# Patient Record
Sex: MALE | Race: WHITE | Employment: OTHER | URBAN - METROPOLITAN AREA
[De-identification: names, ages, dates, MRNs, and addresses within clinical notes are randomized per-mention and may not be internally consistent; named-entity substitution may affect disease eponyms.]

---

## 2017-01-09 ENCOUNTER — OFFICE VISIT (OUTPATIENT)
Dept: CARDIOTHORACIC SURGERY | Age: 67
End: 2017-01-09

## 2017-01-09 VITALS
OXYGEN SATURATION: 99 % | BODY MASS INDEX: 21.11 KG/M2 | WEIGHT: 130.8 LBS | DIASTOLIC BLOOD PRESSURE: 64 MMHG | HEART RATE: 63 BPM | SYSTOLIC BLOOD PRESSURE: 116 MMHG | RESPIRATION RATE: 16 BRPM | TEMPERATURE: 97.9 F

## 2017-01-09 DIAGNOSIS — K55.1 CHRONIC MESENTERIC ISCHEMIA (HCC): Primary | ICD-10-CM

## 2017-01-09 LAB
BASOPHILS ABSOLUTE: 0.1 K/UL (ref 0–0.2)
BASOPHILS RELATIVE PERCENT: 1.2 %
EOSINOPHILS ABSOLUTE: 0.4 K/UL (ref 0–0.6)
EOSINOPHILS RELATIVE PERCENT: 6.2 %
HCT VFR BLD CALC: 36.1 % (ref 40.5–52.5)
HEMOGLOBIN: 11.8 G/DL (ref 13.5–17.5)
LYMPHOCYTES ABSOLUTE: 1 K/UL (ref 1–5.1)
LYMPHOCYTES RELATIVE PERCENT: 17.2 %
MCH RBC QN AUTO: 31.6 PG (ref 26–34)
MCHC RBC AUTO-ENTMCNC: 32.7 G/DL (ref 31–36)
MCV RBC AUTO: 96.6 FL (ref 80–100)
MONOCYTES ABSOLUTE: 0.7 K/UL (ref 0–1.3)
MONOCYTES RELATIVE PERCENT: 10.9 %
NEUTROPHILS ABSOLUTE: 3.9 K/UL (ref 1.7–7.7)
NEUTROPHILS RELATIVE PERCENT: 64.5 %
PDW BLD-RTO: 16.4 % (ref 12.4–15.4)
PLATELET # BLD: 318 K/UL (ref 135–450)
PMV BLD AUTO: 7.5 FL (ref 5–10.5)
RBC # BLD: 3.74 M/UL (ref 4.2–5.9)
WBC # BLD: 6 K/UL (ref 4–11)

## 2017-01-09 PROCEDURE — 99024 POSTOP FOLLOW-UP VISIT: CPT | Performed by: SURGERY

## 2017-01-11 ENCOUNTER — TELEPHONE (OUTPATIENT)
Dept: CARDIOTHORACIC SURGERY | Age: 67
End: 2017-01-11

## 2017-01-18 ENCOUNTER — OFFICE VISIT (OUTPATIENT)
Dept: INTERNAL MEDICINE CLINIC | Age: 67
End: 2017-01-18

## 2017-01-18 ENCOUNTER — TELEPHONE (OUTPATIENT)
Dept: SURGERY | Age: 67
End: 2017-01-18

## 2017-01-18 VITALS
HEART RATE: 74 BPM | RESPIRATION RATE: 12 BRPM | WEIGHT: 130 LBS | DIASTOLIC BLOOD PRESSURE: 70 MMHG | BODY MASS INDEX: 20.89 KG/M2 | SYSTOLIC BLOOD PRESSURE: 120 MMHG | HEIGHT: 66 IN

## 2017-01-18 DIAGNOSIS — E78.5 HYPERLIPIDEMIA, UNSPECIFIED HYPERLIPIDEMIA TYPE: ICD-10-CM

## 2017-01-18 DIAGNOSIS — R10.33 ABDOMINAL PAIN, PERIUMBILICAL: ICD-10-CM

## 2017-01-18 DIAGNOSIS — K21.9 GASTROESOPHAGEAL REFLUX DISEASE WITHOUT ESOPHAGITIS: ICD-10-CM

## 2017-01-18 DIAGNOSIS — M79.2 NEUROPATHIC PAIN: ICD-10-CM

## 2017-01-18 DIAGNOSIS — I25.10 CAD IN NATIVE ARTERY: ICD-10-CM

## 2017-01-18 DIAGNOSIS — I25.10 CORONARY ARTERY CALCIFICATION SEEN ON CT SCAN: ICD-10-CM

## 2017-01-18 DIAGNOSIS — I73.9 PVD (PERIPHERAL VASCULAR DISEASE) WITH CLAUDICATION (HCC): ICD-10-CM

## 2017-01-18 DIAGNOSIS — I65.23 CAROTID STENOSIS, ASYMPTOMATIC, BILATERAL: ICD-10-CM

## 2017-01-18 DIAGNOSIS — I10 BENIGN ESSENTIAL HTN: Primary | ICD-10-CM

## 2017-01-18 DIAGNOSIS — J44.9 CHRONIC OBSTRUCTIVE PULMONARY DISEASE, UNSPECIFIED COPD TYPE (HCC): ICD-10-CM

## 2017-01-18 LAB
A/G RATIO: 2 (ref 1.1–2.2)
ALBUMIN SERPL-MCNC: 4.5 G/DL (ref 3.4–5)
ALP BLD-CCNC: 75 U/L (ref 40–129)
ALT SERPL-CCNC: 12 U/L (ref 10–40)
ANION GAP SERPL CALCULATED.3IONS-SCNC: 18 MMOL/L (ref 3–16)
AST SERPL-CCNC: 16 U/L (ref 15–37)
BASOPHILS ABSOLUTE: 0.1 K/UL (ref 0–0.2)
BASOPHILS RELATIVE PERCENT: 1.3 %
BILIRUB SERPL-MCNC: 0.6 MG/DL (ref 0–1)
BUN BLDV-MCNC: 10 MG/DL (ref 7–20)
C-REACTIVE PROTEIN: <0.3 MG/L (ref 0–5.1)
CALCIUM SERPL-MCNC: 9.8 MG/DL (ref 8.3–10.6)
CHLORIDE BLD-SCNC: 97 MMOL/L (ref 99–110)
CHOLESTEROL, TOTAL: 127 MG/DL (ref 0–199)
CO2: 25 MMOL/L (ref 21–32)
CREAT SERPL-MCNC: 0.8 MG/DL (ref 0.8–1.3)
EOSINOPHILS ABSOLUTE: 0.4 K/UL (ref 0–0.6)
EOSINOPHILS RELATIVE PERCENT: 6.1 %
GFR AFRICAN AMERICAN: >60
GFR NON-AFRICAN AMERICAN: >60
GLOBULIN: 2.2 G/DL
GLUCOSE BLD-MCNC: 89 MG/DL (ref 70–99)
HCT VFR BLD CALC: 37.9 % (ref 40.5–52.5)
HDLC SERPL-MCNC: 65 MG/DL (ref 40–60)
HEMOGLOBIN: 12.6 G/DL (ref 13.5–17.5)
LDL CHOLESTEROL CALCULATED: 48 MG/DL
LYMPHOCYTES ABSOLUTE: 1 K/UL (ref 1–5.1)
LYMPHOCYTES RELATIVE PERCENT: 15.5 %
MCH RBC QN AUTO: 31.9 PG (ref 26–34)
MCHC RBC AUTO-ENTMCNC: 33.2 G/DL (ref 31–36)
MCV RBC AUTO: 96.1 FL (ref 80–100)
MONOCYTES ABSOLUTE: 0.7 K/UL (ref 0–1.3)
MONOCYTES RELATIVE PERCENT: 10.9 %
NEUTROPHILS ABSOLUTE: 4.1 K/UL (ref 1.7–7.7)
NEUTROPHILS RELATIVE PERCENT: 66.2 %
PDW BLD-RTO: 16.3 % (ref 12.4–15.4)
PLATELET # BLD: 323 K/UL (ref 135–450)
PMV BLD AUTO: 7.8 FL (ref 5–10.5)
POTASSIUM SERPL-SCNC: 5.2 MMOL/L (ref 3.5–5.1)
RBC # BLD: 3.94 M/UL (ref 4.2–5.9)
SEDIMENTATION RATE, ERYTHROCYTE: 5 MM/HR (ref 0–20)
SODIUM BLD-SCNC: 140 MMOL/L (ref 136–145)
TOTAL PROTEIN: 6.7 G/DL (ref 6.4–8.2)
TRIGL SERPL-MCNC: 68 MG/DL (ref 0–150)
VLDLC SERPL CALC-MCNC: 14 MG/DL
WBC # BLD: 6.2 K/UL (ref 4–11)

## 2017-01-18 PROCEDURE — 3017F COLORECTAL CA SCREEN DOC REV: CPT | Performed by: INTERNAL MEDICINE

## 2017-01-18 PROCEDURE — 1123F ACP DISCUSS/DSCN MKR DOCD: CPT | Performed by: INTERNAL MEDICINE

## 2017-01-18 PROCEDURE — 99214 OFFICE O/P EST MOD 30 MIN: CPT | Performed by: INTERNAL MEDICINE

## 2017-01-18 PROCEDURE — G8419 CALC BMI OUT NRM PARAM NOF/U: HCPCS | Performed by: INTERNAL MEDICINE

## 2017-01-18 PROCEDURE — G8484 FLU IMMUNIZE NO ADMIN: HCPCS | Performed by: INTERNAL MEDICINE

## 2017-01-18 PROCEDURE — 1036F TOBACCO NON-USER: CPT | Performed by: INTERNAL MEDICINE

## 2017-01-18 PROCEDURE — G8926 SPIRO NO PERF OR DOC: HCPCS | Performed by: INTERNAL MEDICINE

## 2017-01-18 PROCEDURE — G8598 ASA/ANTIPLAT THER USED: HCPCS | Performed by: INTERNAL MEDICINE

## 2017-01-18 PROCEDURE — G8427 DOCREV CUR MEDS BY ELIG CLIN: HCPCS | Performed by: INTERNAL MEDICINE

## 2017-01-18 PROCEDURE — 4040F PNEUMOC VAC/ADMIN/RCVD: CPT | Performed by: INTERNAL MEDICINE

## 2017-01-18 PROCEDURE — 3023F SPIROM DOC REV: CPT | Performed by: INTERNAL MEDICINE

## 2017-01-18 RX ORDER — GABAPENTIN 100 MG/1
100 CAPSULE ORAL NIGHTLY
Qty: 30 CAPSULE | Refills: 1 | Status: SHIPPED | OUTPATIENT
Start: 2017-01-18 | End: 2017-03-12 | Stop reason: SDUPTHER

## 2017-01-24 ENCOUNTER — OFFICE VISIT (OUTPATIENT)
Dept: ORTHOPEDIC SURGERY | Age: 67
End: 2017-01-24

## 2017-01-24 VITALS — HEIGHT: 66 IN | WEIGHT: 130 LBS | BODY MASS INDEX: 20.89 KG/M2

## 2017-01-24 DIAGNOSIS — S92.351A CLOSED DISPLACED FRACTURE OF FIFTH METATARSAL BONE OF RIGHT FOOT, INITIAL ENCOUNTER: Primary | ICD-10-CM

## 2017-01-24 PROCEDURE — 99024 POSTOP FOLLOW-UP VISIT: CPT | Performed by: ORTHOPAEDIC SURGERY

## 2017-01-24 PROCEDURE — 73630 X-RAY EXAM OF FOOT: CPT | Performed by: ORTHOPAEDIC SURGERY

## 2017-02-10 RX ORDER — VARENICLINE TARTRATE 1 MG/1
TABLET, FILM COATED ORAL
Qty: 56 TABLET | Refills: 0 | Status: SHIPPED | OUTPATIENT
Start: 2017-02-10 | End: 2017-04-06 | Stop reason: SDUPTHER

## 2017-03-01 ENCOUNTER — TELEPHONE (OUTPATIENT)
Dept: SURGERY | Age: 67
End: 2017-03-01

## 2017-03-08 ENCOUNTER — HOSPITAL ENCOUNTER (OUTPATIENT)
Dept: ENDOSCOPY | Age: 67
Discharge: HOME | End: 2017-03-08
Attending: INTERNAL MEDICINE | Admitting: INTERNAL MEDICINE

## 2017-03-08 VITALS
HEIGHT: 66 IN | HEART RATE: 62 BPM | TEMPERATURE: 97.7 F | SYSTOLIC BLOOD PRESSURE: 128 MMHG | RESPIRATION RATE: 20 BRPM | WEIGHT: 140 LBS | DIASTOLIC BLOOD PRESSURE: 64 MMHG | OXYGEN SATURATION: 98 % | BODY MASS INDEX: 22.5 KG/M2

## 2017-03-08 RX ORDER — CARVEDILOL 12.5 MG/1
TABLET ORAL
Qty: 60 TABLET | Refills: 6 | Status: SHIPPED | OUTPATIENT
Start: 2017-03-08 | End: 2017-09-25 | Stop reason: SDUPTHER

## 2017-03-13 RX ORDER — VARENICLINE TARTRATE
KIT
Qty: 53 TABLET | Refills: 0 | Status: SHIPPED | OUTPATIENT
Start: 2017-03-13 | End: 2017-05-05

## 2017-03-13 RX ORDER — GABAPENTIN 100 MG/1
CAPSULE ORAL
Qty: 30 CAPSULE | Refills: 3 | Status: SHIPPED | OUTPATIENT
Start: 2017-03-13 | End: 2017-05-05 | Stop reason: ALTCHOICE

## 2017-03-15 ENCOUNTER — TELEPHONE (OUTPATIENT)
Dept: CASE MANAGEMENT | Age: 67
End: 2017-03-15

## 2017-03-28 ENCOUNTER — OFFICE VISIT (OUTPATIENT)
Dept: ORTHOPEDIC SURGERY | Age: 67
End: 2017-03-28

## 2017-03-28 VITALS
SYSTOLIC BLOOD PRESSURE: 122 MMHG | HEART RATE: 61 BPM | HEIGHT: 66 IN | WEIGHT: 137 LBS | DIASTOLIC BLOOD PRESSURE: 75 MMHG | BODY MASS INDEX: 22.02 KG/M2

## 2017-03-28 DIAGNOSIS — M20.21 HALLUX RIGIDUS OF RIGHT FOOT: ICD-10-CM

## 2017-03-28 DIAGNOSIS — M79.671 RIGHT FOOT PAIN: ICD-10-CM

## 2017-03-28 DIAGNOSIS — S92.351A CLOSED DISPLACED FRACTURE OF FIFTH METATARSAL BONE OF RIGHT FOOT, INITIAL ENCOUNTER: Primary | ICD-10-CM

## 2017-03-28 PROCEDURE — 73630 X-RAY EXAM OF FOOT: CPT | Performed by: ORTHOPAEDIC SURGERY

## 2017-03-28 PROCEDURE — 99214 OFFICE O/P EST MOD 30 MIN: CPT | Performed by: ORTHOPAEDIC SURGERY

## 2017-03-28 PROCEDURE — 4040F PNEUMOC VAC/ADMIN/RCVD: CPT | Performed by: ORTHOPAEDIC SURGERY

## 2017-03-28 PROCEDURE — G8427 DOCREV CUR MEDS BY ELIG CLIN: HCPCS | Performed by: ORTHOPAEDIC SURGERY

## 2017-03-28 PROCEDURE — 1036F TOBACCO NON-USER: CPT | Performed by: ORTHOPAEDIC SURGERY

## 2017-03-28 PROCEDURE — G8419 CALC BMI OUT NRM PARAM NOF/U: HCPCS | Performed by: ORTHOPAEDIC SURGERY

## 2017-03-28 PROCEDURE — 3017F COLORECTAL CA SCREEN DOC REV: CPT | Performed by: ORTHOPAEDIC SURGERY

## 2017-03-28 PROCEDURE — 1123F ACP DISCUSS/DSCN MKR DOCD: CPT | Performed by: ORTHOPAEDIC SURGERY

## 2017-03-28 PROCEDURE — G8484 FLU IMMUNIZE NO ADMIN: HCPCS | Performed by: ORTHOPAEDIC SURGERY

## 2017-03-28 PROCEDURE — G8598 ASA/ANTIPLAT THER USED: HCPCS | Performed by: ORTHOPAEDIC SURGERY

## 2017-04-03 ENCOUNTER — HOSPITAL ENCOUNTER (OUTPATIENT)
Dept: CT IMAGING | Age: 67
Discharge: HOME | End: 2017-04-03
Attending: ORTHOPAEDIC SURGERY | Admitting: ORTHOPAEDIC SURGERY

## 2017-04-03 DIAGNOSIS — M79.671 RIGHT FOOT PAIN: ICD-10-CM

## 2017-04-03 DIAGNOSIS — M79.671 PAIN OF RIGHT FOOT: ICD-10-CM

## 2017-04-05 RX ORDER — ATORVASTATIN CALCIUM 80 MG/1
TABLET, FILM COATED ORAL
Qty: 30 TABLET | Refills: 5 | Status: SHIPPED | OUTPATIENT
Start: 2017-04-05 | End: 2017-09-25 | Stop reason: SDUPTHER

## 2017-04-05 RX ORDER — CLOPIDOGREL BISULFATE 75 MG/1
TABLET ORAL
Qty: 60 TABLET | Refills: 0 | Status: SHIPPED | OUTPATIENT
Start: 2017-04-05 | End: 2017-06-03 | Stop reason: SDUPTHER

## 2017-04-07 RX ORDER — VARENICLINE TARTRATE 1 MG/1
TABLET, FILM COATED ORAL
Qty: 56 TABLET | Refills: 0 | Status: SHIPPED | OUTPATIENT
Start: 2017-04-07 | End: 2017-05-01 | Stop reason: SDUPTHER

## 2017-05-01 RX ORDER — VARENICLINE TARTRATE 1 MG/1
TABLET, FILM COATED ORAL
Qty: 56 TABLET | Refills: 0 | Status: SHIPPED | OUTPATIENT
Start: 2017-05-01 | End: 2017-10-06 | Stop reason: SDUPTHER

## 2017-05-05 ENCOUNTER — OFFICE VISIT (OUTPATIENT)
Dept: INTERNAL MEDICINE CLINIC | Age: 67
End: 2017-05-05

## 2017-05-05 VITALS
HEIGHT: 66 IN | BODY MASS INDEX: 22.18 KG/M2 | DIASTOLIC BLOOD PRESSURE: 78 MMHG | RESPIRATION RATE: 12 BRPM | SYSTOLIC BLOOD PRESSURE: 120 MMHG | WEIGHT: 138 LBS | HEART RATE: 63 BPM

## 2017-05-05 DIAGNOSIS — I73.9 PAD (PERIPHERAL ARTERY DISEASE) (HCC): ICD-10-CM

## 2017-05-05 DIAGNOSIS — K21.9 GASTROESOPHAGEAL REFLUX DISEASE WITHOUT ESOPHAGITIS: ICD-10-CM

## 2017-05-05 DIAGNOSIS — I25.10 CAD IN NATIVE ARTERY: ICD-10-CM

## 2017-05-05 DIAGNOSIS — I65.23 CAROTID STENOSIS, ASYMPTOMATIC, BILATERAL: ICD-10-CM

## 2017-05-05 DIAGNOSIS — E78.5 HYPERLIPIDEMIA, UNSPECIFIED HYPERLIPIDEMIA TYPE: ICD-10-CM

## 2017-05-05 DIAGNOSIS — E55.9 VITAMIN D DEFICIENCY: ICD-10-CM

## 2017-05-05 DIAGNOSIS — I10 BENIGN ESSENTIAL HTN: Primary | ICD-10-CM

## 2017-05-05 DIAGNOSIS — J44.9 CHRONIC OBSTRUCTIVE PULMONARY DISEASE, UNSPECIFIED COPD TYPE (HCC): ICD-10-CM

## 2017-05-05 LAB
A/G RATIO: 1.8 (ref 1.1–2.2)
ALBUMIN SERPL-MCNC: 4.5 G/DL (ref 3.4–5)
ALP BLD-CCNC: 87 U/L (ref 40–129)
ALT SERPL-CCNC: 13 U/L (ref 10–40)
ANION GAP SERPL CALCULATED.3IONS-SCNC: 17 MMOL/L (ref 3–16)
AST SERPL-CCNC: 22 U/L (ref 15–37)
BASOPHILS ABSOLUTE: 0.1 K/UL (ref 0–0.2)
BASOPHILS RELATIVE PERCENT: 1.9 %
BILIRUB SERPL-MCNC: 0.9 MG/DL (ref 0–1)
BUN BLDV-MCNC: 12 MG/DL (ref 7–20)
CALCIUM SERPL-MCNC: 9.2 MG/DL (ref 8.3–10.6)
CHLORIDE BLD-SCNC: 98 MMOL/L (ref 99–110)
CHOLESTEROL, TOTAL: 182 MG/DL (ref 0–199)
CO2: 24 MMOL/L (ref 21–32)
CREAT SERPL-MCNC: 0.9 MG/DL (ref 0.8–1.3)
EOSINOPHILS ABSOLUTE: 0.4 K/UL (ref 0–0.6)
EOSINOPHILS RELATIVE PERCENT: 5.6 %
GFR AFRICAN AMERICAN: >60
GFR NON-AFRICAN AMERICAN: >60
GLOBULIN: 2.5 G/DL
GLUCOSE BLD-MCNC: 94 MG/DL (ref 70–99)
HCT VFR BLD CALC: 38.4 % (ref 40.5–52.5)
HDLC SERPL-MCNC: 74 MG/DL (ref 40–60)
HEMOGLOBIN: 12.5 G/DL (ref 13.5–17.5)
LDL CHOLESTEROL CALCULATED: 96 MG/DL
LYMPHOCYTES ABSOLUTE: 0.9 K/UL (ref 1–5.1)
LYMPHOCYTES RELATIVE PERCENT: 14.7 %
MCH RBC QN AUTO: 30.1 PG (ref 26–34)
MCHC RBC AUTO-ENTMCNC: 32.5 G/DL (ref 31–36)
MCV RBC AUTO: 92.6 FL (ref 80–100)
MONOCYTES ABSOLUTE: 0.8 K/UL (ref 0–1.3)
MONOCYTES RELATIVE PERCENT: 12.4 %
NEUTROPHILS ABSOLUTE: 4.2 K/UL (ref 1.7–7.7)
NEUTROPHILS RELATIVE PERCENT: 65.4 %
PDW BLD-RTO: 16.9 % (ref 12.4–15.4)
PLATELET # BLD: 310 K/UL (ref 135–450)
PMV BLD AUTO: 7.6 FL (ref 5–10.5)
POTASSIUM SERPL-SCNC: 5.4 MMOL/L (ref 3.5–5.1)
RBC # BLD: 4.14 M/UL (ref 4.2–5.9)
SODIUM BLD-SCNC: 139 MMOL/L (ref 136–145)
TOTAL PROTEIN: 7 G/DL (ref 6.4–8.2)
TRIGL SERPL-MCNC: 61 MG/DL (ref 0–150)
VITAMIN D 25-HYDROXY: 22.2 NG/ML
VLDLC SERPL CALC-MCNC: 12 MG/DL
WBC # BLD: 6.4 K/UL (ref 4–11)

## 2017-05-05 PROCEDURE — 4040F PNEUMOC VAC/ADMIN/RCVD: CPT | Performed by: INTERNAL MEDICINE

## 2017-05-05 PROCEDURE — 1123F ACP DISCUSS/DSCN MKR DOCD: CPT | Performed by: INTERNAL MEDICINE

## 2017-05-05 PROCEDURE — 1036F TOBACCO NON-USER: CPT | Performed by: INTERNAL MEDICINE

## 2017-05-05 PROCEDURE — G8427 DOCREV CUR MEDS BY ELIG CLIN: HCPCS | Performed by: INTERNAL MEDICINE

## 2017-05-05 PROCEDURE — 3017F COLORECTAL CA SCREEN DOC REV: CPT | Performed by: INTERNAL MEDICINE

## 2017-05-05 PROCEDURE — G8419 CALC BMI OUT NRM PARAM NOF/U: HCPCS | Performed by: INTERNAL MEDICINE

## 2017-05-05 PROCEDURE — G8926 SPIRO NO PERF OR DOC: HCPCS | Performed by: INTERNAL MEDICINE

## 2017-05-05 PROCEDURE — 99214 OFFICE O/P EST MOD 30 MIN: CPT | Performed by: INTERNAL MEDICINE

## 2017-05-05 PROCEDURE — G8598 ASA/ANTIPLAT THER USED: HCPCS | Performed by: INTERNAL MEDICINE

## 2017-05-05 PROCEDURE — 3023F SPIROM DOC REV: CPT | Performed by: INTERNAL MEDICINE

## 2017-05-16 DIAGNOSIS — J44.9 CHRONIC OBSTRUCTIVE PULMONARY DISEASE, UNSPECIFIED COPD TYPE (HCC): Primary | ICD-10-CM

## 2017-05-16 LAB
DLCO %PRED: NORMAL
DLCO PRE: NORMAL
FEF 25-75%-POST: NORMAL
FEF 25-75%-PRE: NORMAL
FEV1-POST: NORMAL
FEV1-PRE: NORMAL
FEV1/FVC-POST: NORMAL
FEV1/FVC-PRE: NORMAL
FVC-POST: NORMAL
FVC-PRE: NORMAL
MEP: NORMAL
MIP: NORMAL
TLC %PRED: NORMAL
TLC PRE: NORMAL

## 2017-05-16 PROCEDURE — 94060 EVALUATION OF WHEEZING: CPT | Performed by: INTERNAL MEDICINE

## 2017-05-16 RX ORDER — ALBUTEROL SULFATE 2.5 MG/3ML
2.5 SOLUTION RESPIRATORY (INHALATION) ONCE
Status: DISCONTINUED | OUTPATIENT
Start: 2017-05-16 | End: 2017-12-23

## 2017-05-19 ENCOUNTER — TELEPHONE (OUTPATIENT)
Dept: INTERNAL MEDICINE CLINIC | Age: 67
End: 2017-05-19

## 2017-06-05 RX ORDER — CLOPIDOGREL BISULFATE 75 MG/1
TABLET ORAL
Qty: 60 TABLET | Refills: 0 | Status: SHIPPED | OUTPATIENT
Start: 2017-06-05 | End: 2017-08-07 | Stop reason: SDUPTHER

## 2017-06-29 ENCOUNTER — CARE COORDINATION (OUTPATIENT)
Dept: CARE COORDINATION | Age: 67
End: 2017-06-29

## 2017-07-11 DIAGNOSIS — I65.23 CAROTID STENOSIS, ASYMPTOMATIC, BILATERAL: Primary | ICD-10-CM

## 2017-07-25 ENCOUNTER — PROCEDURE VISIT (OUTPATIENT)
Dept: SURGERY | Age: 67
End: 2017-07-25

## 2017-07-25 DIAGNOSIS — I70.213 ATHEROSCLEROSIS OF NATIVE ARTERY OF BOTH LOWER EXTREMITIES WITH INTERMITTENT CLAUDICATION (HCC): ICD-10-CM

## 2017-07-25 DIAGNOSIS — I65.23 CAROTID STENOSIS, BILATERAL: Primary | ICD-10-CM

## 2017-07-25 DIAGNOSIS — I73.9 CLAUDICATION OF BOTH LOWER EXTREMITIES (HCC): ICD-10-CM

## 2017-07-25 DIAGNOSIS — I73.9 PVD (PERIPHERAL VASCULAR DISEASE) WITH CLAUDICATION (HCC): ICD-10-CM

## 2017-07-25 PROCEDURE — 93880 EXTRACRANIAL BILAT STUDY: CPT | Performed by: SURGERY

## 2017-07-25 PROCEDURE — 93925 LOWER EXTREMITY STUDY: CPT | Performed by: SURGERY

## 2017-07-28 ENCOUNTER — OFFICE VISIT (OUTPATIENT)
Dept: SURGERY | Age: 67
End: 2017-07-28

## 2017-07-28 VITALS
SYSTOLIC BLOOD PRESSURE: 122 MMHG | DIASTOLIC BLOOD PRESSURE: 75 MMHG | BODY MASS INDEX: 21.86 KG/M2 | WEIGHT: 135.41 LBS | HEART RATE: 65 BPM

## 2017-07-28 DIAGNOSIS — I70.213 ATHEROSCLEROSIS OF NATIVE ARTERY OF BOTH LOWER EXTREMITIES WITH INTERMITTENT CLAUDICATION (HCC): ICD-10-CM

## 2017-07-28 DIAGNOSIS — K55.1 ATHEROSCLEROSIS OF SUPERIOR MESENTERIC ARTERY (HCC): ICD-10-CM

## 2017-07-28 DIAGNOSIS — K55.1 MESENTERIC ARTERY STENOSIS (HCC): ICD-10-CM

## 2017-07-28 DIAGNOSIS — I70.0 ATHEROSCLEROSIS OF ABDOMINAL AORTA (HCC): ICD-10-CM

## 2017-07-28 DIAGNOSIS — I70.1 RENAL ARTERY ATHEROSCLEROSIS (HCC): ICD-10-CM

## 2017-07-28 DIAGNOSIS — I65.23 CAROTID STENOSIS, ASYMPTOMATIC, BILATERAL: Primary | ICD-10-CM

## 2017-07-28 PROCEDURE — 4040F PNEUMOC VAC/ADMIN/RCVD: CPT | Performed by: SURGERY

## 2017-07-28 PROCEDURE — G8598 ASA/ANTIPLAT THER USED: HCPCS | Performed by: SURGERY

## 2017-07-28 PROCEDURE — G8420 CALC BMI NORM PARAMETERS: HCPCS | Performed by: SURGERY

## 2017-07-28 PROCEDURE — 99214 OFFICE O/P EST MOD 30 MIN: CPT | Performed by: SURGERY

## 2017-07-28 PROCEDURE — 1036F TOBACCO NON-USER: CPT | Performed by: SURGERY

## 2017-07-28 PROCEDURE — G8427 DOCREV CUR MEDS BY ELIG CLIN: HCPCS | Performed by: SURGERY

## 2017-07-28 PROCEDURE — 3017F COLORECTAL CA SCREEN DOC REV: CPT | Performed by: SURGERY

## 2017-07-28 PROCEDURE — 1123F ACP DISCUSS/DSCN MKR DOCD: CPT | Performed by: SURGERY

## 2017-07-28 ASSESSMENT — ENCOUNTER SYMPTOMS
GASTROINTESTINAL NEGATIVE: 1
RESPIRATORY NEGATIVE: 1
EYES NEGATIVE: 1
COLOR CHANGE: 1

## 2017-07-31 ENCOUNTER — PROCEDURE VISIT (OUTPATIENT)
Dept: SURGERY | Age: 67
End: 2017-07-31

## 2017-07-31 DIAGNOSIS — K55.1 ATHEROSCLEROSIS OF SUPERIOR MESENTERIC ARTERY (HCC): ICD-10-CM

## 2017-07-31 DIAGNOSIS — K55.1 MESENTERIC ARTERY STENOSIS (HCC): ICD-10-CM

## 2017-07-31 DIAGNOSIS — I70.0 ATHEROSCLEROSIS OF ABDOMINAL AORTA (HCC): ICD-10-CM

## 2017-07-31 DIAGNOSIS — I70.1 RENAL ARTERY ATHEROSCLEROSIS (HCC): ICD-10-CM

## 2017-07-31 PROCEDURE — 93975 VASCULAR STUDY: CPT | Performed by: SURGERY

## 2017-08-01 ENCOUNTER — OFFICE VISIT (OUTPATIENT)
Dept: SURGERY | Age: 67
End: 2017-08-01

## 2017-08-01 VITALS
HEART RATE: 74 BPM | WEIGHT: 137 LBS | DIASTOLIC BLOOD PRESSURE: 75 MMHG | BODY MASS INDEX: 22.02 KG/M2 | SYSTOLIC BLOOD PRESSURE: 129 MMHG | HEIGHT: 66 IN

## 2017-08-01 DIAGNOSIS — K55.1 MESENTERIC ARTERY STENOSIS (HCC): Primary | ICD-10-CM

## 2017-08-01 DIAGNOSIS — K55.1 ATHEROSCLEROSIS OF SUPERIOR MESENTERIC ARTERY (HCC): ICD-10-CM

## 2017-08-01 DIAGNOSIS — I70.219 ATHEROSCLEROTIC PVD WITH INTERMITTENT CLAUDICATION (HCC): ICD-10-CM

## 2017-08-01 DIAGNOSIS — I70.0 ATHEROSCLEROSIS OF ABDOMINAL AORTA (HCC): ICD-10-CM

## 2017-08-01 DIAGNOSIS — E78.5 HYPERLIPIDEMIA, UNSPECIFIED HYPERLIPIDEMIA TYPE: ICD-10-CM

## 2017-08-01 DIAGNOSIS — I10 BENIGN ESSENTIAL HTN: ICD-10-CM

## 2017-08-01 PROCEDURE — 99213 OFFICE O/P EST LOW 20 MIN: CPT | Performed by: SURGERY

## 2017-08-01 PROCEDURE — 1036F TOBACCO NON-USER: CPT | Performed by: SURGERY

## 2017-08-01 PROCEDURE — G8427 DOCREV CUR MEDS BY ELIG CLIN: HCPCS | Performed by: SURGERY

## 2017-08-01 PROCEDURE — G8598 ASA/ANTIPLAT THER USED: HCPCS | Performed by: SURGERY

## 2017-08-01 PROCEDURE — 4040F PNEUMOC VAC/ADMIN/RCVD: CPT | Performed by: SURGERY

## 2017-08-01 PROCEDURE — 3017F COLORECTAL CA SCREEN DOC REV: CPT | Performed by: SURGERY

## 2017-08-01 PROCEDURE — G8420 CALC BMI NORM PARAMETERS: HCPCS | Performed by: SURGERY

## 2017-08-01 PROCEDURE — 1123F ACP DISCUSS/DSCN MKR DOCD: CPT | Performed by: SURGERY

## 2017-08-01 ASSESSMENT — ENCOUNTER SYMPTOMS
ALLERGIC/IMMUNOLOGIC NEGATIVE: 1
EYES NEGATIVE: 1
RESPIRATORY NEGATIVE: 1
COLOR CHANGE: 1
GASTROINTESTINAL NEGATIVE: 1

## 2017-08-10 ENCOUNTER — OFFICE VISIT (OUTPATIENT)
Dept: INTERNAL MEDICINE CLINIC | Age: 67
End: 2017-08-10

## 2017-08-10 VITALS
DIASTOLIC BLOOD PRESSURE: 75 MMHG | BODY MASS INDEX: 21.38 KG/M2 | HEIGHT: 66 IN | HEART RATE: 68 BPM | SYSTOLIC BLOOD PRESSURE: 113 MMHG | WEIGHT: 133 LBS

## 2017-08-10 DIAGNOSIS — E78.5 HYPERLIPIDEMIA, UNSPECIFIED HYPERLIPIDEMIA TYPE: ICD-10-CM

## 2017-08-10 DIAGNOSIS — I95.2 HYPOTENSION DUE TO DRUGS: Primary | ICD-10-CM

## 2017-08-10 DIAGNOSIS — I10 BENIGN ESSENTIAL HTN: ICD-10-CM

## 2017-08-10 DIAGNOSIS — I25.10 CAD IN NATIVE ARTERY: ICD-10-CM

## 2017-08-10 DIAGNOSIS — F17.200 TOBACCO USE DISORDER: ICD-10-CM

## 2017-08-10 DIAGNOSIS — J44.9 CHRONIC OBSTRUCTIVE PULMONARY DISEASE, UNSPECIFIED COPD TYPE (HCC): ICD-10-CM

## 2017-08-10 DIAGNOSIS — I73.9 PAD (PERIPHERAL ARTERY DISEASE) (HCC): ICD-10-CM

## 2017-08-10 DIAGNOSIS — E55.9 VITAMIN D DEFICIENCY: ICD-10-CM

## 2017-08-10 LAB
A/G RATIO: 2.1 (ref 1.1–2.2)
ALBUMIN SERPL-MCNC: 4.4 G/DL (ref 3.4–5)
ALP BLD-CCNC: 82 U/L (ref 40–129)
ALT SERPL-CCNC: 25 U/L (ref 10–40)
ANION GAP SERPL CALCULATED.3IONS-SCNC: 16 MMOL/L (ref 3–16)
AST SERPL-CCNC: 33 U/L (ref 15–37)
BILIRUB SERPL-MCNC: 1 MG/DL (ref 0–1)
BUN BLDV-MCNC: 13 MG/DL (ref 7–20)
CALCIUM SERPL-MCNC: 9.3 MG/DL (ref 8.3–10.6)
CHLORIDE BLD-SCNC: 97 MMOL/L (ref 99–110)
CHOLESTEROL, TOTAL: 126 MG/DL (ref 0–199)
CO2: 24 MMOL/L (ref 21–32)
CREAT SERPL-MCNC: 1 MG/DL (ref 0.8–1.3)
GFR AFRICAN AMERICAN: >60
GFR NON-AFRICAN AMERICAN: >60
GLOBULIN: 2.1 G/DL
GLUCOSE BLD-MCNC: 79 MG/DL (ref 70–99)
HDLC SERPL-MCNC: 70 MG/DL (ref 40–60)
LDL CHOLESTEROL CALCULATED: 42 MG/DL
POTASSIUM SERPL-SCNC: 5.7 MMOL/L (ref 3.5–5.1)
SODIUM BLD-SCNC: 137 MMOL/L (ref 136–145)
TOTAL PROTEIN: 6.5 G/DL (ref 6.4–8.2)
TRIGL SERPL-MCNC: 70 MG/DL (ref 0–150)
TSH SERPL DL<=0.05 MIU/L-ACNC: 3.71 UIU/ML (ref 0.27–4.2)
VITAMIN D 25-HYDROXY: 30.8 NG/ML
VLDLC SERPL CALC-MCNC: 14 MG/DL

## 2017-08-10 PROCEDURE — G8427 DOCREV CUR MEDS BY ELIG CLIN: HCPCS | Performed by: INTERNAL MEDICINE

## 2017-08-10 PROCEDURE — 1123F ACP DISCUSS/DSCN MKR DOCD: CPT | Performed by: INTERNAL MEDICINE

## 2017-08-10 PROCEDURE — 99214 OFFICE O/P EST MOD 30 MIN: CPT | Performed by: INTERNAL MEDICINE

## 2017-08-10 PROCEDURE — 1036F TOBACCO NON-USER: CPT | Performed by: INTERNAL MEDICINE

## 2017-08-10 PROCEDURE — 3023F SPIROM DOC REV: CPT | Performed by: INTERNAL MEDICINE

## 2017-08-10 PROCEDURE — 3017F COLORECTAL CA SCREEN DOC REV: CPT | Performed by: INTERNAL MEDICINE

## 2017-08-10 PROCEDURE — 4040F PNEUMOC VAC/ADMIN/RCVD: CPT | Performed by: INTERNAL MEDICINE

## 2017-08-10 PROCEDURE — G8420 CALC BMI NORM PARAMETERS: HCPCS | Performed by: INTERNAL MEDICINE

## 2017-08-10 PROCEDURE — G8926 SPIRO NO PERF OR DOC: HCPCS | Performed by: INTERNAL MEDICINE

## 2017-08-10 PROCEDURE — G8598 ASA/ANTIPLAT THER USED: HCPCS | Performed by: INTERNAL MEDICINE

## 2017-08-10 RX ORDER — VARENICLINE TARTRATE 1 MG/1
TABLET, FILM COATED ORAL
Qty: 60 TABLET | Refills: 2 | Status: SHIPPED | OUTPATIENT
Start: 2017-08-10 | End: 2017-12-23 | Stop reason: CLARIF

## 2017-08-10 RX ORDER — LOPERAMIDE HYDROCHLORIDE 2 MG/1
2 CAPSULE ORAL DAILY PRN
COMMUNITY
End: 2020-06-08

## 2017-08-10 RX ORDER — NIFEDIPINE 30 MG/1
30 TABLET, EXTENDED RELEASE ORAL DAILY
Qty: 30 TABLET | Refills: 3 | Status: SHIPPED | OUTPATIENT
Start: 2017-08-10 | End: 2017-12-03 | Stop reason: SDUPTHER

## 2017-08-14 RX ORDER — CLOPIDOGREL BISULFATE 75 MG/1
TABLET ORAL
Qty: 60 TABLET | Refills: 0 | Status: SHIPPED | OUTPATIENT
Start: 2017-08-14 | End: 2017-10-09 | Stop reason: SDUPTHER

## 2017-10-06 ENCOUNTER — CARE COORDINATION (OUTPATIENT)
Dept: CARE COORDINATION | Age: 67
End: 2017-10-06

## 2017-10-06 RX ORDER — VARENICLINE TARTRATE 1 MG/1
TABLET, FILM COATED ORAL
Qty: 56 TABLET | Refills: 3 | Status: SHIPPED | OUTPATIENT
Start: 2017-10-06 | End: 2018-01-15 | Stop reason: SDUPTHER

## 2017-10-06 NOTE — CARE COORDINATION
Ambulatory Care Coordination Note  10/6/2017  CM Risk Score: 9  Simon Mortality Risk Score: 5.35    ACC: Vahe Mayen RN    Summary Note: Call to patient   Reports tiredness in legs-   Reports BP good,SBP running 110's. Doing well on Chantix- would like to continue for another 8 weeks. - needs refill   Coming to see Dr Home Angulo next week for preop for cataract surgery- 10/18, 11/1  Following Low Potassium Diet, reviewed foods high in Potassium    Plan  Follow up after OV          Care Coordination Interventions    Program Enrollment:  Rising Risk   Referral from Primary Care Provider:  No   Suggested Interventions and Community Resources   Smoking Cessation:  Completed   Specialty Services Referral:  Completed   Other Services: In Process             Goals Addressed     None          Prior to Admission medications    Medication Sig Start Date End Date Taking?  Authorizing Provider   atorvastatin (LIPITOR) 80 MG tablet TAKE 1 TABLET BY MOUTH DAILY 9/25/17   Pepito Leal DO   carvedilol (COREG) 12.5 MG tablet TAKE 1 TABLET BY MOUTH TWICE DAILY 9/25/17   Pepito Leal DO   clopidogrel (PLAVIX) 75 MG tablet TAKE 1 TABLET BY MOUTH DAILY 8/14/17   Hiro Bateman MD   loperamide (RA ANTI-DIARRHEAL) 2 MG capsule Take 2 mg by mouth 4 times daily as needed for Diarrhea    Historical Provider, MD   varenicline (CHANTIX CONTINUING MONTH PAK) 1 MG tablet TAKE 1 TABLET BY MOUTH TWICE DAILY 8/10/17   Pepito Leal DO   NIFEdipine (PROCARDIA XL) 30 MG extended release tablet Take 1 tablet by mouth daily 8/10/17   Pepito Leal DO   irbesartan (AVAPRO) 300 MG tablet TAKE 1 TABLET BY MOUTH EVERY DAY 6/5/17   Pepito Lela DO   CHANTIX CONTINUING MONTH KELLY 1 MG tablet TAKE 1 TABLET BY MOUTH TWICE DAILY 5/1/17   Pepito Leal DO   aspirin 81 MG tablet Take 81 mg by mouth daily    Historical Provider, MD   Vitamin D (CHOLECALCIFEROL) 1000 UNITS CAPS capsule Take 2,000 Units by mouth daily     Historical Provider, MD Future Appointments  Date Time Provider Virgilio Souza   10/10/2017 10:45 AM Andreina Irizarry DO [facility]   2/9/2018 9:30 AM [facility]   2/9/2018 11:30 AM Lian Smith MD [facility]

## 2017-10-06 NOTE — PATIENT INSTRUCTIONS
Potassium-Restricted Diet: Care Instructions  Your Care Instructions  Potassium is a mineral. It helps keep the right mix of fluids in your body. It also helps your nerves and muscles work as they should. Most people get the potassium they need from the foods they eat. But if you have certain health problems, such as kidney disease, you may need to be careful about how much potassium you get. This is because too much potassium can be harmful. You can control how much potassium you get. You can do this if you eat foods that don't have much of it and you don't eat foods that have lots of it. Follow-up care is a key part of your treatment and safety. Be sure to make and go to all appointments, and call your doctor if you are having problems. It's also a good idea to know your test results and keep a list of the medicines you take. How can you care for yourself at home? · Potassium is in many foods, including in vegetables, fruits, and milk products. Foods high in potassium include bananas, cantaloupe, and tomatoes. They also include foods like broccoli, milk, and potatoes. · Low potassium foods include blueberries, raspberries, and cucumbers. They also include white or brown rice, spaghetti, and macaroni. · Do not use a salt substitute or \"lite\" salt unless you talk to your doctor first. These often are very high in potassium. · Be sure to tell your doctor about any prescription or over-the-counter medicines you are taking. Some medicines can increase the potassium in your body. Where can you learn more? Go to https://NexJ Systemslyubov.FanMob. org and sign in to your [employer] account. Enter M262 in the [facility] box to learn more about \"Potassium-Restricted Diet: Care Instructions. \"     If you do not have an account, please click on the \"Sign Up Now\" link. Current as of: July 26, 2016  Content Version: 11.3  © 2396-2561 [email or web address], Incorporated.  Care instructions adapted under license by [facility] ([facility]). If you have questions about a medical condition or this instruction, always ask your healthcare professional. [place] [address] any warranty or liability for your use of this information.

## 2017-10-09 RX ORDER — CLOPIDOGREL BISULFATE 75 MG/1
TABLET ORAL
Qty: 60 TABLET | Refills: 0 | Status: SHIPPED | OUTPATIENT
Start: 2017-10-09 | End: 2017-12-03 | Stop reason: SDUPTHER

## 2017-10-10 ENCOUNTER — OFFICE VISIT (OUTPATIENT)
Dept: INTERNAL MEDICINE CLINIC | Age: 67
End: 2017-10-10

## 2017-10-10 VITALS
BODY MASS INDEX: 22.18 KG/M2 | HEART RATE: 60 BPM | DIASTOLIC BLOOD PRESSURE: 63 MMHG | HEIGHT: 66 IN | SYSTOLIC BLOOD PRESSURE: 131 MMHG | WEIGHT: 138 LBS | RESPIRATION RATE: 12 BRPM

## 2017-10-10 DIAGNOSIS — Z23 NEED FOR INFLUENZA VACCINATION: ICD-10-CM

## 2017-10-10 DIAGNOSIS — I25.10 CAD IN NATIVE ARTERY: ICD-10-CM

## 2017-10-10 DIAGNOSIS — Z01.818 PREOP EXAM FOR INTERNAL MEDICINE: Primary | ICD-10-CM

## 2017-10-10 DIAGNOSIS — I73.9 PAD (PERIPHERAL ARTERY DISEASE) (HCC): ICD-10-CM

## 2017-10-10 DIAGNOSIS — I10 BENIGN ESSENTIAL HTN: ICD-10-CM

## 2017-10-10 DIAGNOSIS — J44.9 CHRONIC OBSTRUCTIVE PULMONARY DISEASE, UNSPECIFIED COPD TYPE (HCC): ICD-10-CM

## 2017-10-10 DIAGNOSIS — H26.9 CATARACT OF BOTH EYES, UNSPECIFIED CATARACT TYPE: ICD-10-CM

## 2017-10-10 PROCEDURE — 1036F TOBACCO NON-USER: CPT | Performed by: INTERNAL MEDICINE

## 2017-10-10 PROCEDURE — 90662 IIV NO PRSV INCREASED AG IM: CPT | Performed by: INTERNAL MEDICINE

## 2017-10-10 PROCEDURE — 3023F SPIROM DOC REV: CPT | Performed by: INTERNAL MEDICINE

## 2017-10-10 PROCEDURE — 3017F COLORECTAL CA SCREEN DOC REV: CPT | Performed by: INTERNAL MEDICINE

## 2017-10-10 PROCEDURE — 1123F ACP DISCUSS/DSCN MKR DOCD: CPT | Performed by: INTERNAL MEDICINE

## 2017-10-10 PROCEDURE — G8926 SPIRO NO PERF OR DOC: HCPCS | Performed by: INTERNAL MEDICINE

## 2017-10-10 PROCEDURE — G8420 CALC BMI NORM PARAMETERS: HCPCS | Performed by: INTERNAL MEDICINE

## 2017-10-10 PROCEDURE — 99214 OFFICE O/P EST MOD 30 MIN: CPT | Performed by: INTERNAL MEDICINE

## 2017-10-10 PROCEDURE — 4040F PNEUMOC VAC/ADMIN/RCVD: CPT | Performed by: INTERNAL MEDICINE

## 2017-10-10 PROCEDURE — G8598 ASA/ANTIPLAT THER USED: HCPCS | Performed by: INTERNAL MEDICINE

## 2017-10-10 PROCEDURE — G8428 CUR MEDS NOT DOCUMENT: HCPCS | Performed by: INTERNAL MEDICINE

## 2017-10-10 PROCEDURE — G0008 ADMIN INFLUENZA VIRUS VAC: HCPCS | Performed by: INTERNAL MEDICINE

## 2017-10-10 PROCEDURE — G8484 FLU IMMUNIZE NO ADMIN: HCPCS | Performed by: INTERNAL MEDICINE

## 2017-10-10 NOTE — PROGRESS NOTES
[facility]) Physicians  Internal Medicine  Patient Encounter  Ghada Mustafa D.O., [facility]          Chief Complaint   Patient presents with    Pre-op Exam     cataract surg. lt eye  10/18/17  [facility] [place]    Coronary Artery Disease    COPD    Circumcision    Hypertension       HPI-- 78 y.o. male presents today for a preoperative physical.  Pt diagnosed with BL cataracts. Pt has been struggling with worsening visual acuity over the last 6-10 months. In the last 2 months, his vision has become increasingly blurry. He is struggling with night vision and distance. t is scheduled for cataract surgery with IOL implant. I have been asked to see patient for pre-operative risk assessment and clearance. Surgery for the left eye is scheduled for 10/18/2017 and the right eye will be completed 11/1/2017 by Dr. Gallito Veras at [facility] [place]. CAD-- He denies CP, SOB, dizziness, orthopnea. PVD-- he denies claudication. Legs can still get tired. COPD-- he denies cough, wheezing. He is not on inhalers. He quit smoking. He is on Chantix. HTN-- Home readings--104-139/60's-70's. R 30 mg daily, Avapro 300 mg daily, and Coreg 12.5 mg BID. He does occasionally have lightheadedness. No syncope.       Medical/Surgical Histories     Past Medical History:   Diagnosis Date    Abdominal aortic atherosclerosis ([address]     Atherosclerosis of superior mesenteric artery ([address]     CAD (coronary artery disease)     Cerebral artery occlusion with cerebral infarction (HCC)     Chronic kidney disease     Colon polyps     COPD (chronic obstructive pulmonary disease) (HCC)     CVA (cerebral infarction) 5/2013    Multiple, occipital and Cerebellar     DDD (degenerative disc disease)     Cerivical DDD    Epicondylitis elbow, medial     GERD (gastroesophageal reflux disease)     Hyperlipidemia     Hypertension     Ischemic colitis ([address] 11/2016    Osteoarthritis, hand     PVD (peripheral vascular disease) with claudication ([address] Left leg with mod-severe arterial ischemia    Renal artery atherosclerosis (HCC)     SMA stenosis ([address] 11/2016    Thoracic aorta atherosclerosis (HCC)     Venous insufficiency     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  ARTERIAL BYPASS SURGRY  11/29/2016    Dr. Radha Bravo - aorto-SMA bypass using 8mm PTFE    COLONOSCOPY  2008    COLONOSCOPY  9/2/2015    Dr. Josh Bowers Bilateral 1/16/13    bilateral with mesh, Dr. Shani Kumar  9/2/2015    Dr. Kristin Wolfe  11/03    R Leg Vein stripping       No reported problems with anesthesia.       Medications/Allergies     Medication Sig    clopidogrel (PLAVIX) 75 MG tablet TAKE 1 TABLET BY MOUTH DAILY    varenicline (CHANTIX CONTINUING MONTH KELLY) 1 MG tablet TAKE 1 TABLET BY MOUTH TWICE DAILY    atorvastatin (LIPITOR) 80 MG tablet TAKE 1 TABLET BY MOUTH DAILY    carvedilol (COREG) 12.5 MG tablet TAKE 1 TABLET BY MOUTH TWICE DAILY    loperamide (RA ANTI-DIARRHEAL) 2 MG capsule Take 2 mg by mouth 4 times daily as needed for Diarrhea    varenicline (CHANTIX CONTINUING MONTH KELLY) 1 MG tablet TAKE 1 TABLET BY MOUTH TWICE DAILY    NIFEdipine (PROCARDIA XL) 30 MG extended release tablet Take 1 tablet by mouth daily    irbesartan (AVAPRO) 300 MG tablet TAKE 1 TABLET BY MOUTH EVERY DAY    aspirin 81 MG tablet Take 81 mg by mouth daily    Vitamin D (CHOLECALCIFEROL) 1000 UNITS CAPS capsule Take 2,000 Units by mouth daily          No Known Allergies      Substance Use History     Social History   Substance Use Topics    Smoking status: Former Smoker     Packs/day: 0.50     Years: 46.00     Types: Cigarettes     Start date: 1/1/1970     Quit date: 6/21/2016    Smokeless tobacco: Never Used      Comment: Maintain cessation    Alcohol use 12.0 oz/week     20 Cans of beer per week      Comment: Occasionally          Family History     Family History   Problem Relation Age of Onset    Heart Attack Father     Heart Disease Father      MI    Cancer Brother      Brain CA    Hypertension Brother     Cancer Brother      Throat cancer        No family history of problems with general anesthesia, venous thrombosis, or bleeding dyscrasias. REVIEW OF SYSTEMS:    CONSTITUTIONAL:  Neg   Recent weight changes, fatigue, fever, chills or night sweats, anorexia, Sleep difficulties  EYES: As per HPI  EARS:  Neg Hearing loss, tinnitus, vertigo, discharge or otalgia. NOSE:  Neg  Rhinorrhea, sneezing, itching, allergy, epistaxis, or snoring  MOUTH/THROAT:  Neg Bleeding gums, hoarseness, sore throat, dysphagia, throat infections  RESPIRATORY:  Neg SOB ,wheeze, cough, sputum, hemoptysis. CARDIOVASCULAR:  Neg Chest pain, palpitations, heart murmur, dyspnea on exertion, orthopnea, paroxysmal nocturnal dyspnea or edema of extremities, or claudication  GASTROINTESTINAL:  Neg   Nausea, vomiting, or diarrhea, constipation hematemesis, heart burn, dysphagia,change in bowel movements or stool caliber, hematochezia, melena, abdominal pain  GENITOURINARY:  Neg  Urinary frequency, hesitancy, urgency, polyuria, dysuria, hematuria, nocturia, incontinence, change in stream, genital pain or swelling, kidney stones  HEMATOLOGIC/LYMPHATIC:  Neg  Anemia, bleeding dyscrasias, easy bruising, blood clots (DVT/PE), transfusions, or enlarged lymph nodes  MUSCULOSKELETAL:  Neg  New myalgias, bone pain, joint pain, joint swelling, low back pain, neck pain, radicular pain, or fractures.   NEUROLOGICAL:  Neg  Loss of Consciousness, memeory loss or forgetfulness, confusion, difficulty concentrating, seizures, insomina, aphasia or dysarthria unilateral weakness or paresthesias, ataxia, headaches, syncope, tremor  PSYCHIATRIC:  Neg  Depression, anxiety  SKIN :  Neg  Rash  ENDOCRINE:    No H/O Diabetes or Thyroid disease. Physical Exam    /63   Pulse 60   Resp 12   Ht 5' 6\" (1.676 m)   Wt 138 lb (62.6 kg)   BMI 22.27 kg/m²   GEN:  78 y.o. male who is in NAD, A&O. He appears stated age and well nourished. He is cooperative and pleasant. HEAD:  NC/AT, no lesions. EYES:  GABY, EOMI, No scleral icterus or conjunctival injection or discharge. Visual fields in tact to confrontation. OU cataracts. EARS:  EAC's clear, TM's normal.  NOSE:  Nasal cavity is clear. No mucosal congestion or discharge. Sinuses are nontender. MOUTH & THROAT:  Oral cavity is clear without mucosal lesions. Tongue is midline. Dentition is in good repair. No pharyngeal erythema or exudate. NECK:  Supple. Full ROM. Trachea is midline. No increased JVD. No thyromegaly or nodules. No masses  LYMPH: No C/SC/A/F nodes  CARDIAC:  S1S2 NL. Regular rhythm. No murmur/clicks/rubs. No ectopy. PMI is non-displaced. VASC: Carotid upstrokes 2+. No bruits noted. BL pedal pulses palpable, 1+. PULM:  Lungs are CTA. Symmetric breath sounds noted. AP Diameter NL. Decreased BS throughout. No crackles or wheezing. GI:  Abdomen is soft and nontender. No distension. No organomegaly. No masses. No pulsatile masses. EXT:  No Cyanosis or clubbing. No edema. SKIN: Warm and dry, normal turgor, no rash or lesions of concern. NEURO:  Cranial nerves 2-12 are NL. Speech fluent and coherent. PSYCH:  Mood and affect NL. Judgement and insight NL. Encounter Diagnoses   Name Primary?     Preop exam for internal medicine Yes    Cataract of both eyes, unspecified cataract type     CAD in native artery     Chronic obstructive pulmonary disease, unspecified COPD type ([address]     Benign essential HTN     PAD (peripheral artery disease) ([address]        Plan:  Acceptable risk for the planned procedure.   No contraindications at this time    EKG-- Not indicated for this low risk procedure  Lab-- Not indicated for this low risk procedure      Pt will avoid NSAID's, OTC vitamin supplements and fish oil 1 week prior to procedure            Electronically Signed:  Queenie Wilhelm, D.O      Copy of H&P given to pt and sent to Sx

## 2017-11-29 RX ORDER — IRBESARTAN 300 MG/1
TABLET ORAL
Qty: 30 TABLET | Refills: 0 | Status: SHIPPED | OUTPATIENT
Start: 2017-11-29 | End: 2017-12-28 | Stop reason: SDUPTHER

## 2017-12-04 RX ORDER — CLOPIDOGREL BISULFATE 75 MG/1
TABLET ORAL
Qty: 60 TABLET | Refills: 5 | Status: SHIPPED | OUTPATIENT
Start: 2017-12-04 | End: 2018-03-27 | Stop reason: SDUPTHER

## 2017-12-22 ENCOUNTER — CARE COORDINATION (OUTPATIENT)
Dept: CARE COORDINATION | Age: 67
End: 2017-12-22

## 2017-12-22 NOTE — CARE COORDINATION
Call to patient   remains smoke free   Cataract surgery yielded 20/25 vision   feeling good  no reported symptoms     plan  Continued support smoking cessation     Future Appointments  Date Time Provider Virgilio Souza   1/8/2018 4:00 PM DO KALYAN Casey [facility]   2/9/2018 9:30 AM SCHEDULE, VASCULAR LAB 2 [facility]   2/20/2018 1:15 PM Candace Pace MD [facility]

## 2017-12-23 PROBLEM — F10.10 ALCOHOL ABUSE: Chronic | Status: ACTIVE | Noted: 2017-12-23

## 2017-12-23 PROBLEM — R55 SYNCOPE: Status: ACTIVE | Noted: 2017-12-23

## 2017-12-23 PROBLEM — R55 SYNCOPE AND COLLAPSE: Status: ACTIVE | Noted: 2017-12-23

## 2017-12-26 ENCOUNTER — CARE COORDINATION (OUTPATIENT)
Dept: CASE MANAGEMENT | Age: 67
End: 2017-12-26

## 2017-12-26 NOTE — CARE COORDINATION
[address] Transitions Initial Follow Up Call    Call within 2 business days of discharge: Yes    Patient: Sirena Salazar Patient : 1950   MRN: [id number]  Reason for Admission: Syncope and Collapse  Discharge Date: 17 RARS: [facility] Risk Score: 18.25     Spoke with: [id number] Mars[address]: Sikhism  Non-face-to-face services provided:  Scheduled appointment with PCP-Dr Padmaja Noonan 17  Obtained and reviewed discharge summary and/or continuity of care documents      Care Transitions 24 Hour Call    Do you have any ongoing symptoms?:  No  Do you have a copy of your discharge instructions?:  Yes  Do you have all of your prescriptions and are they filled?:  Yes  Have you been contacted by a [facility] Pharmacist?:  No  Have you scheduled your follow up appointment?:  Yes  How are you going to get to your appointment?:  Car - family or friend to transport  Were you discharged with any Home Care or Post Acute Services:  No  Do you have support at home?:  Partner/Spouse/SO  Do you feel like you have everything you need to keep you well at home?:  Yes  Are you an active caregiver in your home?:  No  Care Transitions Interventions     Care Transition nurse 24-48 hr post hospital call. Pt states he his doing fine. No recurrent passing out, lightheadness or falls. He has scheduled f/u with Dr Padmaja Noonan for this 17. No change in meds. Follow Up  Future Appointments  Date Time Provider Virgilio Souza   2017 10:00 AM Cozette Cranker, DO [facility]   2018 4:00 PM Cozette Cranker, DO [facility]   2018 9:30 AM SCHEDULE, VASCULAR LAB 2 [facility]   2018 1:15 PM Tonita Mohs, MD [facility]     [id number] Transition Coordinator  [phone]  [email or web address]

## 2017-12-29 ENCOUNTER — OFFICE VISIT (OUTPATIENT)
Dept: INTERNAL MEDICINE CLINIC | Age: 67
End: 2017-12-29

## 2017-12-29 VITALS
WEIGHT: 138 LBS | BODY MASS INDEX: 22.27 KG/M2 | RESPIRATION RATE: 12 BRPM | SYSTOLIC BLOOD PRESSURE: 120 MMHG | DIASTOLIC BLOOD PRESSURE: 66 MMHG

## 2017-12-29 DIAGNOSIS — J06.9 VIRAL URI: ICD-10-CM

## 2017-12-29 DIAGNOSIS — R55 VASOVAGAL SYNCOPE: Primary | ICD-10-CM

## 2017-12-29 PROCEDURE — 1111F DSCHRG MED/CURRENT MED MERGE: CPT | Performed by: INTERNAL MEDICINE

## 2017-12-29 PROCEDURE — 99495 TRANSJ CARE MGMT MOD F2F 14D: CPT | Performed by: INTERNAL MEDICINE

## 2017-12-29 RX ORDER — GUAIFENESIN 600 MG/1
600 TABLET, EXTENDED RELEASE ORAL 2 TIMES DAILY
COMMUNITY
Start: 2017-12-29 | End: 2018-04-10 | Stop reason: ALTCHOICE

## 2017-12-29 RX ORDER — FLUTICASONE PROPIONATE 50 MCG
2 SPRAY, SUSPENSION (ML) NASAL DAILY
Qty: 1 BOTTLE | Refills: 3 | Status: SHIPPED | OUTPATIENT
Start: 2017-12-29 | End: 2018-04-10 | Stop reason: ALTCHOICE

## 2017-12-29 NOTE — PATIENT INSTRUCTIONS
you may need emergency care. For example, call if:  ? · You have symptoms of a heart problem. These may include:  ¨ Chest pain or pressure. ¨ Severe trouble breathing. ¨ A fast or irregular heartbeat. ? Watch closely for changes in your health, and be sure to contact your doctor if:  ? · You have more episodes of fainting at home. ? · You do not get better as expected. Where can you learn more? Go to https://Sofeapepiceweb.vitalclip. org and sign in to your [employer] account. Enter L754 in the App.net box to learn more about \"Vasovagal Syncope: Care Instructions. \"     If you do not have an account, please click on the \"Sign Up Now\" link. Current as of: March 20, 2017  Content Version: 11.4  © 2643-5450 [employer]. Care instructions adapted under license by [facility] ([facility]). If you have questions about a medical condition or this instruction, always ask your healthcare professional. [place] [address] any warranty or liability for your use of this information. Patient Education        Fainting: Care Instructions  Your Care Instructions    When you faint, or pass out, you lose consciousness for a short time. A brief drop in blood flow to the brain often causes it. When you fall or lie down, more blood flows to your brain and you regain consciousness. Emotional stress, pain, or overheating-especially if you have been standing-can make you faint. In these cases, fainting is usually not serious. But fainting can be a sign of a more serious problem. Your doctor may want you to have more tests to rule out other causes. The treatment you need depends on the reason why you fainted. The doctor has checked you carefully, but problems can develop later. If you notice any problems or new symptoms, get medical treatment right away. Follow-up care is a key part of your treatment and safety.  Be sure to make and go to all appointments, and call your doctor if you are having problems. It's also a good idea to know your test results and keep a list of the medicines you take. How can you care for yourself at home? · Drink plenty of fluids to prevent dehydration. If you have kidney, heart, or liver disease and have to limit fluids, talk with your doctor before you increase your fluid intake. When should you call for help? Call 911 anytime you think you may need emergency care. For example, call if:  ? · You have symptoms of a heart problem. These may include:  ¨ Chest pain or pressure. ¨ Severe trouble breathing. ¨ A fast or irregular heartbeat. ¨ Lightheadedness or sudden weakness. ¨ Coughing up pink, foamy mucus. ¨ Passing out. After you call 911, the  may tell you to chew 1 adult-strength or 2 to 4 low-dose aspirin. Wait for an ambulance. Do not try to drive yourself. ? · You have symptoms of a stroke. These may include:  ¨ Sudden numbness, tingling, weakness, or loss of movement in your face, arm, or leg, especially on only one side of your body. ¨ Sudden vision changes. ¨ Sudden trouble speaking. ¨ Sudden confusion or trouble understanding simple statements. ¨ Sudden problems with walking or balance. ¨ A sudden, severe headache that is different from past headaches. ? · You passed out (lost consciousness) again. ? Watch closely for changes in your health, and be sure to contact your doctor if:  ? · You do not get better as expected. Where can you learn more? Go to https://Yebhicamryn3DLT.com.Buyapowa. org and sign in to your [employer] account. Enter Q301 in the [employer] box to learn more about \"Fainting: Care Instructions. \"     If you do not have an account, please click on the \"Sign Up Now\" link. Current as of: March 20, 2017  Content Version: 11.4  © 2576-4214 Healthwise, Incorporated. Care instructions adapted under license by [facility] ([facility]).  If you have questions about a medical condition or this instruction, always ask your professional. [address] any warranty or liability for your use of this information.

## 2017-12-29 NOTE — PROGRESS NOTES
clopidogrel (PLAVIX) 75 MG tablet  TAKE 1 TABLET BY MOUTH DAILY             fluticasone (FLONASE) 50 MCG/ACT nasal spray  2 sprays by Nasal route daily             guaiFENesin (MUCINEX) 600 MG extended release tablet  Take 1 tablet by mouth 2 times daily             irbesartan (AVAPRO) 300 MG tablet  TAKE 1 TABLET BY MOUTH EVERY DAY             loperamide (RA ANTI-DIARRHEAL) 2 MG capsule  Take 2 mg by mouth daily as needed for Diarrhea              NIFEdipine (PROCARDIA XL) 30 MG extended release tablet  TAKE 1 TABLET BY MOUTH DAILY             varenicline (CHANTIX CONTINUING MONTH KELLY) 1 MG tablet  TAKE 1 TABLET BY MOUTH TWICE DAILY                   Medications marked \"taking\" at this time  Outpatient Prescriptions Marked as Taking for the 12/29/17 encounter (Office Visit) with Flip Leal, DO   Medication Sig Dispense Refill    fluticasone (FLONASE) 50 MCG/ACT nasal spray 2 sprays by Nasal route daily 1 Bottle 3    guaiFENesin (MUCINEX) 600 MG extended release tablet Take 1 tablet by mouth 2 times daily      irbesartan (AVAPRO) 300 MG tablet TAKE 1 TABLET BY MOUTH EVERY DAY 30 tablet 5    Cholecalciferol (VITAMIN D) 2000 units CAPS capsule Take 1 capsule by mouth daily      NIFEdipine (PROCARDIA XL) 30 MG extended release tablet TAKE 1 TABLET BY MOUTH DAILY 30 tablet 5    clopidogrel (PLAVIX) 75 MG tablet TAKE 1 TABLET BY MOUTH DAILY 60 tablet 5    varenicline (CHANTIX CONTINUING MONTH PAK) 1 MG tablet TAKE 1 TABLET BY MOUTH TWICE DAILY 56 tablet 3    atorvastatin (LIPITOR) 80 MG tablet TAKE 1 TABLET BY MOUTH DAILY 30 tablet 5    carvedilol (COREG) 12.5 MG tablet TAKE 1 TABLET BY MOUTH TWICE DAILY 60 tablet 5    loperamide (RA ANTI-DIARRHEAL) 2 MG capsule Take 2 mg by mouth daily as needed for Diarrhea       aspirin 81 MG tablet Take 81 mg by mouth daily          Medications patient taking as of now reconciled against medications ordered at time of hospital discharge       Chief Complaint Patient presents with    Follow-up     admitted to [facility] on 12/23/17 to 12/24/17         Inpatient course: Discharge summary reviewed- see chart. While hospitalized, he was monitored on telemetry. No arrhythmias. Initially, the attending was going to obtain an MRI of the brain, but he had no neuro deficits and thus the test was not indicated. He had 1 stitch placed in the nose laceration. History of Present illness - This is a 70-year-old white male with known history of peripheral vascular disease, coronary artery disease, vascular bypass surgery, COPD, CVA amongst other medical problems who [facility]. after a syncopal episode. The event occurred while standing in the kitchen simply talking to his family. It was reported that the oven was on in the kitchen was very hot. Patient walked into the other room and fell and lost consciousness. He struck his face and sustained a small laceration to the bridge of his nose. Patient reports he did have a prodrome feeling of lightheadedness. Patient also states he was taking cold medicine including NyQuil for upper respiratory symptoms. Patient was admitted to the telemetry unit. No arrhythmias were identified. Patient was discharged on his home medications. He continues to have nasal congestion.       Past Medical History:   Diagnosis Date    Abdominal aortic atherosclerosis ([address]     Atherosclerosis of superior mesenteric artery ([address]     CAD (coronary artery disease)     Cerebral artery occlusion with cerebral infarction (HCC)     Chronic kidney disease     Colon polyps     COPD (chronic obstructive pulmonary disease) (HCC)     CVA (cerebral infarction) 5/2013    Multiple, occipital and Cerebellar     DDD (degenerative disc disease)     Cerivical DDD    Epicondylitis elbow, medial     GERD (gastroesophageal reflux disease)     Hyperlipidemia     Hypertension     Ischemic colitis ([address] conjunctivae normal  ENT: tympanic membrane, external ear and ear canal normal bilaterally, oropharynx clear and moist with normal mucous membranes. Nasal cavity with mucosal congestion and clear rhinorrhea. Neck: neck supple and non tender without mass, no thyromegaly or thyroid nodules, no cervical lymphadenopathy   Pulmonary/Chest: clear to auscultation bilaterally- no wheezes, rales or rhonchi, normal air movement, no respiratory distress  Cardiovascular: normal rate, normal S1 and S2, no gallops, intact distal pulses and no carotid bruits  Extremities: no cyanosis and no clubbing  Neurologic: gait and coordination normal, speech normal and no focal motor deficits. Assessment/Plan:  Becki Arambula was seen today for follow-up. Diagnoses and all orders for this visit:    Vasovagal syncope  -     IN DISCHARGE MEDS RECONCILED W/ CURRENT OUTPATIENT MED LIST    Viral URI  -     fluticasone (FLONASE) 50 MCG/ACT nasal spray; 2 sprays by Nasal route daily  -     guaiFENesin (MUCINEX) 600 MG extended release tablet; Take 1 tablet by mouth 2 times daily          Medical Decision Making: moderate complexity    Literature provided on above diagnoses. Advised pt to call should symptoms return.

## 2018-01-03 ENCOUNTER — CARE COORDINATION (OUTPATIENT)
Dept: CASE MANAGEMENT | Age: 68
End: 2018-01-03

## 2018-01-15 ENCOUNTER — OFFICE VISIT (OUTPATIENT)
Dept: INTERNAL MEDICINE CLINIC | Age: 68
End: 2018-01-15

## 2018-01-15 VITALS
DIASTOLIC BLOOD PRESSURE: 64 MMHG | WEIGHT: 138 LBS | BODY MASS INDEX: 22.27 KG/M2 | HEART RATE: 76 BPM | SYSTOLIC BLOOD PRESSURE: 112 MMHG

## 2018-01-15 DIAGNOSIS — I70.0 THORACIC AORTA ATHEROSCLEROSIS (HCC): ICD-10-CM

## 2018-01-15 DIAGNOSIS — Z87.891 FORMER SMOKER: ICD-10-CM

## 2018-01-15 DIAGNOSIS — I25.10 CORONARY ARTERY CALCIFICATION SEEN ON CT SCAN: ICD-10-CM

## 2018-01-15 DIAGNOSIS — I70.1 RENAL ARTERY ATHEROSCLEROSIS (HCC): ICD-10-CM

## 2018-01-15 DIAGNOSIS — J43.9 PULMONARY EMPHYSEMA, UNSPECIFIED EMPHYSEMA TYPE (HCC): ICD-10-CM

## 2018-01-15 DIAGNOSIS — I25.10 CAD IN NATIVE ARTERY: ICD-10-CM

## 2018-01-15 DIAGNOSIS — E55.9 VITAMIN D DEFICIENCY: ICD-10-CM

## 2018-01-15 DIAGNOSIS — J44.9 CHRONIC OBSTRUCTIVE PULMONARY DISEASE, UNSPECIFIED COPD TYPE (HCC): ICD-10-CM

## 2018-01-15 DIAGNOSIS — K55.1 ATHEROSCLEROSIS OF SUPERIOR MESENTERIC ARTERY (HCC): ICD-10-CM

## 2018-01-15 DIAGNOSIS — E78.5 HYPERLIPIDEMIA, UNSPECIFIED HYPERLIPIDEMIA TYPE: ICD-10-CM

## 2018-01-15 DIAGNOSIS — I10 BENIGN ESSENTIAL HTN: Primary | ICD-10-CM

## 2018-01-15 LAB
A/G RATIO: 2 (ref 1.1–2.2)
ALBUMIN SERPL-MCNC: 4.5 G/DL (ref 3.4–5)
ALP BLD-CCNC: 92 U/L (ref 40–129)
ALT SERPL-CCNC: 41 U/L (ref 10–40)
ANION GAP SERPL CALCULATED.3IONS-SCNC: 14 MMOL/L (ref 3–16)
AST SERPL-CCNC: 43 U/L (ref 15–37)
BILIRUB SERPL-MCNC: 0.8 MG/DL (ref 0–1)
BUN BLDV-MCNC: 17 MG/DL (ref 7–20)
CALCIUM SERPL-MCNC: 9.5 MG/DL (ref 8.3–10.6)
CHLORIDE BLD-SCNC: 99 MMOL/L (ref 99–110)
CHOLESTEROL, TOTAL: 117 MG/DL (ref 0–199)
CO2: 25 MMOL/L (ref 21–32)
CREAT SERPL-MCNC: 1 MG/DL (ref 0.8–1.3)
GFR AFRICAN AMERICAN: >60
GFR NON-AFRICAN AMERICAN: >60
GLOBULIN: 2.3 G/DL
GLUCOSE BLD-MCNC: 90 MG/DL (ref 70–99)
HDLC SERPL-MCNC: 65 MG/DL (ref 40–60)
LDL CHOLESTEROL CALCULATED: 42 MG/DL
POTASSIUM SERPL-SCNC: 5.5 MMOL/L (ref 3.5–5.1)
SODIUM BLD-SCNC: 138 MMOL/L (ref 136–145)
TOTAL PROTEIN: 6.8 G/DL (ref 6.4–8.2)
TRIGL SERPL-MCNC: 51 MG/DL (ref 0–150)
VITAMIN D 25-HYDROXY: 25.4 NG/ML
VLDLC SERPL CALC-MCNC: 10 MG/DL

## 2018-01-15 PROCEDURE — G8427 DOCREV CUR MEDS BY ELIG CLIN: HCPCS | Performed by: INTERNAL MEDICINE

## 2018-01-15 PROCEDURE — 3023F SPIROM DOC REV: CPT | Performed by: INTERNAL MEDICINE

## 2018-01-15 PROCEDURE — 1111F DSCHRG MED/CURRENT MED MERGE: CPT | Performed by: INTERNAL MEDICINE

## 2018-01-15 PROCEDURE — G8484 FLU IMMUNIZE NO ADMIN: HCPCS | Performed by: INTERNAL MEDICINE

## 2018-01-15 PROCEDURE — G8926 SPIRO NO PERF OR DOC: HCPCS | Performed by: INTERNAL MEDICINE

## 2018-01-15 PROCEDURE — 1123F ACP DISCUSS/DSCN MKR DOCD: CPT | Performed by: INTERNAL MEDICINE

## 2018-01-15 PROCEDURE — 1036F TOBACCO NON-USER: CPT | Performed by: INTERNAL MEDICINE

## 2018-01-15 PROCEDURE — 4040F PNEUMOC VAC/ADMIN/RCVD: CPT | Performed by: INTERNAL MEDICINE

## 2018-01-15 PROCEDURE — G8420 CALC BMI NORM PARAMETERS: HCPCS | Performed by: INTERNAL MEDICINE

## 2018-01-15 PROCEDURE — 3017F COLORECTAL CA SCREEN DOC REV: CPT | Performed by: INTERNAL MEDICINE

## 2018-01-15 PROCEDURE — G8598 ASA/ANTIPLAT THER USED: HCPCS | Performed by: INTERNAL MEDICINE

## 2018-01-15 PROCEDURE — 99214 OFFICE O/P EST MOD 30 MIN: CPT | Performed by: INTERNAL MEDICINE

## 2018-01-15 RX ORDER — VARENICLINE TARTRATE 1 MG/1
TABLET, FILM COATED ORAL
Qty: 60 TABLET | Refills: 1 | Status: SHIPPED | OUTPATIENT
Start: 2018-01-15 | End: 2018-04-10 | Stop reason: ALTCHOICE

## 2018-01-15 ASSESSMENT — PATIENT HEALTH QUESTIONNAIRE - PHQ9
1. LITTLE INTEREST OR PLEASURE IN DOING THINGS: 0
SUM OF ALL RESPONSES TO PHQ QUESTIONS 1-9: 0
SUM OF ALL RESPONSES TO PHQ9 QUESTIONS 1 & 2: 0
2. FEELING DOWN, DEPRESSED OR HOPELESS: 0

## 2018-01-15 NOTE — PROGRESS NOTES
[facility]) Physicians  Internal Medicine  Patient Encounter  [address] [facility], D.O., [facility]         Chief Complaint   Patient presents with    3 Month Follow-Up    Medication Check       HPI: 78 y.o. male seen today for follow up regarding the status of his current chronic medical problems below and medication review. He has been feeling well since his hospital stay for syncope. No further episodes of syncope. He has occasional dizziness when he gets up quickly. He denies any vision changes, diaphoresis, tremors. He reports he is compliant with medications, and he has good appetite. He has a cup of coffee for breakfast, then meals for lunch and dinner. He tries to adhere to a healthy diet, avoiding fried foods. He does not exercise daily. HTN-- He has been monitoring his blood pressure daily usage a cuff at home. His blood pressures run in the 120-30s/70s. He is compliant with taking his Avapro, Procardia ER, and Coreg. He denies chest pain, shortness of breath, lightheadedness, swelling in the lower extremities. He can be a little dizzy when he changes his head position quickly. Hyperlipidemia:    Lab Results   Component Value Date    LDLCALC 42 08/10/2017   He is on Lipitor 80 mg nightly and aspirin. He denies polymyalgias, muscle weakness or cramping. PAD/Carotid stenosis-- He was seen by Dr. Joe Alexandre. He had arterial eval of the legs, aorta/renal, and carotids back in August 2017. He has upcoming appointment with Dr. Brendan Abraham on 2/9/2018. Patient is compliant with taking Plavix. He denies lightheadedness, chest pain, palpitations, pain in his lower extremities. Previous history----> Patient has history of chronic mesenteric ischemia and underwent SMA bypass  11/29/2016. Patient's also had a left fem-pop bypass 9/14/2016. He also has EMELINA, carotid stenosis, abd and thoracic aortic ASVD.   Last carotid doppler-- 16-49% BL, 7/2017    CAD--Previous history---->coronary artery calcification seen on CT scan July 2014. Stress Myoview was neg 1/24/2015. He is compliant with his medications. He denies angina, SOB, MAJANO, orthopnea. No swelling. He had another stress test 11/28/2016 which was (-) for ischemia. COPD--He is a former smoker. He quit one year ago. He would like a refill on the Chantix which helped. He denies bizarre dreams, mood changes, suicidal thoughts. No wheezing, shortness of breath. He does not require inhalers. PFT's surprisingly decent. Emphysema seen on CT. He is now off smoking. The Chantix is helping.      Past Medical History:   Diagnosis Date    Abdominal aortic atherosclerosis ([address]     Atherosclerosis of superior mesenteric artery ([address]     CAD (coronary artery disease)     Cerebral artery occlusion with cerebral infarction (HCC)     Chronic kidney disease     Colon polyps     COPD (chronic obstructive pulmonary disease) (HCC)     CVA (cerebral infarction) 5/2013    Multiple, occipital and Cerebellar     DDD (degenerative disc disease)     Cerivical DDD    Epicondylitis elbow, medial     GERD (gastroesophageal reflux disease)     Hyperlipidemia     Hypertension     Ischemic colitis ([address] 11/2016    Osteoarthritis, hand     PVD (peripheral vascular disease) with claudication (HCC)     Left leg with mod-severe arterial ischemia    Renal artery atherosclerosis (HCC)     SMA stenosis ([address] 11/2016    Thoracic aorta atherosclerosis (HCC)     Venous insufficiency     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

## 2018-02-09 ENCOUNTER — PROCEDURE VISIT (OUTPATIENT)
Dept: SURGERY | Age: 68
End: 2018-02-09

## 2018-02-09 DIAGNOSIS — I70.213 ATHEROSCLEROSIS OF NATIVE ARTERY OF BOTH LOWER EXTREMITIES WITH INTERMITTENT CLAUDICATION (HCC): ICD-10-CM

## 2018-02-09 DIAGNOSIS — K55.1 MESENTERIC ARTERY STENOSIS (HCC): ICD-10-CM

## 2018-02-09 DIAGNOSIS — I70.219 ATHEROSCLEROTIC PVD WITH INTERMITTENT CLAUDICATION (HCC): ICD-10-CM

## 2018-02-09 DIAGNOSIS — K55.1 ATHEROSCLEROSIS OF SUPERIOR MESENTERIC ARTERY (HCC): ICD-10-CM

## 2018-02-09 DIAGNOSIS — I73.9 PVD (PERIPHERAL VASCULAR DISEASE) WITH CLAUDICATION (HCC): ICD-10-CM

## 2018-02-09 PROCEDURE — 93925 LOWER EXTREMITY STUDY: CPT | Performed by: SURGERY

## 2018-02-09 PROCEDURE — 93978 VASCULAR STUDY: CPT | Performed by: SURGERY

## 2018-02-20 ENCOUNTER — OFFICE VISIT (OUTPATIENT)
Dept: SURGERY | Age: 68
End: 2018-02-20

## 2018-02-20 VITALS — BODY MASS INDEX: 25.99 KG/M2 | SYSTOLIC BLOOD PRESSURE: 100 MMHG | WEIGHT: 161 LBS | DIASTOLIC BLOOD PRESSURE: 66 MMHG

## 2018-02-20 DIAGNOSIS — K55.1 MESENTERIC ARTERY STENOSIS (HCC): ICD-10-CM

## 2018-02-20 DIAGNOSIS — I10 BENIGN ESSENTIAL HTN: ICD-10-CM

## 2018-02-20 DIAGNOSIS — I70.213 ATHEROSCLEROSIS OF NATIVE ARTERY OF BOTH LOWER EXTREMITIES WITH INTERMITTENT CLAUDICATION (HCC): Primary | ICD-10-CM

## 2018-02-20 DIAGNOSIS — E78.5 HYPERLIPIDEMIA, UNSPECIFIED HYPERLIPIDEMIA TYPE: ICD-10-CM

## 2018-02-20 DIAGNOSIS — I70.219 ATHEROSCLEROTIC PVD WITH INTERMITTENT CLAUDICATION (HCC): ICD-10-CM

## 2018-02-20 DIAGNOSIS — I73.9 PVD (PERIPHERAL VASCULAR DISEASE) WITH CLAUDICATION (HCC): ICD-10-CM

## 2018-02-20 DIAGNOSIS — K55.1 ATHEROSCLEROSIS OF SUPERIOR MESENTERIC ARTERY (HCC): ICD-10-CM

## 2018-02-20 PROCEDURE — 3017F COLORECTAL CA SCREEN DOC REV: CPT | Performed by: SURGERY

## 2018-02-20 PROCEDURE — 1036F TOBACCO NON-USER: CPT | Performed by: SURGERY

## 2018-02-20 PROCEDURE — 4040F PNEUMOC VAC/ADMIN/RCVD: CPT | Performed by: SURGERY

## 2018-02-20 PROCEDURE — G8427 DOCREV CUR MEDS BY ELIG CLIN: HCPCS | Performed by: SURGERY

## 2018-02-20 PROCEDURE — 99214 OFFICE O/P EST MOD 30 MIN: CPT | Performed by: SURGERY

## 2018-02-20 PROCEDURE — 1123F ACP DISCUSS/DSCN MKR DOCD: CPT | Performed by: SURGERY

## 2018-02-20 PROCEDURE — G8419 CALC BMI OUT NRM PARAM NOF/U: HCPCS | Performed by: SURGERY

## 2018-02-20 PROCEDURE — G8484 FLU IMMUNIZE NO ADMIN: HCPCS | Performed by: SURGERY

## 2018-02-20 PROCEDURE — G8598 ASA/ANTIPLAT THER USED: HCPCS | Performed by: SURGERY

## 2018-02-20 ASSESSMENT — ENCOUNTER SYMPTOMS
COLOR CHANGE: 1
RESPIRATORY NEGATIVE: 1
ALLERGIC/IMMUNOLOGIC NEGATIVE: 1
GASTROINTESTINAL NEGATIVE: 1
EYES NEGATIVE: 1

## 2018-02-20 NOTE — PATIENT INSTRUCTIONS
Patient will benefit from continued aspirin 81 mg and Plavix 75 mg for daily antiplatelet therapy and Lipitor 80 mg for daily statin therapy. Patient has been instructed to follow up in 6 months for a mesenteric scan, arterial duplex with chun's and office visit. The patient will need to fast for at least 5 hours prior to the ultrasound scan. Please understand that by not doing so may result in having an inaccurate ultrasound and may cause your ultrasound to be rescheduled.

## 2018-03-19 ENCOUNTER — CARE COORDINATION (OUTPATIENT)
Dept: CARE COORDINATION | Age: 68
End: 2018-03-19

## 2018-03-19 NOTE — PATIENT INSTRUCTIONS
also need to adjust the dose of your diabetes medications. It is not known whether fluticasone nasal will harm an unborn baby. Do not use this medicine without a doctor's advice if you are pregnant or plan to become pregnant. It is not known whether fluticasone nasal passes into breast milk or if it could harm a nursing baby. Do not use this medicine without a doctor's advice if you are breast-feeding a baby. Steroid medicine can affect growth in children. Tell your doctor if your child is not growing at a normal rate while using this medicine. How should I use fluticasone nasal?  Use exactly as directed on the label, or as prescribed by your doctor. Do not use in larger or smaller amounts or for longer than recommended. Do not share this medicine with another person, even if they have the same symptoms you have. The usual dose of fluticasone nasal is 1 to 2 sprays into each nostril once per day. Your dose may change after your symptoms improve. Follow all dosing instructions very carefully. Do not use Flonase in a child younger than 3years old. Do not use Veramyst in a child younger than 3years old. Any child using fluticasone nasal should be supervised by an adult while using the nasal spray. This medication comes with patient instructions for safe and effective use, and directions for priming the inhaler device. Follow these directions carefully. Ask your doctor or pharmacist if you have any questions. If you use Flonase, shake the inhaler device gently before each use. If you use Veramyst, shake the device vigorously before each use. Before your first use, prime the nasal spray pump by shaking the medicine well and spraying 6 test sprays into the air (away from your face), until a fine mist appears. Prime the Flonase spray pump any time you have not used it for 7 days or longer.  Prime the Veramyst spray pump any time you have not used it for longer than 30 days, or if you have left the cap off for 5 use.  What is guaifenesin? Guaifenesin is an expectorant. It helps loosen congestion in your chest and throat, making it easier to cough out through your mouth. Guaifenesin is used to reduce chest congestion caused by the common cold, infections, or allergies. Guaifenesin may also be used for purposes not listed in this medication guide. What should I discuss with my healthcare provider before taking guaifenesin? You should not use this medicine if you are allergic to guaifenesin. Ask a doctor or pharmacist if it is safe for you to use this medicine if you have other medical conditions. It is not known whether guaifenesin will harm an unborn baby. Ask a doctor before using this medicine if you are pregnant. It is not known whether guaifenesin passes into breast milk or if it could harm a nursing baby. Ask a doctor before using this medicine if you are breast-feeding a baby. Do not give this medicine to a child without medical advice. How should I take guaifenesin? Use exactly as directed on the label, or as prescribed by your doctor. Do not use in larger or smaller amounts or for longer than recommended. Cough medicine is usually taken only for a short time until your symptoms clear up. Take guaifenesin with food if it upsets your stomach. Measure liquid medicine with the dosing syringe provided, or with a special dose-measuring spoon or medicine cup. If you do not have a dose-measuring device, ask your pharmacist for one. Drink extra fluids to help loosen the congestion and lubricate your throat while you are taking this medication. Store at room temperature away from moisture, heat, and light. What happens if I miss a dose? Since cough or cold medicine is taken when needed, you may not be on a dosing schedule. If you are taking the medication regularly, take the missed dose as soon as you remember. Skip the missed dose if it is almost time for your next scheduled dose.  Do not take extra medicine

## 2018-03-19 NOTE — CARE COORDINATION
COPD Assessment    Does the patient understand envrionmental exposure?:  Yes  Does the patient have a nebulizer?:  No     No patient-reported symptoms         Symptoms:      Have you had a recent diagnosis of pneumonia either by PCP or at a hospital?:  No       Ambulatory Care Coordination Note  3/19/2018  CM Risk Score: 5  [place] Mortality Risk Score: 5.8    ACC: Kvng Rosario RN    Summary Note:   call to patient   reports doing well   BP checks at home, running 130's/ 70's  Had appt w Dr Valdemar Mcgowan, no findings, no changes. reports legs continue to \" feel tired\"  Plans on starting walking program.  Remains smoke free- continues on Chantix. No further syncopal episodes reported. has some seasonal allergies- when cutting grass       Reviewed use of Flonase and Muccinex for seasonal allergies. Plan  Start walking program  Remain smoke free   use of flonase as needed             Care Coordination Interventions    Program Enrollment:  Rising Risk  Referral from Primary Care Provider:  No  Suggested Interventions and Community Resources  Disease Specific Clinic:  Completed  Smoking Cessation:  Completed  Specialty Services Referral:  Completed  Other Services:  Completed  Zone Management Tools:  Completed         Goals Addressed      Patient Stated (pt-stated)                  Start a walking program this spring    Barriers: none  Plan for overcoming my barriers: N/A  Confidence: 7/10  Anticipated Goal Completion Date:             Prior to Admission medications    Medication Sig Start Date End Date Taking?  Authorizing Provider   atorvastatin (LIPITOR) 80 MG tablet TAKE 1 TABLET BY MOUTH DAILY 3/13/18   Pepito Leal, DO   carvedilol (COREG) 12.5 MG tablet TAKE 1 TABLET BY MOUTH TWICE DAILY 3/13/18   Pepito Leal DO   varenicline (CHANTIX CONTINUING MONTH KELLY) 1 MG tablet TAKE 1 TABLET BY MOUTH TWICE DAILY 1/15/18   Pepito Leal DO   fluticasone (FLONASE) 50 MCG/ACT nasal spray 2 sprays by Nasal route daily

## 2018-03-27 RX ORDER — CLOPIDOGREL BISULFATE 75 MG/1
TABLET ORAL
Qty: 90 TABLET | Refills: 5 | Status: ON HOLD | OUTPATIENT
Start: 2018-03-27 | End: 2018-09-15

## 2018-04-10 ENCOUNTER — TELEPHONE (OUTPATIENT)
Dept: PHARMACY | Facility: CLINIC | Age: 68
End: 2018-04-10

## 2018-04-10 RX ORDER — AMOXICILLIN 875 MG/1
1 TABLET, COATED ORAL 2 TIMES DAILY
Refills: 0 | COMMUNITY
Start: 2018-04-06 | End: 2018-04-16

## 2018-04-17 ENCOUNTER — OFFICE VISIT (OUTPATIENT)
Dept: INTERNAL MEDICINE CLINIC | Age: 68
End: 2018-04-17

## 2018-04-17 VITALS
HEIGHT: 66 IN | DIASTOLIC BLOOD PRESSURE: 70 MMHG | RESPIRATION RATE: 12 BRPM | BODY MASS INDEX: 22.34 KG/M2 | HEART RATE: 64 BPM | SYSTOLIC BLOOD PRESSURE: 148 MMHG | WEIGHT: 139 LBS

## 2018-04-17 DIAGNOSIS — J43.9 PULMONARY EMPHYSEMA, UNSPECIFIED EMPHYSEMA TYPE (HCC): ICD-10-CM

## 2018-04-17 DIAGNOSIS — I73.9 PAD (PERIPHERAL ARTERY DISEASE) (HCC): ICD-10-CM

## 2018-04-17 DIAGNOSIS — I10 BENIGN ESSENTIAL HTN: Primary | ICD-10-CM

## 2018-04-17 DIAGNOSIS — E55.9 VITAMIN D DEFICIENCY: ICD-10-CM

## 2018-04-17 DIAGNOSIS — E78.5 HYPERLIPIDEMIA, UNSPECIFIED HYPERLIPIDEMIA TYPE: ICD-10-CM

## 2018-04-17 DIAGNOSIS — R51.9 NONINTRACTABLE EPISODIC HEADACHE, UNSPECIFIED HEADACHE TYPE: ICD-10-CM

## 2018-04-17 DIAGNOSIS — I25.10 CAD IN NATIVE ARTERY: ICD-10-CM

## 2018-04-17 DIAGNOSIS — J44.9 CHRONIC OBSTRUCTIVE PULMONARY DISEASE, UNSPECIFIED COPD TYPE (HCC): ICD-10-CM

## 2018-04-17 PROBLEM — R55 SYNCOPE: Status: RESOLVED | Noted: 2017-12-23 | Resolved: 2018-04-17

## 2018-04-17 PROBLEM — R55 SYNCOPE AND COLLAPSE: Status: RESOLVED | Noted: 2017-12-23 | Resolved: 2018-04-17

## 2018-04-17 LAB
A/G RATIO: 2 (ref 1.1–2.2)
ALBUMIN SERPL-MCNC: 4.7 G/DL (ref 3.4–5)
ALP BLD-CCNC: 88 U/L (ref 40–129)
ALT SERPL-CCNC: 33 U/L (ref 10–40)
ANION GAP SERPL CALCULATED.3IONS-SCNC: 14 MMOL/L (ref 3–16)
AST SERPL-CCNC: 36 U/L (ref 15–37)
BASOPHILS ABSOLUTE: 0.1 K/UL (ref 0–0.2)
BASOPHILS RELATIVE PERCENT: 2.2 %
BILIRUB SERPL-MCNC: 1.2 MG/DL (ref 0–1)
BUN BLDV-MCNC: 12 MG/DL (ref 7–20)
CALCIUM SERPL-MCNC: 9.6 MG/DL (ref 8.3–10.6)
CHLORIDE BLD-SCNC: 97 MMOL/L (ref 99–110)
CHOLESTEROL, TOTAL: 128 MG/DL (ref 0–199)
CO2: 27 MMOL/L (ref 21–32)
CREAT SERPL-MCNC: 0.9 MG/DL (ref 0.8–1.3)
EOSINOPHILS ABSOLUTE: 0.8 K/UL (ref 0–0.6)
EOSINOPHILS RELATIVE PERCENT: 13 %
GFR AFRICAN AMERICAN: >60
GFR NON-AFRICAN AMERICAN: >60
GLOBULIN: 2.4 G/DL
GLUCOSE BLD-MCNC: 97 MG/DL (ref 70–99)
HCT VFR BLD CALC: 42.9 % (ref 40.5–52.5)
HDLC SERPL-MCNC: 68 MG/DL (ref 40–60)
HEMOGLOBIN: 14.2 G/DL (ref 13.5–17.5)
LDL CHOLESTEROL CALCULATED: 47 MG/DL
LYMPHOCYTES ABSOLUTE: 1 K/UL (ref 1–5.1)
LYMPHOCYTES RELATIVE PERCENT: 15.4 %
MCH RBC QN AUTO: 32.7 PG (ref 26–34)
MCHC RBC AUTO-ENTMCNC: 33 G/DL (ref 31–36)
MCV RBC AUTO: 99.1 FL (ref 80–100)
MONOCYTES ABSOLUTE: 0.8 K/UL (ref 0–1.3)
MONOCYTES RELATIVE PERCENT: 12.7 %
NEUTROPHILS ABSOLUTE: 3.7 K/UL (ref 1.7–7.7)
NEUTROPHILS RELATIVE PERCENT: 56.7 %
PDW BLD-RTO: 14.7 % (ref 12.4–15.4)
PLATELET # BLD: 243 K/UL (ref 135–450)
PMV BLD AUTO: 8.4 FL (ref 5–10.5)
POTASSIUM SERPL-SCNC: 5.3 MMOL/L (ref 3.5–5.1)
RBC # BLD: 4.33 M/UL (ref 4.2–5.9)
SEDIMENTATION RATE, ERYTHROCYTE: 6 MM/HR (ref 0–20)
SODIUM BLD-SCNC: 138 MMOL/L (ref 136–145)
TOTAL PROTEIN: 7.1 G/DL (ref 6.4–8.2)
TRIGL SERPL-MCNC: 65 MG/DL (ref 0–150)
TSH SERPL DL<=0.05 MIU/L-ACNC: 4.81 UIU/ML (ref 0.27–4.2)
VITAMIN D 25-HYDROXY: 34 NG/ML
VLDLC SERPL CALC-MCNC: 13 MG/DL
WBC # BLD: 6.5 K/UL (ref 4–11)

## 2018-04-17 PROCEDURE — G8598 ASA/ANTIPLAT THER USED: HCPCS | Performed by: INTERNAL MEDICINE

## 2018-04-17 PROCEDURE — 1036F TOBACCO NON-USER: CPT | Performed by: INTERNAL MEDICINE

## 2018-04-17 PROCEDURE — G8926 SPIRO NO PERF OR DOC: HCPCS | Performed by: INTERNAL MEDICINE

## 2018-04-17 PROCEDURE — 99214 OFFICE O/P EST MOD 30 MIN: CPT | Performed by: INTERNAL MEDICINE

## 2018-04-17 PROCEDURE — 3023F SPIROM DOC REV: CPT | Performed by: INTERNAL MEDICINE

## 2018-04-17 PROCEDURE — 1123F ACP DISCUSS/DSCN MKR DOCD: CPT | Performed by: INTERNAL MEDICINE

## 2018-04-17 PROCEDURE — 3017F COLORECTAL CA SCREEN DOC REV: CPT | Performed by: INTERNAL MEDICINE

## 2018-04-17 PROCEDURE — G8427 DOCREV CUR MEDS BY ELIG CLIN: HCPCS | Performed by: INTERNAL MEDICINE

## 2018-04-17 PROCEDURE — G8420 CALC BMI NORM PARAMETERS: HCPCS | Performed by: INTERNAL MEDICINE

## 2018-04-17 PROCEDURE — 4040F PNEUMOC VAC/ADMIN/RCVD: CPT | Performed by: INTERNAL MEDICINE

## 2018-06-22 ENCOUNTER — CARE COORDINATION (OUTPATIENT)
Dept: INTERNAL MEDICINE CLINIC | Age: 68
End: 2018-06-22

## 2018-06-26 ASSESSMENT — PATIENT HEALTH QUESTIONNAIRE - PHQ9: SUM OF ALL RESPONSES TO PHQ QUESTIONS 1-9: 0

## 2018-07-13 ENCOUNTER — CARE COORDINATION (OUTPATIENT)
Dept: INTERNAL MEDICINE CLINIC | Age: 68
End: 2018-07-13

## 2018-07-13 NOTE — CARE COORDINATION
Call to patient to invite to [facility]  Group meeting  Pt is not on any inhalers and feels has good understanding of medications  Pt declined attending at this time    Recently ordered an \" as seen on tv\" beet juice however has not yet received it by mail. Advised to discuss w Dr Gudino [place] prior to starting as well as any other OTC/ supplements    Pt verbalized understanding  Has appt upcoming w DR Gudino [place] and will bring it along. Will call if he changes his mind and would like to talk by phone or meet at group meeting.     Future Appointments  Date Time Provider Virgilio Souza   7/30/2018 11:15 AM DO KALYAN Green [facility]   8/28/2018 1:00 PM SCHEDULE, VASCULAR LAB 1 [id number]   8/28/2018 1:45 PM Micah Andrews MD [facility]

## 2018-07-30 ENCOUNTER — OFFICE VISIT (OUTPATIENT)
Dept: INTERNAL MEDICINE CLINIC | Age: 68
End: 2018-07-30

## 2018-07-30 VITALS
SYSTOLIC BLOOD PRESSURE: 130 MMHG | WEIGHT: 135 LBS | BODY MASS INDEX: 21.69 KG/M2 | HEIGHT: 66 IN | HEART RATE: 63 BPM | DIASTOLIC BLOOD PRESSURE: 66 MMHG | RESPIRATION RATE: 12 BRPM

## 2018-07-30 DIAGNOSIS — I10 BENIGN ESSENTIAL HTN: Primary | ICD-10-CM

## 2018-07-30 DIAGNOSIS — E78.5 HYPERLIPIDEMIA, UNSPECIFIED HYPERLIPIDEMIA TYPE: ICD-10-CM

## 2018-07-30 DIAGNOSIS — I65.23 ASYMPTOMATIC BILATERAL CAROTID ARTERY STENOSIS: ICD-10-CM

## 2018-07-30 DIAGNOSIS — I73.9 PAD (PERIPHERAL ARTERY DISEASE) (HCC): ICD-10-CM

## 2018-07-30 DIAGNOSIS — I25.10 CORONARY ARTERY CALCIFICATION SEEN ON CT SCAN: ICD-10-CM

## 2018-07-30 DIAGNOSIS — I25.10 CAD IN NATIVE ARTERY: ICD-10-CM

## 2018-07-30 DIAGNOSIS — J43.9 PULMONARY EMPHYSEMA, UNSPECIFIED EMPHYSEMA TYPE (HCC): ICD-10-CM

## 2018-07-30 PROCEDURE — 3023F SPIROM DOC REV: CPT | Performed by: INTERNAL MEDICINE

## 2018-07-30 PROCEDURE — 3017F COLORECTAL CA SCREEN DOC REV: CPT | Performed by: INTERNAL MEDICINE

## 2018-07-30 PROCEDURE — G8598 ASA/ANTIPLAT THER USED: HCPCS | Performed by: INTERNAL MEDICINE

## 2018-07-30 PROCEDURE — 4040F PNEUMOC VAC/ADMIN/RCVD: CPT | Performed by: INTERNAL MEDICINE

## 2018-07-30 PROCEDURE — 99214 OFFICE O/P EST MOD 30 MIN: CPT | Performed by: INTERNAL MEDICINE

## 2018-07-30 PROCEDURE — G8926 SPIRO NO PERF OR DOC: HCPCS | Performed by: INTERNAL MEDICINE

## 2018-07-30 PROCEDURE — G8420 CALC BMI NORM PARAMETERS: HCPCS | Performed by: INTERNAL MEDICINE

## 2018-07-30 PROCEDURE — G8427 DOCREV CUR MEDS BY ELIG CLIN: HCPCS | Performed by: INTERNAL MEDICINE

## 2018-07-30 PROCEDURE — 1101F PT FALLS ASSESS-DOCD LE1/YR: CPT | Performed by: INTERNAL MEDICINE

## 2018-07-30 PROCEDURE — 1123F ACP DISCUSS/DSCN MKR DOCD: CPT | Performed by: INTERNAL MEDICINE

## 2018-07-30 PROCEDURE — 1036F TOBACCO NON-USER: CPT | Performed by: INTERNAL MEDICINE

## 2018-07-30 NOTE — PATIENT INSTRUCTIONS
Patient Education        Deciding About Using Medicines To Quit Smoking  Deciding About Using Medicines To Quit Smoking  What are the medicines you can use? Your doctor may prescribe varenicline (Chantix) or bupropion (Zyban). These medicines can help you cope with cravings for tobacco. They are pills that don't contain nicotine. You also can use nicotine replacement products. These do contain nicotine. There are many types. · Gum and lozenges slowly release nicotine into your mouth. · Patches stick to your skin. They slowly release nicotine into your bloodstream.  · An inhaler has a cespedes that contains nicotine. You breathe in a puff of nicotine vapor through your mouth and throat. · Nasal spray releases a mist that contains nicotine. What are key points about this decision? · Using medicines can double your chances of quitting smoking. They can ease cravings and withdrawal symptoms. · Getting counseling along with using medicine can raise your chances of quitting even more. · If you smoke fewer than 5 cigarettes a day, you may not need medicines to help you quit smoking. · These medicines have less nicotine than cigarettes. And by itself, nicotine is not nearly as harmful as smoking. The tars, carbon monoxide, and other toxic chemicals in tobacco cause the harmful effects. · The side effects of nicotine replacement products depend on the type of product. For example, a patch can make your skin red and itchy. Medicines in pill form can make you sick to your stomach. They can also cause dry mouth and trouble sleeping. For most people, the side effects are not bad enough to make them stop using the products. Why might you choose to use medicines to quit smoking? · You have tried on your own to stop smoking, but you were not able to stop. · You smoke more than 5 cigarettes a day. · You want to increase your chances of quitting smoking.   · You want to reduce your cravings and withdrawal

## 2018-07-30 NOTE — PROGRESS NOTES
m)   Wt 135 lb (61.2 kg)   BMI 21.79 kg/m²    BP Readings from Last 3 Encounters:   07/30/18 130/66   04/17/18 (!) 148/70   02/20/18 100/66      Wt Readings from Last 3 Encounters:   07/30/18 135 lb (61.2 kg)   04/17/18 139 lb (63 kg)   02/20/18 161 lb (73 kg)       GEN: NAD, A&O, Non-toxic  HEENT: NC/AT, JOCELINE, EOMI, Anicteric. TM's Nl. Oral cavity without mucosal lesions. Mucous membranes moist.   NECK:  Supple. No thyromegaly or nodules. No JVD. LYMPH: No C/SC nodes. CV: Regular rhythm. No ectopy. Rate normal.  Soft 1/6 systolic murmur-- No different. PULM: CTA. No wheezing. Decreased BS throughout. EXT: No edema  GI: Abdomen is soft,  BS+, No masses. Nontender, nondistended  VASC:  Pedal pulses are palpable, 1+ and symmetrical.  Carotid upstrokes are 2+. Soft right carotid bruit-- No worse  SKIN:  No rashes   NEURO: No focal deficits. MS:  Right foot first MTP joint with bony hyperostosis with some redness and callus formation. ASSESSMENT/PLAN:    1. Benign essential HTN  Blood pressure is well controlled  Continue current medical regimen  Continue no added salt diet  Stay well hydrated. Patient counseled on symptoms of hypotension  - Comprehensive Metabolic Panel; Future  - Lipid Panel; Future    2. CAD in native artery  -- Problem was incidentally identified on CT scan. Condition is well controlled  Continue to observe for cardiac decompensation and angina. Continue aspirin and Plavix  - Lipid Panel; Future    3. Hyperlipidemia, unspecified hyperlipidemia type  Condition is controlled  Due for lab  Continue high intensity statin (Lipitor 80 mg on (  Continue to observe for the development of adverse side effects such as myalgias. - Comprehensive Metabolic Panel; Future  - Lipid Panel; Future    4. PAD (peripheral artery disease) (HCC)  Condition is stable without signs of claudication or postprandial abdominal pain status post aortic-superior mesenteric artery bypass.   Continue

## 2018-08-28 ENCOUNTER — PROCEDURE VISIT (OUTPATIENT)
Dept: SURGERY | Age: 68
End: 2018-08-28

## 2018-08-28 ENCOUNTER — OFFICE VISIT (OUTPATIENT)
Dept: SURGERY | Age: 68
End: 2018-08-28

## 2018-08-28 VITALS — WEIGHT: 134 LBS | BODY MASS INDEX: 21.63 KG/M2 | DIASTOLIC BLOOD PRESSURE: 80 MMHG | SYSTOLIC BLOOD PRESSURE: 128 MMHG

## 2018-08-28 DIAGNOSIS — I73.9 PVD (PERIPHERAL VASCULAR DISEASE) WITH CLAUDICATION (HCC): ICD-10-CM

## 2018-08-28 DIAGNOSIS — I10 BENIGN ESSENTIAL HTN: ICD-10-CM

## 2018-08-28 DIAGNOSIS — K55.1 ATHEROSCLEROSIS OF SUPERIOR MESENTERIC ARTERY (HCC): ICD-10-CM

## 2018-08-28 DIAGNOSIS — I70.219 ATHEROSCLEROTIC PVD WITH INTERMITTENT CLAUDICATION (HCC): ICD-10-CM

## 2018-08-28 DIAGNOSIS — I70.213 ATHEROSCLEROSIS OF NATIVE ARTERY OF BOTH LOWER EXTREMITIES WITH INTERMITTENT CLAUDICATION (HCC): ICD-10-CM

## 2018-08-28 DIAGNOSIS — K55.1 MESENTERIC ARTERY STENOSIS (HCC): Primary | ICD-10-CM

## 2018-08-28 DIAGNOSIS — E78.5 HYPERLIPIDEMIA, UNSPECIFIED HYPERLIPIDEMIA TYPE: ICD-10-CM

## 2018-08-28 DIAGNOSIS — K55.1 MESENTERIC ARTERY STENOSIS (HCC): ICD-10-CM

## 2018-08-28 DIAGNOSIS — I65.23 ASYMPTOMATIC BILATERAL CAROTID ARTERY STENOSIS: ICD-10-CM

## 2018-08-28 PROCEDURE — 99213 OFFICE O/P EST LOW 20 MIN: CPT | Performed by: NURSE PRACTITIONER

## 2018-08-28 PROCEDURE — 1123F ACP DISCUSS/DSCN MKR DOCD: CPT | Performed by: NURSE PRACTITIONER

## 2018-08-28 PROCEDURE — 1101F PT FALLS ASSESS-DOCD LE1/YR: CPT | Performed by: NURSE PRACTITIONER

## 2018-08-28 PROCEDURE — G8598 ASA/ANTIPLAT THER USED: HCPCS | Performed by: NURSE PRACTITIONER

## 2018-08-28 PROCEDURE — 1036F TOBACCO NON-USER: CPT | Performed by: NURSE PRACTITIONER

## 2018-08-28 PROCEDURE — G8420 CALC BMI NORM PARAMETERS: HCPCS | Performed by: NURSE PRACTITIONER

## 2018-08-28 PROCEDURE — G8427 DOCREV CUR MEDS BY ELIG CLIN: HCPCS | Performed by: NURSE PRACTITIONER

## 2018-08-28 PROCEDURE — 4040F PNEUMOC VAC/ADMIN/RCVD: CPT | Performed by: NURSE PRACTITIONER

## 2018-08-28 PROCEDURE — 93925 LOWER EXTREMITY STUDY: CPT | Performed by: SURGERY

## 2018-08-28 PROCEDURE — 93976 VASCULAR STUDY: CPT | Performed by: SURGERY

## 2018-08-28 PROCEDURE — 3017F COLORECTAL CA SCREEN DOC REV: CPT | Performed by: NURSE PRACTITIONER

## 2018-08-28 ASSESSMENT — ENCOUNTER SYMPTOMS
ALLERGIC/IMMUNOLOGIC NEGATIVE: 1
COLOR CHANGE: 1
RESPIRATORY NEGATIVE: 1
GASTROINTESTINAL NEGATIVE: 1
EYES NEGATIVE: 1

## 2018-08-28 NOTE — PROGRESS NOTES
for a mesenteric scan, arterial duplex with chun's , carotid scant and office visit. The patient will need to fast for at least 5 hours prior to the ultrasound scan. Please understand that by not doing so may result in having an inaccurate ultrasound and may cause your ultrasound to be rescheduled. Patient has been instructed to follow up in 6 months for a carotid duplex scan and office visit. Signs/Symptoms of Stroke:  - Watch for one eye going dark like a shade was pulled down over it. - Watch for one side of the body not working.  - Difficulty with speech such as slurring your words  - Difficulty finding the right words, i.e. Calling an object by the wrong name when you know the real name. If you have any of these symptoms, do not call us, or your family doctor. Call 911, and go immediately to the hospital. You have a 4 hour window from the point when symptoms start to get to the hospital, get a CT scan done, and initiate clot dissolving drugs. HALI Louis MA am scribing for and in the presence of MARY Britt CNP on this date of 08/28/18 at 2:08 PM    I MARY Britt CNP personally performed the services described in this documentation as scribed by the Certified Medical Assistant Anuja Louis in my presence and it is both accurate and complete.

## 2018-09-11 ENCOUNTER — CARE COORDINATION (OUTPATIENT)
Dept: INTERNAL MEDICINE CLINIC | Age: 68
End: 2018-09-11

## 2018-09-11 ENCOUNTER — OFFICE VISIT (OUTPATIENT)
Dept: INTERNAL MEDICINE CLINIC | Age: 68
End: 2018-09-11

## 2018-09-11 VITALS
DIASTOLIC BLOOD PRESSURE: 74 MMHG | HEART RATE: 80 BPM | SYSTOLIC BLOOD PRESSURE: 118 MMHG | BODY MASS INDEX: 21.95 KG/M2 | RESPIRATION RATE: 16 BRPM | WEIGHT: 136 LBS | TEMPERATURE: 98.5 F

## 2018-09-11 DIAGNOSIS — K92.1 MELENA: ICD-10-CM

## 2018-09-11 DIAGNOSIS — M54.50 ACUTE MIDLINE LOW BACK PAIN WITHOUT SCIATICA: Primary | ICD-10-CM

## 2018-09-11 PROCEDURE — 1101F PT FALLS ASSESS-DOCD LE1/YR: CPT | Performed by: INTERNAL MEDICINE

## 2018-09-11 PROCEDURE — G8420 CALC BMI NORM PARAMETERS: HCPCS | Performed by: INTERNAL MEDICINE

## 2018-09-11 PROCEDURE — 1123F ACP DISCUSS/DSCN MKR DOCD: CPT | Performed by: INTERNAL MEDICINE

## 2018-09-11 PROCEDURE — 3017F COLORECTAL CA SCREEN DOC REV: CPT | Performed by: INTERNAL MEDICINE

## 2018-09-11 PROCEDURE — G8427 DOCREV CUR MEDS BY ELIG CLIN: HCPCS | Performed by: INTERNAL MEDICINE

## 2018-09-11 PROCEDURE — 1036F TOBACCO NON-USER: CPT | Performed by: INTERNAL MEDICINE

## 2018-09-11 PROCEDURE — G8598 ASA/ANTIPLAT THER USED: HCPCS | Performed by: INTERNAL MEDICINE

## 2018-09-11 PROCEDURE — 99213 OFFICE O/P EST LOW 20 MIN: CPT | Performed by: INTERNAL MEDICINE

## 2018-09-11 PROCEDURE — 4040F PNEUMOC VAC/ADMIN/RCVD: CPT | Performed by: INTERNAL MEDICINE

## 2018-09-12 ENCOUNTER — TELEPHONE (OUTPATIENT)
Dept: INTERNAL MEDICINE CLINIC | Age: 68
End: 2018-09-12

## 2018-09-13 ENCOUNTER — HOSPITAL ENCOUNTER (INPATIENT)
Age: 68
LOS: 2 days | Discharge: HOME | DRG: 379 | End: 2018-09-15
Attending: EMERGENCY MEDICINE | Admitting: INTERNAL MEDICINE
Payer: MEDICARE

## 2018-09-13 DIAGNOSIS — D64.9 SYMPTOMATIC ANEMIA: ICD-10-CM

## 2018-09-13 DIAGNOSIS — K92.2 GASTROINTESTINAL HEMORRHAGE, UNSPECIFIED GASTROINTESTINAL HEMORRHAGE TYPE: Primary | ICD-10-CM

## 2018-09-13 LAB
ABO/RH: NORMAL
ANION GAP SERPL CALCULATED.3IONS-SCNC: 12 MMOL/L (ref 3–16)
ANTIBODY SCREEN: NORMAL
BASOPHILS ABSOLUTE: 0.1 K/UL (ref 0–0.2)
BASOPHILS RELATIVE PERCENT: 1.6 %
BUN BLDV-MCNC: 17 MG/DL (ref 7–20)
CALCIUM SERPL-MCNC: 9.1 MG/DL (ref 8.3–10.6)
CHLORIDE BLD-SCNC: 99 MMOL/L (ref 99–110)
CO2: 23 MMOL/L (ref 21–32)
CREAT SERPL-MCNC: 0.8 MG/DL (ref 0.8–1.3)
EOSINOPHILS ABSOLUTE: 0.2 K/UL (ref 0–0.6)
EOSINOPHILS RELATIVE PERCENT: 3 %
GFR AFRICAN AMERICAN: >60
GFR NON-AFRICAN AMERICAN: >60
GLUCOSE BLD-MCNC: 90 MG/DL (ref 70–99)
HCT VFR BLD CALC: 30.6 % (ref 40.5–52.5)
HEMOGLOBIN: 10.4 G/DL (ref 13.5–17.5)
LYMPHOCYTES ABSOLUTE: 1.2 K/UL (ref 1–5.1)
LYMPHOCYTES RELATIVE PERCENT: 15.7 %
MCH RBC QN AUTO: 34.4 PG (ref 26–34)
MCHC RBC AUTO-ENTMCNC: 34 G/DL (ref 31–36)
MCV RBC AUTO: 101 FL (ref 80–100)
MONOCYTES ABSOLUTE: 0.8 K/UL (ref 0–1.3)
MONOCYTES RELATIVE PERCENT: 10.6 %
NEUTROPHILS ABSOLUTE: 5.1 K/UL (ref 1.7–7.7)
NEUTROPHILS RELATIVE PERCENT: 69.1 %
PDW BLD-RTO: 15.2 % (ref 12.4–15.4)
PLATELET # BLD: 234 K/UL (ref 135–450)
PMV BLD AUTO: 8 FL (ref 5–10.5)
POTASSIUM SERPL-SCNC: 5 MMOL/L (ref 3.5–5.1)
RBC # BLD: 3.03 M/UL (ref 4.2–5.9)
SODIUM BLD-SCNC: 134 MMOL/L (ref 136–145)
WBC # BLD: 7.4 K/UL (ref 4–11)

## 2018-09-13 PROCEDURE — C9113 INJ PANTOPRAZOLE SODIUM, VIA: HCPCS | Performed by: EMERGENCY MEDICINE

## 2018-09-13 PROCEDURE — 2580000003 HC RX 258: Performed by: INTERNAL MEDICINE

## 2018-09-13 PROCEDURE — 6360000002 HC RX W HCPCS: Performed by: EMERGENCY MEDICINE

## 2018-09-13 PROCEDURE — 86901 BLOOD TYPING SEROLOGIC RH(D): CPT

## 2018-09-13 PROCEDURE — 1200000000 HC SEMI PRIVATE

## 2018-09-13 PROCEDURE — 6370000000 HC RX 637 (ALT 250 FOR IP): Performed by: INTERNAL MEDICINE

## 2018-09-13 PROCEDURE — 80048 BASIC METABOLIC PNL TOTAL CA: CPT

## 2018-09-13 PROCEDURE — 99285 EMERGENCY DEPT VISIT HI MDM: CPT

## 2018-09-13 PROCEDURE — 36415 COLL VENOUS BLD VENIPUNCTURE: CPT

## 2018-09-13 PROCEDURE — 6360000002 HC RX W HCPCS: Performed by: INTERNAL MEDICINE

## 2018-09-13 PROCEDURE — 2580000003 HC RX 258: Performed by: EMERGENCY MEDICINE

## 2018-09-13 PROCEDURE — 86850 RBC ANTIBODY SCREEN: CPT

## 2018-09-13 PROCEDURE — 85025 COMPLETE CBC W/AUTO DIFF WBC: CPT

## 2018-09-13 PROCEDURE — 86900 BLOOD TYPING SEROLOGIC ABO: CPT

## 2018-09-13 RX ORDER — SODIUM CHLORIDE 0.9 % (FLUSH) 0.9 %
10 SYRINGE (ML) INJECTION EVERY 12 HOURS SCHEDULED
Status: DISCONTINUED | OUTPATIENT
Start: 2018-09-13 | End: 2018-09-15 | Stop reason: HOSPADM

## 2018-09-13 RX ORDER — SODIUM CHLORIDE 9 MG/ML
INJECTION, SOLUTION INTRAVENOUS CONTINUOUS
Status: DISCONTINUED | OUTPATIENT
Start: 2018-09-13 | End: 2018-09-15 | Stop reason: HOSPADM

## 2018-09-13 RX ORDER — SODIUM CHLORIDE 0.9 % (FLUSH) 0.9 %
10 SYRINGE (ML) INJECTION PRN
Status: DISCONTINUED | OUTPATIENT
Start: 2018-09-13 | End: 2018-09-15 | Stop reason: HOSPADM

## 2018-09-13 RX ORDER — RANITIDINE 150 MG/1
150 TABLET ORAL DAILY
Status: ON HOLD | COMMUNITY
End: 2018-09-15 | Stop reason: HOSPADM

## 2018-09-13 RX ORDER — ONDANSETRON 2 MG/ML
4 INJECTION INTRAMUSCULAR; INTRAVENOUS EVERY 6 HOURS PRN
Status: DISCONTINUED | OUTPATIENT
Start: 2018-09-13 | End: 2018-09-15 | Stop reason: HOSPADM

## 2018-09-13 RX ORDER — ACETAMINOPHEN 325 MG/1
650 TABLET ORAL EVERY 4 HOURS PRN
Status: DISCONTINUED | OUTPATIENT
Start: 2018-09-13 | End: 2018-09-15 | Stop reason: HOSPADM

## 2018-09-13 RX ORDER — THIAMINE HYDROCHLORIDE 100 MG/ML
100 INJECTION, SOLUTION INTRAMUSCULAR; INTRAVENOUS DAILY
Status: DISCONTINUED | OUTPATIENT
Start: 2018-09-13 | End: 2018-09-15 | Stop reason: HOSPADM

## 2018-09-13 RX ORDER — ATORVASTATIN CALCIUM 80 MG/1
80 TABLET, FILM COATED ORAL DAILY
Status: DISCONTINUED | OUTPATIENT
Start: 2018-09-14 | End: 2018-09-15 | Stop reason: HOSPADM

## 2018-09-13 RX ORDER — NIFEDIPINE 30 MG/1
TABLET, FILM COATED, EXTENDED RELEASE ORAL DAILY
Status: DISCONTINUED | OUTPATIENT
Start: 2018-09-14 | End: 2018-09-15 | Stop reason: HOSPADM

## 2018-09-13 RX ORDER — CARVEDILOL 12.5 MG/1
12.5 TABLET ORAL 2 TIMES DAILY
Status: DISCONTINUED | OUTPATIENT
Start: 2018-09-13 | End: 2018-09-15 | Stop reason: HOSPADM

## 2018-09-13 RX ADMIN — THIAMINE HYDROCHLORIDE 100 MG: 100 INJECTION, SOLUTION INTRAMUSCULAR; INTRAVENOUS at 18:50

## 2018-09-13 RX ADMIN — SODIUM CHLORIDE: 9 INJECTION, SOLUTION INTRAVENOUS at 18:47

## 2018-09-13 RX ADMIN — SODIUM CHLORIDE 80 MG: 9 INJECTION, SOLUTION INTRAVENOUS at 18:48

## 2018-09-13 RX ADMIN — SODIUM CHLORIDE 8 MG/HR: 900 INJECTION, SOLUTION INTRAVENOUS at 19:33

## 2018-09-13 RX ADMIN — CARVEDILOL 12.5 MG: 12.5 TABLET, FILM COATED ORAL at 21:47

## 2018-09-13 RX ADMIN — OCTREOTIDE ACETATE 25 MCG/HR: 500 INJECTION, SOLUTION INTRAVENOUS; SUBCUTANEOUS at 22:04

## 2018-09-13 ASSESSMENT — PAIN DESCRIPTION - FREQUENCY: FREQUENCY: INTERMITTENT

## 2018-09-13 ASSESSMENT — PAIN SCALES - GENERAL
PAINLEVEL_OUTOF10: 3
PAINLEVEL_OUTOF10: 0

## 2018-09-13 ASSESSMENT — PAIN DESCRIPTION - ORIENTATION: ORIENTATION: MID;LOWER

## 2018-09-13 ASSESSMENT — ENCOUNTER SYMPTOMS
DIARRHEA: 0
VOMITING: 0
ABDOMINAL PAIN: 0
SHORTNESS OF BREATH: 0
NAUSEA: 0
BLOOD IN STOOL: 1

## 2018-09-13 ASSESSMENT — PAIN DESCRIPTION - LOCATION: LOCATION: ABDOMEN

## 2018-09-13 ASSESSMENT — PAIN DESCRIPTION - PAIN TYPE: TYPE: ACUTE PAIN

## 2018-09-13 NOTE — ED NOTES
Report called to [facility] on [facility].  Patient will be transported to room [address] [place] [address]  09/13/18 [id number]

## 2018-09-13 NOTE — H&P
reports that he drinks about 12.0 oz of alcohol per week . Family History:      Reviewed in detail and negative for DM, CAD, Cancer, CVA. Positive as follows:    Family History   Problem Relation Age of Onset    Heart Attack Father     Heart Disease Father         MI    Cancer Brother         Brain CA    Hypertension Brother     Cancer Brother         Throat cancer       REVIEW OF SYSTEMS:   Pertinent positives as noted in the HPI. All other systems reviewed and negative. PHYSICAL EXAM:    BP (!) 144/72   Pulse 66   Temp 98.3 °F (36.8 °C) (Oral)   Resp 18   Ht 5' 6\" (1.676 m)   Wt 140 lb (63.5 kg)   SpO2 100%   BMI 22.60 kg/m²     General appearance:  No apparent distress, appears stated age and cooperative. Appears chronically ill. HEENT:  Normal cephalic, atraumatic without obvious deformity. Pupils equal, round, and reactive to light. Extra ocular muscles intact. Conjunctivae/corneas clear. Neck: Supple, with full range of motion. No jugular venous distention. Trachea midline. Respiratory:  Normal respiratory effort. Clear to auscultation, bilaterally without RALES/WHEEZES/RHONCHI. Cardiovascular:  Regular rate and rhythm with normal S1/S2 without MURMUR, rubs or gallops. Abdomen: Soft, non-tender, non-distended with normal bowel sounds. Musculoskeletal:  No clubbing, cyanosis or EDEMA bilaterally. Full range of motion without deformity. Skin: Skin color, texture, turgor normal.  No rashes or lesions. Neurologic:  Neurovascularly intact without any focal sensory/motor deficits.  Cranial nerves: II-XII intact, grossly non-focal.  Psychiatric:  Alert and oriented, thought content appropriate, normal insight  Capillary Refill: Brisk,< 3 seconds   Peripheral Pulses: +2 palpable, equal bilaterally       Labs:     Recent Labs      09/13/18   1522   WBC  7.4   HGB  10.4*   HCT  30.6*   PLT  234     Recent Labs      09/13/18   1522   NA  134*   K  5.0   CL  99   CO2  23   BUN  17 CREATININE  0.8   CALCIUM  9.1     No results for input(s): AST, ALT, BILIDIR, BILITOT, ALKPHOS in the last 72 hours. No results for input(s): INR in the last 72 hours. No results for input(s): [place] Pamela in the last 72 hours. Urinalysis:      Lab Results   Component Value Date    NITRU Negative 11/25/2016    WBCUA None seen 11/08/2016    BACTERIA RARE 08/04/2015    RBCUA None seen 11/08/2016    BLOODU Negative 11/25/2016    SPECGRAV <=1.005 11/25/2016    GLUCOSEU Negative 11/25/2016       ASSESSMENT/PLAN:    Active Hospital Problems    Diagnosis Date Noted    GI bleed [K92.2] 09/13/2018       Acute Medical Issues Being Addressed:  GI bleed- suspect upper. Likely due to nsaid use. Monitor vital signs on tele, 2 large bore IV, IVF, Transfuse for Hgb <7, monitor H&H every 6 hours, GI consulted for endoscopy. NPO. PPI bolus and drip. Hold antithrombotics. Avoid Nsaids. Chronic Stable Medical Issue --> cont. home regimen as appropriate or otherwise noted:  htn  hld  cad  etoh     DVT Prophylaxis: scd  Diet:  npo  Code Status: full    PT/OT Eval Status: will have eval if appropriate given patient's condition    Dispo - once acute medical process is resolved       Ronny Bonds MD    Thank you Lucero Holm DO for the opportunity to be involved in this patient's care.  If you have any questions or concerns please feel free to contact me at [phone] (pager)

## 2018-09-13 NOTE — ED PROVIDER NOTES
vascular disease) with claudication ([address] Renal artery atherosclerosis ([address] SMA stenosis ([address] Thoracic aorta atherosclerosis ([address] Venous insufficiency; and Vertebral artery dissection ([address] He has a past surgical history that includes Venous clot surgery (11/03); Colonoscopy (2008); Inguinal hernia repair (Bilateral, 1/16/13); Colonoscopy (9/2/2015); Upper gastrointestinal endoscopy (9/2/2015); angioplasty (Left, 08/2016); Arterial bypass surgry (Left, 09/14/2016); angioplasty (11/27/2016); and Arterial bypass surgry (11/29/2016). His family history includes Cancer in his brother and brother; Heart Attack in his father; Heart Disease in his father; Hypertension in his brother. He reports that he quit smoking about 2 years ago. His smoking use included Cigarettes. He started smoking about 48 years ago. He has a 23.00 pack-year smoking history. He has never used smokeless tobacco. He reports that he drinks about 12.0 oz of alcohol per week . He reports that he does not use drugs. Medications     Previous Medications    ASPIRIN 81 MG TABLET    Take 81 mg by mouth daily    ATORVASTATIN (LIPITOR) 80 MG TABLET    TAKE 1 TABLET BY MOUTH DAILY    CARVEDILOL (COREG) 12.5 MG TABLET    TAKE 1 TABLET BY MOUTH TWICE DAILY    CHOLECALCIFEROL (VITAMIN D) 2000 UNITS CAPS CAPSULE    Take 1 capsule by mouth daily    CLOPIDOGREL (PLAVIX) 75 MG TABLET    TAKE 1 TABLET BY MOUTH DAILY    IRBESARTAN (AVAPRO) 300 MG TABLET    TAKE 1 TABLET BY MOUTH EVERY DAY    LOPERAMIDE (RA ANTI-DIARRHEAL) 2 MG CAPSULE    Take 2 mg by mouth daily as needed for Diarrhea     NIFEDIPINE (PROCARDIA XL) 30 MG EXTENDED RELEASE TABLET    TAKE 1 TABLET BY MOUTH DAILY       Allergies     He has No Known Allergies. Physical Exam     INITIAL VITALS: BP: (!) 144/72, Temp: 98.3 °F (36.8 °C), Pulse: 71, Resp: 18, SpO2: 100 %   Physical Exam   Constitutional: He is oriented to person, place, and time. He appears well-developed and well-nourished.  No Allergies, and Family Hx were reviewed. The patient was given the following medications:  Orders Placed This Encounter   Medications    FOLLOWED BY Linked Order Group     pantoprazole (PROTONIX) 80 mg in sodium chloride 0.9 % 50 mL bolus     pantoprazole (PROTONIX) 80 mg in sodium chloride 0.9 % 100 mL infusion       CONSULTS:  IP CONSULT TO GI  IP CONSULT TO HOSPITALIST    [address] [facility] Alfredo is a 80 y.o. male presenting with melena 4 days duration with lightheadedness on standing. His orthostatics were positive. His hemoglobin was 10, down 4 g from 4 months ago. We attempted on several occasions to get in touch with the GI doctor, Attar, and we were unsuccessful. The patient has been instructed to come to the emergency department for what sounds like trending of his blood count and endoscopy. The patient continues to have melena and given his drop in hemoglobin and symptoms of anemia, he will be admitted to the hospitalist with GI consultation. Critical Care:  Due to the immediate potential for life-threatening deterioration due to GIB with symptoms of anemia, I spent 30 minutes providing critical care. This time excludes time spent performing procedures but includes time spent on direct patient care, history retrieval, review of the chart, and discussions with patient, family, and consultant(s). Clinical Impression     1. Gastrointestinal hemorrhage, unspecified gastrointestinal hemorrhage type    2. Symptomatic anemia        Disposition     DISPOSITION Decision To Admit 09/13/2018 04:46:34 PM       Silvia Jones MD  09/13/18 1701      5:23 PM  I received a call back from Dr. Chase Tavarez who will be seeing the patient. He asked that the patient be placed on his consult list and he will see the patient and likely perform endoscopy tomorrow.      Silvia Jones MD  09/13/18 [id number]

## 2018-09-14 LAB
FOLATE: 14.37 NG/ML (ref 4.78–24.2)
HCT VFR BLD CALC: 27.6 % (ref 40.5–52.5)
HCT VFR BLD CALC: 29.6 % (ref 40.5–52.5)
HEMOGLOBIN: 9.4 G/DL (ref 13.5–17.5)
HEMOGLOBIN: 9.7 G/DL (ref 13.5–17.5)
INR BLD: 0.99 (ref 0.86–1.14)
PROTHROMBIN TIME: 11.3 SEC (ref 9.8–13)
VITAMIN B-12: 365 PG/ML (ref 211–911)

## 2018-09-14 PROCEDURE — 36415 COLL VENOUS BLD VENIPUNCTURE: CPT

## 2018-09-14 PROCEDURE — 1200000000 HC SEMI PRIVATE

## 2018-09-14 PROCEDURE — 82607 VITAMIN B-12: CPT

## 2018-09-14 PROCEDURE — 85018 HEMOGLOBIN: CPT

## 2018-09-14 PROCEDURE — 0DB68ZX EXCISION OF STOMACH, VIA NATURAL OR ARTIFICIAL OPENING ENDOSCOPIC, DIAGNOSTIC: ICD-10-PCS | Performed by: INTERNAL MEDICINE

## 2018-09-14 PROCEDURE — 6360000002 HC RX W HCPCS: Performed by: INTERNAL MEDICINE

## 2018-09-14 PROCEDURE — 7100000011 HC PHASE II RECOVERY - ADDTL 15 MIN: Performed by: INTERNAL MEDICINE

## 2018-09-14 PROCEDURE — 7100000010 HC PHASE II RECOVERY - FIRST 15 MIN: Performed by: INTERNAL MEDICINE

## 2018-09-14 PROCEDURE — 0DB98ZX EXCISION OF DUODENUM, VIA NATURAL OR ARTIFICIAL OPENING ENDOSCOPIC, DIAGNOSTIC: ICD-10-PCS | Performed by: INTERNAL MEDICINE

## 2018-09-14 PROCEDURE — 2720000010 HC SURG SUPPLY STERILE: Performed by: INTERNAL MEDICINE

## 2018-09-14 PROCEDURE — 6360000002 HC RX W HCPCS: Performed by: EMERGENCY MEDICINE

## 2018-09-14 PROCEDURE — 6370000000 HC RX 637 (ALT 250 FOR IP): Performed by: INTERNAL MEDICINE

## 2018-09-14 PROCEDURE — 88305 TISSUE EXAM BY PATHOLOGIST: CPT

## 2018-09-14 PROCEDURE — 85610 PROTHROMBIN TIME: CPT

## 2018-09-14 PROCEDURE — 2580000003 HC RX 258: Performed by: EMERGENCY MEDICINE

## 2018-09-14 PROCEDURE — C9113 INJ PANTOPRAZOLE SODIUM, VIA: HCPCS | Performed by: EMERGENCY MEDICINE

## 2018-09-14 PROCEDURE — 85014 HEMATOCRIT: CPT

## 2018-09-14 PROCEDURE — 3609012400 HC EGD TRANSORAL BIOPSY SINGLE/MULTIPLE: Performed by: INTERNAL MEDICINE

## 2018-09-14 PROCEDURE — 2580000003 HC RX 258: Performed by: INTERNAL MEDICINE

## 2018-09-14 PROCEDURE — 82746 ASSAY OF FOLIC ACID SERUM: CPT

## 2018-09-14 RX ORDER — MIDAZOLAM HYDROCHLORIDE 1 MG/ML
INJECTION INTRAMUSCULAR; INTRAVENOUS PRN
Status: DISCONTINUED | OUTPATIENT
Start: 2018-09-14 | End: 2018-09-14 | Stop reason: HOSPADM

## 2018-09-14 RX ORDER — MEPERIDINE HYDROCHLORIDE 50 MG/ML
INJECTION INTRAMUSCULAR; INTRAVENOUS; SUBCUTANEOUS PRN
Status: DISCONTINUED | OUTPATIENT
Start: 2018-09-14 | End: 2018-09-14 | Stop reason: HOSPADM

## 2018-09-14 RX ADMIN — CARVEDILOL 12.5 MG: 12.5 TABLET, FILM COATED ORAL at 22:31

## 2018-09-14 RX ADMIN — NIFEDIPINE 30 MG: 30 TABLET, FILM COATED, EXTENDED RELEASE ORAL at 08:55

## 2018-09-14 RX ADMIN — SODIUM CHLORIDE: 9 INJECTION, SOLUTION INTRAVENOUS at 08:56

## 2018-09-14 RX ADMIN — SODIUM CHLORIDE: 9 INJECTION, SOLUTION INTRAVENOUS at 20:28

## 2018-09-14 RX ADMIN — THIAMINE HYDROCHLORIDE 100 MG: 100 INJECTION, SOLUTION INTRAMUSCULAR; INTRAVENOUS at 08:55

## 2018-09-14 RX ADMIN — ATORVASTATIN CALCIUM 80 MG: 80 TABLET, FILM COATED ORAL at 08:55

## 2018-09-14 RX ADMIN — SODIUM CHLORIDE 8 MG/HR: 900 INJECTION, SOLUTION INTRAVENOUS at 07:10

## 2018-09-14 RX ADMIN — CARVEDILOL 12.5 MG: 12.5 TABLET, FILM COATED ORAL at 08:55

## 2018-09-14 ASSESSMENT — PAIN SCALES - GENERAL
PAINLEVEL_OUTOF10: 0

## 2018-09-14 NOTE — PROGRESS NOTES
Hospitalist Progress Note      PCP: Edgar Savage DO    Date of Admission: 9/13/2018    Chief Complaint: melena    Hospital Course: pt admitted for ugib    Subjective: pt comfortable this am    Medications:  Reviewed    Infusion Medications    pantoprozole (PROTONIX) infusion 8 mg/hr (09/14/18 0710)    sodium chloride 75 mL/hr at 09/14/18 1056    octreotide (SANDOSTATIN) infusion 25 mcg/hr (09/13/18 2204)     Scheduled Medications    atorvastatin  80 mg Oral Daily    carvedilol  12.5 mg Oral BID    NIFEdipine   Oral Daily    sodium chloride flush  10 mL Intravenous 2 times per day    sodium chloride flush  10 mL Intravenous 2 times per day    thiamine  100 mg Intramuscular Daily     PRN Meds: sodium chloride flush, acetaminophen, ondansetron, sodium chloride flush      Intake/Output Summary (Last 24 hours) at 09/14/18 1338  Last data filed at 09/14/18 0700   Gross per 24 hour   Intake          2190.33 ml   Output                0 ml   Net          2190.33 ml       Exam:    BP 98/62   Pulse 70   Temp 97 °F (36.1 °C) (Oral)   Resp 16   Ht 5' 6\" (1.676 m)   Wt 140 lb (63.5 kg)   SpO2 90%   BMI 22.60 kg/m²     General appearance: No apparent distress, appears stated age and cooperative. HEENT: Pupils equal, round, and reactive to light. Conjunctivae/corneas clear. Neck: Supple, with full range of motion. No jugular venous distention. Trachea midline. Respiratory:  Normal respiratory effort. Clear to auscultation, bilaterally without RALES/WHEEZES/Rhonchi. Cardiovascular: Regular rate and rhythm with normal S1/S2 without MURMURS, rubs or gallops. Abdomen: Soft, non-tender, non-distended with normal bowel sounds. Musculoskeletal: No clubbing, cyanosis or EDEMA bilaterally. Full range of motion without deformity. Skin: Skin color, texture, turgor normal.  No rashes or lesions. Neurologic:  Neurovascularly intact without any focal sensory/motor deficits.  Cranial nerves: II-XII intact,

## 2018-09-14 NOTE — PROGRESS NOTES
Received call from Dr. Jerel Thompson at [phone] with verbal orders with readback  for NPO after midnight, exceptions for ice chips and sips with meds and to obtain a verified consent for an EGD.

## 2018-09-14 NOTE — CONSULTS
GI Progress Note    Lai Gant is a 80 y.o. male patient. 1. Gastrointestinal hemorrhage, unspecified gastrointestinal hemorrhage type    2.  Symptomatic anemia        Admit Date: 9/13/2018    Subjective:       Minor abdominal discomfort  No nausea or vomiting      ROS:  Cardiovascular ROS: no chest pain or dyspnea on exertion  Gastrointestinal ROS: as above  Respiratory ROS: no cough, shortness of breath, or wheezing    Scheduled Meds:   atorvastatin  80 mg Oral Daily    carvedilol  12.5 mg Oral BID    NIFEdipine   Oral Daily    sodium chloride flush  10 mL Intravenous 2 times per day    sodium chloride flush  10 mL Intravenous 2 times per day    thiamine  100 mg Intramuscular Daily       Continuous Infusions:   pantoprozole (PROTONIX) infusion 8 mg/hr (09/14/18 0710)    sodium chloride 75 mL/hr at 09/14/18 1056    octreotide (SANDOSTATIN) infusion 25 mcg/hr (09/13/18 2204)       PRN Meds:  midazolam, meperidine, sodium chloride flush, acetaminophen, ondansetron, sodium chloride flush      Objective:       Patient Vitals for the past 24 hrs:   BP Temp Temp src Pulse Resp SpO2   09/14/18 1810 (!) 89/56 - - 62 16 98 %   09/14/18 1805 (!) 93/56 - - 68 11 (!) 88 %   09/14/18 1800 (!) 89/57 - - 62 15 98 %   09/14/18 1755 - - - 65 16 98 %   09/14/18 1750 (!) 102/50 - - 64 10 98 %   09/14/18 1745 95/64 - - 65 (!) 4 97 %   09/14/18 1740 (!) 90/57 - - 61 (!) 7 97 %   09/14/18 1735 99/66 - - 66 (!) 7 98 %   09/14/18 1730 109/72 - - 70 (!) 0 98 %   09/14/18 1725 (!) 142/83 - - 74 17 94 %   09/14/18 1308 98/62 97 °F (36.1 °C) Oral 70 16 90 %   09/14/18 0848 125/78 97.3 °F (36.3 °C) Oral 70 16 91 %   09/14/18 0516 118/64 97 °F (36.1 °C) Oral 68 18 92 %   09/14/18 0031 104/68 97.8 °F (36.6 °C) Oral 69 18 94 %   09/13/18 2139 125/77 98.9 °F (37.2 °C) Axillary 68 18 96 %       Exam:    VITALS:  BP (!) 89/56   Pulse 62   Temp 97 °F (36.1 °C) (Oral)   Resp 16   Ht 5' 6\" (1.676 m)   Wt 140 lb (63.5 kg)   SpO2

## 2018-09-14 NOTE — PLAN OF CARE
Problem: Falls - Risk of:  Goal: Will remain free from falls  Will remain free from falls   Outcome: Met This Shift  Patient remains medium fall risk on the linder fall risk scale. Fall risk prevention measures in place: bed in lowest position, non-skids in place, effectively encourage patient to call for assistance.  Remains without fall and continues to use tyrone light appropriately,

## 2018-09-15 VITALS
TEMPERATURE: 97.7 F | OXYGEN SATURATION: 90 % | RESPIRATION RATE: 16 BRPM | HEART RATE: 68 BPM | BODY MASS INDEX: 22.5 KG/M2 | DIASTOLIC BLOOD PRESSURE: 62 MMHG | SYSTOLIC BLOOD PRESSURE: 133 MMHG | WEIGHT: 140 LBS | HEIGHT: 66 IN

## 2018-09-15 LAB
HCT VFR BLD CALC: 27.6 % (ref 40.5–52.5)
HEMOGLOBIN: 9.4 G/DL (ref 13.5–17.5)

## 2018-09-15 PROCEDURE — 2580000003 HC RX 258: Performed by: EMERGENCY MEDICINE

## 2018-09-15 PROCEDURE — 36415 COLL VENOUS BLD VENIPUNCTURE: CPT

## 2018-09-15 PROCEDURE — 6370000000 HC RX 637 (ALT 250 FOR IP): Performed by: INTERNAL MEDICINE

## 2018-09-15 PROCEDURE — 85014 HEMATOCRIT: CPT

## 2018-09-15 PROCEDURE — 85018 HEMOGLOBIN: CPT

## 2018-09-15 PROCEDURE — 6360000002 HC RX W HCPCS: Performed by: EMERGENCY MEDICINE

## 2018-09-15 PROCEDURE — 2580000003 HC RX 258: Performed by: INTERNAL MEDICINE

## 2018-09-15 PROCEDURE — C9113 INJ PANTOPRAZOLE SODIUM, VIA: HCPCS | Performed by: EMERGENCY MEDICINE

## 2018-09-15 PROCEDURE — 6360000002 HC RX W HCPCS: Performed by: INTERNAL MEDICINE

## 2018-09-15 RX ORDER — ACETAMINOPHEN 325 MG/1
650 TABLET ORAL EVERY 6 HOURS PRN
Qty: 120 TABLET | Refills: 3 | Status: SHIPPED | OUTPATIENT
Start: 2018-09-15

## 2018-09-15 RX ORDER — CLOPIDOGREL BISULFATE 75 MG/1
TABLET ORAL
Qty: 90 TABLET | Refills: 5 | Status: SHIPPED | OUTPATIENT
Start: 2018-09-22 | End: 2020-02-25 | Stop reason: SDUPTHER

## 2018-09-15 RX ORDER — PANTOPRAZOLE SODIUM 40 MG/1
40 TABLET, DELAYED RELEASE ORAL DAILY
Qty: 30 TABLET | Refills: 3 | Status: SHIPPED | OUTPATIENT
Start: 2018-09-15 | End: 2019-03-07 | Stop reason: SDUPTHER

## 2018-09-15 RX ADMIN — ATORVASTATIN CALCIUM 80 MG: 80 TABLET, FILM COATED ORAL at 08:58

## 2018-09-15 RX ADMIN — SODIUM CHLORIDE: 9 INJECTION, SOLUTION INTRAVENOUS at 09:05

## 2018-09-15 RX ADMIN — CARVEDILOL 12.5 MG: 12.5 TABLET, FILM COATED ORAL at 08:58

## 2018-09-15 RX ADMIN — SODIUM CHLORIDE 8 MG/HR: 900 INJECTION, SOLUTION INTRAVENOUS at 02:31

## 2018-09-15 RX ADMIN — NIFEDIPINE 30 MG: 30 TABLET, FILM COATED, EXTENDED RELEASE ORAL at 08:58

## 2018-09-15 RX ADMIN — THIAMINE HYDROCHLORIDE 100 MG: 100 INJECTION, SOLUTION INTRAMUSCULAR; INTRAVENOUS at 08:59

## 2018-09-15 ASSESSMENT — PAIN SCALES - GENERAL
PAINLEVEL_OUTOF10: 0

## 2018-09-15 NOTE — PLAN OF CARE
Problem: Falls - Risk of:  Goal: Absence of physical injury  Absence of physical injury   Outcome: Ongoing  Patient is a risk for falls. Bed locked in the lowest position. Bed alarm in use. Call light and personal belongings within reach. Instructed to call for help before getting up, patient verbalizes understanding. Door to room left open. Wearing non-skid socks. Fall sign posted. Will continue to monitor.

## 2018-09-15 NOTE — DISCHARGE SUMMARY
Patient discharging home with wife. IV and telemetry removed. Discharge instructions and medications reviewed, both the patient and his wife verbalize understanding and state they have no further questions at this time. Discharging with personal belongings and AVS packet. Declined wheelchair at discharge.
convenience and continuity at follow-up the following most recent labs are provided:    Lab Results   Component Value Date    WBC 7.4 09/13/2018    HGB 9.4 09/15/2018    HCT 27.6 09/15/2018    .0 09/13/2018     09/13/2018     09/13/2018    K 5.0 09/13/2018    CL 99 09/13/2018    CO2 23 09/13/2018    BUN 17 09/13/2018    CREATININE 0.8 09/13/2018    CALCIUM 9.1 09/13/2018    PHOS 4.0 12/14/2016    ALKPHOS 79 07/30/2018    ALT 34 07/30/2018    AST 42 07/30/2018    BILITOT 0.9 07/30/2018    BILIDIR <0.2 11/24/2016    LABALBU 4.5 07/30/2018    LDLCALC 37 07/30/2018    TRIG 55 07/30/2018     Lab Results   Component Value Date    INR 0.99 09/14/2018    INR 0.93 12/23/2017    INR 0.97 12/01/2016       Radiology:  No orders to display       Discharge Medications:   Current Discharge Medication List      START taking these medications    Details   acetaminophen (TYLENOL) 325 MG tablet Take 2 tablets by mouth every 6 hours as needed for Pain  Qty: 120 tablet, Refills: 3      pantoprazole (PROTONIX) 40 MG tablet Take 1 tablet by mouth daily  Qty: 30 tablet, Refills: 3           Current Discharge Medication List      CONTINUE these medications which have CHANGED    Details   clopidogrel (PLAVIX) 75 MG tablet TAKE 1 TABLET BY MOUTH DAILY  Qty: 90 tablet, Refills: 5    Comments: **Patient requests 90 days supply**      aspirin 81 MG tablet Take 1 tablet by mouth daily  Qty: 30 tablet, Refills: 3           Current Discharge Medication List      CONTINUE these medications which have NOT CHANGED    Details   Probiotic Product (ALIGN PO) Take 1 tablet by mouth daily      Misc Natural Products (GLUCOSAMINE CHOND MSM FORMULA PO) Take 1 tablet by mouth 2 times daily      atorvastatin (LIPITOR) 80 MG tablet TAKE 1 TABLET BY MOUTH DAILY  Qty: 90 tablet, Refills: 3      carvedilol (COREG) 12.5 MG tablet TAKE 1 TABLET BY MOUTH TWICE DAILY  Qty: 180 tablet, Refills: 3      NIFEdipine (PROCARDIA XL) 30 MG extended release

## 2018-09-15 NOTE — PROCEDURES
[address] [place], [address]                                  PROCEDURE NOTE    PATIENT NAME: Hayder Mcnulty                  :        1950  MED REC NO:   [id number]                          ROOM:       6322  ACCOUNT NO:   [de-identified]                           ADMIT DATE: 2018  PROVIDER:     Leland Phelps MD    DATE OF PROCEDURE:  2018    INDICATION FOR PROCEDURE:  Melena with recent drop in his hemoglobin and  hematocrit. DESCRIPTION OF PROCEDURE:  With the patient in the left lateral position,  and after sedation with 50 mg of Demerol and 5 mg of Versed IV, the Olympus  video endoscope was introduced into the esophagus and advanced towards the  GE junction. Esophagus was normal.  There was no sign of esophagitis or  esophageal varices. Stomach was carefully inspected. Enlarged gastric  polyps were noted and appeared to have improved and become less enlarged  than previous procedure, and biopsies were obtained. The stomach revealed  mild gastritis, several erosions, and small gastric ulcers in the antrum. Biopsies were obtained. The duodenum was normal.  The scope was removed  without complication. IMPRESSION:  1. Small gastric ulcer with erosive gastritis. 2.  Enlarged gastric folds.         Antoni Abad MD    D: 2018 18:22:48       T: 2018 22:27:11     [id number]  Job#: [id number]     Doc#: [id number]    CC:

## 2018-09-17 ENCOUNTER — CARE COORDINATION (OUTPATIENT)
Dept: INTERNAL MEDICINE CLINIC | Age: 68
End: 2018-09-17

## 2018-09-17 NOTE — CARE COORDINATION
Called and LM on VM w ACC name and number. May have OV today w GI, as was previously scheduled. Colonoscopy as OP recommended upon discharge from IP stay , EGD completed during [facility] stay.

## 2018-09-21 ENCOUNTER — CARE COORDINATION (OUTPATIENT)
Dept: INTERNAL MEDICINE CLINIC | Age: 68
End: 2018-09-21

## 2018-09-21 NOTE — CARE COORDINATION
Ambulatory Care Coordination Note  9/21/2018  CM Risk Score: 2  Simon Mortality Risk Score: 5.8    ACC: Jimmie Johnson, RN    Summary Note:   Return call from patient  Reports had colonoscopy yesterday, all clear   Has follow up w GI Dr Jacquelynn Duverney on 10/4  EGD found small gastric ulcer and recommended recheck in 2 months  No longer taking Aleve, denies any black stool  Will discuss with Dr Geraldine Stock need to have repeat EGD in 2 months at OV  Back pain continues, using Tylenol Arthritis without much relief,\  He would like to try back exercises more diligently and check back in about a week and see if any improvement. Plan  Follow up next week re back pian  10/4 appt w Dr Yaritza Rees Interventions    Program Enrollment:  Rising Risk  Referral from Primary Care Provider:  Yes  Suggested Interventions and Community Resources  Pharmacist:  Completed  Physical Therapy:  Not Started  Pulmonary Rehab:  Not Started  Smoking Cessation:  Completed         Goals Addressed             Most Recent     Patient Stated (pt-stated)   No change (6/22/2018)             Start a walking program this spring    Barriers: none  Plan for overcoming my barriers: N/A  Confidence: 7/10  Anticipated Goal Completion Date: 9/28/2018    Has had back pain, will start back exercises            Prior to Admission medications    Medication Sig Start Date End Date Taking?  Authorizing Provider   acetaminophen (TYLENOL) 325 MG tablet Take 2 tablets by mouth every 6 hours as needed for Pain 9/15/18   Catrachito Quintana MD   clopidogrel (PLAVIX) 75 MG tablet TAKE 1 TABLET BY MOUTH DAILY 9/22/18   Catrachito Quintana MD   aspirin 81 MG tablet Take 1 tablet by mouth daily 9/22/18   Catrachito Quintana MD   pantoprazole (PROTONIX) 40 MG tablet Take 1 tablet by mouth daily 9/15/18   Catrachito Quintana MD   Probiotic Product (ALIGN PO) Take 1 tablet by mouth daily    Historical Provider, MD   Misc Natural Products ([address] MSM FORMULA PO) Take 1 tablet by mouth 2 times daily    Historical Provider, MD   atorvastatin (LIPITOR) 80 MG tablet TAKE 1 TABLET BY MOUTH DAILY 9/7/18   Pepito Leal DO   carvedilol (COREG) 12.5 MG tablet TAKE 1 TABLET BY MOUTH TWICE DAILY 9/7/18   Pepito Leal DO   NIFEdipine (PROCARDIA XL) 30 MG extended release tablet TAKE 1 TABLET BY MOUTH DAILY 3/27/18   Pepito Leal DO   irbesartan (AVAPRO) 300 MG tablet TAKE 1 TABLET BY MOUTH EVERY DAY 3/27/18   Pepito Leal DO   Cholecalciferol (VITAMIN D) 2000 units CAPS capsule Take 1 capsule by mouth daily    Historical Provider, MD   loperamide (RA ANTI-DIARRHEAL) 2 MG capsule Take 2 mg by mouth daily as needed for Diarrhea     Historical Provider, MD       Future Appointments  Date Time Provider Virgilio Souza   11/29/2018 11:00 AM DO KALYAN Matias [facility]   3/5/2019 9:30 AM SCHEDULE, VASCULAR LAB 1 PHYSGVS [facility]   3/5/2019 11:00 AM John Storm APRN - CNP [facility]     ,   COPD Assessment    Does the patient understand envrionmental exposure?:  Yes  Does the patient have a nebulizer?:  No     No patient-reported symptoms         Symptoms:      Have you had a recent diagnosis of pneumonia either by PCP or at a hospital?:  No      and   General Assessment    Do you have any symptoms that are causing concern?:  No

## 2018-10-03 RX ORDER — NIFEDIPINE 30 MG/1
30 TABLET, EXTENDED RELEASE ORAL DAILY
Qty: 90 TABLET | Refills: 0 | Status: SHIPPED | OUTPATIENT
Start: 2018-10-03 | End: 2018-12-30 | Stop reason: SDUPTHER

## 2018-10-03 RX ORDER — IRBESARTAN 300 MG/1
TABLET ORAL
Qty: 90 TABLET | Refills: 0 | Status: SHIPPED | OUTPATIENT
Start: 2018-10-03 | End: 2018-12-30 | Stop reason: SDUPTHER

## 2018-10-31 ENCOUNTER — CARE COORDINATION (OUTPATIENT)
Dept: INTERNAL MEDICINE CLINIC | Age: 68
End: 2018-10-31

## 2018-12-06 ENCOUNTER — OFFICE VISIT (OUTPATIENT)
Dept: INTERNAL MEDICINE CLINIC | Age: 68
End: 2018-12-06
Payer: MEDICARE

## 2018-12-06 VITALS
HEIGHT: 66 IN | BODY MASS INDEX: 21.69 KG/M2 | DIASTOLIC BLOOD PRESSURE: 80 MMHG | SYSTOLIC BLOOD PRESSURE: 156 MMHG | HEART RATE: 62 BPM | WEIGHT: 135 LBS

## 2018-12-06 DIAGNOSIS — J43.9 PULMONARY EMPHYSEMA, UNSPECIFIED EMPHYSEMA TYPE (HCC): ICD-10-CM

## 2018-12-06 DIAGNOSIS — Z23 NEED FOR PNEUMOCOCCAL VACCINATION: ICD-10-CM

## 2018-12-06 DIAGNOSIS — I10 BENIGN ESSENTIAL HTN: Primary | ICD-10-CM

## 2018-12-06 DIAGNOSIS — I73.9 PAD (PERIPHERAL ARTERY DISEASE) (HCC): ICD-10-CM

## 2018-12-06 DIAGNOSIS — I25.10 CORONARY ARTERY DISEASE INVOLVING NATIVE CORONARY ARTERY OF NATIVE HEART WITHOUT ANGINA PECTORIS: ICD-10-CM

## 2018-12-06 DIAGNOSIS — E78.5 HYPERLIPIDEMIA, UNSPECIFIED HYPERLIPIDEMIA TYPE: ICD-10-CM

## 2018-12-06 DIAGNOSIS — Z23 FLU VACCINE NEED: ICD-10-CM

## 2018-12-06 DIAGNOSIS — D64.9 NORMOCYTIC ANEMIA: ICD-10-CM

## 2018-12-06 DIAGNOSIS — Z87.19 HISTORY OF GI BLEED: ICD-10-CM

## 2018-12-06 DIAGNOSIS — Z79.899 DRUG THERAPY: ICD-10-CM

## 2018-12-06 PROCEDURE — 3017F COLORECTAL CA SCREEN DOC REV: CPT | Performed by: INTERNAL MEDICINE

## 2018-12-06 PROCEDURE — G8598 ASA/ANTIPLAT THER USED: HCPCS | Performed by: INTERNAL MEDICINE

## 2018-12-06 PROCEDURE — 1123F ACP DISCUSS/DSCN MKR DOCD: CPT | Performed by: INTERNAL MEDICINE

## 2018-12-06 PROCEDURE — G8428 CUR MEDS NOT DOCUMENT: HCPCS | Performed by: INTERNAL MEDICINE

## 2018-12-06 PROCEDURE — G8420 CALC BMI NORM PARAMETERS: HCPCS | Performed by: INTERNAL MEDICINE

## 2018-12-06 PROCEDURE — 90662 IIV NO PRSV INCREASED AG IM: CPT | Performed by: INTERNAL MEDICINE

## 2018-12-06 PROCEDURE — 1036F TOBACCO NON-USER: CPT | Performed by: INTERNAL MEDICINE

## 2018-12-06 PROCEDURE — 4040F PNEUMOC VAC/ADMIN/RCVD: CPT | Performed by: INTERNAL MEDICINE

## 2018-12-06 PROCEDURE — 3023F SPIROM DOC REV: CPT | Performed by: INTERNAL MEDICINE

## 2018-12-06 PROCEDURE — 1101F PT FALLS ASSESS-DOCD LE1/YR: CPT | Performed by: INTERNAL MEDICINE

## 2018-12-06 PROCEDURE — 99214 OFFICE O/P EST MOD 30 MIN: CPT | Performed by: INTERNAL MEDICINE

## 2018-12-06 PROCEDURE — G0009 ADMIN PNEUMOCOCCAL VACCINE: HCPCS | Performed by: INTERNAL MEDICINE

## 2018-12-06 PROCEDURE — 90732 PPSV23 VACC 2 YRS+ SUBQ/IM: CPT | Performed by: INTERNAL MEDICINE

## 2018-12-06 PROCEDURE — G8482 FLU IMMUNIZE ORDER/ADMIN: HCPCS | Performed by: INTERNAL MEDICINE

## 2018-12-06 PROCEDURE — G0008 ADMIN INFLUENZA VIRUS VAC: HCPCS | Performed by: INTERNAL MEDICINE

## 2018-12-06 PROCEDURE — G8926 SPIRO NO PERF OR DOC: HCPCS | Performed by: INTERNAL MEDICINE

## 2018-12-06 RX ORDER — IRON POLYSACCHARIDE COMPLEX 180 MG
CAPSULE ORAL
Refills: 0 | COMMUNITY
Start: 2018-11-21 | End: 2019-01-25 | Stop reason: ALTCHOICE

## 2018-12-07 ENCOUNTER — TELEPHONE (OUTPATIENT)
Dept: INTERNAL MEDICINE CLINIC | Age: 68
End: 2018-12-07

## 2018-12-11 ENCOUNTER — TELEPHONE (OUTPATIENT)
Dept: INTERNAL MEDICINE CLINIC | Age: 68
End: 2018-12-11

## 2018-12-11 DIAGNOSIS — D50.0 IRON DEFICIENCY ANEMIA DUE TO CHRONIC BLOOD LOSS: Primary | ICD-10-CM

## 2018-12-11 RX ORDER — LANOLIN ALCOHOL/MO/W.PET/CERES
325 CREAM (GRAM) TOPICAL
Qty: 90 TABLET | Refills: 2 | Status: SHIPPED | OUTPATIENT
Start: 2018-12-11 | End: 2019-07-12 | Stop reason: SDUPTHER

## 2019-01-07 RX ORDER — OLMESARTAN MEDOXOMIL 40 MG/1
40 TABLET ORAL DAILY
Qty: 90 TABLET | Refills: 1 | Status: SHIPPED | OUTPATIENT
Start: 2019-01-07 | End: 2019-05-03 | Stop reason: ALTCHOICE

## 2019-01-11 ENCOUNTER — CARE COORDINATION (OUTPATIENT)
Dept: CARE COORDINATION | Age: 69
End: 2019-01-11

## 2019-01-11 DIAGNOSIS — D50.9 IRON DEFICIENCY ANEMIA, UNSPECIFIED IRON DEFICIENCY ANEMIA TYPE: Primary | ICD-10-CM

## 2019-01-25 ENCOUNTER — OFFICE VISIT (OUTPATIENT)
Dept: INTERNAL MEDICINE CLINIC | Age: 69
End: 2019-01-25
Payer: MEDICARE

## 2019-01-25 VITALS — SYSTOLIC BLOOD PRESSURE: 140 MMHG | DIASTOLIC BLOOD PRESSURE: 68 MMHG

## 2019-01-25 DIAGNOSIS — Q62.11 STENOSIS OF URETEROPELVIC JUNCTION (UPJ): ICD-10-CM

## 2019-01-25 DIAGNOSIS — M48.061 SPINAL STENOSIS OF LUMBAR REGION WITHOUT NEUROGENIC CLAUDICATION: Primary | ICD-10-CM

## 2019-01-25 DIAGNOSIS — M48.061 LUMBAR FORAMINAL STENOSIS: ICD-10-CM

## 2019-01-25 DIAGNOSIS — D64.9 NORMOCYTIC ANEMIA: ICD-10-CM

## 2019-01-25 DIAGNOSIS — N13.30 HYDRONEPHROSIS OF RIGHT KIDNEY: ICD-10-CM

## 2019-01-25 DIAGNOSIS — M51.36 DDD (DEGENERATIVE DISC DISEASE), LUMBAR: ICD-10-CM

## 2019-01-25 PROCEDURE — 1101F PT FALLS ASSESS-DOCD LE1/YR: CPT | Performed by: INTERNAL MEDICINE

## 2019-01-25 PROCEDURE — G8420 CALC BMI NORM PARAMETERS: HCPCS | Performed by: INTERNAL MEDICINE

## 2019-01-25 PROCEDURE — 1036F TOBACCO NON-USER: CPT | Performed by: INTERNAL MEDICINE

## 2019-01-25 PROCEDURE — 3017F COLORECTAL CA SCREEN DOC REV: CPT | Performed by: INTERNAL MEDICINE

## 2019-01-25 PROCEDURE — G8598 ASA/ANTIPLAT THER USED: HCPCS | Performed by: INTERNAL MEDICINE

## 2019-01-25 PROCEDURE — G8482 FLU IMMUNIZE ORDER/ADMIN: HCPCS | Performed by: INTERNAL MEDICINE

## 2019-01-25 PROCEDURE — 99213 OFFICE O/P EST LOW 20 MIN: CPT | Performed by: INTERNAL MEDICINE

## 2019-01-25 PROCEDURE — 4040F PNEUMOC VAC/ADMIN/RCVD: CPT | Performed by: INTERNAL MEDICINE

## 2019-01-25 PROCEDURE — 1123F ACP DISCUSS/DSCN MKR DOCD: CPT | Performed by: INTERNAL MEDICINE

## 2019-01-25 PROCEDURE — G8427 DOCREV CUR MEDS BY ELIG CLIN: HCPCS | Performed by: INTERNAL MEDICINE

## 2019-01-29 ENCOUNTER — TELEPHONE (OUTPATIENT)
Dept: INTERNAL MEDICINE CLINIC | Age: 69
End: 2019-01-29

## 2019-03-07 ENCOUNTER — OFFICE VISIT (OUTPATIENT)
Dept: INTERNAL MEDICINE CLINIC | Age: 69
End: 2019-03-07
Payer: MEDICARE

## 2019-03-07 VITALS
WEIGHT: 132 LBS | OXYGEN SATURATION: 97 % | SYSTOLIC BLOOD PRESSURE: 128 MMHG | HEART RATE: 64 BPM | BODY MASS INDEX: 21.31 KG/M2 | DIASTOLIC BLOOD PRESSURE: 62 MMHG

## 2019-03-07 DIAGNOSIS — I73.9 PAD (PERIPHERAL ARTERY DISEASE) (HCC): ICD-10-CM

## 2019-03-07 DIAGNOSIS — I65.23 CAROTID STENOSIS, ASYMPTOMATIC, BILATERAL: ICD-10-CM

## 2019-03-07 DIAGNOSIS — K55.1 ATHEROSCLEROSIS OF SUPERIOR MESENTERIC ARTERY (HCC): ICD-10-CM

## 2019-03-07 DIAGNOSIS — I25.10 CAD IN NATIVE ARTERY: ICD-10-CM

## 2019-03-07 DIAGNOSIS — Z12.5 SCREENING FOR PROSTATE CANCER: ICD-10-CM

## 2019-03-07 DIAGNOSIS — I10 BENIGN ESSENTIAL HTN: Primary | ICD-10-CM

## 2019-03-07 DIAGNOSIS — I70.0 THORACIC AORTA ATHEROSCLEROSIS (HCC): ICD-10-CM

## 2019-03-07 DIAGNOSIS — I70.1 RENAL ARTERY ATHEROSCLEROSIS (HCC): ICD-10-CM

## 2019-03-07 DIAGNOSIS — J43.9 PULMONARY EMPHYSEMA, UNSPECIFIED EMPHYSEMA TYPE (HCC): ICD-10-CM

## 2019-03-07 DIAGNOSIS — E78.5 HYPERLIPIDEMIA, UNSPECIFIED HYPERLIPIDEMIA TYPE: ICD-10-CM

## 2019-03-07 DIAGNOSIS — D64.9 NORMOCYTIC ANEMIA: ICD-10-CM

## 2019-03-07 PROCEDURE — G8598 ASA/ANTIPLAT THER USED: HCPCS | Performed by: INTERNAL MEDICINE

## 2019-03-07 PROCEDURE — 1036F TOBACCO NON-USER: CPT | Performed by: INTERNAL MEDICINE

## 2019-03-07 PROCEDURE — 1123F ACP DISCUSS/DSCN MKR DOCD: CPT | Performed by: INTERNAL MEDICINE

## 2019-03-07 PROCEDURE — G8482 FLU IMMUNIZE ORDER/ADMIN: HCPCS | Performed by: INTERNAL MEDICINE

## 2019-03-07 PROCEDURE — 1101F PT FALLS ASSESS-DOCD LE1/YR: CPT | Performed by: INTERNAL MEDICINE

## 2019-03-07 PROCEDURE — G8427 DOCREV CUR MEDS BY ELIG CLIN: HCPCS | Performed by: INTERNAL MEDICINE

## 2019-03-07 PROCEDURE — 99214 OFFICE O/P EST MOD 30 MIN: CPT | Performed by: INTERNAL MEDICINE

## 2019-03-07 PROCEDURE — 3017F COLORECTAL CA SCREEN DOC REV: CPT | Performed by: INTERNAL MEDICINE

## 2019-03-07 PROCEDURE — 4040F PNEUMOC VAC/ADMIN/RCVD: CPT | Performed by: INTERNAL MEDICINE

## 2019-03-07 PROCEDURE — 3023F SPIROM DOC REV: CPT | Performed by: INTERNAL MEDICINE

## 2019-03-07 PROCEDURE — G8420 CALC BMI NORM PARAMETERS: HCPCS | Performed by: INTERNAL MEDICINE

## 2019-03-07 PROCEDURE — G8926 SPIRO NO PERF OR DOC: HCPCS | Performed by: INTERNAL MEDICINE

## 2019-03-07 RX ORDER — PANTOPRAZOLE SODIUM 40 MG/1
40 TABLET, DELAYED RELEASE ORAL DAILY
Qty: 90 TABLET | Refills: 3 | Status: SHIPPED | OUTPATIENT
Start: 2019-03-07 | End: 2020-03-05

## 2019-03-07 ASSESSMENT — PATIENT HEALTH QUESTIONNAIRE - PHQ9
SUM OF ALL RESPONSES TO PHQ QUESTIONS 1-9: 0
2. FEELING DOWN, DEPRESSED OR HOPELESS: 0
SUM OF ALL RESPONSES TO PHQ QUESTIONS 1-9: 0
1. LITTLE INTEREST OR PLEASURE IN DOING THINGS: 0
SUM OF ALL RESPONSES TO PHQ9 QUESTIONS 1 & 2: 0

## 2019-03-08 ENCOUNTER — CARE COORDINATION (OUTPATIENT)
Dept: CARE COORDINATION | Age: 69
End: 2019-03-08

## 2019-03-13 ENCOUNTER — TELEPHONE (OUTPATIENT)
Dept: INTERNAL MEDICINE CLINIC | Age: 69
End: 2019-03-13

## 2019-03-27 ENCOUNTER — OFFICE VISIT (OUTPATIENT)
Dept: SURGERY | Age: 69
End: 2019-03-27
Payer: MEDICARE

## 2019-03-27 ENCOUNTER — PROCEDURE VISIT (OUTPATIENT)
Dept: SURGERY | Age: 69
End: 2019-03-27
Payer: MEDICARE

## 2019-03-27 VITALS
WEIGHT: 135.8 LBS | HEART RATE: 61 BPM | BODY MASS INDEX: 21.92 KG/M2 | SYSTOLIC BLOOD PRESSURE: 149 MMHG | DIASTOLIC BLOOD PRESSURE: 78 MMHG

## 2019-03-27 DIAGNOSIS — K55.1 MESENTERIC ARTERY STENOSIS (HCC): ICD-10-CM

## 2019-03-27 DIAGNOSIS — E78.5 HYPERLIPIDEMIA, UNSPECIFIED HYPERLIPIDEMIA TYPE: ICD-10-CM

## 2019-03-27 DIAGNOSIS — I70.213 ATHEROSCLEROSIS OF NATIVE ARTERY OF BOTH LOWER EXTREMITIES WITH INTERMITTENT CLAUDICATION (HCC): Primary | ICD-10-CM

## 2019-03-27 DIAGNOSIS — I65.23 CAROTID STENOSIS, ASYMPTOMATIC, BILATERAL: ICD-10-CM

## 2019-03-27 DIAGNOSIS — I65.23 ASYMPTOMATIC BILATERAL CAROTID ARTERY STENOSIS: ICD-10-CM

## 2019-03-27 DIAGNOSIS — I70.213 ATHEROSCLEROSIS OF NATIVE ARTERY OF BOTH LOWER EXTREMITIES WITH INTERMITTENT CLAUDICATION (HCC): ICD-10-CM

## 2019-03-27 PROCEDURE — 93975 VASCULAR STUDY: CPT | Performed by: SURGERY

## 2019-03-27 PROCEDURE — G8427 DOCREV CUR MEDS BY ELIG CLIN: HCPCS | Performed by: NURSE PRACTITIONER

## 2019-03-27 PROCEDURE — G8482 FLU IMMUNIZE ORDER/ADMIN: HCPCS | Performed by: NURSE PRACTITIONER

## 2019-03-27 PROCEDURE — 93880 EXTRACRANIAL BILAT STUDY: CPT | Performed by: SURGERY

## 2019-03-27 PROCEDURE — G8420 CALC BMI NORM PARAMETERS: HCPCS | Performed by: NURSE PRACTITIONER

## 2019-03-27 PROCEDURE — 4040F PNEUMOC VAC/ADMIN/RCVD: CPT | Performed by: NURSE PRACTITIONER

## 2019-03-27 PROCEDURE — 3017F COLORECTAL CA SCREEN DOC REV: CPT | Performed by: NURSE PRACTITIONER

## 2019-03-27 PROCEDURE — 1036F TOBACCO NON-USER: CPT | Performed by: NURSE PRACTITIONER

## 2019-03-27 PROCEDURE — G8598 ASA/ANTIPLAT THER USED: HCPCS | Performed by: NURSE PRACTITIONER

## 2019-03-27 PROCEDURE — 1123F ACP DISCUSS/DSCN MKR DOCD: CPT | Performed by: NURSE PRACTITIONER

## 2019-03-27 PROCEDURE — 99214 OFFICE O/P EST MOD 30 MIN: CPT | Performed by: NURSE PRACTITIONER

## 2019-03-27 PROCEDURE — 93925 LOWER EXTREMITY STUDY: CPT | Performed by: SURGERY

## 2019-03-27 ASSESSMENT — ENCOUNTER SYMPTOMS
ALLERGIC/IMMUNOLOGIC NEGATIVE: 1
RESPIRATORY NEGATIVE: 1
GASTROINTESTINAL NEGATIVE: 1
EYES NEGATIVE: 1
COLOR CHANGE: 1

## 2019-03-29 ENCOUNTER — TELEPHONE (OUTPATIENT)
Dept: SURGERY | Age: 69
End: 2019-03-29

## 2019-04-01 RX ORDER — NIFEDIPINE 30 MG/1
30 TABLET, EXTENDED RELEASE ORAL DAILY
Qty: 90 TABLET | Refills: 3 | Status: SHIPPED | OUTPATIENT
Start: 2019-04-01 | End: 2019-08-22 | Stop reason: DRUGHIGH

## 2019-04-05 NOTE — PRE-PROCEDURE INSTRUCTIONS
Called patient about procedure in cath lab, instructed to arrive at  1130 for procedure at 1300. Patient should be NPO after midnight, but can take morning medication with sips of water. Instructed to have a responsible adult be with patient to take them home and stay with them afterwards. Patient asked to bring current list of medications. All questions and concerns addressed.

## 2019-04-08 ENCOUNTER — HOSPITAL ENCOUNTER (OUTPATIENT)
Dept: CARDIAC CATH/INVASIVE PROCEDURES | Age: 69
Setting detail: OUTPATIENT SURGERY
Discharge: HOME | End: 2019-04-08
Payer: MEDICARE

## 2019-04-08 VITALS — WEIGHT: 132.5 LBS | BODY MASS INDEX: 21.29 KG/M2 | HEIGHT: 66 IN

## 2019-04-08 DIAGNOSIS — I73.9 PAD (PERIPHERAL ARTERY DISEASE) (HCC): Primary | ICD-10-CM

## 2019-04-08 LAB
ANION GAP SERPL CALCULATED.3IONS-SCNC: 15 MMOL/L (ref 3–16)
BUN BLDV-MCNC: 10 MG/DL (ref 7–20)
CALCIUM SERPL-MCNC: 9 MG/DL (ref 8.3–10.6)
CHLORIDE BLD-SCNC: 96 MMOL/L (ref 99–110)
CO2: 23 MMOL/L (ref 21–32)
CREAT SERPL-MCNC: 0.8 MG/DL (ref 0.8–1.3)
GFR AFRICAN AMERICAN: >60
GFR NON-AFRICAN AMERICAN: >60
GLUCOSE BLD-MCNC: 80 MG/DL (ref 70–99)
HCT VFR BLD CALC: 40.1 % (ref 40.5–52.5)
HEMOGLOBIN: 13.7 G/DL (ref 13.5–17.5)
MCH RBC QN AUTO: 34.1 PG (ref 26–34)
MCHC RBC AUTO-ENTMCNC: 34.1 G/DL (ref 31–36)
MCV RBC AUTO: 100.1 FL (ref 80–100)
PDW BLD-RTO: 16 % (ref 12.4–15.4)
PLATELET # BLD: 253 K/UL (ref 135–450)
PMV BLD AUTO: 6.7 FL (ref 5–10.5)
POC ACT LR: 393 SEC
POTASSIUM REFLEX MAGNESIUM: 4.8 MMOL/L (ref 3.5–5.1)
RBC # BLD: 4 M/UL (ref 4.2–5.9)
SODIUM BLD-SCNC: 134 MMOL/L (ref 136–145)
WBC # BLD: 5.4 K/UL (ref 4–11)

## 2019-04-08 PROCEDURE — 6360000002 HC RX W HCPCS

## 2019-04-08 PROCEDURE — 2500000003 HC RX 250 WO HCPCS

## 2019-04-08 PROCEDURE — 37229 HC TIB PER TERRITORY ATHER: CPT

## 2019-04-08 PROCEDURE — 6360000004 HC RX CONTRAST MEDICATION: Performed by: SURGERY

## 2019-04-08 PROCEDURE — 80048 BASIC METABOLIC PNL TOTAL CA: CPT

## 2019-04-08 PROCEDURE — 85347 COAGULATION TIME ACTIVATED: CPT

## 2019-04-08 PROCEDURE — 2709999900 HC NON-CHARGEABLE SUPPLY

## 2019-04-08 PROCEDURE — APPNB30 APP NON BILLABLE TIME 0-30 MINS: Performed by: NURSE PRACTITIONER

## 2019-04-08 PROCEDURE — C1894 INTRO/SHEATH, NON-LASER: HCPCS

## 2019-04-08 PROCEDURE — 2780000010 HC IMPLANT OTHER

## 2019-04-08 PROCEDURE — C1769 GUIDE WIRE: HCPCS

## 2019-04-08 PROCEDURE — 99153 MOD SED SAME PHYS/QHP EA: CPT

## 2019-04-08 PROCEDURE — 37225 HC FEM POP TERRITORY ATHERECTOMY: CPT

## 2019-04-08 PROCEDURE — 75625 CONTRAST EXAM ABDOMINL AORTA: CPT

## 2019-04-08 PROCEDURE — C1724 CATH, TRANS ATHEREC,ROTATION: HCPCS

## 2019-04-08 PROCEDURE — 75774 ARTERY X-RAY EACH VESSEL: CPT

## 2019-04-08 PROCEDURE — 85027 COMPLETE CBC AUTOMATED: CPT

## 2019-04-08 PROCEDURE — 75716 ARTERY X-RAYS ARMS/LEGS: CPT

## 2019-04-08 PROCEDURE — C1725 CATH, TRANSLUMIN NON-LASER: HCPCS

## 2019-04-08 PROCEDURE — 99152 MOD SED SAME PHYS/QHP 5/>YRS: CPT

## 2019-04-08 RX ORDER — MORPHINE SULFATE 2 MG/ML
2 INJECTION, SOLUTION INTRAMUSCULAR; INTRAVENOUS
Status: ACTIVE | OUTPATIENT
Start: 2019-04-08 | End: 2019-04-08

## 2019-04-08 RX ORDER — FENTANYL CITRATE 50 UG/ML
25 INJECTION, SOLUTION INTRAMUSCULAR; INTRAVENOUS
Status: ACTIVE | OUTPATIENT
Start: 2019-04-08 | End: 2019-04-08

## 2019-04-08 RX ORDER — SODIUM CHLORIDE 9 MG/ML
INJECTION, SOLUTION INTRAVENOUS CONTINUOUS
Status: DISCONTINUED | OUTPATIENT
Start: 2019-04-08 | End: 2019-04-08 | Stop reason: ALTCHOICE

## 2019-04-08 RX ORDER — ACETAMINOPHEN 325 MG/1
650 TABLET ORAL EVERY 4 HOURS PRN
Status: DISCONTINUED | OUTPATIENT
Start: 2019-04-08 | End: 2019-04-09 | Stop reason: HOSPADM

## 2019-04-08 RX ORDER — ONDANSETRON 2 MG/ML
4 INJECTION INTRAMUSCULAR; INTRAVENOUS EVERY 6 HOURS PRN
Status: DISCONTINUED | OUTPATIENT
Start: 2019-04-08 | End: 2019-04-09 | Stop reason: HOSPADM

## 2019-04-08 RX ORDER — SODIUM CHLORIDE 0.9 % (FLUSH) 0.9 %
10 SYRINGE (ML) INJECTION PRN
Status: DISCONTINUED | OUTPATIENT
Start: 2019-04-08 | End: 2019-04-08 | Stop reason: ALTCHOICE

## 2019-04-08 RX ORDER — SODIUM CHLORIDE 0.9 % (FLUSH) 0.9 %
10 SYRINGE (ML) INJECTION EVERY 12 HOURS SCHEDULED
Status: DISCONTINUED | OUTPATIENT
Start: 2019-04-08 | End: 2019-04-08 | Stop reason: ALTCHOICE

## 2019-04-08 RX ORDER — SODIUM CHLORIDE 9 MG/ML
INJECTION, SOLUTION INTRAVENOUS CONTINUOUS
Status: DISCONTINUED | OUTPATIENT
Start: 2019-04-08 | End: 2019-04-09 | Stop reason: HOSPADM

## 2019-04-08 RX ORDER — 0.9 % SODIUM CHLORIDE 0.9 %
500 INTRAVENOUS SOLUTION INTRAVENOUS PRN
Status: DISCONTINUED | OUTPATIENT
Start: 2019-04-08 | End: 2019-04-09 | Stop reason: HOSPADM

## 2019-04-08 RX ORDER — ALPRAZOLAM 0.5 MG/1
0.5 TABLET ORAL
Status: DISCONTINUED | OUTPATIENT
Start: 2019-04-08 | End: 2019-04-08 | Stop reason: ALTCHOICE

## 2019-04-08 RX ADMIN — IOPAMIDOL 150 ML: 755 INJECTION, SOLUTION INTRAVENOUS at 13:30

## 2019-04-08 NOTE — PRE SEDATION
ALPRAZolam  Home Meds:   Prior to Admission medications    Medication Sig Start Date End Date Taking? Authorizing Provider   NIFEdipine (PROCARDIA XL) 30 MG extended release tablet TAKE 1 TABLET BY MOUTH DAILY 4/1/19  Yes Pepito Leal DO   pantoprazole (PROTONIX) 40 MG tablet Take 1 tablet by mouth daily 3/7/19  Yes Pepito Leal DO   olmesartan (BENICAR) 40 MG tablet Take 1 tablet by mouth daily 1/7/19  Yes Pepito Leal DO   ferrous sulfate 325 (65 Fe) MG EC tablet Take 1 tablet by mouth 3 times daily (with meals) 12/11/18  Yes Pepito Leal DO   acetaminophen (TYLENOL) 325 MG tablet Take 2 tablets by mouth every 6 hours as needed for Pain 9/15/18  Yes Julieth Glynn MD   clopidogrel (PLAVIX) 75 MG tablet TAKE 1 TABLET BY MOUTH DAILY 9/22/18  Yes Julieth Glynn MD   aspirin 81 MG tablet Take 1 tablet by mouth daily 9/22/18  Yes Julieth Glynn MD   Probiotic Product (ALIGN PO) Take 1 tablet by mouth daily   Yes Historical Provider, MD   atorvastatin (LIPITOR) 80 MG tablet TAKE 1 TABLET BY MOUTH DAILY 9/7/18  Yes Pepito Leal DO   carvedilol (COREG) 12.5 MG tablet TAKE 1 TABLET BY MOUTH TWICE DAILY 9/7/18  Yes Pepito Leal DO   Cholecalciferol (VITAMIN D) 2000 units CAPS capsule Take 1 capsule by mouth daily   Yes Historical Provider, MD   loperamide (RA ANTI-DIARRHEAL) 2 MG capsule Take 2 mg by mouth daily as needed for Diarrhea    Yes Historical Provider, MD         Pre-Sedation Documentation and Exam:   I have personally completed a history, physical exam & review of systems for this patient (see notes).     Mallampati Airway Assessment:  normal    Prior History of Anesthesia Complications:   none    ASA Classification:  Class 2 - A normal healthy patient with mild systemic disease    Sedation/ Anesthesia Plan:   intravenous sedation    Medications Planned:   midazolam (Versed) intravenously and fentanyl intravenously    Patient is an appropriate candidate for plan of sedation: yes    Electronically signed by Octavio Jain MD on 4/8/2019 at 12:46 PM

## 2019-04-09 NOTE — PROCEDURES
[address]                                 PROCEDURE NOTE    PATIENT NAME: Carmenza Zamora                  :        1950  MED REC NO:   [id number]                          ROOM:  ACCOUNT NO:   [de-identified]                           ADMIT DATE: 2019  PROVIDER:     Jose Douglas MD    ANGIOGRAPHY    DATE OF PROCEDURE:  2019    PREPROCEDURE DIAGNOSES:  1. Lower extremity atherosclerosis with claudication. 2.  Peripheral arterial disease. POSTPROCEDURE DIAGNOSES:  1. Lower extremity atherosclerosis with claudication. 2.  Peripheral arterial disease. PROCEDURES PERFORMED:  1. Ultrasound-guided access to the left common femoral artery. 2.  Abdominal aortogram.  3.  Angiogram of the bilateral lower extremities. 4.  Selective angiography of the right popliteal and tibial arteries. 5.  Atherectomy with angioplasty, right superficial femoral and  popliteal arteries. 6.  Atherectomy with angioplasty, right tibioperoneal trunk. SURGEON:  Jose Douglas MD    ANESTHESIA:  Local with IV sedation. INDICATIONS:  The patient is a 60-year-old male with severe peripheral  arterial disease and progressively worsening lower extremity  claudication. Angiography is indicated to determine if he is a  candidate for revascularization. Endovascular revascularization is  indicated to alleviate his symptoms of disabling claudication. He is  aware of the risks, benefits, and alternatives of the procedure and  willing to proceed. PROCEDURE:  Using ultrasound guidance, percutaneous access was gained to  the left common femoral artery with a 5-Danish sheath. A permanent  image of the access was obtained and recorded. Through the 5-Danish  sheath, a pigtail catheter was advanced into the aorta to the level of  the L1 vertebral body.   At this location, a full and complete abdominal  aortogram was widely patent. The superior mesenteric  artery is patent. There is evidence of a superior mesenteric artery  bypass, which is patent. The infrarenal abdominal aorta is normal  appearing. The bilateral renal arteries are widely patent. Right Lower Extremity:  The right common, internal, and external iliac  arteries are normal.  The common and deep femoral arteries are normal.   The proximal superficial femoral artery has a 40% stenosis at its  origin. There is a calcified 70% stenosis at the distal superficial  femoral artery. The popliteal has a 50% to 60% stenosis. There is  two-vessel runoff to the foot via the peroneal and posterior tibial  arteries. There is a 70% stenosis of the tibioperoneal trunk. The  anterior tibial artery has a high takeoff from the P2 segment of the  popliteal artery and the anterior tibial artery has occluded in the mid  calf. There is reconstitution distally. Left Lower Extremity:  The left common, internal, and external iliac  arteries are widely patent. The common and deep femoral arteries are  widely patent. The native superficial femoral artery occludes in the  mid thigh. There is a femoral to below-knee popliteal artery bypass,  which is widely patent with no stenosis. Tibial vessel runoff is not  well visualized secondary to the patient movement and contrast washout. Main runoff to the foot is via the posterior tibial artery and the  peroneal artery is patent as well. The anterior tibial artery is not  visualized. IMPRESSION:  Good angiographic result postendovascular revascularization  of the right lower extremity.         Iggy Awad MD    D: 04/08/2019 13:39:46       T: 04/08/2019 14:24:45     [id number]  Job#: [id number]     Doc#: [id number]    CC:  Maicol Flores DO

## 2019-04-09 NOTE — PROGRESS NOTES
Patient called to discuss any concerns regarding procedure in cath lab. Reviewed any concerns regarding procedure, medications and follow up. Patient denies any complications or difficulty.

## 2019-04-29 ENCOUNTER — OFFICE VISIT (OUTPATIENT)
Dept: SURGERY | Age: 69
End: 2019-04-29
Payer: MEDICARE

## 2019-04-29 ENCOUNTER — PROCEDURE VISIT (OUTPATIENT)
Dept: SURGERY | Age: 69
End: 2019-04-29
Payer: MEDICARE

## 2019-04-29 VITALS
WEIGHT: 135 LBS | SYSTOLIC BLOOD PRESSURE: 155 MMHG | DIASTOLIC BLOOD PRESSURE: 88 MMHG | BODY MASS INDEX: 21.69 KG/M2 | HEIGHT: 66 IN

## 2019-04-29 DIAGNOSIS — K55.1 ATHEROSCLEROSIS OF SUPERIOR MESENTERIC ARTERY (HCC): ICD-10-CM

## 2019-04-29 DIAGNOSIS — I65.23 CAROTID STENOSIS, ASYMPTOMATIC, BILATERAL: Primary | ICD-10-CM

## 2019-04-29 DIAGNOSIS — I73.9 PAD (PERIPHERAL ARTERY DISEASE) (HCC): ICD-10-CM

## 2019-04-29 DIAGNOSIS — I70.219 ATHEROSCLEROTIC PVD WITH INTERMITTENT CLAUDICATION (HCC): ICD-10-CM

## 2019-04-29 DIAGNOSIS — I73.9 PAD (PERIPHERAL ARTERY DISEASE) (HCC): Primary | ICD-10-CM

## 2019-04-29 PROCEDURE — 4040F PNEUMOC VAC/ADMIN/RCVD: CPT | Performed by: NURSE PRACTITIONER

## 2019-04-29 PROCEDURE — 99212 OFFICE O/P EST SF 10 MIN: CPT | Performed by: NURSE PRACTITIONER

## 2019-04-29 PROCEDURE — G8420 CALC BMI NORM PARAMETERS: HCPCS | Performed by: NURSE PRACTITIONER

## 2019-04-29 PROCEDURE — 1123F ACP DISCUSS/DSCN MKR DOCD: CPT | Performed by: NURSE PRACTITIONER

## 2019-04-29 PROCEDURE — 3017F COLORECTAL CA SCREEN DOC REV: CPT | Performed by: NURSE PRACTITIONER

## 2019-04-29 PROCEDURE — G8598 ASA/ANTIPLAT THER USED: HCPCS | Performed by: NURSE PRACTITIONER

## 2019-04-29 PROCEDURE — 1036F TOBACCO NON-USER: CPT | Performed by: NURSE PRACTITIONER

## 2019-04-29 PROCEDURE — G8427 DOCREV CUR MEDS BY ELIG CLIN: HCPCS | Performed by: NURSE PRACTITIONER

## 2019-04-29 PROCEDURE — 93926 LOWER EXTREMITY STUDY: CPT | Performed by: SURGERY

## 2019-04-29 ASSESSMENT — ENCOUNTER SYMPTOMS
ALLERGIC/IMMUNOLOGIC NEGATIVE: 1
GASTROINTESTINAL NEGATIVE: 1
RESPIRATORY NEGATIVE: 1
COLOR CHANGE: 1
EYES NEGATIVE: 1

## 2019-04-29 NOTE — PROGRESS NOTES
Subjective:      Patient ID: Roxy Valdez is a 77 y.o. male. Chief Complaint   Patient presents with    Follow Up After Procedure     Patient presents to the office s/p right lower extremity angiogram done on 4/8/19. Arterial duplex scan don today by the vascular technician. Leg Pain    The incident occurred more than 1 week ago. The incident occurred at home. The injury mechanism is unknown. The pain is present in the left leg and right leg. The patient is experiencing no pain. It is unknown if a foreign body is present. The symptoms are aggravated by weight bearing and movement. He has tried rest (fem-pop bypass 9/16/16) for the symptoms. Angiogram 4/8/19  PROCEDURES PERFORMED:  1. Ultrasound-guided access to the left common femoral artery. 2.  Abdominal aortogram.  3.  Angiogram of the bilateral lower extremities. 4.  Selective angiography of the right popliteal and tibial arteries. 5.  Atherectomy with angioplasty, right superficial femoral and  popliteal arteries. 6.  Atherectomy with angioplasty, right tibioperoneal trunk.       He has been doing well, walking without pain or discomfort. Denies numbness or tingling       Review of Systems   Constitutional: Negative. HENT: Negative. Eyes: Negative. Respiratory: Negative. Cardiovascular: Negative. Gastrointestinal: Negative. Endocrine: Negative. Genitourinary: Negative. Musculoskeletal: Negative. Skin: Positive for color change (lower extremities). Allergic/Immunologic: Negative. Neurological: Negative. Hematological: Negative. Psychiatric/Behavioral: Negative. Objective:   Physical Exam   Constitutional: He is oriented to person, place, and time. He appears well-developed and well-nourished. HENT:   Head: Normocephalic and atraumatic.    Right Ear: External ear normal.   Left Ear: External ear normal.   Nose: Nose normal.   Mouth/Throat: Oropharynx is clear and moist.   Eyes: Pupils are equal, round, and reactive to light. Conjunctivae and EOM are normal.   Neck: Normal range of motion. Neck supple. Cardiovascular: Normal rate, regular rhythm and normal heart sounds. Pulses:       Carotid pulses are 2+ on the right side, and 2+ on the left side. Femoral pulses are 2+ on the right side, and 2+ on the left side. Dorsalis pedis pulses are 2+ on the right side, and 1+ on the left side. Posterior tibial pulses are 2+ on the right side, and 2+ on the left side. Right 1.38 and left ALISTAIR 0.98   Pulmonary/Chest: Effort normal and breath sounds normal.   Abdominal: Soft. Bowel sounds are normal. He exhibits no distension and no mass. There is no tenderness. Musculoskeletal: Normal range of motion. He exhibits no edema. Neurological: He is alert and oriented to person, place, and time. Skin: Skin is warm and dry. Psychiatric: He has a normal mood and affect. His behavior is normal. Judgment and thought content normal.           Assessment:        Diagnosis Orders   1. Carotid stenosis, asymptomatic, bilateral     2. PAD (peripheral artery disease) ([facility])  VL DUP LOWER EXTREMITY ARTERIES RIGHT   3. Atherosclerotic PVD with intermittent claudication (HCC)  VL DUP LOWER EXTREMITY ARTERIES RIGHT   4. Atherosclerosis of superior mesenteric artery (HCC)       Pt is here for follow up visit for peripheral artery disease. Testing today reviewed - patent right tibioperoneal artery. Plan:         EDUCATION:  Educated patient on plan of care and disease process. - all questions answered    Patient will benefit from continued aspirin 81 mg and Plavix 75 mg for daily antiplatelet therapy and Lipitor 80 mg for daily statin therapy. Patient has been instructed to follow up in 6 months for a mesenteric scan, arterial duplex with alistair's , carotid scan and office visit. The patient will need to fast for at least 5 hours prior to the ultrasound scan.  Please understand that by not doing so may result in having an inaccurate ultrasound and may cause your ultrasound to be rescheduled. Signs/Symptoms of Stroke:  - Watch for one eye going dark like a shade was pulled down over it. - Watch for one side of the body not working.  - Difficulty with speech such as slurring your words  - Difficulty finding the right words, i.e. Calling an object by the wrong name when you know the real name. If you have any of these symptoms, do not call us, or your family doctor. Call 911, and go immediately to the hospital. You have a 4 hour window from the point when symptoms start to get to the hospital, get a CT scan done, and initiate clot dissolving drugs.        MARY Gotti - CNP

## 2019-05-03 RX ORDER — TELMISARTAN 80 MG/1
80 TABLET ORAL DAILY
Qty: 30 TABLET | Refills: 3 | Status: SHIPPED | OUTPATIENT
Start: 2019-05-03 | End: 2019-05-03 | Stop reason: SDUPTHER

## 2019-05-06 RX ORDER — TELMISARTAN 80 MG/1
80 TABLET ORAL DAILY
Qty: 90 TABLET | Refills: 3 | Status: SHIPPED | OUTPATIENT
Start: 2019-05-06 | End: 2020-04-21

## 2019-05-13 ENCOUNTER — CARE COORDINATION (OUTPATIENT)
Dept: CARE COORDINATION | Age: 69
End: 2019-05-13

## 2019-05-13 NOTE — CARE COORDINATION
Call to patient  Made aware of recommendations per Dr Foster Lanes  He will try     Has started walking program    Plan  Will dc from panel   patient has [facility] number to call if concerns/ questions arise

## 2019-06-06 ENCOUNTER — OFFICE VISIT (OUTPATIENT)
Dept: INTERNAL MEDICINE CLINIC | Age: 69
End: 2019-06-06
Payer: MEDICARE

## 2019-06-06 VITALS
SYSTOLIC BLOOD PRESSURE: 130 MMHG | BODY MASS INDEX: 21.38 KG/M2 | HEIGHT: 66 IN | DIASTOLIC BLOOD PRESSURE: 78 MMHG | RESPIRATION RATE: 12 BRPM | HEART RATE: 64 BPM | WEIGHT: 133 LBS

## 2019-06-06 DIAGNOSIS — I73.9 PAD (PERIPHERAL ARTERY DISEASE) (HCC): ICD-10-CM

## 2019-06-06 DIAGNOSIS — M48.061 SPINAL STENOSIS OF LUMBAR REGION WITHOUT NEUROGENIC CLAUDICATION: ICD-10-CM

## 2019-06-06 DIAGNOSIS — E78.5 HYPERLIPIDEMIA, UNSPECIFIED HYPERLIPIDEMIA TYPE: ICD-10-CM

## 2019-06-06 DIAGNOSIS — I25.10 CAD IN NATIVE ARTERY: ICD-10-CM

## 2019-06-06 DIAGNOSIS — I10 BENIGN ESSENTIAL HTN: Primary | ICD-10-CM

## 2019-06-06 DIAGNOSIS — J43.9 PULMONARY EMPHYSEMA, UNSPECIFIED EMPHYSEMA TYPE (HCC): ICD-10-CM

## 2019-06-06 PROCEDURE — 1036F TOBACCO NON-USER: CPT | Performed by: INTERNAL MEDICINE

## 2019-06-06 PROCEDURE — 3017F COLORECTAL CA SCREEN DOC REV: CPT | Performed by: INTERNAL MEDICINE

## 2019-06-06 PROCEDURE — 4040F PNEUMOC VAC/ADMIN/RCVD: CPT | Performed by: INTERNAL MEDICINE

## 2019-06-06 PROCEDURE — 3023F SPIROM DOC REV: CPT | Performed by: INTERNAL MEDICINE

## 2019-06-06 PROCEDURE — 1123F ACP DISCUSS/DSCN MKR DOCD: CPT | Performed by: INTERNAL MEDICINE

## 2019-06-06 PROCEDURE — 99214 OFFICE O/P EST MOD 30 MIN: CPT | Performed by: INTERNAL MEDICINE

## 2019-06-06 PROCEDURE — G8420 CALC BMI NORM PARAMETERS: HCPCS | Performed by: INTERNAL MEDICINE

## 2019-06-06 PROCEDURE — G8598 ASA/ANTIPLAT THER USED: HCPCS | Performed by: INTERNAL MEDICINE

## 2019-06-06 PROCEDURE — G8427 DOCREV CUR MEDS BY ELIG CLIN: HCPCS | Performed by: INTERNAL MEDICINE

## 2019-06-06 PROCEDURE — G8926 SPIRO NO PERF OR DOC: HCPCS | Performed by: INTERNAL MEDICINE

## 2019-07-12 DIAGNOSIS — D50.0 IRON DEFICIENCY ANEMIA DUE TO CHRONIC BLOOD LOSS: ICD-10-CM

## 2019-07-12 RX ORDER — FERROUS SULFATE 325(65) MG
325 TABLET ORAL
Qty: 90 TABLET | Refills: 1 | Status: SHIPPED | OUTPATIENT
Start: 2019-07-12

## 2019-08-22 ENCOUNTER — HOSPITAL ENCOUNTER (OUTPATIENT)
Dept: CT IMAGING | Age: 69
Discharge: HOME | End: 2019-08-22
Payer: MEDICARE

## 2019-08-22 ENCOUNTER — OFFICE VISIT (OUTPATIENT)
Dept: INTERNAL MEDICINE CLINIC | Age: 69
End: 2019-08-22
Payer: MEDICARE

## 2019-08-22 VITALS
RESPIRATION RATE: 14 BRPM | OXYGEN SATURATION: 97 % | HEART RATE: 67 BPM | DIASTOLIC BLOOD PRESSURE: 86 MMHG | SYSTOLIC BLOOD PRESSURE: 176 MMHG

## 2019-08-22 DIAGNOSIS — I10 ESSENTIAL HYPERTENSION: Primary | ICD-10-CM

## 2019-08-22 DIAGNOSIS — I65.23 CAROTID STENOSIS, BILATERAL: ICD-10-CM

## 2019-08-22 DIAGNOSIS — R55 SYNCOPE, UNSPECIFIED SYNCOPE TYPE: ICD-10-CM

## 2019-08-22 DIAGNOSIS — I10 ESSENTIAL HYPERTENSION: ICD-10-CM

## 2019-08-22 DIAGNOSIS — I77.74 VERTEBRAL ARTERY DISSECTION (HCC): ICD-10-CM

## 2019-08-22 DIAGNOSIS — I35.0 AORTIC VALVE STENOSIS, ETIOLOGY OF CARDIAC VALVE DISEASE UNSPECIFIED: ICD-10-CM

## 2019-08-22 LAB
GFR AFRICAN AMERICAN: >60
GFR NON-AFRICAN AMERICAN: >60
PERFORMED ON: NORMAL
POC CREATININE: 0.9 MG/DL (ref 0.8–1.3)
POC SAMPLE TYPE: NORMAL

## 2019-08-22 PROCEDURE — G8427 DOCREV CUR MEDS BY ELIG CLIN: HCPCS | Performed by: INTERNAL MEDICINE

## 2019-08-22 PROCEDURE — 99215 OFFICE O/P EST HI 40 MIN: CPT | Performed by: INTERNAL MEDICINE

## 2019-08-22 PROCEDURE — G8420 CALC BMI NORM PARAMETERS: HCPCS | Performed by: INTERNAL MEDICINE

## 2019-08-22 PROCEDURE — 1036F TOBACCO NON-USER: CPT | Performed by: INTERNAL MEDICINE

## 2019-08-22 PROCEDURE — G8598 ASA/ANTIPLAT THER USED: HCPCS | Performed by: INTERNAL MEDICINE

## 2019-08-22 PROCEDURE — 70498 CT ANGIOGRAPHY NECK: CPT

## 2019-08-22 PROCEDURE — 6360000004 HC RX CONTRAST MEDICATION: Performed by: INTERNAL MEDICINE

## 2019-08-22 PROCEDURE — 4040F PNEUMOC VAC/ADMIN/RCVD: CPT | Performed by: INTERNAL MEDICINE

## 2019-08-22 PROCEDURE — 93000 ELECTROCARDIOGRAM COMPLETE: CPT | Performed by: INTERNAL MEDICINE

## 2019-08-22 PROCEDURE — 70450 CT HEAD/BRAIN W/O DYE: CPT

## 2019-08-22 PROCEDURE — 3017F COLORECTAL CA SCREEN DOC REV: CPT | Performed by: INTERNAL MEDICINE

## 2019-08-22 PROCEDURE — 70496 CT ANGIOGRAPHY HEAD: CPT

## 2019-08-22 PROCEDURE — 82565 ASSAY OF CREATININE: CPT

## 2019-08-22 PROCEDURE — 1123F ACP DISCUSS/DSCN MKR DOCD: CPT | Performed by: INTERNAL MEDICINE

## 2019-08-22 RX ORDER — NIFEDIPINE 30 MG/1
60 TABLET, EXTENDED RELEASE ORAL DAILY
Qty: 90 TABLET | Refills: 3 | Status: SHIPPED
Start: 2019-08-22 | End: 2019-09-10 | Stop reason: DRUGHIGH

## 2019-08-22 RX ADMIN — IOPAMIDOL 80 ML: 755 INJECTION, SOLUTION INTRAVENOUS at 17:09

## 2019-08-22 NOTE — PROGRESS NOTES
[address]  Primary Care  Acute Visit  Joey Henderson MD      Balwinder Mckenzie is a 77 y.o. male who presents with   Chief Complaint   Patient presents with    Blood Pressure Check    Hypertension   . HPI    Here because home BP readings were high. 180s/130s at home today. No symptoms today however yesterday he was in [place] with some friends and started to feel lightheaded and woozy, slightly sweaty. His vision \"blacked out\" briefly and he fell to his knees. He thinks he may have had LOC briefly but for no more than a few seconds. When he came to he was sweaty but mentally at baseline. He did not have palpitations, chest pain, shortness of breath. He takes nifedipine ER 30 mg daily, telmisartan 80 mg daily, carvedilol 12.5 mg twice a day. He denies HA's, dizziness, lightheadedness. He has a history of vertebral artery dissection and states when that happened that is the only time in the past he has passed out. He was evaluated by EMS and not taken to the ED. He has significant vascular disease (including carotid disease) and follows regularly with Dr. Akilah Osborne    Of note on a past TTE in 2016 he had aortic stenosis noted     Review of Systems   Constitutional: Positive for diaphoresis. Negative for activity change, appetite change, chills, fatigue and fever. HENT: Negative. Respiratory: Negative for apnea, cough, chest tightness, shortness of breath and wheezing. Cardiovascular: Negative for chest pain, palpitations and leg swelling. Gastrointestinal: Positive for nausea. Negative for abdominal pain and vomiting. Endocrine: Negative. Genitourinary: Negative. Musculoskeletal: Negative. Skin: Negative for rash. Allergic/Immunologic: Negative. Neurological: Positive for dizziness, syncope and light-headedness. Negative for facial asymmetry, speech difficulty, weakness, numbness and headaches. Hematological: Negative. Psychiatric/Behavioral: Negative.           Past Medical History:   Diagnosis Date    Abdominal aortic atherosclerosis ([address]     Atherosclerosis of superior mesenteric artery ([address]     CAD (coronary artery disease)     Cerebral artery occlusion with cerebral infarction ([address]     Chronic kidney disease     Colon polyps     COPD (chronic obstructive pulmonary disease) (HCC)     CVA (cerebral infarction) 5/2013    Multiple, occipital and Cerebellar     DDD (degenerative disc disease)     Cerivical DDD    Epicondylitis elbow, medial     GERD (gastroesophageal reflux disease)     Hyperlipidemia     Hypertension     Ischemic colitis ([address] 11/2016    Osteoarthritis, hand     PVD (peripheral vascular disease) with claudication (HCC)     Left leg with mod-severe arterial ischemia    Renal artery atherosclerosis (HCC)     SMA stenosis ([address] 11/2016    Thoracic aorta atherosclerosis ([address]     Venous insufficiency     Vertebral artery dissection ([address] 5/2013       Past Surgical History:   Procedure Laterality Date    ANGIOPLASTY Left 08/2016    left femoral -popl stent    ANGIOPLASTY  11/27/2016    SMA    ARTERIAL BYPASS SURGRY Left 09/14/2016    left femoral-popliteal bypass graft.     ARTERIAL BYPASS SURGRY  11/29/2016    Dr. Dagmar Quintanilla - aorto-SMA bypass using 8mm PTFE    COLONOSCOPY  2008    COLONOSCOPY  9/2/2015    Dr. Zana Shaver  09/20/2018    Dr. Leon Lambert Bilateral 1/16/13    bilateral with mesh, Dr. Oakes Look  9/2/2015    Dr. Adrian Vega N/A 9/14/2018    EGD BIOPSY performed by Deena Norman MD at [address] [place] [address]  11/03    R Leg Vein stripping       Social History     Socioeconomic History    Marital status:      Spouse name: Not on file    Number of children: 3    Years of education: Not on file    Highest education level: Not on file   Occupational History    Not on file   Social Needs    Financial resource strain: Not on file    Food insecurity:     Worry: Not on file     Inability: Not on file    Transportation needs:     Medical: Not on file     Non-medical: Not on file   Tobacco Use    Smoking status: Former Smoker     Packs/day: 0.50     Years: 46.00     Pack years: 23.00     Types: Cigarettes     Start date: 1/1/1970     Last attempt to quit: 6/21/2016     Years since quitting: 3.2    Smokeless tobacco: Never Used    Tobacco comment: Maintain cessation   Substance and Sexual Activity    Alcohol use:  Yes     Alcohol/week: 20.0 standard drinks     Types: 20 Cans of beer per week     Comment: Occasionally    Drug use: No    Sexual activity: Not Currently     Partners: Female     Comment:    Lifestyle    Physical activity:     Days per week: Not on file     Minutes per session: Not on file    Stress: Not on file   Relationships    Social connections:     Talks on phone: Not on file     Gets together: Not on file     Attends Orthodox service: Not on file     Active member of club or organization: Not on file     Attends meetings of clubs or organizations: Not on file     Relationship status: Not on file    Intimate partner violence:     Fear of current or ex partner: Not on file     Emotionally abused: Not on file     Physically abused: Not on file     Forced sexual activity: Not on file   Other Topics Concern    Not on file   Social History Narrative    Not on file         Current Outpatient Medications on File Prior to Visit   Medication Sig Dispense Refill    ferrous sulfate (FEROSUL) 325 (65 Fe) MG tablet Take 1 tablet by mouth daily (with breakfast) 90 tablet 1    telmisartan (MICARDIS) 80 MG tablet TAKE 1 TABLET BY MOUTH DAILY 90 tablet 3    NIFEdipine (PROCARDIA XL) 30 MG extended release tablet TAKE 1 TABLET BY MOUTH DAILY 90 tablet 3    pantoprazole (PROTONIX) 40 MG tablet Take 1 tablet by mouth daily 90 tablet 3    acetaminophen (TYLENOL) 325 MG tablet Take 2 tablets by mouth every 6 hours as needed for Pain 120 tablet 3    clopidogrel (PLAVIX) 75 MG tablet TAKE 1 TABLET BY MOUTH DAILY 90 tablet 5    aspirin 81 MG tablet Take 1 tablet by mouth daily 30 tablet 3    Probiotic Product (ALIGN PO) Take 1 tablet by mouth daily      atorvastatin (LIPITOR) 80 MG tablet TAKE 1 TABLET BY MOUTH DAILY 90 tablet 3    carvedilol (COREG) 12.5 MG tablet TAKE 1 TABLET BY MOUTH TWICE DAILY 180 tablet 3    Cholecalciferol (VITAMIN D) 2000 units CAPS capsule Take 1 capsule by mouth daily      loperamide (RA ANTI-DIARRHEAL) 2 MG capsule Take 2 mg by mouth daily as needed for Diarrhea        No current facility-administered medications on file prior to visit. No Known Allergies      BP (!) 190/88 (Site: Right Upper Arm, Position: Sitting, Cuff Size: Small Adult)   Pulse 67   Resp 14   SpO2 97%   Physical Exam   Constitutional: He is oriented to person, place, and time. He appears well-developed and well-nourished. No distress. HENT:   Head: Normocephalic and atraumatic. Right Ear: Tympanic membrane, external ear and ear canal normal.   Left Ear: Tympanic membrane, external ear and ear canal normal.   Mouth/Throat: Oropharynx is clear and moist. No oropharyngeal exudate. Eyes: Pupils are equal, round, and reactive to light. EOM are normal. No scleral icterus. Neck: Normal range of motion. Neck supple. No JVD present. No thyromegaly present. No carotid bruits   Cardiovascular: Normal rate, regular rhythm, normal heart sounds and intact distal pulses. Exam reveals no gallop and no friction rub. No murmur heard. Pulmonary/Chest: Effort normal and breath sounds normal. He has no wheezes. He has no rales. Abdominal: Soft. Bowel sounds are normal. He exhibits no distension. There is no tenderness. There is no rebound and no guarding. Musculoskeletal: Normal range of motion. He exhibits no edema, tenderness or deformity. Lymphadenopathy:     He has no cervical adenopathy.    Neurological: He is

## 2019-08-23 PROBLEM — R71.8 ELEVATED MCV: Status: ACTIVE | Noted: 2019-08-23

## 2019-08-26 RX ORDER — ATORVASTATIN CALCIUM 80 MG/1
TABLET, FILM COATED ORAL
Qty: 90 TABLET | Refills: 0 | Status: SHIPPED | OUTPATIENT
Start: 2019-08-26 | End: 2019-12-05 | Stop reason: SDUPTHER

## 2019-08-26 RX ORDER — CARVEDILOL 12.5 MG/1
TABLET ORAL
Qty: 180 TABLET | Refills: 0 | Status: SHIPPED | OUTPATIENT
Start: 2019-08-26 | End: 2019-12-05 | Stop reason: SDUPTHER

## 2019-08-28 ENCOUNTER — OFFICE VISIT (OUTPATIENT)
Dept: INTERNAL MEDICINE CLINIC | Age: 69
End: 2019-08-28
Payer: MEDICARE

## 2019-08-28 VITALS
SYSTOLIC BLOOD PRESSURE: 140 MMHG | DIASTOLIC BLOOD PRESSURE: 60 MMHG | BODY MASS INDEX: 21.79 KG/M2 | WEIGHT: 135 LBS | HEART RATE: 70 BPM

## 2019-08-28 DIAGNOSIS — I10 ESSENTIAL HYPERTENSION: Primary | ICD-10-CM

## 2019-08-28 DIAGNOSIS — I35.0 AORTIC VALVE STENOSIS, ETIOLOGY OF CARDIAC VALVE DISEASE UNSPECIFIED: ICD-10-CM

## 2019-08-28 DIAGNOSIS — R55 SYNCOPE, UNSPECIFIED SYNCOPE TYPE: ICD-10-CM

## 2019-08-28 DIAGNOSIS — R00.2 HEART PALPITATIONS: ICD-10-CM

## 2019-08-28 PROCEDURE — 1036F TOBACCO NON-USER: CPT | Performed by: INTERNAL MEDICINE

## 2019-08-28 PROCEDURE — G8420 CALC BMI NORM PARAMETERS: HCPCS | Performed by: INTERNAL MEDICINE

## 2019-08-28 PROCEDURE — G8427 DOCREV CUR MEDS BY ELIG CLIN: HCPCS | Performed by: INTERNAL MEDICINE

## 2019-08-28 PROCEDURE — 99213 OFFICE O/P EST LOW 20 MIN: CPT | Performed by: INTERNAL MEDICINE

## 2019-08-28 PROCEDURE — 1123F ACP DISCUSS/DSCN MKR DOCD: CPT | Performed by: INTERNAL MEDICINE

## 2019-08-28 PROCEDURE — G8598 ASA/ANTIPLAT THER USED: HCPCS | Performed by: INTERNAL MEDICINE

## 2019-08-28 PROCEDURE — 3017F COLORECTAL CA SCREEN DOC REV: CPT | Performed by: INTERNAL MEDICINE

## 2019-08-28 PROCEDURE — 4040F PNEUMOC VAC/ADMIN/RCVD: CPT | Performed by: INTERNAL MEDICINE

## 2019-08-28 NOTE — PROGRESS NOTES
artery ([facility]     CAD (coronary artery disease)     Cerebral artery occlusion with cerebral infarction (HCC)     Chronic kidney disease     Colon polyps     COPD (chronic obstructive pulmonary disease) (HCC)     CVA (cerebral infarction) 5/2013    Multiple, occipital and Cerebellar     DDD (degenerative disc disease)     Cerivical DDD    Epicondylitis elbow, medial     GERD (gastroesophageal reflux disease)     Hyperlipidemia     Hypertension     Ischemic colitis ([facility] 11/2016    Osteoarthritis, hand     PVD (peripheral vascular disease) with claudication ([facility])     Left leg with mod-severe arterial ischemia    Renal artery atherosclerosis ([facility])     SMA stenosis ([facility] 11/2016    Thoracic aorta atherosclerosis ([facility])     Venous insufficiency     Vertebral artery dissection ([facility] 5/2013         MEDICATIONS:  Prior to Visit Medications    Medication Sig Taking?  Authorizing Provider   carvedilol (COREG) 12.5 MG tablet TAKE 1 TABLET BY MOUTH TWICE DAILY Yes Pepito Leal DO   atorvastatin (LIPITOR) 80 MG tablet TAKE 1 TABLET BY MOUTH DAILY Yes Pepito Leal DO   NIFEdipine (PROCARDIA XL) 30 MG extended release tablet Take 2 tablets by mouth daily Yes Carolin Samayoa MD   ferrous sulfate (FEROSUL) 325 (65 Fe) MG tablet Take 1 tablet by mouth daily (with breakfast) Yes Pepito Leal DO   telmisartan (MICARDIS) 80 MG tablet TAKE 1 TABLET BY MOUTH DAILY Yes Pepito Leal DO   pantoprazole (PROTONIX) 40 MG tablet Take 1 tablet by mouth daily Yes Pepito Leal DO   clopidogrel (PLAVIX) 75 MG tablet TAKE 1 TABLET BY MOUTH DAILY Yes Steve Anthony MD   aspirin 81 MG tablet Take 1 tablet by mouth daily Yes Steve Anthony MD   Probiotic Product (ALIGN PO) Take 1 tablet by mouth daily Yes Historical Provider, MD   Cholecalciferol (VITAMIN D) 2000 units CAPS capsule Take 1 capsule by mouth daily Yes Historical Provider, MD   acetaminophen (TYLENOL) 325 MG tablet Take 2 tablets by mouth every 6 hours

## 2019-08-28 NOTE — PATIENT INSTRUCTIONS
vision changes. ? Sudden trouble speaking. ? Sudden confusion or trouble understanding simple statements. ? Sudden problems with walking or balance. ? A sudden, severe headache that is different from past headaches.     · You passed out (lost consciousness) again.    Watch closely for changes in your health, and be sure to contact your doctor if:    · You do not get better as expected. Where can you learn more? Go to https://GojipePlatiza.Diarize. org and sign in to your [employer] account. Enter Y719 in the So1 box to learn more about \"Fainting: Care Instructions. \"     If you do not have an account, please click on the \"Sign Up Now\" link. Current as of: September 23, 2018  Content Version: 12.1  © 7873-3511 Healthwise, [email or web address]. Care instructions adapted under license by [facility] ([facility]). If you have questions about a medical condition or this instruction, always ask your healthcare professional. [place] [address] any warranty or liability for your use of this information. Patient Education        DASH Diet: Care Instructions  Your Care Instructions    The DASH diet is an eating plan that can help lower your blood pressure. DASH stands for Dietary Approaches to Stop Hypertension. Hypertension is high blood pressure. The DASH diet focuses on eating foods that are high in calcium, potassium, and magnesium. These nutrients can lower blood pressure. The foods that are highest in these nutrients are fruits, vegetables, low-fat dairy products, nuts, seeds, and legumes. But taking calcium, potassium, and magnesium supplements instead of eating foods that are high in those nutrients does not have the same effect. The DASH diet also includes whole grains, fish, and poultry. The DASH diet is one of several lifestyle changes your doctor may recommend to lower your high blood pressure. Your doctor may also want you to decrease the amount of sodium in your diet. warranty or liability for your use of this information. Patient Education        Aortic Valve Stenosis: Care Instructions  Your Care Instructions    Having aortic valve stenosis means that the valve between your heart and the large blood vessel that carries blood to the body (aorta) has narrowed. That forces the heart to pump harder to get enough blood through the valve. As stenosis gets worse, the valve gets narrower. This can cause symptoms. Symptoms include chest pain, dizziness, fainting, or shortness of breath. If you have mild or moderate stenosis and don't have symptoms, you may not need treatment now. Your doctor will check your heart regularly. You may need treatment if the stenosis gets worse. With severe stenosis, you may need to have the valve replaced. Follow-up care is a key part of your treatment and safety. Be sure to make and go to all appointments, and call your doctor if you are having problems. It's also a good idea to know your test results and keep a list of the medicines you take. How can you care for yourself at home? · Be safe with medicines. Take your medicines exactly as prescribed. Call your doctor if you think you are having a problem with your medicine. You will get more details on the specific medicines your doctor prescribes. · Plan your meals so that you are eating heart-healthy foods. ? Eat a variety of foods daily. Fresh fruits and vegetables and whole grains are good choices. ? Limit your fat intake, especially saturated and trans fat. ? Limit salt (sodium). ? Increase fiber in your diet. ? Limit alcohol. · Be active. Ask your doctor what type and level of exercise is safe for you. Walking is a good choice. Your doctor may suggest that you join a cardiac rehabilitation program so that you can have help increasing your physical activity safely. · Do not smoke. Smoking can make aortic valve stenosis worse.  If you need help quitting, talk to your doctor about stop-smoking programs and medicines. These can increase your chances of quitting for good. · Stay at a healthy weight. Lose weight if you need to. · Avoid colds and flu. Get a pneumococcal vaccine shot. If you have had one before, ask your doctor if you need another dose. Get a flu vaccine every year. · Take care of your teeth and gums. Get regular dental checkups. Good dental health is important because bacteria can spread from infected teeth and gums to the heart valves. When should you call for help? Call 911 anytime you think you may need emergency care. For example, call if:    · You passed out (lost consciousness).     · You have symptoms of a heart attack. These may include:  ? Chest pain or pressure, or a strange feeling in the chest.  ? Sweating. ? Shortness of breath. ? Nausea or vomiting. ? Pain, pressure, or a strange feeling in the back, neck, jaw, or upper belly or in one or both shoulders or arms. ? Lightheadedness or sudden weakness. ? A fast or irregular heartbeat.    After you call 911, the  may tell you to chew 1 adult-strength or 2 to 4 low-dose aspirin. Wait for an ambulance. Do not try to drive yourself.   Call your doctor now or seek immediate medical care if:    · You develop new symptoms of aortic valve stenosis, such as chest pain, dizziness, or shortness of breath.     · You have new or increased shortness of breath.     · You are dizzy or lightheaded, or you feel like you may faint.     · You have sudden weight gain, such as more than 2 to 3 pounds in a day or 5 pounds in a week. (Your doctor may suggest a different range of weight gain.)     · You have increased swelling in your legs, ankles, or feet.    Watch closely for changes in your health, and be sure to contact your doctor if:    · You have trouble making healthy lifestyle changes.     · You want more information about healthy lifestyle changes. Where can you learn more?   Go to

## 2019-09-02 ASSESSMENT — ENCOUNTER SYMPTOMS
VOMITING: 0
APNEA: 0
CHEST TIGHTNESS: 0
COUGH: 0
ALLERGIC/IMMUNOLOGIC NEGATIVE: 1
NAUSEA: 1
SHORTNESS OF BREATH: 0
WHEEZING: 0
ABDOMINAL PAIN: 0

## 2019-09-10 ENCOUNTER — TELEPHONE (OUTPATIENT)
Dept: INTERNAL MEDICINE CLINIC | Age: 69
End: 2019-09-10

## 2019-09-10 RX ORDER — NIFEDIPINE 60 MG/1
60 TABLET, EXTENDED RELEASE ORAL DAILY
Qty: 90 TABLET | Refills: 1 | Status: SHIPPED | OUTPATIENT
Start: 2019-09-10 | End: 2020-03-05

## 2019-09-10 NOTE — TELEPHONE ENCOUNTER
Patient is requesting the following prescription but states Dr. Julio Cesar Owen told him it could be prescribed for 1 pill of 60 mg. Instead of taking 2 pills of 30 mg.       NIFEdipine (PROCARDIA XL) 30 MG extended release tablet      [facility] [address] [place] [address] [phone]

## 2019-09-11 ENCOUNTER — HOSPITAL ENCOUNTER (OUTPATIENT)
Dept: NON INVASIVE DIAGNOSTICS | Age: 69
Discharge: HOME | End: 2019-09-11
Payer: MEDICARE

## 2019-09-11 DIAGNOSIS — R55 SYNCOPE, UNSPECIFIED SYNCOPE TYPE: ICD-10-CM

## 2019-09-11 DIAGNOSIS — I35.0 AORTIC VALVE STENOSIS, ETIOLOGY OF CARDIAC VALVE DISEASE UNSPECIFIED: ICD-10-CM

## 2019-09-11 DIAGNOSIS — I10 ESSENTIAL HYPERTENSION: ICD-10-CM

## 2019-09-11 LAB
LV EF: 63 %
LVEF MODALITY: NORMAL

## 2019-09-11 PROCEDURE — 93306 TTE W/DOPPLER COMPLETE: CPT

## 2019-09-16 ENCOUNTER — TELEPHONE (OUTPATIENT)
Dept: INTERNAL MEDICINE CLINIC | Age: 69
End: 2019-09-16

## 2019-10-17 ENCOUNTER — OFFICE VISIT (OUTPATIENT)
Dept: INTERNAL MEDICINE CLINIC | Age: 69
End: 2019-10-17
Payer: MEDICARE

## 2019-10-17 VITALS
HEART RATE: 68 BPM | BODY MASS INDEX: 21.79 KG/M2 | OXYGEN SATURATION: 98 % | SYSTOLIC BLOOD PRESSURE: 130 MMHG | WEIGHT: 135 LBS | DIASTOLIC BLOOD PRESSURE: 72 MMHG

## 2019-10-17 DIAGNOSIS — I73.9 PAD (PERIPHERAL ARTERY DISEASE) (HCC): ICD-10-CM

## 2019-10-17 DIAGNOSIS — I25.10 CORONARY ARTERY DISEASE INVOLVING NATIVE CORONARY ARTERY OF NATIVE HEART WITHOUT ANGINA PECTORIS: ICD-10-CM

## 2019-10-17 DIAGNOSIS — D75.89 MACROCYTOSIS: ICD-10-CM

## 2019-10-17 DIAGNOSIS — I10 BENIGN ESSENTIAL HTN: Primary | ICD-10-CM

## 2019-10-17 DIAGNOSIS — J43.9 PULMONARY EMPHYSEMA, UNSPECIFIED EMPHYSEMA TYPE (HCC): ICD-10-CM

## 2019-10-17 DIAGNOSIS — E78.5 HYPERLIPIDEMIA, UNSPECIFIED HYPERLIPIDEMIA TYPE: ICD-10-CM

## 2019-10-17 PROCEDURE — G8598 ASA/ANTIPLAT THER USED: HCPCS | Performed by: INTERNAL MEDICINE

## 2019-10-17 PROCEDURE — 90653 IIV ADJUVANT VACCINE IM: CPT | Performed by: INTERNAL MEDICINE

## 2019-10-17 PROCEDURE — G8420 CALC BMI NORM PARAMETERS: HCPCS | Performed by: INTERNAL MEDICINE

## 2019-10-17 PROCEDURE — G8427 DOCREV CUR MEDS BY ELIG CLIN: HCPCS | Performed by: INTERNAL MEDICINE

## 2019-10-17 PROCEDURE — 99214 OFFICE O/P EST MOD 30 MIN: CPT | Performed by: INTERNAL MEDICINE

## 2019-10-17 PROCEDURE — 3023F SPIROM DOC REV: CPT | Performed by: INTERNAL MEDICINE

## 2019-10-17 PROCEDURE — 1123F ACP DISCUSS/DSCN MKR DOCD: CPT | Performed by: INTERNAL MEDICINE

## 2019-10-17 PROCEDURE — G8482 FLU IMMUNIZE ORDER/ADMIN: HCPCS | Performed by: INTERNAL MEDICINE

## 2019-10-17 PROCEDURE — G0008 ADMIN INFLUENZA VIRUS VAC: HCPCS | Performed by: INTERNAL MEDICINE

## 2019-10-17 PROCEDURE — 1036F TOBACCO NON-USER: CPT | Performed by: INTERNAL MEDICINE

## 2019-10-17 PROCEDURE — 4040F PNEUMOC VAC/ADMIN/RCVD: CPT | Performed by: INTERNAL MEDICINE

## 2019-10-17 PROCEDURE — 3017F COLORECTAL CA SCREEN DOC REV: CPT | Performed by: INTERNAL MEDICINE

## 2019-10-17 PROCEDURE — G8926 SPIRO NO PERF OR DOC: HCPCS | Performed by: INTERNAL MEDICINE

## 2019-11-11 ENCOUNTER — PROCEDURE VISIT (OUTPATIENT)
Dept: SURGERY | Age: 69
End: 2019-11-11
Payer: MEDICARE

## 2019-11-11 ENCOUNTER — OFFICE VISIT (OUTPATIENT)
Dept: SURGERY | Age: 69
End: 2019-11-11
Payer: MEDICARE

## 2019-11-11 VITALS
HEART RATE: 67 BPM | SYSTOLIC BLOOD PRESSURE: 115 MMHG | DIASTOLIC BLOOD PRESSURE: 74 MMHG | WEIGHT: 138 LBS | BODY MASS INDEX: 22.27 KG/M2

## 2019-11-11 DIAGNOSIS — I87.2 VENOUS INSUFFICIENCY: ICD-10-CM

## 2019-11-11 DIAGNOSIS — I70.0 ATHEROSCLEROSIS OF ABDOMINAL AORTA (HCC): ICD-10-CM

## 2019-11-11 DIAGNOSIS — I73.9 PAD (PERIPHERAL ARTERY DISEASE) (HCC): Primary | ICD-10-CM

## 2019-11-11 DIAGNOSIS — I65.23 CAROTID STENOSIS, ASYMPTOMATIC, BILATERAL: ICD-10-CM

## 2019-11-11 DIAGNOSIS — K55.1 MESENTERIC ARTERY STENOSIS (HCC): ICD-10-CM

## 2019-11-11 DIAGNOSIS — K55.1 ATHEROSCLEROSIS OF SUPERIOR MESENTERIC ARTERY (HCC): ICD-10-CM

## 2019-11-11 DIAGNOSIS — I70.219 ATHEROSCLEROTIC PVD WITH INTERMITTENT CLAUDICATION (HCC): ICD-10-CM

## 2019-11-11 DIAGNOSIS — E78.5 HYPERLIPIDEMIA, UNSPECIFIED HYPERLIPIDEMIA TYPE: ICD-10-CM

## 2019-11-11 PROCEDURE — 4040F PNEUMOC VAC/ADMIN/RCVD: CPT | Performed by: NURSE PRACTITIONER

## 2019-11-11 PROCEDURE — 93925 LOWER EXTREMITY STUDY: CPT | Performed by: SURGERY

## 2019-11-11 PROCEDURE — 99213 OFFICE O/P EST LOW 20 MIN: CPT | Performed by: NURSE PRACTITIONER

## 2019-11-11 PROCEDURE — G8420 CALC BMI NORM PARAMETERS: HCPCS | Performed by: NURSE PRACTITIONER

## 2019-11-11 PROCEDURE — G8427 DOCREV CUR MEDS BY ELIG CLIN: HCPCS | Performed by: NURSE PRACTITIONER

## 2019-11-11 PROCEDURE — G8482 FLU IMMUNIZE ORDER/ADMIN: HCPCS | Performed by: NURSE PRACTITIONER

## 2019-11-11 PROCEDURE — 93978 VASCULAR STUDY: CPT | Performed by: SURGERY

## 2019-11-11 PROCEDURE — 3017F COLORECTAL CA SCREEN DOC REV: CPT | Performed by: NURSE PRACTITIONER

## 2019-11-11 PROCEDURE — 93880 EXTRACRANIAL BILAT STUDY: CPT | Performed by: SURGERY

## 2019-11-11 PROCEDURE — G8598 ASA/ANTIPLAT THER USED: HCPCS | Performed by: NURSE PRACTITIONER

## 2019-11-11 PROCEDURE — 1123F ACP DISCUSS/DSCN MKR DOCD: CPT | Performed by: NURSE PRACTITIONER

## 2019-11-11 PROCEDURE — 1036F TOBACCO NON-USER: CPT | Performed by: NURSE PRACTITIONER

## 2019-11-11 ASSESSMENT — ENCOUNTER SYMPTOMS
BACK PAIN: 1
GASTROINTESTINAL NEGATIVE: 1
RESPIRATORY NEGATIVE: 1
EYES NEGATIVE: 1
ALLERGIC/IMMUNOLOGIC NEGATIVE: 1

## 2019-12-05 RX ORDER — ATORVASTATIN CALCIUM 80 MG/1
TABLET, FILM COATED ORAL
Qty: 90 TABLET | Refills: 3 | Status: SHIPPED | OUTPATIENT
Start: 2019-12-05 | End: 2020-09-25

## 2019-12-05 RX ORDER — CARVEDILOL 12.5 MG/1
TABLET ORAL
Qty: 180 TABLET | Refills: 3 | Status: SHIPPED | OUTPATIENT
Start: 2019-12-05 | End: 2020-06-08

## 2020-01-08 ENCOUNTER — NURSE ONLY (OUTPATIENT)
Dept: CARDIOLOGY CLINIC | Age: 70
End: 2020-01-08

## 2020-01-24 ENCOUNTER — TELEPHONE (OUTPATIENT)
Dept: INTERNAL MEDICINE CLINIC | Age: 70
End: 2020-01-24

## 2020-01-24 NOTE — TELEPHONE ENCOUNTER
FYI:    Patient states his Holter Monitor was defective, so he had to get another one. Since he is going on vacation, he won't be able to wear it until 02-. Patient can be reached @ phone # provided should there be any questions.

## 2020-02-25 ENCOUNTER — TELEPHONE (OUTPATIENT)
Dept: INTERNAL MEDICINE CLINIC | Age: 70
End: 2020-02-25

## 2020-02-25 RX ORDER — CLOPIDOGREL BISULFATE 75 MG/1
TABLET ORAL
Qty: 90 TABLET | Refills: 3 | Status: SHIPPED | OUTPATIENT
Start: 2020-02-25 | End: 2021-02-10

## 2020-02-28 ENCOUNTER — TELEPHONE (OUTPATIENT)
Dept: INTERNAL MEDICINE CLINIC | Age: 70
End: 2020-02-28

## 2020-02-28 NOTE — TELEPHONE ENCOUNTER
The following message was left on the Non-Emergency Line:    Patient states he is having trouble with the heart monitor. He is requesting to know if Dr. Valdemar Jones has enough data, so he can return the monitor. Please contact patient @ phone # provided.

## 2020-03-05 RX ORDER — NIFEDIPINE 60 MG/1
60 TABLET, EXTENDED RELEASE ORAL DAILY
Qty: 90 TABLET | Refills: 3 | Status: SHIPPED | OUTPATIENT
Start: 2020-03-05 | End: 2020-03-09 | Stop reason: ALTCHOICE

## 2020-03-05 RX ORDER — PANTOPRAZOLE SODIUM 40 MG/1
40 TABLET, DELAYED RELEASE ORAL DAILY
Qty: 90 TABLET | Refills: 3 | Status: SHIPPED | OUTPATIENT
Start: 2020-03-05 | End: 2020-03-06 | Stop reason: SDUPTHER

## 2020-03-06 ENCOUNTER — TELEPHONE (OUTPATIENT)
Dept: INTERNAL MEDICINE CLINIC | Age: 70
End: 2020-03-06

## 2020-03-06 RX ORDER — PANTOPRAZOLE SODIUM 40 MG/1
40 TABLET, DELAYED RELEASE ORAL DAILY
Qty: 90 TABLET | Refills: 3 | Status: SHIPPED | OUTPATIENT
Start: 2020-03-06 | End: 2020-11-16 | Stop reason: ALTCHOICE

## 2020-03-06 NOTE — TELEPHONE ENCOUNTER
Patient called to let us know he is transferring from [place] to [facility] in [place]. He needs ma new Rx for Pantoprazole 40 mg to [facility].  Any questions/concerns please call at number provided. Please change [facility] to [facility] in system. Thanks.

## 2020-03-09 ENCOUNTER — TELEPHONE (OUTPATIENT)
Dept: INTERNAL MEDICINE CLINIC | Age: 70
End: 2020-03-09

## 2020-03-09 RX ORDER — NIFEDIPINE 90 MG/1
90 TABLET, EXTENDED RELEASE ORAL DAILY
Qty: 30 TABLET | Refills: 3 | Status: SHIPPED | OUTPATIENT
Start: 2020-03-09 | End: 2020-03-16 | Stop reason: SDUPTHER

## 2020-03-09 RX ORDER — NIFEDIPINE 30 MG/1
TABLET, EXTENDED RELEASE ORAL
Qty: 1 TABLET | Refills: 0 | Status: SHIPPED | OUTPATIENT
Start: 2020-03-09 | End: 2020-03-16 | Stop reason: DRUGHIGH

## 2020-03-16 ENCOUNTER — OFFICE VISIT (OUTPATIENT)
Dept: INTERNAL MEDICINE CLINIC | Age: 70
End: 2020-03-16
Payer: MEDICARE

## 2020-03-16 VITALS
SYSTOLIC BLOOD PRESSURE: 140 MMHG | BODY MASS INDEX: 22.27 KG/M2 | DIASTOLIC BLOOD PRESSURE: 60 MMHG | RESPIRATION RATE: 12 BRPM | HEART RATE: 68 BPM | WEIGHT: 138 LBS

## 2020-03-16 PROCEDURE — 1036F TOBACCO NON-USER: CPT | Performed by: INTERNAL MEDICINE

## 2020-03-16 PROCEDURE — 99214 OFFICE O/P EST MOD 30 MIN: CPT | Performed by: INTERNAL MEDICINE

## 2020-03-16 PROCEDURE — G8427 DOCREV CUR MEDS BY ELIG CLIN: HCPCS | Performed by: INTERNAL MEDICINE

## 2020-03-16 PROCEDURE — G8420 CALC BMI NORM PARAMETERS: HCPCS | Performed by: INTERNAL MEDICINE

## 2020-03-16 PROCEDURE — G8482 FLU IMMUNIZE ORDER/ADMIN: HCPCS | Performed by: INTERNAL MEDICINE

## 2020-03-16 PROCEDURE — 4040F PNEUMOC VAC/ADMIN/RCVD: CPT | Performed by: INTERNAL MEDICINE

## 2020-03-16 PROCEDURE — 3017F COLORECTAL CA SCREEN DOC REV: CPT | Performed by: INTERNAL MEDICINE

## 2020-03-16 PROCEDURE — G8926 SPIRO NO PERF OR DOC: HCPCS | Performed by: INTERNAL MEDICINE

## 2020-03-16 PROCEDURE — 3023F SPIROM DOC REV: CPT | Performed by: INTERNAL MEDICINE

## 2020-03-16 PROCEDURE — 1123F ACP DISCUSS/DSCN MKR DOCD: CPT | Performed by: INTERNAL MEDICINE

## 2020-03-16 RX ORDER — NIFEDIPINE 90 MG/1
90 TABLET, EXTENDED RELEASE ORAL DAILY
Qty: 90 TABLET | Refills: 1 | Status: SHIPPED | OUTPATIENT
Start: 2020-03-16 | End: 2020-09-21

## 2020-03-16 NOTE — PROGRESS NOTES
[facility]) Physicians  Internal Medicine  Patient Encounter  Pato Lloyd D.O., [facility]         Chief Complaint   Patient presents with    Hypertension       HPI: 72 y.o. male with multiple complicated medical problems seen today for his routine checkup regarding the status of current chronic issues listed below along with a medication review and reconciliation. The latter is accurate. He drinks 4 beers per day. HTN-- BP has been 140's-150's for the most part until 3/15/2020 when his BP spiked to 188/104 and 183/99. He called the office. His Nifedipine was increased to 90 mg daily. BP came down. Today, home BP was 153/86, 152/87. He denies dizziness, swelling. He denies lightheadedness.       Hyperlipidemia:    Lab Results   Component Value Date    LDLCALC 47 10/17/2019    He is on Lipitor 80 mg daily. He denies myalgias.       PAD/Carotid stenosis/superior mesenteric artery stenosis/thoracic aorta atherosclerosis/renal artery atherosclerosis/abdominal aorta atherosclerosis--   Previous history----> Patient has history of chronic mesenteric ischemia and underwent SMA bypass  11/29/2016. Patient's also had a left fem-pop bypass 9/14/2016. He also has EMELINA, carotid stenosis, abd and thoracic aortic ASVD. Last carotid doppler-- 16-49% BL, 7/2017   H/OLE angiogram  1.  Ultrasound-guided access to the left common femoral artery. 2.  Abdominal aortogram.  3.  Angiogram of the bilateral lower extremities. 4.  Selective angiography of the right popliteal and tibial arteries. 5.  Atherectomy with angioplasty, right superficial femoral and  popliteal arteries. 6.  Atherectomy with angioplasty, right tibioperoneal trunk.     Vasc. areterial dopplers of mesentery, carotid and LE 11/2019-- reviewed. Left ICA worse--- 50-80%. No additional imaging ordered. rec follow up in 6 months. CTA neck back in [place] showed 50% of the left ICA. He states he walks, but not part of exercise regimen.  His mod-severe arterial ischemia    Renal artery atherosclerosis (HCC)     SMA stenosis ([facility] [address] 11/2016    Thoracic aorta atherosclerosis (HCC)     Venous insufficiency     Vertebral artery dissection ([facility] [address] [facility] 5/2013       Patient Care Team:  [address] [place] [address],  as PCP - General (Internal Medicine)  [address] [place] [address] as PCP - [facility] Empaneled Provider  Nevin Lopez MD as Surgeon (General Surgery)  Raul Avilez MD as Consulting Physician (Urology)  Sharon Hardy MD (Vascular Surgery)  Elin Amaya MD as Consulting Physician (Gastroenterology)  Nabor Whitfield MD as Consulting Physician (Orthopedic Surgery)             Prior to Visit Medications    Medication Sig Taking?  Authorizing Provider   NIFEdipine (PROCARDIA XL) 90 MG extended release tablet Take 1 tablet by mouth daily Yes Donna Mcduffie MD   pantoprazole (PROTONIX) 40 MG tablet Take 1 tablet by mouth daily Yes Pepito Leal DO   clopidogrel (PLAVIX) 75 MG tablet TAKE 1 TABLET BY MOUTH DAILY Yes Pepito Leal DO   atorvastatin (LIPITOR) 80 MG tablet TAKE 1 TABLET BY MOUTH DAILY Yes Pepito Leal DO   carvedilol (COREG) 12.5 MG tablet TAKE 1 TABLET BY MOUTH TWICE DAILY Yes Pepito Leal DO   ferrous sulfate (FEROSUL) 325 (65 Fe) MG tablet Take 1 tablet by mouth daily (with breakfast) Yes Pepito Leal DO   telmisartan (MICARDIS) 80 MG tablet TAKE 1 TABLET BY MOUTH DAILY Yes Pepito Leal DO   aspirin 81 MG tablet Take 1 tablet by mouth daily Yes Marcial Herbert MD   Probiotic Product (ALIGN PO) Take 1 tablet by mouth daily Yes Historical Provider, MD   Cholecalciferol (VITAMIN D) 2000 units CAPS capsule Take 1 capsule by mouth daily Yes Historical Provider, MD   acetaminophen (TYLENOL) 325 MG tablet Take 2 tablets by mouth every 6 hours as needed for Pain  Marcial Herbert MD   loperamide (RA ANTI-DIARRHEAL) 2 MG capsule Take 2 mg by mouth daily as needed for Diarrhea   Historical Provider, MD          Review of Systems - As per HPI        OBJECTIVE:  BP (!) 140/60   Pulse 68   Resp 12   Wt 138 lb (62.6 kg)   BMI 22.27 kg/m²    BP Readings from Last 3 Encounters:   03/16/20 (!) 140/60   11/11/19 115/74   10/17/19 130/72      Wt Readings from Last 3 Encounters:   03/16/20 138 lb (62.6 kg)   11/11/19 138 lb (62.6 kg)   10/17/19 135 lb (61.2 kg)       GEN: NAD, A&O, Non-toxic  HEENT: NC/AT, JOCELINE, EOMI, Anicteric. TM's Nl. Oral cavity without mucosal lesions. Mucous membranes moist.   NECK:  Supple. No thyromegaly or nodules. No JVD. LYMPH: No C/SC nodes. CV: Regular rhythm. No ectopy. Rate normal.  Soft 2/6 systolic murmur--best at aortic post  PULM: CTA. EXT: No edema  GI: Abdomen is soft,  BS+, No masses. Nontender, nondistended  VASC:  Pedal pulses are palpable, faint. + Bilateral carotid bruits which may represent a radiant cardiac murmur. SKIN:  No rashes. NEURO: No focal deficits. Cranial nerve II through XII are intact        ASSESSMENT/PLAN:    1. MAJANO (dyspnea on exertion)  Condition etiology is unclear. Differential would include lung disease but PFTs in the past have been surprisingly normal despite previous tobacco use. However he only has what amounts to be a 15-pack-year history. Other considerations obviously include anginal equivalent  Patient needs a Lexiscan Myoview stress test  - XR CHEST STANDARD (2 VW); Future  - NM MYOCARDIAL SPECT REST EXERCISE OR RX; Future  - Comprehensive Metabolic Panel; Future  - TSH without Reflex; Future  - CBC Auto Differential; Future    2. Coronary artery disease involving native coronary artery of native heart without angina pectoris  Edition stability is uncertain. As above  - NM MYOCARDIAL SPECT REST EXERCISE OR RX; Future  - Comprehensive Metabolic Panel; Future  - Lipid Panel; Future    3. Carotid stenosis, bilateral  Condition is worsening particularly on the left based on most recent Doppler.   Based on velocity, his left internal carotid artery stenosis might be

## 2020-03-16 NOTE — PATIENT INSTRUCTIONS
Decrease alcohol by 50%.   It is affecting your bone marrow    Stress testing    Chest X-ray    Lab today

## 2020-04-13 ENCOUNTER — TELEPHONE (OUTPATIENT)
Dept: INTERNAL MEDICINE CLINIC | Age: 70
End: 2020-04-13

## 2020-04-13 NOTE — TELEPHONE ENCOUNTER
Persistent headache. in temple area. Pt only using tylenol Arthritis. Some dizziness upon standing.  Pt requesting to know what other med he can use

## 2020-04-14 ENCOUNTER — VIRTUAL VISIT (OUTPATIENT)
Dept: INTERNAL MEDICINE CLINIC | Age: 70
End: 2020-04-14
Payer: MEDICARE

## 2020-04-14 VITALS — DIASTOLIC BLOOD PRESSURE: 83 MMHG | HEART RATE: 79 BPM | SYSTOLIC BLOOD PRESSURE: 146 MMHG | TEMPERATURE: 97.1 F

## 2020-04-14 PROCEDURE — 4040F PNEUMOC VAC/ADMIN/RCVD: CPT | Performed by: INTERNAL MEDICINE

## 2020-04-14 PROCEDURE — 3017F COLORECTAL CA SCREEN DOC REV: CPT | Performed by: INTERNAL MEDICINE

## 2020-04-14 PROCEDURE — 99214 OFFICE O/P EST MOD 30 MIN: CPT | Performed by: INTERNAL MEDICINE

## 2020-04-14 PROCEDURE — 1123F ACP DISCUSS/DSCN MKR DOCD: CPT | Performed by: INTERNAL MEDICINE

## 2020-04-14 PROCEDURE — G8427 DOCREV CUR MEDS BY ELIG CLIN: HCPCS | Performed by: INTERNAL MEDICINE

## 2020-04-14 RX ORDER — CARVEDILOL 25 MG/1
25 TABLET ORAL 2 TIMES DAILY
Qty: 60 TABLET | Refills: 3 | Status: SHIPPED | OUTPATIENT
Start: 2020-04-14 | End: 2020-08-05

## 2020-04-14 ASSESSMENT — ENCOUNTER SYMPTOMS
SORE THROAT: 0
COUGH: 0
EYE PAIN: 0
NAUSEA: 0
SINUS PRESSURE: 0
ABDOMINAL PAIN: 0
EYE REDNESS: 0
SHORTNESS OF BREATH: 0
FACIAL SWELLING: 0

## 2020-04-14 NOTE — PROGRESS NOTES
2020    [facility]) Physicians  Internal Medicine  Patient Encounter  Kyung Giron D.O., [address] -- Audio/Visual (During EEJMQ-23 public health emergency)      I discussed at this telephone/video visit is a nontraditional type of visit that we are conducting in lieu of an office visit to minimize patient risk during the coronavirus pandemic. I discussed with the patient that I would not be able to perform a full physical examination, but that in most other respects the visit would be beneficial to his/her continued medical care. He/She again gave verbal consent for us to conduct this type of visit. Chief Complaint   Patient presents with    Headache         HPI:    Jose G Lilly (:  1950) has requested an audio/video evaluation for the following concern(s): Headache, dizziness    This is a 72-year-old male with multiple complex medical problems including peripheral arterial disease, hypertension, COPD, tobacco abuse, multiple strokes who called the office yesterday with report of a headache located in the temporal region with associated dizziness. He reported initially that the severity of his discomfort was an 8 on a scale of 1-10 and that symptoms have been ongoing for 2 weeks. The medical assistant called the patient and he described the pain as a 5 on a scale of 1-10 and he was just wanting to know if Tylenol was okay to take for his headache. His last carotid Doppler was 2019 which showed 16 to 49% stenosis of the right internal carotid artery, 50 to 80% stenosis of the left internal carotid artery. Back in August he had a CTA of the neck. This was done on 2019. This revealed 40% stenosis of the right internal carotid artery and 50% stenosis of the left internal carotid artery. Carotid doppler to rechecked in 12 months. In 6 months he will have a mesenteric arterial scan and lower extremity ABIs.     Today the patient states his discomfort is mild. He describes an ache in the temple area bilaterally. The discomfort is a dull ache and not throbbing. Patient denies any exacerbating factors. Relieving factors include putting pressure on the temple region. He denies any fever, chills, sweats. He denies any ear pain or hearing difficulties. He denies any visual changes. He denies any nausea or vomiting. He does report some mild orthostatic dizziness but this is not new. He denies any vertiginous symptoms. Home blood pressures have been running in the 140s/80's and pulse has been mostly in the 70s. He denies any chest pain or shortness of breath. Past Medical History:   Diagnosis Date    Abdominal aortic atherosclerosis ([address]     Atherosclerosis of superior mesenteric artery ([address]     CAD (coronary artery disease)     Cerebral artery occlusion with cerebral infarction (HCC)     Chronic kidney disease     Colon polyps     COPD (chronic obstructive pulmonary disease) (HCC)     CVA (cerebral infarction) 5/2013    Multiple, occipital and Cerebellar     DDD (degenerative disc disease)     Cerivical DDD    Epicondylitis elbow, medial     GERD (gastroesophageal reflux disease)     Hyperlipidemia     Hypertension     Ischemic colitis ([address] 11/2016    Osteoarthritis, hand     PVD (peripheral vascular disease) with claudication (HCC)     Left leg with mod-severe arterial ischemia    Renal artery atherosclerosis (HCC)     SMA stenosis ([address] 11/2016    Thoracic aorta atherosclerosis ([address]     Venous insufficiency     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  Authorizing Provider   NIFEdipine (PROCARDIA XL) 90 MG extended release tablet Take 1 tablet by mouth daily  Pepito Leal DO   pantoprazole (PROTONIX) 40 MG tablet Take 1 tablet by mouth daily  Pepito Leal DO   clopidogrel (PLAVIX) 75 MG tablet TAKE 1 TABLET BY MOUTH DAILY  Pepito Leal,

## 2020-04-14 NOTE — PATIENT INSTRUCTIONS
began, how long it lasted, and what the pain was like (throbbing, aching, stabbing, or dull). Write down any other symptoms you had with the headache, such as nausea, flashing lights or dark spots, or sensitivity to bright light or loud noise. Note if the headache occurred near your period. List anything that might have triggered the headache, such as certain foods (chocolate, cheese, wine) or odors, smoke, bright light, stress, or lack of sleep. · Find healthy ways to deal with stress. Headaches are most common during or right after stressful times. Take time to relax before and after you do something that has caused a headache in the past.  · Try to keep your muscles relaxed by keeping good posture. Check your jaw, face, neck, and shoulder muscles for tension, and try relaxing them. When sitting at a desk, change positions often, and stretch for 30 seconds each hour. · Get plenty of sleep and exercise. · Eat regularly and well. Long periods without food can trigger a headache. · Treat yourself to a massage. Some people find that regular massages are very helpful in relieving tension. · Limit caffeine by not drinking too much coffee, tea, or soda. But don't quit caffeine suddenly, because that can also give you headaches. · Reduce eyestrain from computers by blinking frequently and looking away from the computer screen every so often. Make sure you have proper eyewear and that your monitor is set up properly, about an arm's length away. · Seek help if you have depression or anxiety. Your headaches may be linked to these conditions. Treatment can both prevent headaches and help with symptoms of anxiety or depression. When should you call for help? Call 911 anytime you think you may need emergency care. For example, call if:    · You have signs of a stroke. These may include:  ? Sudden numbness, paralysis, or weakness in your face, arm, or leg, especially on only one side of your body.   ? Sudden vision taking a prescription pain medicine, ask your doctor if you can take an over-the-counter pain medicine. · Take it easy for the next few days or longer if you are not feeling well. · Use a warm, moist towel or heating pad set on low to relax tight muscles in your shoulder and neck. Have someone gently massage your neck and shoulders. · Put ice or a cold pack on the area for 10 to 20 minutes at a time. Put a thin cloth between the ice and your skin. When should you call for help? Call 911 anytime you think you may need emergency care. For example, call if:    · You have twitching, jerking, or a seizure.     · You passed out (lost consciousness).     · You have symptoms of a stroke. These may include:  ? Sudden numbness, tingling, weakness, or loss of movement in your face, arm, or leg, especially on only one side of your body. ? Sudden vision changes. ? Sudden trouble speaking. ? Sudden confusion or trouble understanding simple statements. ? Sudden problems with walking or balance. ? A sudden, severe headache that is different from past headaches.     · You have jaw pain and pain in your chest, shoulder, neck, or arm.    Call your doctor now or seek immediate medical care if:    · You have a fever with a stiff neck or a severe headache.     · You have nausea and vomiting, or you cannot keep food or liquids down.    Watch closely for changes in your health, and be sure to contact your doctor if:    · Your head or face pain does not get better as expected. Where can you learn more? Go to https://Zuujitlyubov.[employer]. org and sign in to your [employer] account. Enter P568 in the Carwow box to learn more about \"Head or Face Pain: Care Instructions. \"     If you do not have an account, please click on the \"Sign Up Now\" link. Current as of: June 26, 2019Content Version: 12.4  © 8052-8861 Healthwise, Incorporated. Care instructions adapted under license by [facility] ([facility]).  If you have questions about a medical condition or this instruction, always ask your healthcare professional. [place] [address] any warranty or liability for your use of this information.

## 2020-04-21 ENCOUNTER — TELEPHONE (OUTPATIENT)
Dept: INTERNAL MEDICINE CLINIC | Age: 70
End: 2020-04-21

## 2020-04-21 RX ORDER — TELMISARTAN 80 MG/1
TABLET ORAL
Qty: 90 TABLET | Refills: 0 | Status: SHIPPED | OUTPATIENT
Start: 2020-04-21 | End: 2020-06-09

## 2020-04-21 NOTE — TELEPHONE ENCOUNTER
Patient is requesting to speak with Larry Hammer regarding the medication adjustment for his Headaches not helping. Patient provided no additional information. Please contact patient @ phone # provided.

## 2020-04-22 ENCOUNTER — TELEPHONE (OUTPATIENT)
Dept: INTERNAL MEDICINE CLINIC | Age: 70
End: 2020-04-22

## 2020-04-22 ENCOUNTER — HOSPITAL ENCOUNTER (OUTPATIENT)
Dept: CT IMAGING | Age: 70
Discharge: HOME | End: 2020-04-22
Payer: MEDICARE

## 2020-04-22 LAB
ANION GAP SERPL CALCULATED.3IONS-SCNC: 9 MMOL/L (ref 3–16)
BASOPHILS ABSOLUTE: 0.1 K/UL (ref 0–0.2)
BASOPHILS RELATIVE PERCENT: 1.5 %
BUN BLDV-MCNC: 12 MG/DL (ref 7–20)
C-REACTIVE PROTEIN: <0.3 MG/L (ref 0–5.1)
CALCIUM SERPL-MCNC: 9.3 MG/DL (ref 8.3–10.6)
CHLORIDE BLD-SCNC: 96 MMOL/L (ref 99–110)
CO2: 28 MMOL/L (ref 21–32)
CREAT SERPL-MCNC: 0.9 MG/DL (ref 0.8–1.3)
EOSINOPHILS ABSOLUTE: 0.1 K/UL (ref 0–0.6)
EOSINOPHILS RELATIVE PERCENT: 2.7 %
GFR AFRICAN AMERICAN: >60
GFR NON-AFRICAN AMERICAN: >60
GLUCOSE BLD-MCNC: 110 MG/DL (ref 70–99)
HCT VFR BLD CALC: 39.6 % (ref 40.5–52.5)
HEMOGLOBIN: 13.1 G/DL (ref 13.5–17.5)
LYMPHOCYTES ABSOLUTE: 0.6 K/UL (ref 1–5.1)
LYMPHOCYTES RELATIVE PERCENT: 11.5 %
MCH RBC QN AUTO: 35 PG (ref 26–34)
MCHC RBC AUTO-ENTMCNC: 33.2 G/DL (ref 31–36)
MCV RBC AUTO: 105.4 FL (ref 80–100)
MONOCYTES ABSOLUTE: 0.9 K/UL (ref 0–1.3)
MONOCYTES RELATIVE PERCENT: 16.5 %
NEUTROPHILS ABSOLUTE: 3.5 K/UL (ref 1.7–7.7)
NEUTROPHILS RELATIVE PERCENT: 67.8 %
PDW BLD-RTO: 14 % (ref 12.4–15.4)
PLATELET # BLD: 269 K/UL (ref 135–450)
PMV BLD AUTO: 6.9 FL (ref 5–10.5)
POTASSIUM SERPL-SCNC: 4.8 MMOL/L (ref 3.5–5.1)
RBC # BLD: 3.76 M/UL (ref 4.2–5.9)
SEDIMENTATION RATE, ERYTHROCYTE: 10 MM/HR (ref 0–20)
SODIUM BLD-SCNC: 133 MMOL/L (ref 136–145)
WBC # BLD: 5.2 K/UL (ref 4–11)

## 2020-04-22 PROCEDURE — 80048 BASIC METABOLIC PNL TOTAL CA: CPT

## 2020-04-22 PROCEDURE — 36415 COLL VENOUS BLD VENIPUNCTURE: CPT

## 2020-04-22 PROCEDURE — 85652 RBC SED RATE AUTOMATED: CPT

## 2020-04-22 PROCEDURE — 85025 COMPLETE CBC W/AUTO DIFF WBC: CPT

## 2020-04-22 PROCEDURE — 86140 C-REACTIVE PROTEIN: CPT

## 2020-04-22 PROCEDURE — 70450 CT HEAD/BRAIN W/O DYE: CPT

## 2020-04-23 RX ORDER — AMOXICILLIN AND CLAVULANATE POTASSIUM 875; 125 MG/1; MG/1
1 TABLET, FILM COATED ORAL 2 TIMES DAILY
Qty: 20 TABLET | Refills: 0 | Status: SHIPPED | OUTPATIENT
Start: 2020-04-23 | End: 2020-05-03

## 2020-04-23 RX ORDER — SACCHAROMYCES BOULARDII 250 MG
250 CAPSULE ORAL 2 TIMES DAILY
Qty: 60 CAPSULE | Refills: 0 | Status: SHIPPED | OUTPATIENT
Start: 2020-04-23 | End: 2020-05-23

## 2020-04-28 ENCOUNTER — TELEMEDICINE (OUTPATIENT)
Dept: ENT CLINIC | Age: 70
End: 2020-04-28
Payer: MEDICARE

## 2020-04-28 PROCEDURE — 3017F COLORECTAL CA SCREEN DOC REV: CPT | Performed by: OTOLARYNGOLOGY

## 2020-04-28 PROCEDURE — G8428 CUR MEDS NOT DOCUMENT: HCPCS | Performed by: OTOLARYNGOLOGY

## 2020-04-28 PROCEDURE — G8420 CALC BMI NORM PARAMETERS: HCPCS | Performed by: OTOLARYNGOLOGY

## 2020-04-28 PROCEDURE — 4040F PNEUMOC VAC/ADMIN/RCVD: CPT | Performed by: OTOLARYNGOLOGY

## 2020-04-28 PROCEDURE — 1123F ACP DISCUSS/DSCN MKR DOCD: CPT | Performed by: OTOLARYNGOLOGY

## 2020-04-28 PROCEDURE — 99202 OFFICE O/P NEW SF 15 MIN: CPT | Performed by: OTOLARYNGOLOGY

## 2020-04-28 PROCEDURE — 1036F TOBACCO NON-USER: CPT | Performed by: OTOLARYNGOLOGY

## 2020-04-28 ASSESSMENT — ENCOUNTER SYMPTOMS
EYE ITCHING: 0
SINUS PAIN: 0
RHINORRHEA: 0
SHORTNESS OF BREATH: 0
FACIAL SWELLING: 0
NAUSEA: 0
CHOKING: 0
SORE THROAT: 0
EYE REDNESS: 0
VOICE CHANGE: 0
EYE PAIN: 0
TROUBLE SWALLOWING: 0
SINUS PRESSURE: 0
DIARRHEA: 0
COUGH: 0

## 2020-04-28 NOTE — PROGRESS NOTES
organization: Not on file     Attends meetings of clubs or organizations: Not on file     Relationship status: Not on file    Intimate partner violence     Fear of current or ex partner: Not on file     Emotionally abused: Not on file     Physically abused: Not on file     Forced sexual activity: Not on file   Other Topics Concern    Not on file   Social History Narrative    Not on file       DRUG/FOOD ALLERGIES: Patient has no known allergies. CURRENT MEDICATIONS  Prior to Admission medications    Medication Sig Start Date End Date Taking?  Authorizing Provider   amoxicillin-clavulanate (AUGMENTIN) 875-125 MG per tablet Take 1 tablet by mouth 2 times daily for 10 days 4/23/20 5/3/20 Yes Pepito Leal DO   saccharomyces boulardii (FLORASTOR) 250 MG capsule Take 1 capsule by mouth 2 times daily 4/23/20 5/23/20 Yes Pepito Leal DO   telmisartan (MICARDIS) 80 MG tablet TAKE 1 TABLET BY MOUTH EVERY DAY 4/21/20  Yes Pepito Leal DO   carvedilol (COREG) 25 MG tablet Take 1 tablet by mouth 2 times daily 4/14/20  Yes Pepito Leal DO   NIFEdipine (PROCARDIA XL) 90 MG extended release tablet Take 1 tablet by mouth daily 3/16/20  Yes Pepito Leal DO   pantoprazole (PROTONIX) 40 MG tablet Take 1 tablet by mouth daily 3/6/20  Yes Pepito Leal DO   clopidogrel (PLAVIX) 75 MG tablet TAKE 1 TABLET BY MOUTH DAILY 2/25/20  Yes Pepito Leal DO   atorvastatin (LIPITOR) 80 MG tablet TAKE 1 TABLET BY MOUTH DAILY 12/5/19  Yes Pepito Leal DO   carvedilol (COREG) 12.5 MG tablet TAKE 1 TABLET BY MOUTH TWICE DAILY 12/5/19  Yes Pepito Leal DO   ferrous sulfate (FEROSUL) 325 (65 Fe) MG tablet Take 1 tablet by mouth daily (with breakfast) 7/12/19  Yes Pepito Leal DO   acetaminophen (TYLENOL) 325 MG tablet Take 2 tablets by mouth every 6 hours as needed for Pain 9/15/18  Yes Tierra June MD   aspirin 81 MG tablet Take 1 tablet by mouth daily 9/22/18  Yes Tierra June MD   Probiotic Product (Erin Fiddler

## 2020-06-01 ENCOUNTER — HOSPITAL ENCOUNTER (OUTPATIENT)
Dept: GENERAL RADIOLOGY | Age: 70
Discharge: HOME | End: 2020-06-01
Payer: MEDICARE

## 2020-06-01 PROCEDURE — 71046 X-RAY EXAM CHEST 2 VIEWS: CPT

## 2020-06-03 ENCOUNTER — TELEPHONE (OUTPATIENT)
Dept: ENT CLINIC | Age: 70
End: 2020-06-03

## 2020-06-03 DIAGNOSIS — K52.1 ANTIBIOTIC-ASSOCIATED DIARRHEA: ICD-10-CM

## 2020-06-03 DIAGNOSIS — T36.95XA ANTIBIOTIC-ASSOCIATED DIARRHEA: ICD-10-CM

## 2020-06-03 LAB
ANION GAP SERPL CALCULATED.3IONS-SCNC: 11 MMOL/L (ref 3–16)
BUN BLDV-MCNC: 11 MG/DL (ref 7–20)
CALCIUM SERPL-MCNC: 8.9 MG/DL (ref 8.3–10.6)
CHLORIDE BLD-SCNC: 95 MMOL/L (ref 99–110)
CO2: 27 MMOL/L (ref 21–32)
CREAT SERPL-MCNC: 1.1 MG/DL (ref 0.8–1.3)
GFR AFRICAN AMERICAN: >60
GFR NON-AFRICAN AMERICAN: >60
GLUCOSE BLD-MCNC: 137 MG/DL (ref 70–99)
POTASSIUM SERPL-SCNC: 4.3 MMOL/L (ref 3.5–5.1)
SODIUM BLD-SCNC: 133 MMOL/L (ref 136–145)

## 2020-06-03 NOTE — TELEPHONE ENCOUNTER
Called patient to gather more information re: request for another antibiotic. Last antibiotic was called in 4/28/20 for 10 days. Based on that note-should have finished/conpleted antibiotic. Of note-patient has appointment 6/8/20 with Dr Cleo Pabon.

## 2020-06-04 ENCOUNTER — TELEPHONE (OUTPATIENT)
Dept: INTERNAL MEDICINE CLINIC | Age: 70
End: 2020-06-04

## 2020-06-04 LAB — C DIFF TOXIN/ANTIGEN: ABNORMAL

## 2020-06-05 ENCOUNTER — TELEPHONE (OUTPATIENT)
Dept: INTERNAL MEDICINE CLINIC | Age: 70
End: 2020-06-05

## 2020-06-08 ENCOUNTER — OFFICE VISIT (OUTPATIENT)
Dept: ENT CLINIC | Age: 70
End: 2020-06-08
Payer: MEDICARE

## 2020-06-08 VITALS
BODY MASS INDEX: 21.69 KG/M2 | HEIGHT: 66 IN | WEIGHT: 135 LBS | SYSTOLIC BLOOD PRESSURE: 114 MMHG | HEART RATE: 69 BPM | DIASTOLIC BLOOD PRESSURE: 69 MMHG | TEMPERATURE: 98 F

## 2020-06-08 PROCEDURE — 99212 OFFICE O/P EST SF 10 MIN: CPT | Performed by: OTOLARYNGOLOGY

## 2020-06-08 PROCEDURE — 4040F PNEUMOC VAC/ADMIN/RCVD: CPT | Performed by: OTOLARYNGOLOGY

## 2020-06-08 PROCEDURE — 31231 NASAL ENDOSCOPY DX: CPT | Performed by: OTOLARYNGOLOGY

## 2020-06-08 PROCEDURE — 1036F TOBACCO NON-USER: CPT | Performed by: OTOLARYNGOLOGY

## 2020-06-08 PROCEDURE — G8420 CALC BMI NORM PARAMETERS: HCPCS | Performed by: OTOLARYNGOLOGY

## 2020-06-08 PROCEDURE — 1123F ACP DISCUSS/DSCN MKR DOCD: CPT | Performed by: OTOLARYNGOLOGY

## 2020-06-08 PROCEDURE — G8427 DOCREV CUR MEDS BY ELIG CLIN: HCPCS | Performed by: OTOLARYNGOLOGY

## 2020-06-08 PROCEDURE — 3017F COLORECTAL CA SCREEN DOC REV: CPT | Performed by: OTOLARYNGOLOGY

## 2020-06-08 RX ORDER — VANCOMYCIN HYDROCHLORIDE 125 MG/1
125 CAPSULE ORAL 4 TIMES DAILY
COMMUNITY
End: 2020-07-20 | Stop reason: ALTCHOICE

## 2020-06-08 RX ORDER — SACCHAROMYCES BOULARDII 250 MG
250 CAPSULE ORAL 2 TIMES DAILY
COMMUNITY
End: 2020-07-20 | Stop reason: ALTCHOICE

## 2020-06-08 ASSESSMENT — ENCOUNTER SYMPTOMS
NAUSEA: 0
VOICE CHANGE: 0
FACIAL SWELLING: 0
TROUBLE SWALLOWING: 0
RHINORRHEA: 0
EYE ITCHING: 0
CHOKING: 0
DIARRHEA: 0
SINUS PAIN: 0
EYE PAIN: 0
SHORTNESS OF BREATH: 0
SORE THROAT: 0
SINUS PRESSURE: 0
COUGH: 0
EYE REDNESS: 0

## 2020-06-08 NOTE — PROGRESS NOTES
Attends meetings of clubs or organizations: Not on file     Relationship status: Not on file    Intimate partner violence     Fear of current or ex partner: Not on file     Emotionally abused: Not on file     Physically abused: Not on file     Forced sexual activity: Not on file   Other Topics Concern    Not on file   Social History Narrative    Not on file       DRUG/FOOD ALLERGIES: Patient has no known allergies. CURRENT MEDICATIONS  Prior to Admission medications    Medication Sig Start Date End Date Taking?  Authorizing Provider   vancomycin (VANCOCIN) 125 MG capsule Take 125 mg by mouth 4 times daily   Yes Historical Provider, MD   saccharomyces boulardii (FLORASTOR) 250 MG capsule Take 250 mg by mouth 2 times daily   Yes Historical Provider, MD   telmisartan (MICARDIS) 80 MG tablet TAKE 1 TABLET BY MOUTH EVERY DAY 4/21/20  Yes Pepito Leal DO   carvedilol (COREG) 25 MG tablet Take 1 tablet by mouth 2 times daily 4/14/20  Yes Pepito Leal DO   NIFEdipine (PROCARDIA XL) 90 MG extended release tablet Take 1 tablet by mouth daily 3/16/20  Yes Pepito Leal DO   pantoprazole (PROTONIX) 40 MG tablet Take 1 tablet by mouth daily 3/6/20  Yes Pepito Leal DO   clopidogrel (PLAVIX) 75 MG tablet TAKE 1 TABLET BY MOUTH DAILY 2/25/20  Yes Pepito Leal DO   atorvastatin (LIPITOR) 80 MG tablet TAKE 1 TABLET BY MOUTH DAILY 12/5/19  Yes Pepito Leal DO   ferrous sulfate (FEROSUL) 325 (65 Fe) MG tablet Take 1 tablet by mouth daily (with breakfast) 7/12/19  Yes Pepito Leal DO   acetaminophen (TYLENOL) 325 MG tablet Take 2 tablets by mouth every 6 hours as needed for Pain 9/15/18  Yes Carlos Spangler MD   aspirin 81 MG tablet Take 1 tablet by mouth daily 9/22/18  Yes Carlos Spangler MD   Probiotic Product (ALIGN PO) Take 1 tablet by mouth daily   Yes Historical Provider, MD   Cholecalciferol (VITAMIN D) 2000 units CAPS capsule Take 1 capsule by mouth daily   Yes Historical Provider, MD     Review of Systems   Constitutional: Negative for activity change, appetite change, chills, fatigue and fever. HENT: Negative for congestion, ear discharge, ear pain, facial swelling, hearing loss, nosebleeds, postnasal drip, rhinorrhea, sinus pressure, sinus pain, sneezing, sore throat, tinnitus, trouble swallowing and voice change. Eyes: Negative for pain, redness, itching and visual disturbance. Respiratory: Negative for cough, choking and shortness of breath. Gastrointestinal: Negative for diarrhea and nausea. Endocrine: Negative for cold intolerance and heat intolerance. Objective:   Physical Exam  Constitutional:       Appearance: He is well-developed. HENT:      Head: Not macrocephalic and not microcephalic. No Norris's sign, abrasion, right periorbital erythema, left periorbital erythema or laceration. Nose: No nasal deformity, septal deviation, laceration, mucosal edema or rhinorrhea. Right Nostril: No epistaxis or occlusion. Left Nostril: No epistaxis or occlusion. Right Turbinates: Not enlarged, swollen or pale. Left Turbinates: Not enlarged, swollen or pale. Right Sinus: No maxillary sinus tenderness or frontal sinus tenderness. Left Sinus: No maxillary sinus tenderness or frontal sinus tenderness. Mouth/Throat:      Lips: No lesions. Mouth: Mucous membranes are moist.      Tongue: No lesions. Palate: No mass. Pharynx: Uvula midline. No oropharyngeal exudate or posterior oropharyngeal erythema. Tonsils: No tonsillar abscesses. Eyes:      General:         Right eye: No discharge. Left eye: No discharge. Extraocular Movements:      Right eye: Normal extraocular motion. Left eye: Normal extraocular motion. Conjunctiva/sclera:      Right eye: Right conjunctiva is not injected. No chemosis or exudate. Left eye: Left conjunctiva is not injected. No chemosis or exudate.   Neck:      Thyroid: No thyroid mass or

## 2020-06-09 RX ORDER — TELMISARTAN 80 MG/1
TABLET ORAL
Qty: 90 TABLET | Refills: 0 | Status: SHIPPED | OUTPATIENT
Start: 2020-06-09 | End: 2020-08-31

## 2020-06-22 ENCOUNTER — TELEPHONE (OUTPATIENT)
Dept: INTERNAL MEDICINE CLINIC | Age: 70
End: 2020-06-22

## 2020-06-29 ENCOUNTER — HOSPITAL ENCOUNTER (OUTPATIENT)
Dept: NON INVASIVE DIAGNOSTICS | Age: 70
Discharge: HOME | End: 2020-06-29
Payer: MEDICARE

## 2020-06-29 ENCOUNTER — APPOINTMENT (OUTPATIENT)
Dept: NON INVASIVE DIAGNOSTICS | Age: 70
End: 2020-06-29
Payer: MEDICARE

## 2020-06-29 LAB
LV EF: 70 %
LVEF MODALITY: NORMAL

## 2020-06-29 PROCEDURE — 93017 CV STRESS TEST TRACING ONLY: CPT

## 2020-06-29 PROCEDURE — 78452 HT MUSCLE IMAGE SPECT MULT: CPT

## 2020-06-29 PROCEDURE — A9502 TC99M TETROFOSMIN: HCPCS | Performed by: INTERNAL MEDICINE

## 2020-06-29 PROCEDURE — 6360000002 HC RX W HCPCS: Performed by: INTERNAL MEDICINE

## 2020-06-29 PROCEDURE — 2580000003 HC RX 258: Performed by: INTERNAL MEDICINE

## 2020-06-29 PROCEDURE — 3430000000 HC RX DIAGNOSTIC RADIOPHARMACEUTICAL: Performed by: INTERNAL MEDICINE

## 2020-06-29 RX ORDER — SODIUM CHLORIDE 0.9 % (FLUSH) 0.9 %
10 SYRINGE (ML) INJECTION PRN
Status: DISCONTINUED | OUTPATIENT
Start: 2020-06-29 | End: 2020-06-30 | Stop reason: HOSPADM

## 2020-06-29 RX ADMIN — Medication 10 ML: at 13:54

## 2020-06-29 RX ADMIN — REGADENOSON 0.4 MG: 0.08 INJECTION, SOLUTION INTRAVENOUS at 14:39

## 2020-06-29 RX ADMIN — Medication 10 ML: at 14:40

## 2020-06-29 RX ADMIN — TETROFOSMIN 31.5 MILLICURIE: 1.38 INJECTION, POWDER, LYOPHILIZED, FOR SOLUTION INTRAVENOUS at 14:39

## 2020-06-29 RX ADMIN — TETROFOSMIN 9.7 MILLICURIE: 1.38 INJECTION, POWDER, LYOPHILIZED, FOR SOLUTION INTRAVENOUS at 13:54

## 2020-07-02 ENCOUNTER — PROCEDURE VISIT (OUTPATIENT)
Dept: SURGERY | Age: 70
End: 2020-07-02
Payer: MEDICARE

## 2020-07-02 PROCEDURE — 93925 LOWER EXTREMITY STUDY: CPT | Performed by: SURGERY

## 2020-07-02 PROCEDURE — 93880 EXTRACRANIAL BILAT STUDY: CPT | Performed by: SURGERY

## 2020-07-02 PROCEDURE — 93976 VASCULAR STUDY: CPT | Performed by: SURGERY

## 2020-07-20 ENCOUNTER — OFFICE VISIT (OUTPATIENT)
Dept: INTERNAL MEDICINE CLINIC | Age: 70
End: 2020-07-20
Payer: MEDICARE

## 2020-07-20 VITALS
BODY MASS INDEX: 21.43 KG/M2 | DIASTOLIC BLOOD PRESSURE: 78 MMHG | SYSTOLIC BLOOD PRESSURE: 128 MMHG | RESPIRATION RATE: 12 BRPM | TEMPERATURE: 98.7 F | WEIGHT: 132.8 LBS | HEART RATE: 65 BPM | OXYGEN SATURATION: 98 %

## 2020-07-20 PROBLEM — M48.061 LUMBAR FORAMINAL STENOSIS: Status: ACTIVE | Noted: 2020-07-20

## 2020-07-20 PROBLEM — K55.1 MESENTERIC ARTERY STENOSIS (HCC): Status: RESOLVED | Noted: 2017-07-28 | Resolved: 2020-07-20

## 2020-07-20 PROBLEM — I25.10 CORONARY ARTERY CALCIFICATION SEEN ON CAT SCAN: Status: ACTIVE | Noted: 2020-07-20

## 2020-07-20 PROBLEM — I35.0 AORTIC VALVE STENOSIS: Status: ACTIVE | Noted: 2020-07-20

## 2020-07-20 PROBLEM — M48.061 SPINAL STENOSIS OF LUMBAR REGION WITHOUT NEUROGENIC CLAUDICATION: Status: ACTIVE | Noted: 2020-07-20

## 2020-07-20 PROBLEM — K92.2 GI BLEED: Status: RESOLVED | Noted: 2018-09-13 | Resolved: 2020-07-20

## 2020-07-20 PROCEDURE — G8427 DOCREV CUR MEDS BY ELIG CLIN: HCPCS | Performed by: INTERNAL MEDICINE

## 2020-07-20 PROCEDURE — 1036F TOBACCO NON-USER: CPT | Performed by: INTERNAL MEDICINE

## 2020-07-20 PROCEDURE — 3017F COLORECTAL CA SCREEN DOC REV: CPT | Performed by: INTERNAL MEDICINE

## 2020-07-20 PROCEDURE — 3023F SPIROM DOC REV: CPT | Performed by: INTERNAL MEDICINE

## 2020-07-20 PROCEDURE — 1123F ACP DISCUSS/DSCN MKR DOCD: CPT | Performed by: INTERNAL MEDICINE

## 2020-07-20 PROCEDURE — G8420 CALC BMI NORM PARAMETERS: HCPCS | Performed by: INTERNAL MEDICINE

## 2020-07-20 PROCEDURE — G8926 SPIRO NO PERF OR DOC: HCPCS | Performed by: INTERNAL MEDICINE

## 2020-07-20 PROCEDURE — 4040F PNEUMOC VAC/ADMIN/RCVD: CPT | Performed by: INTERNAL MEDICINE

## 2020-07-20 PROCEDURE — 99214 OFFICE O/P EST MOD 30 MIN: CPT | Performed by: INTERNAL MEDICINE

## 2020-07-20 ASSESSMENT — PATIENT HEALTH QUESTIONNAIRE - PHQ9
SUM OF ALL RESPONSES TO PHQ QUESTIONS 1-9: 0
2. FEELING DOWN, DEPRESSED OR HOPELESS: 0
SUM OF ALL RESPONSES TO PHQ9 QUESTIONS 1 & 2: 0
SUM OF ALL RESPONSES TO PHQ QUESTIONS 1-9: 0
1. LITTLE INTEREST OR PLEASURE IN DOING THINGS: 0

## 2020-07-20 NOTE — PATIENT INSTRUCTIONS
To do list:    #1 follow-up with Dr. Joshua Norris for your back. #2 continue your current medications. Stay well-hydrated    #3 schedule your echocardiogram after September 11, 2020          Patient Education        Learning About Ventricular Tachycardia  What is ventricular tachycardia? Ventricular tachycardia (say \"magaly-TRICK-yuh-ler dips-hp-HQO-nadja-uh\"), or VT, is a type of fast heart rhythm. It starts in the lower part of the heart (ventricles). Some forms of VT may get worse and lead to ventricular fibrillation. Both conditions can be life-threatening. What causes it? VT may be caused by a heart problem that affects the structure of the heart or the heart muscle. These problems may include a heart attack, a heart defect that you were born with, or inflammation in the heart. Sometimes VT happens after heart surgery. Other heart rhythm problems can also lead to it. Some people with normal hearts have VT. This tends to be less serious. Some medicines, such as those used to treat some types of abnormal heart rhythms, can cause VT. Other causes include electrolyte imbalances and using illegal drugs such as stimulants like cocaine. In some cases, the cause isn't known. What are the symptoms? Symptoms of VT include:  · Palpitations. This is an uncomfortable awareness of the heart beating very fast or not in a regular way. · Feeling dizzy or lightheaded. · Shortness of breath. · Chest pain or pressure. · Near-fainting or fainting. Symptoms happen because the heart beats too fast. It can't pump enough blood to the rest of the body. How is VT diagnosed? Your doctor will do an exam and ask about your health history. Your doctor will also do an electrocardiogram (EKG, ECG). This is a tracing of the electrical activity of your heart. VT can come and go. It may be hard to capture with an EKG at your doctor's office. So the doctor may want you to wear a heart monitor.  It records your heart rhythm over a few you do not have an account, please click on the \"Sign Up Now\" link. Current as of: December 16, 2019               Content Version: 12.5  © 8100-5713 [employer]. Care instructions adapted under license by [facility] ([facility]). If you have questions about a medical condition or this instruction, always ask your healthcare professional. [address] any warranty or liability for your use of this information.

## 2020-07-20 NOTE — PROGRESS NOTES
underwent SMA bypass  11/29/2016. Patient's also had a left fem-pop bypass 9/14/2016. He also has EMELINA, carotid stenosis, abd and thoracic aortic ASVD. Last carotid doppler-- 16-49% BL, 7/2017   H/OLE angiogram  1.  Ultrasound-guided access to the left common femoral artery. 2.  Abdominal aortogram.  3.  Angiogram of the bilateral lower extremities. 4.  Selective angiography of the right popliteal and tibial arteries. 5.  Atherectomy with angioplasty, right superficial femoral and  popliteal arteries. 6.  Atherectomy with angioplasty, right tibioperoneal trunk.     Vascular areterial dopplers of mesentery, carotid and LE 7/2/2020-- reviewed. Left ICA 50 to 80%, right 16 to 49%. CTA of the neck in August 2019 showed a 50% stenosis of the left ICA. Lower extremity arterial Doppler showed greater than 50% stenosis of the right proximal superficial femoral artery, less than 50% stenosis of the right distal popliteal artery, total occlusion of the right anterior tibial artery. Right ALISTAIR was 1.31 vessels were noncompressible due to arterial calcification. Left ALISTAIR was 1. 19. The right proximal SFA stenosis was new. Aorta and iliac Dopplers showed patent superior mesenteric artery bypass graft. He states he walks, but not part of exercise regimen. His legs get tired when walking. He also reports leg aches when walking. He denies cramping. CAD--Previous history---->coronary artery calcification seen on CT scan July 2014. Stress Myoview was neg 1/24/2015. Interval history----->  Due to a complaint of chest tightness he underwent a stress Myoview on 6/29/2020. This showed no evidence of reversible ischemia. Ejection fraction was 70%. He denies any current issues with chest pain or tightness. He denies any syncopal episodes or palpitations. He denies any orthopnea or increased edema. He does have ankle swelling off and on.     Also, the patient did undergo a 30-day event recorder back in  PVD (peripheral vascular disease) with claudication (HCC)     Left leg with mod-severe arterial ischemia    Renal artery atherosclerosis (HCC)     SMA stenosis ([facility] [address] 11/2016    Spinal stenosis of lumbar region without neurogenic claudication 7/20/2020    Thoracic aorta atherosclerosis (HCC)     Venous insufficiency     Vertebral artery dissection ([facility] [address] 5/2013     Prior to Visit Medications    Medication Sig Taking?  Authorizing Provider   telmisartan (MICARDIS) 80 MG tablet TAKE 1 TABLET BY MOUTH EVERY DAY Yes Pepito Leal DO   carvedilol (COREG) 25 MG tablet Take 1 tablet by mouth 2 times daily Yes Pepito Leal DO   NIFEdipine (PROCARDIA XL) 90 MG extended release tablet Take 1 tablet by mouth daily Yes Pepito Leal DO   pantoprazole (PROTONIX) 40 MG tablet Take 1 tablet by mouth daily Yes Pepito Leal DO   clopidogrel (PLAVIX) 75 MG tablet TAKE 1 TABLET BY MOUTH DAILY Yes Pepito Leal DO   atorvastatin (LIPITOR) 80 MG tablet TAKE 1 TABLET BY MOUTH DAILY Yes Pepito Leal DO   ferrous sulfate (FEROSUL) 325 (65 Fe) MG tablet Take 1 tablet by mouth daily (with breakfast) Yes Pepito Leal DO   acetaminophen (TYLENOL) 325 MG tablet Take 2 tablets by mouth every 6 hours as needed for Pain Yes Fior Sparks MD   aspirin 81 MG tablet Take 1 tablet by mouth daily Yes Fior Sparks MD   Probiotic Product (ALIGN PO) Take 1 tablet by mouth daily Yes Historical Provider, MD   Cholecalciferol (VITAMIN D) 2000 units CAPS capsule Take 1 capsule by mouth daily Yes Historical Provider, MD         Patient Care Team:  Quirino Wei DO as PCP - General (Internal Medicine)  Quirino Wei DO [facility] PCP [facility]  Cecile Rascon MD as Surgeon (General Surgery)  Frank Beard MD as Consulting Physician (Urology)  Elvira Chilel MD (Vascular Surgery)  Lydia Garcia MD as Consulting Physician (Gastroenterology)  Jenni Cramer MD as Consulting Physician (Orthopedic Surgery) Review of Systems - As per HPI        OBJECTIVE:  /78   Pulse 65   Temp 98.7 °F (37.1 °C) (Oral)   Resp 12   Wt 132 lb 12.8 oz (60.2 kg)   SpO2 98%   BMI 21.43 kg/m²    BP Readings from Last 3 Encounters:   07/20/20 128/78   06/08/20 114/69   04/14/20 (!) 146/83      Wt Readings from Last 3 Encounters:   07/20/20 132 lb 12.8 oz (60.2 kg)   06/08/20 135 lb (61.2 kg)   03/16/20 138 lb (62.6 kg)       GEN: NAD, A&O, Non-toxic  HEENT: NC/AT, JOCELINE, EOMI, Anicteric. TM's Nl. Oral cavity without mucosal lesions. Mucous membranes moist.   NECK:  Supple. No thyromegaly or nodules. No JVD. LYMPH: No C/SC nodes. CV: Regular rhythm. No ectopy. Rate normal.  Soft 2/6 systolic murmur--best at aortic post with radiation to carotid columns  PULM: CTA. EXT: + Trace BL ankle edema, pitting. GI: Abdomen is soft,  BS+, No masses. Nontender, nondistended  VASC:  Pedal pulses are palpable, faint. + Bilateral carotid bruits which may represent a radiant cardiac murmur. Dilated venules and spider veins and varicose veins. Right LE >Left LE   SKIN:  No rashes. NEURO: No focal deficits. Cranial nerve II through XII are intact        ASSESSMENT/PLAN:    1. Essential hypertension  Blood pressure is well controlled  Continue current medications  Monitor for hypotension  Encourage patient to stay well-hydrated  Check electrolytes and renal function  - Lipid Panel; Future  - Comprehensive Metabolic Panel; Future    2. PAD (peripheral artery disease) (HCC)  Condition seems stable but he still continues to have leg aches particularly with walking. Arterial Dopplers reviewed  Follow-up with vascular surgery as needed  Continue antiplatelet therapy. Monitor for bleeding    3. Hyperlipidemia, unspecified hyperlipidemia type  Condition stability and control are uncertain at this time. Due for lab  Continue high intensity statin therapy  Continue low-fat diet  - Lipid Panel;  Future  - Comprehensive Metabolic

## 2020-08-05 RX ORDER — CARVEDILOL 25 MG/1
TABLET ORAL
Qty: 180 TABLET | Refills: 1 | Status: SHIPPED | OUTPATIENT
Start: 2020-08-05 | End: 2021-01-27

## 2020-08-19 ENCOUNTER — HOSPITAL ENCOUNTER (EMERGENCY)
Age: 70
Discharge: HOME | End: 2020-08-19
Attending: EMERGENCY MEDICINE
Payer: MEDICARE

## 2020-08-19 VITALS
TEMPERATURE: 97.8 F | SYSTOLIC BLOOD PRESSURE: 155 MMHG | RESPIRATION RATE: 16 BRPM | BODY MASS INDEX: 22.5 KG/M2 | HEART RATE: 63 BPM | DIASTOLIC BLOOD PRESSURE: 69 MMHG | OXYGEN SATURATION: 100 % | WEIGHT: 140 LBS | HEIGHT: 66 IN

## 2020-08-19 LAB
ANION GAP SERPL CALCULATED.3IONS-SCNC: 10 MMOL/L (ref 3–16)
BASOPHILS ABSOLUTE: 0.1 K/UL (ref 0–0.2)
BASOPHILS RELATIVE PERCENT: 1 %
BUN BLDV-MCNC: 10 MG/DL (ref 7–20)
CALCIUM SERPL-MCNC: 9.1 MG/DL (ref 8.3–10.6)
CHLORIDE BLD-SCNC: 94 MMOL/L (ref 99–110)
CO2: 25 MMOL/L (ref 21–32)
CREAT SERPL-MCNC: 0.9 MG/DL (ref 0.8–1.3)
EOSINOPHILS ABSOLUTE: 0.2 K/UL (ref 0–0.6)
EOSINOPHILS RELATIVE PERCENT: 2.2 %
GFR AFRICAN AMERICAN: >60
GFR NON-AFRICAN AMERICAN: >60
GLUCOSE BLD-MCNC: 95 MG/DL (ref 70–99)
HCT VFR BLD CALC: 38.2 % (ref 40.5–52.5)
HEMOGLOBIN: 13 G/DL (ref 13.5–17.5)
LYMPHOCYTES ABSOLUTE: 0.6 K/UL (ref 1–5.1)
LYMPHOCYTES RELATIVE PERCENT: 6.7 %
MCH RBC QN AUTO: 35.3 PG (ref 26–34)
MCHC RBC AUTO-ENTMCNC: 34.1 G/DL (ref 31–36)
MCV RBC AUTO: 103.6 FL (ref 80–100)
MONOCYTES ABSOLUTE: 0.8 K/UL (ref 0–1.3)
MONOCYTES RELATIVE PERCENT: 9.3 %
NEUTROPHILS ABSOLUTE: 6.7 K/UL (ref 1.7–7.7)
NEUTROPHILS RELATIVE PERCENT: 80.8 %
PDW BLD-RTO: 15.1 % (ref 12.4–15.4)
PLATELET # BLD: 213 K/UL (ref 135–450)
PMV BLD AUTO: 6.7 FL (ref 5–10.5)
POTASSIUM REFLEX MAGNESIUM: 5.7 MMOL/L (ref 3.5–5.1)
RBC # BLD: 3.69 M/UL (ref 4.2–5.9)
SODIUM BLD-SCNC: 129 MMOL/L (ref 136–145)
TROPONIN: <0.01 NG/ML
WBC # BLD: 8.3 K/UL (ref 4–11)

## 2020-08-19 PROCEDURE — 93005 ELECTROCARDIOGRAM TRACING: CPT | Performed by: STUDENT IN AN ORGANIZED HEALTH CARE EDUCATION/TRAINING PROGRAM

## 2020-08-19 PROCEDURE — 80048 BASIC METABOLIC PNL TOTAL CA: CPT

## 2020-08-19 PROCEDURE — 99284 EMERGENCY DEPT VISIT MOD MDM: CPT

## 2020-08-19 PROCEDURE — 85025 COMPLETE CBC W/AUTO DIFF WBC: CPT

## 2020-08-19 PROCEDURE — 84484 ASSAY OF TROPONIN QUANT: CPT

## 2020-08-19 ASSESSMENT — ENCOUNTER SYMPTOMS
EYES NEGATIVE: 1
RESPIRATORY NEGATIVE: 1

## 2020-08-19 NOTE — ED PROVIDER NOTES
ED Attending Attestation Note     Date of evaluation: 8/19/2020    This patient was seen by the resident. I have seen and examined the patient, agree with the workup, evaluation, management and diagnosis. The care plan has been discussed. I have reviewed the ECG and concur with the resident's interpretation. My assessment reveals patient with lightheadedness earlier [place] [employer]. Feels fine now. Labs showing mild hyponatremia. Orthostatics positive by BP. Will discharge but advise short term re-check of labs.      Bijan Rea MD  08/20/20 [id number]

## 2020-08-19 NOTE — ED PROVIDER NOTES
(chronic obstructive pulmonary disease) ([address] CVA (cerebral infarction), DDD (degenerative disc disease), Dizziness, Epicondylitis elbow, medial, GERD (gastroesophageal reflux disease), Headache, Hyperlipidemia, Hypertension, Ischemic colitis ([address] Lumbar foraminal stenosis, Lung disease, Osteoarthritis, hand, PVD (peripheral vascular disease) with claudication ([address] Renal artery atherosclerosis ([address] SMA stenosis ([address] Spinal stenosis of lumbar region without neurogenic claudication, Thoracic aorta atherosclerosis ([address] Venous insufficiency, and Vertebral artery dissection ([address] He has a past surgical history that includes Venous clot surgery (11/03); Colonoscopy (2008); Inguinal hernia repair (Bilateral, 1/16/13); Colonoscopy (9/2/2015); Upper gastrointestinal endoscopy (9/2/2015); angioplasty (Left, 08/2016); Arterial bypass surgry (Left, 09/14/2016); angioplasty (11/27/2016); Arterial bypass surgry (11/29/2016); Upper gastrointestinal endoscopy (N/A, 9/14/2018); Colonoscopy (09/20/2018); and Atherectomy (Right, 04/08/2019). His family history includes Cancer in his brother and brother; Heart Attack in his father; Heart Disease in his father; Hypertension in his brother. He reports that he quit smoking about 4 years ago. His smoking use included cigarettes. He started smoking about 50 years ago. He has a 23.00 pack-year smoking history. He has never used smokeless tobacco. He reports current alcohol use of about 20.0 standard drinks of alcohol per week. He reports that he does not use drugs.     Medications     Discharge Medication List as of 8/19/2020  4:54 PM      CONTINUE these medications which have NOT CHANGED    Details   carvedilol (COREG) 25 MG tablet TAKE 1 TABLET BY MOUTH TWICE A DAY, Disp-180 tablet,R-1Normal      telmisartan (MICARDIS) 80 MG tablet TAKE 1 TABLET BY MOUTH EVERY DAY, Disp-90 tablet, R-0Normal      NIFEdipine (PROCARDIA XL) 90 MG extended release tablet Take 1 tablet by mouth daily, Disp-90 tablet, R-1Normal      pantoprazole (PROTONIX) 40 MG tablet Take 1 tablet by mouth daily, Disp-90 tablet, R-3Normal      clopidogrel (PLAVIX) 75 MG tablet TAKE 1 TABLET BY MOUTH DAILY, Disp-90 tablet, R-3**Patient requests 90 days supply**Normal      atorvastatin (LIPITOR) 80 MG tablet TAKE 1 TABLET BY MOUTH DAILY, Disp-90 tablet, R-3Normal      ferrous sulfate (FEROSUL) 325 (65 Fe) MG tablet Take 1 tablet by mouth daily (with breakfast), Disp-90 tablet, R-1Normal      acetaminophen (TYLENOL) 325 MG tablet Take 2 tablets by mouth every 6 hours as needed for Pain, Disp-120 tablet, R-3Normal      aspirin 81 MG tablet Take 1 tablet by mouth daily, Disp-30 tablet, R-3Normal      Probiotic Product (ALIGN PO) Take 1 tablet by mouth dailyHistorical Med      Cholecalciferol (VITAMIN D) 2000 units CAPS capsule Take 1 capsule by mouth dailyHistorical Med             Allergies     He has No Known Allergies. Physical Exam     INITIAL VITALS: BP: (!) 155/69, Temp: 97.8 °F (36.6 °C), Pulse: 64, Resp: 18, SpO2: 100 %   Physical Exam  Vitals signs reviewed. HENT:      Head: Normocephalic and atraumatic. Nose: Nose normal.      Mouth/Throat:      Mouth: Mucous membranes are dry. Pharynx: Oropharynx is clear. Eyes:      Extraocular Movements: Extraocular movements intact. Conjunctiva/sclera: Conjunctivae normal.   Neck:      Musculoskeletal: Normal range of motion. Vascular: No carotid bruit. Cardiovascular:      Rate and Rhythm: Normal rate and regular rhythm. Pulses: Normal pulses. Heart sounds: Normal heart sounds. Pulmonary:      Effort: Pulmonary effort is normal.      Breath sounds: Normal breath sounds. Abdominal:      General: Abdomen is flat. Bowel sounds are normal.      Palpations: Abdomen is soft. Musculoskeletal: Normal range of motion. Skin:     General: Skin is warm and dry. Capillary Refill: Capillary refill takes less than 2 seconds.    Neurological: Calculation (Cyrus) 409 ms    P Axis 50 degrees    R Axis 61 degrees    T Axis 55 degrees    Diagnosis       EKG performed in ER and to be interpreted by ER physician. Confirmed by MD, ER ([id number]),  Bin Leon ([facility])TOMER ([id number]) on 8/20/2020 6:07:48 AM       ED BEDSIDE ULTRASOUND:  NA    RECENT VITALS:  BP: (!) 155/69, Temp: 97.8 °F (36.6 °C),Pulse: 63, Resp: 16, SpO2: 100 %     Procedures     NA    ED Course     Nursing Notes, Past Medical Hx, Past Surgical Hx, Social Hx, Allergies, and FamilyHx were reviewed. The patient was giventhe following medications:  No orders of the defined types were placed in this encounter. CONSULTS:  Edgardo Gongora / LEXIE / Demetrio Meng is a 72 y.o. male who presented with light headedness. Labs were significant for mild hyponatremia. He was orthostatic positive. Overall, he feels wells not. Stable for DC, but advice for re-check of labs. This patient was also evaluated by the attending physician. All care plans were discussed and agreed upon. Clinical Impression     1. Dizziness    2.  Hyperkalemia        Disposition     PATIENT REFERRED TO:  Hakeem Lunsford, [address]    Schedule an appointment as soon as possible for a visit in 1 week        DISCHARGE MEDICATIONS:  Discharge Medication List as of 8/19/2020  4:54 PM          DISPOSITION Decision To Discharge 08/19/2020 04:44:35 PM

## 2020-08-20 LAB
EKG ATRIAL RATE: 63 BPM
EKG DIAGNOSIS: NORMAL
EKG P AXIS: 50 DEGREES
EKG P-R INTERVAL: 184 MS
EKG Q-T INTERVAL: 400 MS
EKG QRS DURATION: 78 MS
EKG QTC CALCULATION (BAZETT): 409 MS
EKG R AXIS: 61 DEGREES
EKG T AXIS: 55 DEGREES
EKG VENTRICULAR RATE: 63 BPM

## 2020-08-26 DIAGNOSIS — E87.5 HYPERKALEMIA: ICD-10-CM

## 2020-08-26 LAB
ANION GAP SERPL CALCULATED.3IONS-SCNC: 13 MMOL/L (ref 3–16)
BUN BLDV-MCNC: 14 MG/DL (ref 7–20)
CALCIUM SERPL-MCNC: 9.6 MG/DL (ref 8.3–10.6)
CHLORIDE BLD-SCNC: 98 MMOL/L (ref 99–110)
CO2: 23 MMOL/L (ref 21–32)
CREAT SERPL-MCNC: 1 MG/DL (ref 0.8–1.3)
GFR AFRICAN AMERICAN: >60
GFR NON-AFRICAN AMERICAN: >60
GLUCOSE BLD-MCNC: 111 MG/DL (ref 70–99)
POTASSIUM SERPL-SCNC: 5 MMOL/L (ref 3.5–5.1)
SODIUM BLD-SCNC: 134 MMOL/L (ref 136–145)

## 2020-08-28 ENCOUNTER — HOSPITAL ENCOUNTER (OUTPATIENT)
Dept: NON INVASIVE DIAGNOSTICS | Age: 70
Discharge: HOME | End: 2020-08-28
Payer: MEDICARE

## 2020-08-28 LAB
LV EF: 58 %
LVEF MODALITY: NORMAL

## 2020-08-28 PROCEDURE — 93306 TTE W/DOPPLER COMPLETE: CPT

## 2020-08-31 RX ORDER — TELMISARTAN 80 MG/1
TABLET ORAL
Qty: 90 TABLET | Refills: 0 | Status: SHIPPED | OUTPATIENT
Start: 2020-08-31 | End: 2020-12-21

## 2020-09-21 RX ORDER — NIFEDIPINE 90 MG/1
TABLET, EXTENDED RELEASE ORAL
Qty: 90 TABLET | Refills: 1 | Status: SHIPPED | OUTPATIENT
Start: 2020-09-21 | End: 2021-03-16

## 2020-09-24 ENCOUNTER — TELEPHONE (OUTPATIENT)
Dept: INTERNAL MEDICINE CLINIC | Age: 70
End: 2020-09-24

## 2020-09-24 NOTE — TELEPHONE ENCOUNTER
Pre-op appt scheduled for 9/29. Surgery is 10/23.  Please call patient to reschedule for closer to surgery date

## 2020-09-24 NOTE — TELEPHONE ENCOUNTER
----- Message from Gris Garciaye sent at 9/24/2020  2:07 PM EDT -----  Subject: Message to Provider    QUESTIONS  Information for Provider? pt is set up for pre op appt and wants to know   if covid antibodies can be tested also at that time please call to further   assist   ---------------------------------------------------------------------------  --------------  [address]  What is the best way for the office to contact you? OK to leave message on   voicemail  Preferred Call Back Phone Number? [phone]  ---------------------------------------------------------------------------  --------------  SCRIPT ANSWERS  Relationship to Patient?  Self

## 2020-09-25 RX ORDER — ATORVASTATIN CALCIUM 80 MG/1
TABLET, FILM COATED ORAL
Qty: 90 TABLET | Refills: 1 | Status: SHIPPED | OUTPATIENT
Start: 2020-09-25 | End: 2021-03-22

## 2020-10-01 ENCOUNTER — TELEPHONE (OUTPATIENT)
Dept: INTERNAL MEDICINE CLINIC | Age: 70
End: 2020-10-01

## 2020-10-01 DIAGNOSIS — Z01.89 PATIENT REQUESTED DIAGNOSTIC TESTING: ICD-10-CM

## 2020-10-01 DIAGNOSIS — Z20.822 EXPOSURE TO COVID-19 VIRUS: ICD-10-CM

## 2020-10-01 LAB — SARS-COV-2 ANTIBODY, TOTAL: NEGATIVE

## 2020-10-01 NOTE — TELEPHONE ENCOUNTER
Patient has an order for the COVID antibody test. He is wanting to  Know where he needs to go to get tested for that?

## 2020-10-19 ENCOUNTER — OFFICE VISIT (OUTPATIENT)
Dept: INTERNAL MEDICINE CLINIC | Age: 70
End: 2020-10-19
Payer: MEDICARE

## 2020-10-19 VITALS
DIASTOLIC BLOOD PRESSURE: 70 MMHG | WEIGHT: 137 LBS | BODY MASS INDEX: 22.02 KG/M2 | SYSTOLIC BLOOD PRESSURE: 146 MMHG | TEMPERATURE: 96.9 F | RESPIRATION RATE: 12 BRPM | HEIGHT: 66 IN | HEART RATE: 68 BPM

## 2020-10-19 PROCEDURE — 1036F TOBACCO NON-USER: CPT | Performed by: INTERNAL MEDICINE

## 2020-10-19 PROCEDURE — 4040F PNEUMOC VAC/ADMIN/RCVD: CPT | Performed by: INTERNAL MEDICINE

## 2020-10-19 PROCEDURE — G0008 ADMIN INFLUENZA VIRUS VAC: HCPCS | Performed by: INTERNAL MEDICINE

## 2020-10-19 PROCEDURE — 3017F COLORECTAL CA SCREEN DOC REV: CPT | Performed by: INTERNAL MEDICINE

## 2020-10-19 PROCEDURE — 99214 OFFICE O/P EST MOD 30 MIN: CPT | Performed by: INTERNAL MEDICINE

## 2020-10-19 PROCEDURE — G8420 CALC BMI NORM PARAMETERS: HCPCS | Performed by: INTERNAL MEDICINE

## 2020-10-19 PROCEDURE — 90694 VACC AIIV4 NO PRSRV 0.5ML IM: CPT | Performed by: INTERNAL MEDICINE

## 2020-10-19 PROCEDURE — G8427 DOCREV CUR MEDS BY ELIG CLIN: HCPCS | Performed by: INTERNAL MEDICINE

## 2020-10-19 PROCEDURE — 1123F ACP DISCUSS/DSCN MKR DOCD: CPT | Performed by: INTERNAL MEDICINE

## 2020-10-19 PROCEDURE — G8484 FLU IMMUNIZE NO ADMIN: HCPCS | Performed by: INTERNAL MEDICINE

## 2020-10-19 RX ORDER — LOPERAMIDE HYDROCHLORIDE 2 MG/1
2 CAPSULE ORAL 4 TIMES DAILY PRN
COMMUNITY
End: 2021-12-18 | Stop reason: ALTCHOICE

## 2020-10-19 NOTE — PROGRESS NOTES
stenosis of lumbar region without neurogenic claudication 7/20/2020    Thoracic [facility] atherosclerosis [facility]     Venous insufficiency     Vertebral artery dissection ([facility] 5/2013      No prior H/O DVT, PE or bleeding dyscrasias    Past Surgical History:   Procedure Laterality Date    ANGIOPLASTY Left 08/2016    left femoral -popl stent    ANGIOPLASTY  11/27/2016    SMA    ARTERIAL BYPASS SURGRY Left 09/14/2016    left femoral-popliteal bypass graft.  ARTERIAL BYPASS SURGRY  11/29/2016    Dr. Stevan Martinez - aorto-SMA bypass using 8mm PTFE    ATHERECTOMY Right 04/08/2019    Atherectomy with angioplasty right SFA and popliteal arteries, atherectomy and angioplasty right tibial peroneal trunk    COLONOSCOPY  2008    COLONOSCOPY  9/2/2015    Dr. Tommy Jacques  09/20/2018    Dr. Wes Raygoza Bilateral 1/16/13    bilateral with mesh, Dr. Puja Coreas  9/2/2015    Dr. Kristy Damon 9/14/2018    EGD BIOPSY performed by John Dwyer MD at [address] [place] [address]  11/03    R Leg Vein stripping       No reported problems with anesthesia.       Medications/Allergies     Medication Sig   atorvastatin (LIPITOR) 80 MG tablet TAKE 1 TABLET BY MOUTH EVERY DAY   NIFEdipine (PROCARDIA XL) 90 MG extended release tablet TAKE 1 TABLET BY MOUTH EVERY DAY   telmisartan (MICARDIS) 80 MG tablet TAKE 1 TABLET BY MOUTH EVERY DAY   carvedilol (COREG) 25 MG tablet TAKE 1 TABLET BY MOUTH TWICE A DAY   pantoprazole (PROTONIX) 40 MG tablet Take 1 tablet by mouth daily   clopidogrel (PLAVIX) 75 MG tablet TAKE 1 TABLET BY MOUTH DAILY   ferrous sulfate (FEROSUL) 325 (65 Fe) MG tablet Take 1 tablet by mouth daily (with breakfast)   aspirin 81 MG tablet Take 1 tablet by mouth daily   Cholecalciferol (VITAMIN D) 2000 units CAPS capsule Take 1 capsule by mouth daily   loperamide (RA ANTI-DIARRHEAL) 2 MG capsule Take 2 mg by mouth 4 times daily as needed for Diarrhea   acetaminophen (TYLENOL) 325 MG tablet Take 2 tablets by mouth every 6 hours as needed for Pain   Probiotic Product (ALIGN PO) Take 1 tablet by mouth daily            No Known Allergies      Substance Use History     Social History     Tobacco Use    Smoking status: Former Smoker     Packs/day: 0.50     Years: 47.00     Pack years: 23.50     Types: Cigarettes     Start date: 1970     Last attempt to quit: 2016     Years since quittin.3    Smokeless tobacco: Never Used    Tobacco comment: Maintain cessation   Substance Use Topics    Alcohol use: Yes     Alcohol/week: 20.0 standard drinks     Types: 20 Cans of beer per week     Comment: couple beers a day    Drug use: No          Family History     Family History   Problem Relation Age of Onset    Heart Attack Father     Heart Disease Father         MI    Cancer Brother         Brain CA    Hypertension Brother     Cancer Brother         Throat cancer        No family history of problems with general anesthesia, venous thrombosis, or bleeding dyscrasias. REVIEW OF SYSTEMS:    CONSTITUTIONAL:  Neg   Recent weight changes, fatigue, fever, chills or night sweats, anorexia, Sleep difficulties  EYES: Neg Visual disturbances. EARS:  Neg Hearing loss, tinnitus, vertigo, discharge or otalgia. NOSE:  Neg  Rhinorrhea, sneezing, itching, allergy, epistaxis, or snoring  MOUTH/THROAT:  Neg Bleeding gums, hoarseness, sore throat, dysphagia, throat infections  RESPIRATORY:  Neg SOB ,wheeze, cough, sputum, hemoptysis. CARDIOVASCULAR:  Neg Chest pain, palpitations, heart murmur, dyspnea on exertion, orthopnea, paroxysmal nocturnal dyspnea or edema of extremities, or claudication. As stated above he has had a stress Myoview back in 2020 and an echocardiogram this past August.  H/O Vertebral artery dissection.     GASTROINTESTINAL:  Neg   Nausea, vomiting, or diarrhea, constipation hematemesis, heart burn, dysphagia,change in nontender. No distension. No organomegaly. No masses. No pulsatile masses. EXT:  No Cyanosis or clubbing. No edema. SKIN: Warm and dry, normal turgor, no rash or lesions of concern. NEURO:  Cranial nerves 2-12 are NL. Speech fluent and coherent. PSYCH:  Mood and affect NL. Judgement and insight NL. Encounter Diagnoses   Name Primary?  Preop exam for internal medicine Yes    Spondylolisthesis of lumbar region     Spinal stenosis of lumbar region without neurogenic claudication     Flu vaccine need     Essential hypertension     [facility]     Coronary artery disease involving native coronary artery of native heart without angina pectoris     Carotid stenosis, asymptomatic, bilateral     Macrocytosis        Plan:  Acceptable risk for the planned procedure. No contraindications at this time    EKG--report reviewed. No contraindications for surgery  Lab--reviewed.   No contraindications for surgery      Stop Aspirin 1 week prior to surgery    Avoid NSAID's (Motrin, Aleve, Advil, Ibuprofen),  vitamin supplements and fish oil 1 week prior to procedure       Flu vaccine          Electronically Signed:  Zac Cassidy D.O      Copy of H&P given to pt and sent to [facility]

## 2020-11-04 RX ORDER — SACCHAROMYCES BOULARDII 250 MG
CAPSULE ORAL
Qty: 60 CAPSULE | Refills: 3 | Status: SHIPPED | OUTPATIENT
Start: 2020-11-04 | End: 2020-11-16 | Stop reason: ALTCHOICE

## 2020-11-16 ENCOUNTER — VIRTUAL VISIT (OUTPATIENT)
Dept: INTERNAL MEDICINE CLINIC | Age: 70
End: 2020-11-16
Payer: MEDICARE

## 2020-11-16 VITALS
DIASTOLIC BLOOD PRESSURE: 79 MMHG | SYSTOLIC BLOOD PRESSURE: 128 MMHG | WEIGHT: 137 LBS | BODY MASS INDEX: 22.02 KG/M2 | TEMPERATURE: 97.3 F | HEART RATE: 78 BPM | HEIGHT: 66 IN

## 2020-11-16 PROCEDURE — G0438 PPPS, INITIAL VISIT: HCPCS | Performed by: INTERNAL MEDICINE

## 2020-11-16 PROCEDURE — G0446 INTENS BEHAVE THER CARDIO DX: HCPCS | Performed by: INTERNAL MEDICINE

## 2020-11-16 PROCEDURE — 1123F ACP DISCUSS/DSCN MKR DOCD: CPT | Performed by: INTERNAL MEDICINE

## 2020-11-16 PROCEDURE — 4040F PNEUMOC VAC/ADMIN/RCVD: CPT | Performed by: INTERNAL MEDICINE

## 2020-11-16 PROCEDURE — 3017F COLORECTAL CA SCREEN DOC REV: CPT | Performed by: INTERNAL MEDICINE

## 2020-11-16 RX ORDER — CEFUROXIME AXETIL 500 MG/1
500 TABLET ORAL 4 TIMES DAILY
COMMUNITY
End: 2021-03-17 | Stop reason: ALTCHOICE

## 2020-11-16 RX ORDER — HYDROCODONE BITARTRATE AND ACETAMINOPHEN 5; 325 MG/1; MG/1
1 TABLET ORAL EVERY 6 HOURS PRN
COMMUNITY
End: 2021-03-17 | Stop reason: ALTCHOICE

## 2020-11-16 RX ORDER — ZOSTER VACCINE RECOMBINANT, ADJUVANTED 50 MCG/0.5
0.5 KIT INTRAMUSCULAR SEE ADMIN INSTRUCTIONS
Qty: 0.5 ML | Refills: 1 | Status: SHIPPED | OUTPATIENT
Start: 2020-11-16 | End: 2021-05-15

## 2020-11-16 RX ORDER — CYCLOBENZAPRINE HCL 5 MG
5 TABLET ORAL 3 TIMES DAILY PRN
COMMUNITY
End: 2021-03-17 | Stop reason: ALTCHOICE

## 2020-11-16 RX ORDER — FAMOTIDINE 20 MG/1
20 TABLET, FILM COATED ORAL 2 TIMES DAILY
Qty: 180 TABLET | Refills: 3 | Status: SHIPPED | OUTPATIENT
Start: 2020-11-16 | End: 2021-03-17 | Stop reason: ALTCHOICE

## 2020-11-16 ASSESSMENT — LIFESTYLE VARIABLES
HOW OFTEN DO YOU HAVE A DRINK CONTAINING ALCOHOL: 4
HOW OFTEN DURING THE LAST YEAR HAVE YOU FAILED TO DO WHAT WAS NORMALLY EXPECTED FROM YOU BECAUSE OF DRINKING: 0
HOW OFTEN DURING THE LAST YEAR HAVE YOU NEEDED AN ALCOHOLIC DRINK FIRST THING IN THE MORNING TO GET YOURSELF GOING AFTER A NIGHT OF HEAVY DRINKING: 0
HAS A RELATIVE, FRIEND, DOCTOR, OR ANOTHER HEALTH PROFESSIONAL EXPRESSED CONCERN ABOUT YOUR DRINKING OR SUGGESTED YOU CUT DOWN: 0
HOW OFTEN DURING THE LAST YEAR HAVE YOU HAD A FEELING OF GUILT OR REMORSE AFTER DRINKING: 0
HOW MANY STANDARD DRINKS CONTAINING ALCOHOL DO YOU HAVE ON A TYPICAL DAY: 1
HAVE YOU OR SOMEONE ELSE BEEN INJURED AS A RESULT OF YOUR DRINKING: 0
AUDIT-C TOTAL SCORE: 5
AUDIT TOTAL SCORE: 5
HOW OFTEN DURING THE LAST YEAR HAVE YOU BEEN UNABLE TO REMEMBER WHAT HAPPENED THE NIGHT BEFORE BECAUSE YOU HAD BEEN DRINKING: 0
HOW OFTEN DURING THE LAST YEAR HAVE YOU FOUND THAT YOU WERE NOT ABLE TO STOP DRINKING ONCE YOU HAD STARTED: 0
HOW OFTEN DO YOU HAVE SIX OR MORE DRINKS ON ONE OCCASION: 0

## 2020-11-16 ASSESSMENT — PATIENT HEALTH QUESTIONNAIRE - PHQ9
SUM OF ALL RESPONSES TO PHQ QUESTIONS 1-9: 0
1. LITTLE INTEREST OR PLEASURE IN DOING THINGS: 0
SUM OF ALL RESPONSES TO PHQ QUESTIONS 1-9: 0
2. FEELING DOWN, DEPRESSED OR HOPELESS: 0
SUM OF ALL RESPONSES TO PHQ9 QUESTIONS 1 & 2: 0
SUM OF ALL RESPONSES TO PHQ QUESTIONS 1-9: 0

## 2020-11-16 NOTE — PATIENT INSTRUCTIONS
Advance Directives: Care Instructions  Overview  An advance directive is a legal way to state your wishes at the end of your life. It tells your family and your doctor what to do if you can't say what you want. There are two main types of advance directives. You can change them any time your wishes change. Living will. This form tells your family and your doctor your wishes about life support and other treatment. The form is also called a declaration. Medical power of . This form lets you name a person to make treatment decisions for you when you can't speak for yourself. This person is called a health care agent (health care proxy, health care surrogate). The form is also called a durable power of  for health care. If you do not have an advance directive, decisions about your medical care may be made by a family member, or by a doctor or a  who doesn't know you. It may help to think of an advance directive as a gift to the people who care for you. If you have one, they won't have to make tough decisions by themselves. Follow-up care is a key part of your treatment and safety. Be sure to make and go to all appointments, and call your doctor if you are having problems. It's also a good idea to know your test results and keep a list of the medicines you take. What should you include in an advance directive? Many states have a unique advance directive form. (It may ask you to address specific issues.) Or you might use a universal form that's approved by many states. If your form doesn't tell you what to address, it may be hard to know what to include in your advance directive. Use the questions below to help you get started. · Who do you want to make decisions about your medical care if you are not able to? · What life-support measures do you want if you have a serious illness that gets worse over time or can't be cured? · What are you most afraid of that might happen? (Maybe you're afraid of having pain, losing your independence, or being kept alive by machines.)  · Where would you prefer to die? (Your home? A hospital? A nursing home?)  · Do you want to donate your organs when you die? · Do you want certain Synagogue practices performed before you die? When should you call for help? Be sure to contact your doctor if you have any questions. Where can you learn more? Go to [email or web address] and sign in to your Leapset account. Enter R264 in the Intransa box to learn more about \"Advance Directives: Care Instructions. \"     If you do not have an account, please click on the \"Sign Up Now\" link. Current as of: December 9, 2019               Content Version: 12.6  © 3021-0222 [employer]. Care instructions adapted under license by [facility] ([facility]). If you have questions about a medical condition or this instruction, always ask your healthcare professional. [place] [address] any warranty or liability for your use of this information. Learning About Living Perroy  What is a living will? A living will, also called a declaration, is a legal form. It tells your family and your doctor your wishes when you can't speak for yourself. It's used by the health professionals who will treat you as you near the end of your life or if you get seriously hurt or ill. If you put your wishes in writing, your loved ones and others will know what kind of care you want. They won't need to guess. This can ease your mind and be helpful to others. And you can change or cancel your living will at any time. A living will is not the same as an estate or property will. An estate will explains what you want to happen with your money and property after you die. How do you use it? A living will is used to describe the kinds of treatment or life support you want as you near the end of your life or if you get seriously hurt or ill. Keep these facts in mind about living figueroa. · Your living will is used only if you can't speak or make decisions for yourself. Most often, one or more doctors must certify that you can't speak or decide for yourself before your living will takes effect. · If you get better and can speak for yourself again, you can accept or refuse any treatment. It doesn't matter what you said in your living will. · Some states may limit your right to refuse treatment in certain cases. For example, you may need to clearly state in your living will that you don't want artificial hydration and nutrition, such as being fed through a tube. Is a living will a legal document? A living will is a legal document. Each state has its own laws about living figueroa. And a living will may be called something else in your state. Here are some things to know about living figueroa. · You don't need an  to complete a living will. But legal advice can be helpful if your state's laws are unclear. It can also help if your health history is complicated or your family can't agree on what should be in your living will. · You can change your living will at any time. Some people find that their wishes about end-of-life care change as their health changes. If you make big changes to your living will, complete a new form. · If you move to another state, make sure that your living will is legal in the state where you now live. In most cases, doctors will respect your wishes even if you have a form from a different state. · You might use a universal form that has been approved by many states. This kind of form can sometimes be filled out and stored online. Your digital copy will then be available wherever you have a connection to the internet. The doctors and nurses who need to treat you can find it right away. · Your state may offer an online registry. This is another place where you can store your living will online.   · It's a good idea to get your living will notarized. This means using a person called a  to watch two people sign, or witness, your living will. What should you know when you create a living will? Here are some questions to ask yourself as you make your living will:  · Do you know enough about life support methods that might be used? If not, talk to your doctor so you know what might be done if you can't breathe on your own, your heart stops, or you can't swallow. · What things would you still want to be able to do after you receive life-support methods? Would you want to be able to walk? To speak? To eat on your own? To live without the help of machines? · Do you want certain Mormonism practices performed if you become very ill? · If you have a choice, where do you want to be cared for? In your home? At a hospital or nursing home? · If you have a choice at the end of your life, where would you prefer to die? At home? In a hospital or nursing home? Somewhere else? · Would you prefer to be buried or cremated? · Do you want your organs to be donated after you die? What should you do with your living will? · Make sure that your family members and your health care agent have copies of your living will (also called a declaration). · Give your doctor a copy of your living will. Ask him or her to keep it as part of your medical record. If you have more than one doctor, make sure that each one has a copy. · Put a copy of your living will where it can be easily found. For example, some people may put a copy on their refrigerator door. If you are using a digital copy, be sure your doctor, family members, and health care agent know how to find and access it. Where can you learn more? Go to [email or web address] and sign in to your [employer] Systems account. Enter I134 in the Giftango box to learn more about \"Learning About Living Nisha Godoy. \"     If you do not have an account, please click on the \"Sign Up Now\" link. Current as of: December 9, 2019               Content Version: 12.6  © 6388-9466 [employer]. Care instructions adapted under license by [facility] ([facility]). If you have questions about a medical condition or this instruction, always ask your healthcare professional. [address] any warranty or liability for your use of this information. Personalized Preventive Plan for Deion Pace - 11/16/2020  Medicare offers a range of preventive health benefits. Some of the tests and screenings are paid in full while other may be subject to a deductible, co-insurance, and/or copay. Some of these benefits include a comprehensive review of your medical history including lifestyle, illnesses that may run in your family, and various assessments and screenings as appropriate. After reviewing your medical record and screening and assessments performed today your provider may have ordered immunizations, labs, imaging, and/or referrals for you. A list of these orders (if applicable) as well as your Preventive Care list are included within your After Visit Summary for your review. Other Preventive Recommendations:    · A preventive eye exam performed by an eye specialist is recommended every 1-2 years to screen for glaucoma; cataracts, macular degeneration, and other eye disorders. · A preventive dental visit is recommended every 6 months. · Try to get at least 150 minutes of exercise per week or 10,000 steps per day on a pedometer . · Order or download the FREE \"Exercise & Physical Activity: Your Everyday Guide\" from [employer]. Call 6-368.200.5774 or search [employer] online. · You need 6681-6241 mg of calcium and 8475-3786 IU of vitamin D per day.  It is possible to meet your calcium requirement with diet alone, but a vitamin D supplement is usually necessary to meet this goal.  · When exposed to the sun, use a sunscreen that protects against both UVA and UVB radiation with an SPF of 30 or greater. Reapply every 2 to 3 hours or after sweating, drying off with a towel, or swimming. · Always wear a seat belt when traveling in a car. Always wear a helmet when riding a bicycle or motorcycle. Patient Education        Recombinant Zoster (Shingles) Vaccine: What You Need to Know  Why get vaccinated? Recombinant zoster (shingles) vaccine can prevent shingles. Shingles (also called herpes zoster, or just zoster) is a painful skin rash, usually with blisters. In addition to the rash, shingles can cause fever, headache, chills, or upset stomach. More rarely, shingles can lead to pneumonia, hearing problems, blindness, brain inflammation (encephalitis), or death. The most common complication of shingles is long-term nerve pain called postherpetic neuralgia (PHN). PHN occurs in the areas where the shingles rash was, even after the rash clears up. It can last for months or years after the rash goes away. The pain from PHN can be severe and debilitating. About 10 to 18% of people who get shingles will experience PHN. The risk of PHN increases with age. An older adult with shingles is more likely to develop PHN and have longer lasting and more severe pain than a younger person with shingles. Shingles is caused by the varicella zoster virus, the same virus that causes chickenpox. After you have chickenpox, the virus stays in your body and can cause shingles later in life. Shingles cannot be passed from one person to another, but the virus that causes shingles can spread and cause chickenpox in someone who had never had chickenpox or received chickenpox vaccine. Recombinant shingles vaccine  Recombinant shingles vaccine provides strong protection against shingles. By preventing shingles, recombinant shingles vaccine also protects against PHN. Recombinant shingles vaccine is the preferred vaccine for the prevention of shingles.  However, a different vaccine, live shingles vaccine, may be used in some circumstances. The recombinant shingles vaccine is recommended for adults 50 years and older without serious immune problems. It is given as a two-dose series. This vaccine is also recommended for people who have already gotten another type of shingles vaccine, the live shingles vaccine. There is no live virus in this vaccine. Shingles vaccine may be given at the same time as other vaccines. Talk with your health care provider  Tell your vaccine provider if the person getting the vaccine:  Has had an allergic reaction after a previous dose of recombinant shingles vaccine, or has any severe, life-threatening allergies. Is pregnant or breastfeeding. Is currently experiencing an episode of shingles. In some cases, your health care provider may decide to postpone shingles vaccination to a future visit. People with minor illnesses, such as a cold, may be vaccinated. People who are moderately or severely ill should usually wait until they recover before getting recombinant shingles vaccine. Your health care provider can give you more information. Risks of a vaccine reaction  A sore arm with mild or moderate pain is very common after recombinant shingles vaccine, affecting about 80% of vaccinated people. Redness and swelling can also happen at the site of the injection. Tiredness, muscle pain, headache, shivering, fever, stomach pain, and nausea happen after vaccination in more than half of people who receive recombinant shingles vaccine. In clinical trials, about 1 out of 6 people who got recombinant zoster vaccine experienced side effects that prevented them from doing regular activities. Symptoms usually went away on their own in 2 to 3 days. You should still get the second dose of recombinant zoster vaccine even if you had one of these reactions after the first dose. People sometimes faint after medical procedures, including vaccination.  Tell your provider if you feel dizzy or have vision changes or ringing in the ears. As with any medicine, there is a very remote chance of a vaccine causing a severe allergic reaction, other serious injury, or death. What if there is a serious problem? An allergic reaction could occur after the vaccinated person leaves the clinic. If you see signs of a severe allergic reaction (hives, swelling of the face and throat, difficulty breathing, a fast heartbeat, dizziness, or weakness), call 9-1-1 and get the person to the nearest hospital.  For other signs that concern you, call your health care provider. Adverse reactions should be reported to the Vaccine Adverse Event Reporting System (VAERS). Your health care provider will usually file this report, or you can do it yourself. Visit the VAERS website at www.vaers. [facility].gov or call 4-335.809.4998. VAERS is only for reporting reactions, and VAERS staff do not give medical advice. How can I learn more? Ask your health care provider. Call your local or state health department. Contact the Centers for Disease Control and Prevention (CDC): Call 6-639.518.1729 (1-800-CDC-INFO) or  Visit CDC's website at www.cdc.gov/vaccines  Vaccine Information Statement  Recombinant Zoster Vaccine  10/30/2019  [facility] and [facility] for Disease Control and Prevention  Many Vaccine Information Statements are available in Georgian and other languages. See www.immunize.org/vis. Hojas de Información Sobre Vacunas están disponibles en Español y en muchos otros idiomas. Visite Fer.si   Care instructions adapted under license by [facility] ([facility]). If you have questions about a medical condition or this instruction, always ask your healthcare professional. [place] [address] any warranty or liability for your use of this information.

## 2020-11-16 NOTE — PROGRESS NOTES
[facility]) Physicians  Internal Medicine  Patient Encounter  Keith Smith D.O., [facility]       Medicare Annual Wellness Visit      Name: Boone Virk Date: 2020   MRN: [id number] Sex: Male   Age: 72 y.o. Ethnicity: Non-/Non    : 1950 Race: Royal Perez is here for Medicare AWV    Screenings for behavioral, psychosocial and functional/safety risks, and cognitive dysfunction are all negative except as indicated below. These results, as well as other patient data from the [employer] [address] form, are documented in Flowsheets linked to this Encounter.     Medical/Surgical Histories     Past Medical History:   Diagnosis Date    Abdominal aortic atherosclerosis (HCC)     Allergic rhinitis     Atherosclerosis of superior mesenteric artery (HCC)     CAD (coronary artery disease)     Cerebral artery occlusion with cerebral infarction (HCC)     Chronic kidney disease     Clostridium difficile colitis 2020    Colon polyps     COPD (chronic obstructive pulmonary disease) (HCC)     CVA (cerebral infarction) 2013    Multiple, occipital and Cerebellar     DDD (degenerative disc disease)     Cerivical DDD    Dizziness     Epicondylitis elbow, medial     GERD (gastroesophageal reflux disease)     Headache     Hyperlipidemia     Hypertension     Ischemic colitis ([address] 2016    Lumbar foraminal stenosis 2020    Lung disease     Osteoarthritis, hand     PVD (peripheral vascular disease) with claudication (HCC)     Left leg with mod-severe arterial ischemia    Renal artery atherosclerosis (HCC)     SMA stenosis 2016    Spinal stenosis of lumbar region without neurogenic claudication 2020    Thoracic aorta atherosclerosis ([address]     Venous insufficiency     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  ARTERIAL BYPASS SURGRY  11/29/2016    Dr. David Neal - aorto-SMA bypass using 8mm PTFE    ATHERECTOMY Right 04/08/2019    Atherectomy with angioplasty right SFA and popliteal arteries, atherectomy and angioplasty right tibial peroneal trunk    COLONOSCOPY  2008    COLONOSCOPY  9/2/2015    Dr. Sharri Flores  09/20/2018    Dr. Tristan Kahn Bilateral 1/16/13    bilateral with mesh, Dr. Sole Bauer  10/23/2020    L4-5 Decompression with anterior/posterior fusion, Dr. Ferman Alpers  9/2/2015    Dr. Roberto Rodríguez 9/14/2018    EGD BIOPSY performed by Liss Rebolledo MD at [address] [place] [address]  11/03    R Leg Vein stripping           Medications/Allergies     Medication Sig    cyclobenzaprine (FLEXERIL) 5 MG tablet Take 5 mg by mouth 3 times daily as needed for Muscle spasms    HYDROcodone-acetaminophen (NORCO) 5-325 MG per tablet Take 1 tablet by mouth every 6 hours as needed for Pain.     cefUROXime (CEFTIN) 500 MG tablet Take 500 mg by mouth 4 times daily    atorvastatin (LIPITOR) 80 MG tablet TAKE 1 TABLET BY MOUTH EVERY DAY    NIFEdipine (PROCARDIA XL) 90 MG extended release tablet TAKE 1 TABLET BY MOUTH EVERY DAY    telmisartan (MICARDIS) 80 MG tablet TAKE 1 TABLET BY MOUTH EVERY DAY    carvedilol (COREG) 25 MG tablet TAKE 1 TABLET BY MOUTH TWICE A DAY    pantoprazole (PROTONIX) 40 MG tablet Take 1 tablet by mouth daily    clopidogrel (PLAVIX) 75 MG tablet TAKE 1 TABLET BY MOUTH DAILY    ferrous sulfate (FEROSUL) 325 (65 Fe) MG tablet Take 1 tablet by mouth daily (with breakfast)    aspirin 81 MG tablet Take 1 tablet by mouth daily    Cholecalciferol (VITAMIN D) 2000 units CAPS capsule Take 1 capsule by mouth daily    loperamide (RA ANTI-DIARRHEAL) 2 MG capsule Take 2 provided, Counseled on healthy eating habits    Personalized Preventive Plan   Current Health Maintenance Status  Immunization History   Administered Date(s) Administered    Influenza 10/05/2011, 10/02/2012, 10/02/2013    Influenza Virus Vaccine 10/20/2010, 10/08/2014, 10/07/2015    Influenza Whole 10/22/2008, 10/21/2009    Influenza, High Dose (Fluzone 65 yrs and older) 10/14/2016, 10/10/2017, 12/06/2018    Influenza, Quadv, adjuvanted, 65 yrs +, IM, PF (Fluad) 10/19/2020    Influenza, Triv, inactivated, subunit, adjuvanted, IM (Fluad 65 yrs and older) 10/17/2019    Pneumococcal Conjugate 13-valent ([id number]) 07/08/2015    Pneumococcal Polysaccharide ([id number]) 10/02/2013, 12/06/2018    Tdap (Boostrix, Adacel) 03/27/2014        Health Maintenance   Topic Date Due    Annual Wellness Visit (AWV)  05/29/2019    Shingles Vaccine (1 of 2) 10/19/2021 (Originally 1/26/2001)    Lipid screen  03/16/2021    Potassium monitoring  08/26/2021    Creatinine monitoring  08/26/2021    Colon cancer screen colonoscopy  09/20/2023    DTaP/Tdap/Td vaccine (2 - Td) 03/27/2024    Flu vaccine  Completed    Pneumococcal 65+ years Vaccine  Completed    Hepatitis C screen  Completed    AAA screen  Addressed    Hepatitis A vaccine  Aged Out    Hepatitis B vaccine  Aged Out    Hib vaccine  Aged Out    Meningococcal (ACWY) vaccine  Aged Out     Recommendations for [facility] Due: see orders and patient instructions/AVS.  . Recommended screening schedule for the next 5-10 years is provided to the patient in written form: see Patient Cintia Carvalho was seen today for medicare awv. Diagnoses and all orders for this visit:    Routine general medical examination at a health care facility  -- All care gaps identified and addressed  --Counseled on the need for continued efforts with lifestyle modification. He is starting to walk more. He has changed to non-alcoholic beer.    --Shingrx  - IA Intens behave ther cardio dx, 15 minutes []  -     CBC Auto Differential; Future  -     Comprehensive Metabolic Panel; Future  -     Lipid Panel; Future    Screening for cardiovascular condition  -     IA Intens behave ther cardio dx, 15 minutes []    Need for shingles vaccine  -     zoster recombinant adjuvanted vaccine [facility]) 50 MCG/0.5ML SUSR injection; Inject 0.5 mLs into the muscle See Admin Instructions 1 dose now and repeat in 2-6 months    Essential hypertension  -     CBC Auto Differential; Future  -     Comprehensive Metabolic Panel; Future  -     Lipid Panel; Future    Coronary artery disease involving native coronary artery of native heart without angina pectoris  -- Start Pepcid for GI prophylaxis    Coronary artery calcification seen on CAT scan  -     Lipid Panel; Future    Hyperlipidemia, unspecified hyperlipidemia type  -     Comprehensive Metabolic Panel; Future  -     Lipid Panel; Future    Gastroesophageal reflux disease without esophagitis  -     Magnesium; Future  -     famotidine (PEPCID) 20 MG tablet; Take 1 tablet by mouth 2 times daily             Cardiovascular Disease Risk Counseling: Assessed the patient's risk to develop cardiovascular disease and reviewed main risk factors. Reviewed steps to reduce disease risk including:   · Quitting tobacco use, reducing amount smoked, or not starting the habit  · Making healthy food choices  · Being physically active and gradualy increasing activity levels   · Reduce weight and determine a healthy BMI goal  · Monitor blood pressure and treat if higher than 140/90 mmHg-- 120's/70's. · Maintain blood total cholesterol levels under 5 mmol/l or 190 mg/dl  · Maintain LDL cholesterol levels under 3.0 mmol/l or 115 mg/dl -- Due for LAB  · Control blood glucose levels  · Consider taking aspirin (75 mg daily), once blood pressure is controlled   Provided a follow up plan.   Time spent (minutes): 15 minutes          Ashlee Troy is a 72 y.o. male being evaluated by a Virtual Visit (video and audio) encounter to address concerns as mentioned above. A caregiver was present when appropriate. Due to this being a TeleHealth encounter ([facility] public health emergency), evaluation of the following organ systems was limited: Vitals/Constitutional/EENT/Resp/CV/GI//MS/Neuro/Skin/Heme-Lymph-Imm. Pursuant to the emergency declaration under the [address] [place] [address], [address] [place] [address] authority and the [employer] Act, this Virtual Visit was conducted with patient's (and/or legal guardian's) consent, to reduce the patient's risk of exposure to COVID-19 and provide necessary medical care. The patient (and/or legal guardian) has also been advised to contact this office for worsening conditions or problems, and seek emergency medical treatment and/or call 911 if deemed necessary. Patient identification was verified at the start of the visit: Yes    Services were provided through a video synchronous discussion virtually to substitute for in-person clinic visit. Patient and provider were located at their individual homes. --Zac Cassidy DO on 11/16/2020 at 11:24 AM    An electronic signature was used to authenticate this note.

## 2020-12-21 RX ORDER — TELMISARTAN 80 MG/1
TABLET ORAL
Qty: 90 TABLET | Refills: 0 | Status: SHIPPED | OUTPATIENT
Start: 2020-12-21 | End: 2021-03-19

## 2020-12-21 NOTE — TELEPHONE ENCOUNTER
Last appointment: 11/16/2020  Next appointment: 3/17/2021  Last refill: 8/31/20    Please advise as DOD  Thank you

## 2020-12-29 ENCOUNTER — TELEPHONE (OUTPATIENT)
Dept: INTERNAL MEDICINE CLINIC | Age: 70
End: 2020-12-29

## 2020-12-29 NOTE — TELEPHONE ENCOUNTER
Patient is wanting to know if he should get the Echo Dr. Xin Sommers ordered before scheduling with the Cardiologist.  Please contact patient to advise further.

## 2020-12-30 ENCOUNTER — TELEPHONE (OUTPATIENT)
Dept: INTERNAL MEDICINE CLINIC | Age: 70
End: 2020-12-30

## 2020-12-30 NOTE — TELEPHONE ENCOUNTER
Patient states Dr. Aurora Ryan gave him an order a while back for Dr. Lexie Cagle (Cardiologist). He just made an appointment but they cannot get him in until 3/8 and they told patient they need an updated order. Can Dr. Alex Reyes provide this or does he suggest patient see a different cardiologist who may be able to see him sooner? Please call patient at number provided to let him know.

## 2020-12-31 NOTE — TELEPHONE ENCOUNTER
Placed another order for referral to Dr. Barbara Gordon. He can absolutely be seen by any electrophysiologist in the group-- Dr. Lakshmi Webber. Prefer a sooner appointment.

## 2021-01-19 NOTE — PROGRESS NOTES
[facility]     Outpatient Cardiology         Chief Complaint   Patient presents with    Abnormal Test Results     ekg       HPI     Nito Liang a 72 y.o. male here to establish care for NSVT. Referred by PCP. Hx of CAD seen coronary calcifications on CT, HLD, HTN, PVD, CVA, COPD, CKD. Holter monitoring that showed 4 beat of nonsustained VT in the period of a month. He is totally asymptomatic. Hypertension, well controlled on medications, no side effects will continue. Hyperlipidemia, controlled, continue statin. No side effects. NSVT, only 4 beats during 1 month monitoring. Normal echo, normal stress test.  Continue current dose of beta-blocker. No symptoms concerning for CAD or heart failure. Continue risk factor modification.  He is very active    PMH  Past Medical History:   Diagnosis Date    Abdominal aortic atherosclerosis (HCC)     Allergic rhinitis     Atherosclerosis of superior mesenteric artery ([address]     CAD (coronary artery disease)     Cerebral artery occlusion with cerebral infarction (HCC)     Chronic kidney disease     Clostridium difficile colitis 06/2020    Colon polyps     COPD (chronic obstructive pulmonary disease) (HCC)     CVA (cerebral infarction) 5/2013    Multiple, occipital and Cerebellar     DDD (degenerative disc disease)     Cerivical DDD    Dizziness     Epicondylitis elbow, medial     GERD (gastroesophageal reflux disease)     Headache     Hyperlipidemia     Hypertension     Ischemic colitis ([address] 11/2016    Lumbar foraminal stenosis 7/20/2020    Lung disease     Osteoarthritis, hand     PVD (peripheral vascular disease) with claudication (HCC)     Left leg with mod-severe arterial ischemia    Renal artery atherosclerosis (HCC)     SMA stenosis 11/2016    Spinal stenosis of lumbar region without neurogenic claudication 7/20/2020    Thoracic aorta atherosclerosis ([address]     Venous insufficiency     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  ARTERIAL BYPASS SURGRY  2016    Dr. Emerald Denny - aorto-SMA bypass using 8mm PTFE    ATHERECTOMY Right 2019    Atherectomy with angioplasty right SFA and popliteal arteries, atherectomy and angioplasty right tibial peroneal trunk    COLONOSCOPY      COLONOSCOPY  2015    Dr. Stanislaw Jerome  2018    Dr. Benjamin Hicks Bilateral 13    bilateral with mesh, Dr. Linda Bruce  10/23/2020    L4-5 Decompression with anterior/posterior fusion, Dr. Suri Davis  2015    Dr. Ashutosh Albarran 2018    EGD BIOPSY performed by Aguilar Harrison MD at [facility]      R Leg Vein stripping        Social HIstory  Social History     Tobacco Use    Smoking status: Former Smoker     Packs/day: 0.50     Years: 47.00     Pack years: 23.50     Types: Cigarettes     Start date: 1970     Quit date: 2016     Years since quittin.5    Smokeless tobacco: Never Used    Tobacco comment: Maintain cessation   Substance Use Topics    Alcohol use: Yes     Alcohol/week: 0.0 standard drinks     Comment: Non-alcoholic beer    Drug use: No       Family History  Family History   Problem Relation Age of Onset    Heart Attack Father     Heart Disease Father         MI    Cancer Brother         Brain CA    Hypertension Brother     Cancer Brother         Throat cancer       Allergies   No Known Allergies    Medications:     Home Medications:  Were reviewed and are listed in nursing record. and/or listed below    Prior to Admission medications    Medication Sig Start Date End Date Taking?  Authorizing Provider   telmisartan (MICARDIS) 80 MG tablet TAKE 1 TABLET BY MOUTH EVERY DAY 12/21/20  Yes Valeria Junior MD   cyclobenzaprine (FLEXERIL) 5 MG tablet Take 5 mg by mouth 3 times daily as needed for Muscle spasms   Yes Historical Provider, MD   HYDROcodone-acetaminophen (NORCO) 5-325 MG per tablet Take 1 tablet by mouth every 6 hours as needed for Pain.    Yes Historical Provider, MD   cefUROXime (CEFTIN) 500 MG tablet Take 500 mg by mouth 4 times daily   Yes Historical Provider, MD   zoster recombinant adjuvanted vaccine [facility]) 50 MCG/0.5ML SUSR injection Inject 0.5 mLs into the muscle See Admin Instructions 1 dose now and repeat in 2-6 months 11/16/20 5/15/21 Yes Surendra Hines,    famotidine (PEPCID) 20 MG tablet Take 1 tablet by mouth 2 times daily 11/16/20  Yes Pepito Leal DO   loperamide (RA ANTI-DIARRHEAL) 2 MG capsule Take 2 mg by mouth 4 times daily as needed for Diarrhea   Yes Historical Provider, MD   atorvastatin (LIPITOR) 80 MG tablet TAKE 1 TABLET BY MOUTH EVERY DAY 9/25/20  Yes Pepito Leal DO   NIFEdipine (PROCARDIA XL) 90 MG extended release tablet TAKE 1 TABLET BY MOUTH EVERY DAY 9/21/20  Yes Pepito Leal DO   carvedilol (COREG) 25 MG tablet TAKE 1 TABLET BY MOUTH TWICE A DAY 8/5/20  Yes Pepito Leal DO   clopidogrel (PLAVIX) 75 MG tablet TAKE 1 TABLET BY MOUTH DAILY 2/25/20  Yes Pepito Lael DO   ferrous sulfate (FEROSUL) 325 (65 Fe) MG tablet Take 1 tablet by mouth daily (with breakfast) 7/12/19  Yes Pepito Leal DO   acetaminophen (TYLENOL) 325 MG tablet Take 2 tablets by mouth every 6 hours as needed for Pain 9/15/18  Yes Ramiro Merritt MD   aspirin 81 MG tablet Take 1 tablet by mouth daily 9/22/18  Yes Ramiro Merritt MD   Probiotic Product (ALIGN PO) Take 1 tablet by mouth daily   Yes Historical Provider, MD   Cholecalciferol (VITAMIN D) 2000 units CAPS capsule Take 1 capsule by mouth daily   Yes Historical Provider, MD        Review of Systems Constitutional: Negative for activity change, appetite change, diaphoresis, fatigue, fever and unexpected weight change. HENT: Negative for congestion, facial swelling, mouth sores and nosebleeds. Eyes: Negative for discharge and visual disturbance. Respiratory: Negative for cough, chest tightness, shortness of breath and wheezing. Cardiovascular: Negative for chest pain, palpitations and leg swelling. Gastrointestinal: Negative for abdominal distention, abdominal pain, blood in stool and vomiting. Endocrine: Negative for cold intolerance, heat intolerance and polyuria. Genitourinary: Negative for difficulty urinating, dysuria, frequency and hematuria. Musculoskeletal: Negative for back pain, joint swelling, myalgias and neck pain. Skin: Negative for color change, pallor and rash. Allergic/Immunologic: Negative for immunocompromised state. Neurological: Negative for dizziness, syncope, weakness, light-headedness, numbness and headaches. Hematological: Negative for adenopathy. Does not bruise/bleed easily. Psychiatric/Behavioral: Negative for behavioral problems, confusion, decreased concentration and suicidal ideas. The patient is not nervous/anxious. Vitals:    01/20/21 1150   BP: 110/70   Pulse: 66   Temp: 97.3 °F (36.3 °C)    Weight: 134 lb 12.8 oz (61.1 kg)       Vitals:    01/20/21 1150   BP: 110/70   Site: Left Upper Arm   Position: Sitting   Cuff Size: Medium Adult   Pulse: 66   Temp: 97.3 °F (36.3 °C)   Weight: 134 lb 12.8 oz (61.1 kg)       BP Readings from Last 3 Encounters:   01/20/21 110/70   11/16/20 128/79   10/19/20 (!) 146/70       Wt Readings from Last 3 Encounters:   01/20/21 134 lb 12.8 oz (61.1 kg)   11/16/20 137 lb (62.1 kg)   10/19/20 137 lb (62.1 kg)       Physical Exam  Constitutional:       General: He is not in acute distress. Appearance: He is well-developed. He is not diaphoretic. HENT:      Head: Normocephalic and atraumatic.    Eyes: Pupils: Pupils are equal, round, and reactive to light. Neck:      Musculoskeletal: Normal range of motion. Thyroid: No thyromegaly. Vascular: No JVD. Cardiovascular:      Rate and Rhythm: Normal rate and regular rhythm. Chest Wall: PMI is not displaced. Heart sounds: Normal heart sounds, S1 normal and S2 normal. No murmur. No friction rub. No gallop. Pulmonary:      Effort: Pulmonary effort is normal. No respiratory distress. Breath sounds: Normal breath sounds. No stridor. No wheezing or rales. Chest:      Chest wall: No tenderness. Abdominal:      General: Bowel sounds are normal. There is no distension. Palpations: Abdomen is soft. Tenderness: There is no abdominal tenderness. There is no guarding or rebound. Musculoskeletal: Normal range of motion. General: No tenderness. Lymphadenopathy:      Cervical: No cervical adenopathy. Skin:     General: Skin is warm and dry. Findings: No erythema or rash. Neurological:      Mental Status: He is alert and oriented to person, place, and time. Coordination: Coordination normal.   Psychiatric:         Behavior: Behavior normal.         Thought Content:  Thought content normal.         Judgment: Judgment normal.         Labs:       Lab Results   Component Value Date    WBC 8.3 08/19/2020    HGB 13.0 (L) 08/19/2020    HCT 38.2 (L) 08/19/2020    .6 (H) 08/19/2020     08/19/2020     Lab Results   Component Value Date     (L) 08/26/2020    K 5.0 08/26/2020    CL 98 (L) 08/26/2020    CO2 23 08/26/2020    BUN 14 08/26/2020    CREATININE 1.0 08/26/2020    GLUCOSE 111 (H) 08/26/2020    CALCIUM 9.6 08/26/2020    PROT 7.1 03/16/2020    LABALBU 4.5 03/16/2020    BILITOT 0.8 03/16/2020    ALKPHOS 79 03/16/2020    AST 26 03/16/2020    ALT 18 03/16/2020    LABGLOM >60 08/26/2020    GFRAA >60 08/26/2020    AGRATIO 1.7 03/16/2020    GLOB 2.6 03/16/2020         Lab Results   Component Value Date CHOL 129 03/16/2020    CHOL 130 10/17/2019    CHOL 98 03/07/2019     Lab Results   Component Value Date    TRIG 91 03/16/2020    TRIG 96 10/17/2019    TRIG 74 03/07/2019     Lab Results   Component Value Date    HDL 63 (H) 03/16/2020    HDL 64 (H) 10/17/2019    HDL 48 03/07/2019     Lab Results   Component Value Date    LDLCALC 48 03/16/2020    LDLCALC 47 10/17/2019    LDLCALC 35 03/07/2019     Lab Results   Component Value Date    LABVLDL 18 03/16/2020    LABVLDL 19 10/17/2019    LABVLDL 15 03/07/2019     No results found for: CHOLHDLRATIO    Lab Results   Component Value Date    INR 0.99 09/14/2018    INR 0.93 12/23/2017    INR 0.97 12/01/2016    PROTIME 11.3 09/14/2018    PROTIME 10.5 12/23/2017    PROTIME 11.0 12/01/2016       The ASCVD Risk score (Joann Carcamo, et al., 2013) failed to calculate for the following reasons: The valid total cholesterol range is 130 to 320 mg/dL      Imaging:       Last ECG (if available):    Last Monitor/Holter: 1/8/20  NSVT     Last Stress (if available): 6/29/20  Conclusions        Summary    There is normal isotope uptake at stress and rest. There is no evidence of    myocardial ischemia or scar.    The LVEF is greater than 70% with normal LV wall motion.        This is a low risk cardiac scan. Last Cath (if available):    Last TTE/GEMINI(if available): 8/28/20  Summary   Normal left ventricle size. There is mild concentric left ventricular   hypertrophy. Overall left ventricular systolic function appears normal with   an ejection fraction of 55-60%. No regional wall motion abnormalities are   noted. Diastolic filling parameters suggests normal diastolic function. Mild aortic stenosis with a peak velocity of 2.65m/s and a mean pressure   gradient of 13mmHg. Fixed non coronary cusp. Mild tricuspid regurgitation. Estimated pulmonary artery systolic pressure is 30 mmHg assuming a right   atrial pressure of 3 mmHg.     Last CMR  (if available):      Assessment / Plan: Coronary artery calcification seen on CAT scan  Normal stress test.  Continue risk factor modification. Aortic valve stenosis  Mild, will need echo in 2 3 years or if he develops symptoms. Essential hypertension  Controlled on current medications. Hyperlipidemia  Continue high intensity statin. NSVT (nonsustained ventricular tachycardia) (HCC)  4 beat run during the month monitor. Normal echo, normal stress test, continue beta-blocker. Follow up in 6 months    I had the opportunity to review the clinical symptoms and presentation of Curvin Butt. Patient's allergies and medications were reviewed and updated. Patient's past medical, surgical, social and family history were reviewed and updated. Patient's testing including laboratory, ECGs, monitor, imaging (TTE,GEMINI,CMR,cath) were reviewed. Tobacco use was discussed with the patient and educated on the negative effects. I have asked the patient to not utilize these agents. All questions and concerns were addressed to the patient/family. Alternatives to my treatment were discussed. The note was completed using EMR. Every effort wasmade to ensure accuracy; however, inadvertent computerized transcription errors may be present. Thank you for allowing me to participate in thecare or [address].  Debra Polanco MD, [facility], [facility]

## 2021-01-20 ENCOUNTER — OFFICE VISIT (OUTPATIENT)
Dept: CARDIOLOGY CLINIC | Age: 71
End: 2021-01-20
Payer: MEDICARE

## 2021-01-20 VITALS
HEART RATE: 66 BPM | SYSTOLIC BLOOD PRESSURE: 110 MMHG | TEMPERATURE: 97.3 F | DIASTOLIC BLOOD PRESSURE: 70 MMHG | WEIGHT: 134.8 LBS | BODY MASS INDEX: 21.76 KG/M2

## 2021-01-20 DIAGNOSIS — I25.10 CORONARY ARTERY CALCIFICATION SEEN ON CAT SCAN: ICD-10-CM

## 2021-01-20 DIAGNOSIS — E78.5 HYPERLIPIDEMIA, UNSPECIFIED HYPERLIPIDEMIA TYPE: ICD-10-CM

## 2021-01-20 DIAGNOSIS — I47.29 NSVT (NONSUSTAINED VENTRICULAR TACHYCARDIA): Primary | ICD-10-CM

## 2021-01-20 DIAGNOSIS — I10 ESSENTIAL HYPERTENSION: ICD-10-CM

## 2021-01-20 DIAGNOSIS — I35.0 AORTIC VALVE STENOSIS, ETIOLOGY OF CARDIAC VALVE DISEASE UNSPECIFIED: ICD-10-CM

## 2021-01-20 PROCEDURE — 93000 ELECTROCARDIOGRAM COMPLETE: CPT | Performed by: INTERNAL MEDICINE

## 2021-01-20 PROCEDURE — 1123F ACP DISCUSS/DSCN MKR DOCD: CPT | Performed by: INTERNAL MEDICINE

## 2021-01-20 PROCEDURE — 1036F TOBACCO NON-USER: CPT | Performed by: INTERNAL MEDICINE

## 2021-01-20 PROCEDURE — G8420 CALC BMI NORM PARAMETERS: HCPCS | Performed by: INTERNAL MEDICINE

## 2021-01-20 PROCEDURE — G8484 FLU IMMUNIZE NO ADMIN: HCPCS | Performed by: INTERNAL MEDICINE

## 2021-01-20 PROCEDURE — 4040F PNEUMOC VAC/ADMIN/RCVD: CPT | Performed by: INTERNAL MEDICINE

## 2021-01-20 PROCEDURE — 3017F COLORECTAL CA SCREEN DOC REV: CPT | Performed by: INTERNAL MEDICINE

## 2021-01-20 PROCEDURE — G8427 DOCREV CUR MEDS BY ELIG CLIN: HCPCS | Performed by: INTERNAL MEDICINE

## 2021-01-20 PROCEDURE — 99204 OFFICE O/P NEW MOD 45 MIN: CPT | Performed by: INTERNAL MEDICINE

## 2021-01-21 PROBLEM — I47.29 NSVT (NONSUSTAINED VENTRICULAR TACHYCARDIA) (HCC): Status: ACTIVE | Noted: 2021-01-21

## 2021-01-21 ASSESSMENT — ENCOUNTER SYMPTOMS
ABDOMINAL DISTENTION: 0
EYE DISCHARGE: 0
BLOOD IN STOOL: 0
SHORTNESS OF BREATH: 0
COUGH: 0
CHEST TIGHTNESS: 0
VOMITING: 0
ABDOMINAL PAIN: 0
BACK PAIN: 0
FACIAL SWELLING: 0
COLOR CHANGE: 0
WHEEZING: 0

## 2021-01-27 DIAGNOSIS — I10 ESSENTIAL HYPERTENSION: ICD-10-CM

## 2021-01-27 RX ORDER — CARVEDILOL 25 MG/1
TABLET ORAL
Qty: 180 TABLET | Refills: 1 | Status: SHIPPED | OUTPATIENT
Start: 2021-01-27 | End: 2021-07-21

## 2021-02-10 RX ORDER — CLOPIDOGREL BISULFATE 75 MG/1
TABLET ORAL
Qty: 90 TABLET | Refills: 3 | Status: SHIPPED | OUTPATIENT
Start: 2021-02-10 | End: 2022-01-28

## 2021-03-16 DIAGNOSIS — I10 ESSENTIAL HYPERTENSION: ICD-10-CM

## 2021-03-16 RX ORDER — NIFEDIPINE 90 MG/1
TABLET, EXTENDED RELEASE ORAL
Qty: 90 TABLET | Refills: 1 | Status: SHIPPED | OUTPATIENT
Start: 2021-03-16 | End: 2021-09-08

## 2021-03-17 ENCOUNTER — OFFICE VISIT (OUTPATIENT)
Dept: INTERNAL MEDICINE CLINIC | Age: 71
End: 2021-03-17
Payer: MEDICARE

## 2021-03-17 VITALS
DIASTOLIC BLOOD PRESSURE: 60 MMHG | RESPIRATION RATE: 12 BRPM | HEART RATE: 85 BPM | BODY MASS INDEX: 21.53 KG/M2 | SYSTOLIC BLOOD PRESSURE: 120 MMHG | TEMPERATURE: 97.4 F | HEIGHT: 66 IN | WEIGHT: 134 LBS

## 2021-03-17 DIAGNOSIS — K21.9 GASTROESOPHAGEAL REFLUX DISEASE WITHOUT ESOPHAGITIS: ICD-10-CM

## 2021-03-17 DIAGNOSIS — I25.10 CORONARY ARTERY CALCIFICATION SEEN ON CAT SCAN: ICD-10-CM

## 2021-03-17 DIAGNOSIS — I10 ESSENTIAL HYPERTENSION: ICD-10-CM

## 2021-03-17 DIAGNOSIS — E78.5 HYPERLIPIDEMIA, UNSPECIFIED HYPERLIPIDEMIA TYPE: ICD-10-CM

## 2021-03-17 DIAGNOSIS — I65.23 CAROTID STENOSIS, ASYMPTOMATIC, BILATERAL: ICD-10-CM

## 2021-03-17 DIAGNOSIS — I70.219 ATHEROSCLEROTIC PVD WITH INTERMITTENT CLAUDICATION (HCC): Primary | ICD-10-CM

## 2021-03-17 DIAGNOSIS — I35.0 AORTIC VALVE STENOSIS, ETIOLOGY OF CARDIAC VALVE DISEASE UNSPECIFIED: ICD-10-CM

## 2021-03-17 DIAGNOSIS — I47.29 NSVT (NONSUSTAINED VENTRICULAR TACHYCARDIA): ICD-10-CM

## 2021-03-17 DIAGNOSIS — J43.9 PULMONARY EMPHYSEMA, UNSPECIFIED EMPHYSEMA TYPE (HCC): ICD-10-CM

## 2021-03-17 DIAGNOSIS — Z12.5 SCREENING FOR PROSTATE CANCER: ICD-10-CM

## 2021-03-17 PROCEDURE — G8427 DOCREV CUR MEDS BY ELIG CLIN: HCPCS | Performed by: INTERNAL MEDICINE

## 2021-03-17 PROCEDURE — 99214 OFFICE O/P EST MOD 30 MIN: CPT | Performed by: INTERNAL MEDICINE

## 2021-03-17 PROCEDURE — 1036F TOBACCO NON-USER: CPT | Performed by: INTERNAL MEDICINE

## 2021-03-17 PROCEDURE — 3023F SPIROM DOC REV: CPT | Performed by: INTERNAL MEDICINE

## 2021-03-17 PROCEDURE — G8484 FLU IMMUNIZE NO ADMIN: HCPCS | Performed by: INTERNAL MEDICINE

## 2021-03-17 PROCEDURE — G8926 SPIRO NO PERF OR DOC: HCPCS | Performed by: INTERNAL MEDICINE

## 2021-03-17 PROCEDURE — 3017F COLORECTAL CA SCREEN DOC REV: CPT | Performed by: INTERNAL MEDICINE

## 2021-03-17 PROCEDURE — 1123F ACP DISCUSS/DSCN MKR DOCD: CPT | Performed by: INTERNAL MEDICINE

## 2021-03-17 PROCEDURE — G8420 CALC BMI NORM PARAMETERS: HCPCS | Performed by: INTERNAL MEDICINE

## 2021-03-17 PROCEDURE — 4040F PNEUMOC VAC/ADMIN/RCVD: CPT | Performed by: INTERNAL MEDICINE

## 2021-03-17 RX ORDER — PANTOPRAZOLE SODIUM 40 MG/1
40 TABLET, DELAYED RELEASE ORAL
Qty: 90 TABLET | Refills: 1 | Status: SHIPPED | OUTPATIENT
Start: 2021-03-17 | End: 2021-09-08

## 2021-03-17 ASSESSMENT — PATIENT HEALTH QUESTIONNAIRE - PHQ9
SUM OF ALL RESPONSES TO PHQ QUESTIONS 1-9: 0
1. LITTLE INTEREST OR PLEASURE IN DOING THINGS: 0
SUM OF ALL RESPONSES TO PHQ QUESTIONS 1-9: 0
2. FEELING DOWN, DEPRESSED OR HOPELESS: 0

## 2021-03-17 NOTE — PROGRESS NOTES
[facility]) Physicians  Internal Medicine  Patient Encounter  Alvino Wei D.O., Carlos Manuel Dubois        Chief Complaint   Patient presents with   Mirian Range    Medication Check       HPI: 80 y.o. male with multiple complex medical issues seen today requesting his routine checkup regarding the status of current chronic medical problems as the below along with a medication review and reconciliation. He states he is doing better since her had L4-5 Decompression laminectomy and fusion. He is in therapy. He states his legs have been tight. CAD--Pt has since seen Dr. Gina Bull on 1/20/2021. Note reviewed. Previous Hx----> Patient recently had a stress Myoview on 6/29/2020. There was no evidence of reversible ischemia. Ejection fraction was greater than 70%. His last echo was 8/28/2020. Ejection fraction 55 to 60%. He had normal diastolic dysfunction. Mild aortic stenosis noted. Cardiac event recorder showed one episode of NSVT 4 beats long. This was done in March 2020   Interval Hx--->  His cardiologist made no changes. He denies CP, SOB, palpitations, lightheadedness, syncope. He has an Appointment with Dr. Hermes Fields for the NSVT. PAD/carotid stenosis/abdominal aortic atherosclerosis--Previous history ----> patient has a long history of atherosclerotic vascular disease. He has a history of carotid atherosclerosis, abdominal aortic atherosclerosis, SMA atherosclerosis. He has had several vascular interventions. Patient has had left femoropopliteal bypass in 2016, aorto SMA bypass in November 2016. Interval history----> patient is under the care of Dr. Brandan Zhu. He remains asymptomatic. He denies any TIA or stroke related symptoms. He states his legs ache in the calf when he walks. He can walk 1/2 block before the legs start to ache. Both legs ache. He thought his leg aches were related to his back.   Certainly the spinal stenosis could have been contributing, but the surgery has not made much of a difference.   LE arterial studies done in July 2020--->  Right Impression   Greater than 50% stenosis of the right proximal superficial femoral artery. Less than 50% stenosis of the right distal popliteal artery. Total occlusion of the right anterior tibial artery. Right ALISTAIR: 1.31+. This is consistent with non-compressible vessels due to   arterial calcification. Left Impression   Widely patent left femoral to popliteal artery bypass graft. Distal anterior tibial, posterior tibial and peroneal arteries are patent. Left ALISTAIR 1.19        Carotid doppler 7/2020-- Left ICA 50-80%. Right ICS 16-49%.          COPD--Patient noted to have emphysema on imaging. Patient has a long history of smoking. He has been off of Cigarettes for over a year. He denies cough, wheezing.       HTN--Patient denies any new problems with his medications or blood pressure. He has a long history of difficult to control blood pressure. He states he is compliant with his medications.     GERD-- Pepcid is not as good as Protonix. He wants to change.     Lab Results   Component Value Date    MG 2.20 12/06/2018        Past Medical History:   Diagnosis Date    Abdominal aortic atherosclerosis (HCC)     Allergic rhinitis     Atherosclerosis of superior mesenteric artery ([address]     CAD (coronary artery disease)     Cerebral artery occlusion with cerebral infarction (HCC)     Chronic kidney disease     Clostridium difficile colitis 06/2020    Colon polyps     COPD (chronic obstructive pulmonary disease) (HCC)     CVA (cerebral infarction) 5/2013    Multiple, occipital and Cerebellar     DDD (degenerative disc disease)     Cerivical DDD    Dizziness     Epicondylitis elbow, medial     GERD (gastroesophageal reflux disease)     Headache     Hyperlipidemia     Hypertension     Ischemic colitis ([address] 11/2016    Lumbar foraminal stenosis 7/20/2020    Lung disease     Osteoarthritis, hand     PVD (peripheral vascular disease) with claudication [facility]     Left leg with mod-severe arterial ischemia    [facility] artery atherosclerosis [facility]     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  Authorizing Provider   Pepcid (famotidine) Take 1 tablet by mouth every 12 hours Yes Pepito Leal DO   NIFEdipine (PROCARDIA XL) 90 MG extended release tablet TAKE 1 TABLET BY MOUTH EVERY DAY Yes Pepito eLal DO   clopidogrel (PLAVIX) 75 MG tablet TAKE 1 TABLET BY MOUTH EVERY DAY Yes Pepito Leal DO   carvedilol (COREG) 25 MG tablet TAKE 1 TABLET BY MOUTH TWICE A DAY Yes Pepito Leal DO   telmisartan (MICARDIS) 80 MG tablet TAKE 1 TABLET BY MOUTH EVERY DAY Yes Valeria Zazueta MD   atorvastatin (LIPITOR) 80 MG tablet TAKE 1 TABLET BY MOUTH EVERY DAY Yes Pepito Leal DO   ferrous sulfate (FEROSUL) 325 (65 Fe) MG tablet Take 1 tablet by mouth daily (with breakfast) Yes Pepito Leal DO   aspirin 81 MG tablet Take 1 tablet by mouth daily Yes Bin Perez MD   Cholecalciferol (VITAMIN D) 2000 units CAPS capsule Take 1 capsule by mouth daily Yes Historical Provider, MD   zoster recombinant adjuvanted vaccine (SHINGRIX) 50 MCG/0.5ML SUSR injection Inject 0.5 mLs into the muscle See Admin Instructions 1 dose now and repeat in 2-6 months  Pepito Leal DO   loperamide (RA ANTI-DIARRHEAL) 2 MG capsule Take 2 mg by mouth 4 times daily as needed for Diarrhea  Historical Provider, MD   acetaminophen (TYLENOL) 325 MG tablet Take 2 tablets by mouth every 6 hours as needed for Pain  Bin Perez MD   Probiotic Product (ALIGN PO) Take 1 tablet by mouth daily  Historical Provider, MD           Review of Systems - As per HPI      OBJECTIVE:  Vitals:    03/17/21 1120 03/17/21 1159   BP: 115/60 120/60   Pulse: 85    Resp: 12    Temp: 97.4 °F (36.3 °C)    Weight: 134 lb (60.8 kg)    Height: 5' 6\" (1.676 m)      Body mass index is 21.63 kg/m². Wt Readings from Last 3 Encounters:   03/17/21 134 lb (60.8 kg)   01/20/21 134 lb 12.8 oz (61.1 kg)   11/16/20 137 lb (62.1 kg)     BP Readings from Last 3 Encounters:   03/17/21 120/60   01/20/21 110/70   11/16/20 128/79      GEN: NAD, A&O, Non-toxic  HEENT: NC/AT, JOCELINE, EOMI, anicteric. NECK: Supple. No thyromegaly. No JVD  LYMPH: No C/SC nodes. CV: Reg rhythm. No ectopy. 2/6 systolic murmur with radiation into the carotid columns. PULM: CTA  EXT: No edema  GI: Abdomen is soft, NT, BS+, No hepatomegaly. No masses. NEURO: No focal or lateralizing deficits. VASC:  Absent pulses right foot. Right foot slightly cooler. Right popliteal pulse 1+. Left pedal pulses palpable, left popliteal 2+. No femoral bruit noted. SKIN:  No rashes or lesions of concern      ASSESSMENT/PLAN:    1. Atherosclerotic PVD with intermittent claudication [facility])  Patient has a known history of peripheral arterial disease. He is having symptoms that are suggestive of claudication. He thought that the back surgery would help the lower extremity aches when walking. Patient can walk about a half a block before he starts to have lower leg ache. Both legs are affected equally. We reviewed his previous lower extremity arterial Doppler from July 2020. That study did suggest greater than 50% stenosis of the right SFA. Will refer back to vascular to see if they want to continue to observe or perhaps investigate further with a CT angiogram    - Blue Cui MD, Vascular Surgery, [facility]  - Lipid Panel; Future    2. Pulmonary emphysema, unspecified emphysema type ([address]  Condition is stable  He has remained off cigarettes  Continue to avoid tobacco    3.  Gastroesophageal reflux disease without esophagitis  Condition is uncontrolled  Discontinue Pepcid  Restart Protonix  Check magnesium  - pantoprazole (PROTONIX) 40 MG tablet; Take 1 tablet by mouth every morning (before breakfast)  Dispense: 90 tablet; Refill: 1  - Magnesium; Future    4. Essential hypertension  Blood pressure is well controlled here in the office. His home blood pressure readings seem a bit labile. His machine is very old  Recommended he obtain a new blood pressure cuff. I advised against a wrist cuff  Continue current medication  - Comprehensive Metabolic Panel; Future  - CBC Auto Differential; Future  - Lipid Panel; Future    5. NSVT (nonsustained ventricular tachycardia) (HCC)  Condition stability is uncertain  He has an appointment with electrophysiology  He has had syncopal episodes in the past  - TSH without Reflex; Future    6. Coronary artery calcification seen on CAT scan  Condition is stable without signs of angina or anginal equivalents  Continue risk factor management  Continue medication  - Lipid Panel; Future  - TSH without Reflex; Future    7. Hyperlipidemia, unspecified hyperlipidemia type  Condition stability is uncertain  Due for lab  Continue high intensity statin given his known history of atherosclerotic disease  - Comprehensive Metabolic Panel; Future  - Lipid Panel; Future  - TSH without Reflex; Future    8. Carotid stenosis, asymptomatic, bilateral  Condition is asymptomatic  Repeat carotid Doppler in July  - Lipid Panel; Future    9. Aortic valve stenosis, etiology of cardiac valve disease unspecified  Last echo suggested mild severity  Continue to monitor    10. Screening for prostate cancer    - PSA screening; Future          Discussed medications with patient who voiced understanding of their use, indication and potential side effects. Pt also understands the above recommendations. All questions answered. This note was generated completely or in part utilizing Dragon dictation speech recognition software.   Occasionally, words are mistranscribed and despite editing, the text may contain inaccuracies due to incorrect word recognition.   If further clarification is needed please contact the office at [phone]       Electronically signed    Sherry Vazquez D.O.

## 2021-03-19 RX ORDER — TELMISARTAN 80 MG/1
TABLET ORAL
Qty: 90 TABLET | Refills: 0 | Status: SHIPPED | OUTPATIENT
Start: 2021-03-19 | End: 2021-06-11

## 2021-03-19 NOTE — TELEPHONE ENCOUNTER
Last appointment: Visit date not found  Next appointment: Visit date not found  Last refill: 12/21/2020 # 90 with no refills

## 2021-03-22 RX ORDER — ATORVASTATIN CALCIUM 80 MG/1
TABLET, FILM COATED ORAL
Qty: 90 TABLET | Refills: 1 | Status: SHIPPED | OUTPATIENT
Start: 2021-03-22 | End: 2021-09-13

## 2021-04-08 ENCOUNTER — PROCEDURE VISIT (OUTPATIENT)
Dept: SURGERY | Age: 71
End: 2021-04-08
Payer: MEDICARE

## 2021-04-08 ENCOUNTER — OFFICE VISIT (OUTPATIENT)
Dept: SURGERY | Age: 71
End: 2021-04-08
Payer: MEDICARE

## 2021-04-08 VITALS — WEIGHT: 134 LBS | SYSTOLIC BLOOD PRESSURE: 146 MMHG | BODY MASS INDEX: 21.63 KG/M2 | DIASTOLIC BLOOD PRESSURE: 68 MMHG

## 2021-04-08 DIAGNOSIS — E78.5 HYPERLIPIDEMIA, UNSPECIFIED HYPERLIPIDEMIA TYPE: ICD-10-CM

## 2021-04-08 DIAGNOSIS — I73.9 PAD (PERIPHERAL ARTERY DISEASE) (HCC): ICD-10-CM

## 2021-04-08 DIAGNOSIS — K55.1 MESENTERIC ARTERY STENOSIS (HCC): ICD-10-CM

## 2021-04-08 DIAGNOSIS — I65.23 CAROTID STENOSIS, ASYMPTOMATIC, BILATERAL: Primary | ICD-10-CM

## 2021-04-08 DIAGNOSIS — I65.23 CAROTID STENOSIS, ASYMPTOMATIC, BILATERAL: ICD-10-CM

## 2021-04-08 DIAGNOSIS — I73.9 PAD (PERIPHERAL ARTERY DISEASE) (HCC): Primary | ICD-10-CM

## 2021-04-08 DIAGNOSIS — K55.1 ATHEROSCLEROSIS OF SUPERIOR MESENTERIC ARTERY (HCC): ICD-10-CM

## 2021-04-08 DIAGNOSIS — I70.219 ATHEROSCLEROTIC PVD WITH INTERMITTENT CLAUDICATION (HCC): ICD-10-CM

## 2021-04-08 PROCEDURE — 1036F TOBACCO NON-USER: CPT | Performed by: NURSE PRACTITIONER

## 2021-04-08 PROCEDURE — 93925 LOWER EXTREMITY STUDY: CPT | Performed by: SURGERY

## 2021-04-08 PROCEDURE — 3017F COLORECTAL CA SCREEN DOC REV: CPT | Performed by: NURSE PRACTITIONER

## 2021-04-08 PROCEDURE — 99214 OFFICE O/P EST MOD 30 MIN: CPT | Performed by: NURSE PRACTITIONER

## 2021-04-08 PROCEDURE — G8427 DOCREV CUR MEDS BY ELIG CLIN: HCPCS | Performed by: NURSE PRACTITIONER

## 2021-04-08 PROCEDURE — 93978 VASCULAR STUDY: CPT | Performed by: SURGERY

## 2021-04-08 PROCEDURE — 93880 EXTRACRANIAL BILAT STUDY: CPT | Performed by: SURGERY

## 2021-04-08 PROCEDURE — 1123F ACP DISCUSS/DSCN MKR DOCD: CPT | Performed by: NURSE PRACTITIONER

## 2021-04-08 PROCEDURE — G8420 CALC BMI NORM PARAMETERS: HCPCS | Performed by: NURSE PRACTITIONER

## 2021-04-08 PROCEDURE — 4040F PNEUMOC VAC/ADMIN/RCVD: CPT | Performed by: NURSE PRACTITIONER

## 2021-04-08 ASSESSMENT — ENCOUNTER SYMPTOMS
COLOR CHANGE: 0
BACK PAIN: 1
ABDOMINAL PAIN: 0

## 2021-04-08 NOTE — PATIENT INSTRUCTIONS
Please follow up in 6 months for a mesenteric scan, arterial duplex with chun's , and office visit. Carotid scan in 6 months as well. Please schedule two hours for scans.      PATIENT EDUCATION focused on signs/symptoms of stroke:  - Watch for one eye going dark like a shade was pulled down. - Watch for one side of the body or face not functioning correctly. - Difficulty with speech, such as slurring your words. - Difficulty finding the right words, such as calling an object by the wrong name when you know the real name. If you have any of these symptoms, do not call us or your family doctor. Call 911 and go immediately to the hospital.  You have a 4-6 hour window from the point when symptoms start to get to the hospital, get a CT scan done and initiate clot dissolving drugs.

## 2021-04-08 NOTE — PROGRESS NOTES
4/8/2021  Chief Complaint   Patient presents with    Claudication     PAD, carotid stenosis, mesenteric artery stenosis       Pain Assessment  The patient is currently not experiencing any pain at this time. HPI    The patient is a 80 y.o. male who presents with a complaint of carotid stenosis follow up. The patient has been previously diagnosed with Left carotid artery stenosis and Right carotid artery stenosis. Date last scanned 7/20/20. Patient denies numbness/weakness on any one side, speech disturbances or visual disturbances. Since last visit patient has had left CDS to be review today and right CDS to be reviewed today. The patient has not previously undergone surgical intervention for this problem. Kristen Benjamin is a 80 y.o. male who presents with a FU on his mesenteric artery bypass graft. Per vascular scan today, the bypass graft from aorta to SMA is patent. No change from previous study of 7/2/20. The patient is also here for FU of his lower extremities with an arterial scan and ALISTAIR's. Date of last scan was 7/2/20. The symptoms first began year(s) ago. The patient has not experienced any new symptoms since last visit. The patient is currently not experiencing any s/s of claudication. There have been no new developments in patient's medical status. Relevant surgeries include atherectomy w/stent placement left popliteal artery & angioplasty of left PTA 7/7/16, atherectomy w/angioplasty right popliteal artery, PTA and right peroneal artery 7/26/16, atherectomy w/stent left popliteal artery & atherectomy w/angioplasty left tibioperoneal trunk 8/11/16, left fem-pop bypass 9/14/16, aorta to SMA bypass using 8 mm PTFE graft 11/29/16 and atherectomy w/angioplasty right SFA & popliteal arteries and atherectomy w/angioplasty right tibioperoneal trunk 4/8/18. Review of Systems   Constitutional: Negative for activity change, chills and fever. Eyes: Negative for visual disturbance. in acute distress. Appearance: Normal appearance. HENT:      Head: Normocephalic. Right Ear: Hearing normal.      Left Ear: Hearing normal.   Eyes:      General: Lids are normal.      Conjunctiva/sclera: Conjunctivae normal.   Neck:      Musculoskeletal: Normal range of motion and neck supple. Trachea: Trachea normal. No tracheal deviation. Cardiovascular:      Rate and Rhythm: Normal rate and regular rhythm. Pulses:           Popliteal pulses are 1+ on the right side and 2+ on the left side. Dorsalis pedis pulses are detected w/ Doppler on the right side and detected w/ Doppler on the left side. Posterior tibial pulses are 1+ on the right side and 2+ on the left side. Heart sounds: Normal heart sounds. Comments:   ALISTAIR'S:  Right:  P.T.: 200 D. P.: 200 ARM BP: 146         P. I.: WNO/WNO  Left:  P.T.: 230 D.P.: 154 ARM BP: 146         P. I.: 1.11/1.05    Palpable left bypass graft pulse    Doppler 4/8/2021  Right DP monophasic  Right PT monophasic    Left DP biphasic (weak)  Left PT monophasic    Pulmonary:      Effort: Pulmonary effort is normal.      Breath sounds: Normal breath sounds. Musculoskeletal: Normal range of motion. Skin:     General: Skin is warm and dry. Neurological:      Mental Status: He is alert and oriented to person, place, and time. Psychiatric:         Judgment: Judgment normal.         ASSESSMENT/PLAN:     Diagnosis   1. Carotid stenosis, asymptomatic, bilateral   The patient has 50-80% RAJENDRA stenosis by velocity criteria; RAJENDRA 57% by image. The patient has 69-83% LICA stenosis by velocity criteria. The patient has not experienced any new symptoms related to his carotid disease. Follow up in 6 months with a carotid doppler scan and office visit. 2. PAD (peripheral artery disease) ([address]    3. Mesenteric artery stenosis (HCC) - patent aorta to SMA bypass graft   4.  Hyperlipidemia - stable, on Lipitor 80 mg         Per LE arterial scan today:  Right:  Greater than 50% stenosis of the right proximal SFA. Less than 50% stenosis of the right distal popliteal artery. Total occlusion of the right NATASHA. Right ALISTAIR 1.37+. This is consistent with non-compressible vessels due to arterial calcification. Left:  Widely patent left femoral to popliteal artery bypass graft. Dilatation of the graft at the distal anastamosis with maximum diamater of 1.33 cm. Distal NATASHA, PTA and peroneal arteries are patent. Left ALISTAIR 1. 11. Return in about 6 months (around 10/8/2021) for mesenteric, BLE arterial duplex and bilateral CDS with office visit.

## 2021-04-08 NOTE — LETTER
[facility] Vascular Surgery  [facility] [address]  Phone: [phone]  Fax: [phone]    MARY Edwards CNP        April 8, 2021      [address] [place] [address], [address] [place] [address]     Patient: Bree Munoz    MR Number: [id number]    YOB: 1950    Date of Visit: 4/8/2021        Dear [address] [place] [address]:    Dr. Della Shelton Prim patient, was in the office today following his carotid, mesenteric and LE arterial duplex scans. My assessment is as follows:      Carotid artery stenosis, w/o mention of cerebral infarction  Current plans       * The patient has a 50-80% RAJENDRA stenosis by velocity criteria; RAJENDRA 57% by            image. * The patient has a 93-45% LICA stenosis by velocity criteria. * The patient has not experienced any symptoms related to his carotid disease. * Follow up in 6 months with carotid doppler scan and office visit. Mesenteric artery stenosis  Current plans        * Patent aorta to SMA bypass graft. PAD (peripheral artery disease)  Current plans        * Follow up in 6 months with a repeat LE arterial scan. I will keep you posted regarding his progress.     Sincerely,        MARY Edwards CNP

## 2021-05-18 NOTE — PROGRESS NOTES
[facility]   Cardiac Electrophysiology Consultation   Date: 5/20/2021  Reason for Consultation:  NSVT  Consult Requesting Physician: No att. providers found     Chief Complaint:   Chief Complaint   Patient presents with    New Patient     New Pt Ref by Dr. Roberto Lim      HPI: Anuel Sawyer is a 80 y.o. male referred by Dr. Roberto Lim for NSVT. PMH significant for HTN, HLD, PVD, CVA, COPD, CKD, CAD seen on CT, and NSVT. Holter in 1/2020 showed a single run of NSVT lasting 4 beats. Stress testing negative for ischemia. Echo normal with the exception of mild AS. Stress test was negative for ischemia. Currently taking Coreg. Interval History: Today, he is being seen for NSVT seen on the monitor in 1/2020. He does report episodes of lightheadedness with postural changes. Otherwise, denies complaints of palpitations, CP, SOB, orthopnea, presyncope, or syncope.     Past Medical History:   Diagnosis Date    Abdominal aortic atherosclerosis (HCC)     Allergic rhinitis     Atherosclerosis of superior mesenteric artery (HCC)     CAD (coronary artery disease)     Cerebral artery occlusion with cerebral infarction (HCC)     Chronic kidney disease     Clostridium difficile colitis 06/2020    Colon polyps     COPD (chronic obstructive pulmonary disease) (HCC)     CVA (cerebral infarction) 5/2013    Multiple, occipital and Cerebellar     DDD (degenerative disc disease)     Cerivical DDD    Dizziness     Epicondylitis elbow, medial     GERD (gastroesophageal reflux disease)     Headache     Hyperlipidemia     Hypertension     Ischemic colitis ([facility] [address] 11/2016    Lumbar foraminal stenosis 7/20/2020    Lung disease     Osteoarthritis, hand     PVD (peripheral vascular disease) with claudication (HCC)     Left leg with mod-severe arterial ischemia    Renal artery atherosclerosis (HCC)     SMA stenosis 11/2016    Spinal stenosis of lumbar region without neurogenic claudication 7/20/2020    Thoracic aorta atherosclerosis ([facility] [address]     Venous insufficiency     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  ARTERIAL BYPASS SURGRY  11/29/2016    Dr. Julien Deras - aorto-SMA bypass using 8mm PTFE    ATHERECTOMY Right 04/08/2019    Atherectomy with angioplasty right SFA and popliteal arteries, atherectomy and angioplasty right tibial peroneal trunk    COLONOSCOPY  2008    COLONOSCOPY  9/2/2015    Dr. Goddard December 09/20/2018    Dr. Miryam Cartagena Bilateral 1/16/13    bilateral with mesh, Dr. Ramesh Nicole  10/23/2020    L4-5 Decompression with anterior/posterior fusion, Dr. Isak Guardado  9/2/2015    Dr. Tony Kerns 9/14/2018    EGD BIOPSY performed by Karis Cole MD at [address] [place] [address]  11/03    R Leg Vein stripping       Allergies:  No Known Allergies    Medication:   Prior to Admission medications    Medication Sig Start Date End Date Taking?  Authorizing Provider   atorvastatin (LIPITOR) 80 MG tablet TAKE 1 TABLET BY MOUTH EVERY DAY 3/22/21  Yes Pepito Leal DO   telmisartan (MICARDIS) 80 MG tablet TAKE 1 TABLET BY MOUTH EVERY DAY 3/19/21  Yes Pepito Leal DO   pantoprazole (PROTONIX) 40 MG tablet Take 1 tablet by mouth every morning (before breakfast) 3/17/21  Yes Pepito Leal DO   NIFEdipine (PROCARDIA XL) 90 MG extended release tablet TAKE 1 TABLET BY MOUTH EVERY DAY 3/16/21  Yes Pepito Leal DO   clopidogrel (PLAVIX) 75 MG tablet TAKE 1 TABLET BY MOUTH EVERY DAY 2/10/21  Yes Pepito Leal DO   carvedilol (COREG) 25 MG tablet TAKE 1 TABLET BY MOUTH TWICE A DAY 1/27/21  Yes Pepito Leal DO   loperamide (RA ANTI-DIARRHEAL) stools, diarrhea, hematemesis, melena, nausea/vomiting or swallowing difficulty/pain  · Genito-Urinary ROS: negative for - dysuria or incontinence  · Musculoskeletal ROS: negative for - joint swelling or muscle pain  · Neurological ROS: negative for - confusion, dizziness, gait disturbance, headaches, numbness/tingling, seizures, speech problems, tremors, visual changes or weakness  · Dermatological ROS: negative for - rash    Physical Examination:  Vitals:    05/20/21 1403   BP: 110/60   Pulse: 67       · Constitutional: Oriented. No distress. · Head: Normocephalic and atraumatic. · Mouth/Throat: Oropharynx is clear and moist.   · Eyes: Conjunctivae normal. EOM are normal.   · Neck: Normal range of motion. Neck supple. No rigidity. No JVD present. · Cardiovascular: Normal rate, regular rhythm, S1&S2 and intact distal pulses. · Pulmonary/Chest: Bilateral respiratory sounds. No wheezes. No rhonchi. · Abdominal: Soft. Bowel sounds present. No distension, No tenderness. · Musculoskeletal: No tenderness. No edema    · Lymphadenopathy: Has no cervical adenopathy. · Neurological: Alert and oriented. Cranial nerve appears intact, No Gross deficit   · Skin: Skin is warm and dry. No rash noted. · Psychiatric: Has a normal mood, affect and behavior     Labs:  Reviewed. ECG: reviewed, Sinus  Rhythm, with QRS duration 88 ms. No pathologic Q waves, ventricular pre-excitation, or QT prolongation. Studies:   1. 30 day BioTel (1/8-3/16/20):  SR with average HR 73 (), single run of NSVT lasting 4 beats, <1% PAC, <1% PVC    2. Echo 8/28/20:   Summary   Normal left ventricle size. There is mild concentric left ventricular   hypertrophy. Overall left ventricular systolic function appears normal with   an ejection fraction of 55-60%. No regional wall motion abnormalities are   noted. Diastolic filling parameters suggests normal diastolic function.    Mild aortic stenosis with a peak velocity of 2.65m/s and a mean pressure   gradient of 13mmHg. Fixed non coronary cusp. Mild tricuspid regurgitation. Estimated pulmonary artery systolic pressure is 30 mmHg assuming a right   atrial pressure of 3 mmHg. 3. Stress Test 6/29/20:    Amparo Umanzor is normal isotope uptake at stress and rest. There is no evidence of    myocardial ischemia or scar.    The LVEF is greater than 70% with normal LV wall motion.        This is a low risk cardiac scan. 4. Cath: The MCOT, echocardiogram, stress test, and coronary angiography/PCI were reviewed by myself and used for my plan of care. Procedures:  1.  none    Assessment/Plan:  1. NSVT, on Coreg  2. CAD, on ASA and Plavix  3.  AS, mild  4. HTN, stable on Coreg and nifedipine  5. HLD, stable on statin  6. H/o CVA, on ASA and Plavix     Monitor in 1/2020 showed single run of NSVT lasting 4 beats  Normal EF with mild AS  Stress test negative for ischemia  No symptoms correlating with NSVT    No further work-up needed from EP standpoint    Continue Coreg 25 mg BID    Continue to f/u with Dr. Berhane Sanon as already scheduled  Follow up with EP as needed    Thank you for allowing me to participate in the care of Siri Wong. All questions and concerns were addressed to the patient/family. Alternatives to my treatment were discussed. Lori Santoro RN, am scribing for and in the presence of Dr. Hue Bloom. 05/20/21 2:14 PM  Elizabeth Owen, EMILIE    I, Aaron Martin MD, personally performed the services prescribed in this documentation as scribed by Ms. Elizabeth Owen RN in my presence and it is both accurate and complete.      Aaron Martin MD  Cardiac Electrophysiology  [address]

## 2021-05-20 ENCOUNTER — OFFICE VISIT (OUTPATIENT)
Dept: CARDIOLOGY CLINIC | Age: 71
End: 2021-05-20
Payer: MEDICARE

## 2021-05-20 VITALS
SYSTOLIC BLOOD PRESSURE: 110 MMHG | HEART RATE: 67 BPM | WEIGHT: 134 LBS | DIASTOLIC BLOOD PRESSURE: 60 MMHG | BODY MASS INDEX: 21.63 KG/M2

## 2021-05-20 DIAGNOSIS — E78.2 MIXED HYPERLIPIDEMIA: ICD-10-CM

## 2021-05-20 DIAGNOSIS — I25.10 CORONARY ARTERY CALCIFICATION SEEN ON CAT SCAN: ICD-10-CM

## 2021-05-20 DIAGNOSIS — I47.29 NSVT (NONSUSTAINED VENTRICULAR TACHYCARDIA): Primary | ICD-10-CM

## 2021-05-20 DIAGNOSIS — I35.0 AORTIC VALVE STENOSIS, ETIOLOGY OF CARDIAC VALVE DISEASE UNSPECIFIED: ICD-10-CM

## 2021-05-20 DIAGNOSIS — I10 ESSENTIAL HYPERTENSION: ICD-10-CM

## 2021-05-20 PROCEDURE — 1036F TOBACCO NON-USER: CPT | Performed by: INTERNAL MEDICINE

## 2021-05-20 PROCEDURE — 93000 ELECTROCARDIOGRAM COMPLETE: CPT | Performed by: INTERNAL MEDICINE

## 2021-05-20 PROCEDURE — 99204 OFFICE O/P NEW MOD 45 MIN: CPT | Performed by: INTERNAL MEDICINE

## 2021-05-20 PROCEDURE — 3017F COLORECTAL CA SCREEN DOC REV: CPT | Performed by: INTERNAL MEDICINE

## 2021-05-20 PROCEDURE — 4040F PNEUMOC VAC/ADMIN/RCVD: CPT | Performed by: INTERNAL MEDICINE

## 2021-05-20 PROCEDURE — 1123F ACP DISCUSS/DSCN MKR DOCD: CPT | Performed by: INTERNAL MEDICINE

## 2021-05-20 PROCEDURE — G8427 DOCREV CUR MEDS BY ELIG CLIN: HCPCS | Performed by: INTERNAL MEDICINE

## 2021-05-20 PROCEDURE — G8420 CALC BMI NORM PARAMETERS: HCPCS | Performed by: INTERNAL MEDICINE

## 2021-06-11 ENCOUNTER — OFFICE VISIT (OUTPATIENT)
Dept: INTERNAL MEDICINE CLINIC | Age: 71
End: 2021-06-11
Payer: MEDICARE

## 2021-06-11 VITALS
SYSTOLIC BLOOD PRESSURE: 120 MMHG | DIASTOLIC BLOOD PRESSURE: 60 MMHG | RESPIRATION RATE: 12 BRPM | HEART RATE: 60 BPM | WEIGHT: 134 LBS | BODY MASS INDEX: 21.63 KG/M2

## 2021-06-11 DIAGNOSIS — R19.4 ALTERED BOWEL HABITS: ICD-10-CM

## 2021-06-11 DIAGNOSIS — R63.0 DECREASED APPETITE: ICD-10-CM

## 2021-06-11 DIAGNOSIS — J32.9 CHRONIC SINUSITIS, UNSPECIFIED LOCATION: ICD-10-CM

## 2021-06-11 DIAGNOSIS — R51.9 RECURRENT HEADACHE: ICD-10-CM

## 2021-06-11 DIAGNOSIS — R14.0 ABDOMINAL BLOATING: Primary | ICD-10-CM

## 2021-06-11 PROCEDURE — 4040F PNEUMOC VAC/ADMIN/RCVD: CPT | Performed by: INTERNAL MEDICINE

## 2021-06-11 PROCEDURE — 1036F TOBACCO NON-USER: CPT | Performed by: INTERNAL MEDICINE

## 2021-06-11 PROCEDURE — 3017F COLORECTAL CA SCREEN DOC REV: CPT | Performed by: INTERNAL MEDICINE

## 2021-06-11 PROCEDURE — 99214 OFFICE O/P EST MOD 30 MIN: CPT | Performed by: INTERNAL MEDICINE

## 2021-06-11 PROCEDURE — G8420 CALC BMI NORM PARAMETERS: HCPCS | Performed by: INTERNAL MEDICINE

## 2021-06-11 PROCEDURE — G8427 DOCREV CUR MEDS BY ELIG CLIN: HCPCS | Performed by: INTERNAL MEDICINE

## 2021-06-11 PROCEDURE — 1123F ACP DISCUSS/DSCN MKR DOCD: CPT | Performed by: INTERNAL MEDICINE

## 2021-06-11 RX ORDER — TELMISARTAN 80 MG/1
TABLET ORAL
Qty: 90 TABLET | Refills: 0 | Status: SHIPPED | OUTPATIENT
Start: 2021-06-11 | End: 2021-09-07

## 2021-06-11 NOTE — PROGRESS NOTES
[facility]) Physicians  Internal Medicine  Patient Encounter  Ariadne Daniel D.O., [facility]        Chief Complaint   Patient presents with    GI Problem    Headache       HPI: 80 y.o. male seen today with C/O abdominal bloating. Symptoms started a few weeks ago. He reports associated with intermittent diarrhea. He may have a watery BM every 3 days. He will then take an Imodium which will cause him to be constipated for 2-3 days. He denies Hematochezia, melena, nausea. He states he has not been eating as much as he has not been as hungry. He felt he had lost weight, but his weight appears stable. He denies any pain after eating. He has been taking Align for GI health. He is still off of cigarettes. Also, he has additional complaints of headaches which are bitemporal.  Patient had similar headaches back in April 2020. CT scan of the head showed evidence of chronic sinusitis. These headaches are similar. The headaches are achy. The headaches may have some throbbing quality to them. He denies fever, joint aches, jaw claudication. He denies vision changes. He denies visual scotomata. He denies photophobia or phonophobia. Patient can at times feel like his \"equilibrium is off. \"  HA's is present more in the evening. He drinks 4 alcoholic beverages per day (beer). Tylenol does not help. He has been taking 1500 mg of Tylenol twice a day. He has had sinus issues in the past.  He denies sinus congestion. He saw Dr. Francine Agosto 6/8/2020. He was found to have chronic sinusitis on CT scan. He was treated with Augmentin and got C. Diff.       Past Medical History:   Diagnosis Date    Abdominal aortic atherosclerosis (HCC)     Allergic rhinitis     Atherosclerosis of superior mesenteric artery (HCC)     CAD (coronary artery disease)     Cerebral artery occlusion with cerebral infarction (HCC)     Chronic kidney disease     Clostridium difficile colitis 06/2020    Colon polyps     COPD (chronic obstructive pulmonary disease) ([facility] [address]     CVA (cerebral infarction) 5/2013    Multiple, occipital and Cerebellar     DDD (degenerative disc disease)     Cerivical DDD    Dizziness     Epicondylitis elbow, medial     GERD (gastroesophageal reflux disease)     Headache     Hyperlipidemia     Hypertension     Ischemic colitis ([facility] [address] 11/2016    Lumbar foraminal stenosis 7/20/2020    Lung disease     Osteoarthritis, hand     PVD (peripheral vascular disease) with claudication ([facility]     Left leg with mod-severe arterial ischemia    Renal artery atherosclerosis (HCC)     SMA stenosis 11/2016    Spinal stenosis of lumbar region without neurogenic claudication 7/20/2020    Thoracic aorta atherosclerosis ([facility])     Venous insufficiency     Vertebral artery dissection ([facility]) 5/2013         MEDICATIONS:  Medication Sig   atorvastatin (LIPITOR) 80 MG tablet TAKE 1 TABLET BY MOUTH EVERY DAY   telmisartan (MICARDIS) 80 MG tablet TAKE 1 TABLET BY MOUTH EVERY DAY   pantoprazole (PROTONIX) 40 MG tablet Take 1 tablet by mouth every morning (before breakfast)   NIFEdipine (PROCARDIA XL) 90 MG extended release tablet TAKE 1 TABLET BY MOUTH EVERY DAY   clopidogrel (PLAVIX) 75 MG tablet TAKE 1 TABLET BY MOUTH EVERY DAY   carvedilol (COREG) 25 MG tablet TAKE 1 TABLET BY MOUTH TWICE A DAY   ferrous sulfate (FEROSUL) 325 (65 Fe) MG tablet Take 1 tablet by mouth daily (with breakfast)   aspirin 81 MG tablet Take 1 tablet by mouth daily   Cholecalciferol (VITAMIN D) 2000 units CAPS capsule Take 1 capsule by mouth daily   loperamide (RA ANTI-DIARRHEAL) 2 MG capsule Take 2 mg by mouth 4 times daily as needed for Diarrhea   acetaminophen (TYLENOL) 325 MG tablet Take 2 tablets by mouth every 6 hours as needed for Pain   Probiotic Product (ALIGN PO) Take 1 tablet by mouth daily  Patient not taking: Reported on 6/11/2021           Review of Systems - As per HPI  GEN: Pt denies fever, chills or night sweats.   Denies weight changes. Appetite good  HEENT: Pt denies visual and auditory changes, epistaxis, upper respiratory symptoms and dysphagia  CV: Pt denies CP, SOB, MAJANO, orthopnea palpitations, LE swelling, and claudication. PULM: Pt denies cough, wheezing or sputum production  GI: Pt denies N/V/D, heart burn, rectal bleeding, or melena. NEURO: Pt denies unilateral weakness, paresthesia and dizziness. OBJECTIVE:  Vitals:    06/11/21 1141   BP: 120/60   Pulse: 60   Resp: 12   Weight: 134 lb (60.8 kg)     Body mass index is 21.63 kg/m². Wt Readings from Last 3 Encounters:   06/11/21 134 lb (60.8 kg)   05/20/21 134 lb (60.8 kg)   04/08/21 134 lb (60.8 kg)     BP Readings from Last 3 Encounters:   06/11/21 120/60   05/20/21 110/60   04/08/21 (!) 146/68      137# in October 2020. GEN: NAD, A&O, Non-toxic  HEENT: NC/AT, JOCELINE, EOMI, Oral cavity Clear,  TM's NL, Nasal cavity clear. NECK: Supple. No thyromegaly. No JVD  LYMPH: No C/SC nodes. CV: S1 S2 NL, RRR.  0-3/3 systolic murmur. (History of aortic stenosis)  PULM: CTA, symmentric air exchange  EXT: No C/C/E  GI: Abdomen is soft, NT, BS+, No hepatomegaly. No masses. Midline incision. Rectus diastasis. No robert hernia noted. NEURO: No focal or lateralizing deficits. VASC:  No carotid bruits. Pulses symmetric. Temporal arteries are not visible nor are they indurated. No tenderness with palpation along the temporal artery. No scalp sensitivity. SKIN:  No rashes or lesions of concern      ASSESSMENT/PLAN:    1. Abdominal bloating  Etiology is unclear  CT scan abdomen with lab work  We will try adding Metamucil  May need GI consultation  - CT ABDOMEN PELVIS W IV CONTRAST Additional Contrast? Radiologist Recommendation; Future  - CBC Auto Differential; Future  - Sedimentation Rate; Future  - C-Reactive Protein; Future  - Comprehensive Metabolic Panel; Future  - Lipase; Future  - Amylase;  Future  - URINALYSIS WITH MICROSCOPIC  - Psyllium 48.57 % POWD; Take 1 teaspoon daily for 3 days then 2 teaspoons daily. Mix in 8 oz of water. Dispense: 1 Bottle; Refill: 0    2. Altered bowel habits  As above  - CT ABDOMEN PELVIS W IV CONTRAST Additional Contrast? Radiologist Recommendation; Future  - CBC Auto Differential; Future  - Sedimentation Rate; Future  - C-Reactive Protein; Future  - Comprehensive Metabolic Panel; Future  - Lipase; Future  - Amylase; Future  - Psyllium 48.57 % POWD; Take 1 teaspoon daily for 3 days then 2 teaspoons daily. Mix in 8 oz of water. Dispense: 1 Bottle; Refill: 0    3. Decreased appetite  As above  - CT ABDOMEN PELVIS W IV CONTRAST Additional Contrast? Radiologist Recommendation; Future  - CBC Auto Differential; Future  - Sedimentation Rate; Future  - C-Reactive Protein; Future  - Comprehensive Metabolic Panel; Future  - Lipase; Future  - Amylase; Future    4. Recurrent headache  Headaches are similar to when he had sinusitis. That was seen on CT scan  Rescan sinuses  Other considerations would be migraine syndrome, tension headache  Check sed rate  Consider tizanidine  - CT SINUS WO CONTRAST; Future    5. Chronic sinusitis, unspecified location  R/O recurrent disease. No antibiotic yet  Okay to continue using Tylenol for now  - CT SINUS WO CONTRAST; Future            Discussed medications with patient who voiced understanding of their use, indication and potential side effects. Pt also understands the above recommendations. All questions answered. This note was generated completely or in part utilizing Dragon dictation speech recognition software. Occasionally, words are mistranscribed and despite editing, the text may contain inaccuracies due to incorrect word recognition.   If further clarification is needed please contact the office at [phone]       Electronically signed    Edu Nguyen D.O.

## 2021-06-12 DIAGNOSIS — E87.1 HYPONATREMIA: Primary | ICD-10-CM

## 2021-06-15 ENCOUNTER — HOSPITAL ENCOUNTER (OUTPATIENT)
Dept: CT IMAGING | Age: 71
Discharge: HOME | End: 2021-06-15
Payer: MEDICARE

## 2021-06-15 DIAGNOSIS — R14.0 ABDOMINAL BLOATING: ICD-10-CM

## 2021-06-15 DIAGNOSIS — R63.0 DECREASED APPETITE: ICD-10-CM

## 2021-06-15 DIAGNOSIS — R51.9 RECURRENT HEADACHE: ICD-10-CM

## 2021-06-15 DIAGNOSIS — R19.4 ALTERED BOWEL HABITS: ICD-10-CM

## 2021-06-15 DIAGNOSIS — J32.9 CHRONIC SINUSITIS, UNSPECIFIED LOCATION: ICD-10-CM

## 2021-06-15 PROCEDURE — 6360000004 HC RX CONTRAST MEDICATION: Performed by: INTERNAL MEDICINE

## 2021-06-15 PROCEDURE — 70486 CT MAXILLOFACIAL W/O DYE: CPT

## 2021-06-15 PROCEDURE — 74177 CT ABD & PELVIS W/CONTRAST: CPT

## 2021-06-15 RX ADMIN — IOHEXOL 50 ML: 240 INJECTION, SOLUTION INTRATHECAL; INTRAVASCULAR; INTRAVENOUS; ORAL at 18:25

## 2021-06-15 RX ADMIN — IOPAMIDOL 80 ML: 755 INJECTION, SOLUTION INTRAVENOUS at 18:26

## 2021-06-16 DIAGNOSIS — J32.4 CHRONIC PANSINUSITIS: Primary | ICD-10-CM

## 2021-06-16 DIAGNOSIS — R93.0 RIGHT MAXILLARY SINUS OPACIFICATION: ICD-10-CM

## 2021-06-22 ENCOUNTER — OFFICE VISIT (OUTPATIENT)
Dept: ENT CLINIC | Age: 71
End: 2021-06-22
Payer: MEDICARE

## 2021-06-22 VITALS
HEART RATE: 67 BPM | TEMPERATURE: 97.9 F | HEIGHT: 66 IN | BODY MASS INDEX: 21.69 KG/M2 | DIASTOLIC BLOOD PRESSURE: 80 MMHG | SYSTOLIC BLOOD PRESSURE: 140 MMHG | WEIGHT: 135 LBS

## 2021-06-22 DIAGNOSIS — J32.1 CHRONIC FRONTAL SINUSITIS: ICD-10-CM

## 2021-06-22 DIAGNOSIS — J34.2 DEVIATED NASAL SEPTUM: ICD-10-CM

## 2021-06-22 DIAGNOSIS — J32.2 CHRONIC ETHMOIDAL SINUSITIS: ICD-10-CM

## 2021-06-22 DIAGNOSIS — J32.0 CHRONIC MAXILLARY SINUSITIS: Primary | ICD-10-CM

## 2021-06-22 PROCEDURE — 99214 OFFICE O/P EST MOD 30 MIN: CPT | Performed by: OTOLARYNGOLOGY

## 2021-06-22 PROCEDURE — G8427 DOCREV CUR MEDS BY ELIG CLIN: HCPCS | Performed by: OTOLARYNGOLOGY

## 2021-06-22 PROCEDURE — 1123F ACP DISCUSS/DSCN MKR DOCD: CPT | Performed by: OTOLARYNGOLOGY

## 2021-06-22 PROCEDURE — G8420 CALC BMI NORM PARAMETERS: HCPCS | Performed by: OTOLARYNGOLOGY

## 2021-06-22 PROCEDURE — 3017F COLORECTAL CA SCREEN DOC REV: CPT | Performed by: OTOLARYNGOLOGY

## 2021-06-22 PROCEDURE — 4040F PNEUMOC VAC/ADMIN/RCVD: CPT | Performed by: OTOLARYNGOLOGY

## 2021-06-22 PROCEDURE — 1036F TOBACCO NON-USER: CPT | Performed by: OTOLARYNGOLOGY

## 2021-06-22 ASSESSMENT — ENCOUNTER SYMPTOMS
EYE PAIN: 0
NAUSEA: 0
DIARRHEA: 0
SINUS PRESSURE: 0
FACIAL SWELLING: 0
EYE ITCHING: 0
SINUS PAIN: 0
RHINORRHEA: 0
VOICE CHANGE: 0
SORE THROAT: 0
EYE REDNESS: 0
COUGH: 0
CHOKING: 0
SHORTNESS OF BREATH: 0
TROUBLE SWALLOWING: 0

## 2021-06-22 NOTE — PROGRESS NOTES
Subjective:      Patient ID: Dillan Keyes is a 80 y.o. male. HPI  Chief Complaint   Patient presents with    headaches  History of Present Illness:Nicolas is a(n) 80 y.o. male who presents with a one year follow up. Still with headaches. CT sinuses revealed worsening maxillary and ethmoid sinus disease. Large septal spur to left blocking left maxillary and ethmoid access. Discussed durgery.    Nasal Discharge: none  Nasal Obstruction: Yes bilateral; intermittent  Post Nasal Drainage: Yes  Nasal Bleeding: No  Sense of Smell: normal  Eye Symptoms: none  Previous Treatments: Steroids and antibiotics     Patient Active Problem List   Diagnosis    Venous insufficiency    Hyperlipidemia    Gastroesophageal reflux disease without esophagitis    Lesion of colon    Vitamin D deficiency    Pulmonary emphysema ([address]    DDD (degenerative disc disease), cervical    Osteoarthritis    Essential hypertension    Atherosclerosis of abdominal aorta ([address]    Thoracic aorta atherosclerosis (HCC)    Atherosclerosis of superior mesenteric artery ([address]    Renal artery atherosclerosis ([address]    Carotid stenosis, asymptomatic, bilateral    Atherosclerotic PVD with intermittent claudication ([address]    Coronary artery disease involving native coronary artery of native heart without angina pectoris    S/P vascular bypass    Hallux rigidus, acquired    Mesenteric artery stenosis (HCC)    Alcohol abuse    PAD (peripheral artery disease) ([address]    Hydronephrosis of right kidney    Stenosis of ureteropelvic junction (UPJ)    Elevated MCV    Aortic valve stenosis    Coronary artery calcification seen on CAT scan    Spinal stenosis of lumbar region without neurogenic claudication    Lumbar foraminal stenosis    NSVT (nonsustained ventricular tachycardia) ([address]     Past Surgical History:   Procedure Laterality Date    ANGIOPLASTY Left 08/2016    left femoral -popl stent    ANGIOPLASTY  11/27/2016    SMA    ARTERIAL BYPASS SURGRY Left 2016    left femoral-popliteal bypass graft.  ARTERIAL BYPASS SURGRY  2016    Dr. Sandra Patino - aorto-SMA bypass using 8mm PTFE    ATHERECTOMY Right 2019    Atherectomy with angioplasty right SFA and popliteal arteries, atherectomy and angioplasty right tibial peroneal trunk    COLONOSCOPY      COLONOSCOPY  2015    Dr. Lupillo Norman  2018    Dr. Eloisa Cordero Bilateral 13    bilateral with mesh, Dr. Sheryl Reed  10/23/2020    L4-5 Decompression with anterior/posterior fusion, Dr. Tobias Mccabe  2015    Dr. Calvin Moses 2018    EGD BIOPSY performed by Becky Washington MD at [address] [place] [address]      R Leg Vein stripping     Family History   Problem Relation Age of Onset    Heart Attack Father     Heart Disease Father         MI    Cancer Brother         Brain CA    Hypertension Brother     Cancer Brother         Throat cancer     Social History     Socioeconomic History    Marital status:      Spouse name: Not on file    Number of children: 3    Years of education: Not on file    Highest education level: Not on file   Occupational History    Not on file   Tobacco Use    Smoking status: Former Smoker     Packs/day: 0.50     Years: 47.00     Pack years: 23.50     Types: Cigarettes     Start date: 1970     Quit date: 2016     Years since quittin.0    Smokeless tobacco: Never Used    Tobacco comment: Maintain cessation   Substance and Sexual Activity    Alcohol use:  Yes     Alcohol/week: 0.0 standard drinks     Comment: Non-alcoholic beer    Drug use: No    Sexual activity: Not Currently     Partners: Female     Comment:    Other Topics Concern    Not on file   Social History Narrative    Not on file     Social Determinants of Health     Financial Resource Strain:    Ivon Ayers Difficulty of Paying Living Expenses:    Food Insecurity:     Worried About Running Out of Food in the Last Year:     [address] in the Last Year:    Transportation Needs:     Lack of Transportation (Medical):  Lack of Transportation (Non-Medical):    Physical Activity:     Days of Exercise per Week:     Minutes of Exercise per Session:    Stress:     Feeling of Stress :    Social Connections:     Frequency of Communication with Friends and Family:     Frequency of Social Gatherings with Friends and Family:     Attends [employer]ical Services:     Active Member of Clubs or Organizations:     Attends Club or Organization Meetings:     Marital Status:    Intimate Partner Violence:     Fear of Current or Ex-Partner:     Emotionally Abused:     Physically Abused:     Sexually Abused:        DRUG/FOOD ALLERGIES: Patient has no known allergies. CURRENT MEDICATIONS  Prior to Admission medications    Medication Sig Start Date End Date Taking? Authorizing Provider   telmisartan (MICARDIS) 80 MG tablet TAKE 1 TABLET BY MOUTH EVERY DAY 6/11/21  Yes Pepito Leal DO   Psyllium 48.57 % POWD Take 1 teaspoon daily for 3 days then 2 teaspoons daily. Mix in 8 oz of water.  6/11/21  Yes Pepito Leal DO   atorvastatin (LIPITOR) 80 MG tablet TAKE 1 TABLET BY MOUTH EVERY DAY 3/22/21  Yes Pepito Leal DO   pantoprazole (PROTONIX) 40 MG tablet Take 1 tablet by mouth every morning (before breakfast) 3/17/21  Yes Pepito Leal DO   NIFEdipine (PROCARDIA XL) 90 MG extended release tablet TAKE 1 TABLET BY MOUTH EVERY DAY 3/16/21  Yes Pepito Leal DO   clopidogrel (PLAVIX) 75 MG tablet TAKE 1 TABLET BY MOUTH EVERY DAY 2/10/21  Yes Peipto Leal DO   carvedilol (COREG) 25 MG tablet TAKE 1 TABLET BY MOUTH TWICE A DAY 1/27/21  Yes Pepito Leal DO   loperamide (RA ANTI-DIARRHEAL) 2 MG capsule Take 2 mg by mouth 4 times daily as needed for Diarrhea   Yes Historical Provider, MD   ferrous sulfate (FEROSUL) 325 (65 Fe) MG tablet Take 1 tablet by mouth daily (with breakfast) 7/12/19  Yes Pepito Leal DO   acetaminophen (TYLENOL) 325 MG tablet Take 2 tablets by mouth every 6 hours as needed for Pain 9/15/18  Yes Sunny Patino MD   aspirin 81 MG tablet Take 1 tablet by mouth daily 9/22/18  Yes Sunny Patino MD   Probiotic Product (ALIGN PO) Take 1 tablet by mouth daily    Yes Historical Provider, MD   Cholecalciferol (VITAMIN D) 2000 units CAPS capsule Take 1 capsule by mouth daily   Yes Historical Provider, MD         Lab Studies:  Lab Results   Component Value Date    WBC 5.0 06/11/2021    HGB 13.8 06/11/2021    HCT 40.2 (L) 06/11/2021    .7 (H) 06/11/2021     06/11/2021     Lab Results   Component Value Date    GLUCOSE 105 (H) 06/11/2021    BUN 12 06/11/2021    CREATININE 1.0 06/11/2021    K 5.2 (H) 06/11/2021     (L) 06/11/2021    CL 91 (L) 06/11/2021    CALCIUM 9.5 06/11/2021     Lab Results   Component Value Date    MG 2.20 03/17/2021     Lab Results   Component Value Date    PHOS 4.0 12/14/2016     Lab Results   Component Value Date    ALKPHOS 75 06/11/2021    ALT 11 06/11/2021    AST 30 06/11/2021    BILITOT 1.0 06/11/2021    PROT 7.8 06/11/2021     Review of Systems   Constitutional: Negative for activity change, appetite change, chills, fatigue and fever. HENT: Positive for congestion. Negative for ear discharge, ear pain, facial swelling, hearing loss, nosebleeds, postnasal drip, rhinorrhea, sinus pressure, sinus pain, sneezing, sore throat, tinnitus, trouble swallowing and voice change. Eyes: Negative for pain, redness, itching and visual disturbance. Respiratory: Negative for cough, choking and shortness of breath. Gastrointestinal: Negative for diarrhea and nausea. Endocrine: Negative for cold intolerance and heat intolerance. Neurological: Positive for headaches. Objective:   Physical Exam  Constitutional:       Appearance: He is well-developed.    HENT: Head: Not macrocephalic and not microcephalic. No Norris's sign, abrasion, right periorbital erythema, left periorbital erythema or laceration. Nose: Septal deviation present. No nasal deformity, laceration, mucosal edema or rhinorrhea. Right Nostril: No epistaxis or occlusion. Left Nostril: No epistaxis or occlusion. Right Turbinates: Not enlarged, swollen or pale. Left Turbinates: Not enlarged, swollen or pale. Right Sinus: No maxillary sinus tenderness or frontal sinus tenderness. Left Sinus: No maxillary sinus tenderness or frontal sinus tenderness. Mouth/Throat:      Lips: No lesions. Mouth: Mucous membranes are moist.      Tongue: No lesions. Palate: No mass. Pharynx: Uvula midline. No oropharyngeal exudate or posterior oropharyngeal erythema. Tonsils: No tonsillar abscesses. Eyes:      General:         Right eye: No discharge. Left eye: No discharge. Extraocular Movements:      Right eye: Normal extraocular motion. Left eye: Normal extraocular motion. Conjunctiva/sclera:      Right eye: Right conjunctiva is not injected. No chemosis or exudate. Left eye: Left conjunctiva is not injected. No chemosis or exudate. Neck:      Thyroid: No thyroid mass or thyromegaly. Cardiovascular:      Rate and Rhythm: Normal rate and regular rhythm. Pulmonary:      Effort: No tachypnea or respiratory distress. Lymphadenopathy:      Head:      Right side of head: No preauricular or posterior auricular adenopathy. Left side of head: No preauricular or posterior auricular adenopathy. Cervical:      Right cervical: No superficial, deep or posterior cervical adenopathy. Left cervical: No superficial, deep or posterior cervical adenopathy. Neurological:      Mental Status: He is alert and oriented to person, place, and time. Assessment:       Diagnosis Orders   1. Chronic maxillary sinusitis     2.  Chronic ethmoidal sinusitis     3. Chronic frontal sinusitis     4. Deviated nasal septum             Plan:      OR for FESS and septoplasty. The patient has a diagnosis of chronic sinusitis and deviated septum which has not been responsive or cannot be treated with maximal medical therapy. The patient has been advised of options including further medical therapy, surgery, or nothing. The risks and benefits of surgery were described not limited to injury to the skull base, brain, stroke, hemorrhage, brain fluid leak, double vision, visual loss, epistaxis, and need for further surgical management of disease. Patient expectations during and after surgery including post-operative sinonasal care and pain control were described. Questions were answered and the patient agreed to proceed with surgery which will be scheduled in an appropriate time frame in line with the severity of disease. The patient was counseled at length about the risks of subha Covid-19 in the raman-operative and post-operative states including the recovery window of their procedure. The patient was made aware that subha Covid-19 after a surgical procedure may worsen their prognosis for recovering from the virus and lend to a higher morbidity and or mortality risk. The patient was given the options of postponing their procedure. All of the risks, benefits, and alternatives were discussed. The patient does wish to proceed with the procedure.                James Arora MD

## 2021-06-22 NOTE — LETTER
[facility]    Surgery Schedule Request Form: 06/22/21  [address] [place]      DATE OF SURGERY: 08-    TIME OF SURGERY:  10:00 am            CONF #: ____________________       Patient Information:    Patient name: Anuel Sawyer    YOB: 1950 Age/Sex:71 y.o./male    SS #:[id number]    Wt Readings from Last 1 Encounters:   06/22/21 135 lb (61.2 kg)       Telephone Information:   Mobile [phone]     Home [phone]     Surgeon & Procedure Information:     Lead surgeon: Marla Way Co-Surgeon: tai  Phone: [phone] Fax: [phone]  PCP: Joselito Rocha DO    Diagnosis: Chronic pansinusitis J01.40, deviated nasal septum  J34.2    Procedure name/CPT: Bilateral fronto-ethmoidectomy (75933), Bilateral Maxillary Antrostomy with Tissue Removal (50913-99) and Septoplasty (81189) Nasal scope (27764-49)    Procedure length: 2 hours Anesthesia: General    Special Equipment: no    Patient Status: SDS (OP)   COVID VACCINATED:   YES  MARCH 2021    Primary Payor Plan: MEDICARE  Member ID: [id number]   [facility] [address] name: Charlotte Michel    [] Implement attached clinical orders for patient.       Electronically signed by Veronica Pabon MD on 6/22/2021 at 10:08 AM

## 2021-06-28 ENCOUNTER — TELEPHONE (OUTPATIENT)
Dept: INTERNAL MEDICINE CLINIC | Age: 71
End: 2021-06-28

## 2021-07-12 NOTE — PROGRESS NOTES
Accompanied by Mom Narcisa and Sister Juju    Parental Concerns - None    Water Source:  private well    Milk - whole Amount - 2-3 cups    Second Hand Smoke Exposure No    Pets No    Parental Hearing Concerns No    Parental Vision Concerns No    Dentist Yes    TB Risk: Low Risk  Child Born Outside of [place] No  Contact with anyone with TB No  Contact with anyone with positive PPD No    Lead Risk Low Risk  House <1960 and peeling paint No  House <1960 and renovations No  Hobbies with Pb exposure No  Sibs or Playmates with Pb poisoning No  Live near a Pb smelter or battery recycling center No    Screening/Safety:  Child rides in the second row of the car.   Child rides in a 5 point harness carseat.  Any patient education given No.      Screening  Vision Passed   the left side. Posterior tibial pulses are 2+ on the right side, and 2+ on the left side. Right ALISTAIR 1.17 and left ALISTAIR 1.11   Pulmonary/Chest: Effort normal and breath sounds normal.   Abdominal: Soft. Bowel sounds are normal. He exhibits no distension (mild abdominal bloating) and no mass. There is no tenderness. Midline abdominal scar healed   Musculoskeletal: Normal range of motion. He exhibits no edema (left lower extremity swelling resolved). Neurological: He is alert and oriented to person, place, and time. Skin: Skin is warm and dry. Left leg incision scar healed   Psychiatric: He has a normal mood and affect. His behavior is normal. Judgment and thought content normal.       Assessment:       1. Atherosclerosis of native artery of both lower extremities with intermittent claudication ([address]     2. PVD (peripheral vascular disease) with claudication ([address]     3. Mesenteric artery stenosis (HCC)     4. Atherosclerosis of superior mesenteric artery ([address]     5. Atherosclerotic PVD with intermittent claudication ([address]     6. Benign essential HTN     7. Hyperlipidemia, unspecified hyperlipidemia type       Mesenteric artery duplex imaging performed 2/9 is normal with a widely patent aortic to superior mesenteric artery bypass. Incidentally he is noted to have widely patent bilateral renal artery stents and bilateral common iliac artery stents. In addition patient had lower arterial duplex imaging performed on 2/9 and he has widely patent arteries of the right lower extremity and a patent left femoral to popliteal artery bypass with normal ankle brachial indices. I reviewed the images of his mesenteric artery duplex scan and his lower arterial duplex scan. Plan:       Patient will benefit from continued aspirin 81 mg and Plavix 75 mg for daily antiplatelet therapy and Lipitor 80 mg for daily statin therapy.   Patient has been instructed to follow up in 6 months for a mesenteric scan, arterial

## 2021-07-19 ENCOUNTER — OFFICE VISIT (OUTPATIENT)
Dept: INTERNAL MEDICINE CLINIC | Age: 71
End: 2021-07-19
Payer: MEDICARE

## 2021-07-19 VITALS
OXYGEN SATURATION: 94 % | DIASTOLIC BLOOD PRESSURE: 74 MMHG | HEIGHT: 66 IN | HEART RATE: 90 BPM | BODY MASS INDEX: 22.18 KG/M2 | WEIGHT: 138 LBS | SYSTOLIC BLOOD PRESSURE: 134 MMHG

## 2021-07-19 DIAGNOSIS — J43.9 PULMONARY EMPHYSEMA, UNSPECIFIED EMPHYSEMA TYPE (HCC): ICD-10-CM

## 2021-07-19 DIAGNOSIS — I25.10 CORONARY ARTERY CALCIFICATION SEEN ON CAT SCAN: ICD-10-CM

## 2021-07-19 DIAGNOSIS — I35.0 AORTIC VALVE STENOSIS, ETIOLOGY OF CARDIAC VALVE DISEASE UNSPECIFIED: ICD-10-CM

## 2021-07-19 DIAGNOSIS — J32.4 CHRONIC PANSINUSITIS: ICD-10-CM

## 2021-07-19 DIAGNOSIS — I65.23 CAROTID STENOSIS, ASYMPTOMATIC, BILATERAL: ICD-10-CM

## 2021-07-19 DIAGNOSIS — Z01.818 PREOP EXAM FOR INTERNAL MEDICINE: Primary | ICD-10-CM

## 2021-07-19 DIAGNOSIS — I10 ESSENTIAL HYPERTENSION: ICD-10-CM

## 2021-07-19 DIAGNOSIS — I25.10 CORONARY ARTERY DISEASE INVOLVING NATIVE CORONARY ARTERY OF NATIVE HEART WITHOUT ANGINA PECTORIS: ICD-10-CM

## 2021-07-19 DIAGNOSIS — K21.9 GASTROESOPHAGEAL REFLUX DISEASE WITHOUT ESOPHAGITIS: ICD-10-CM

## 2021-07-19 PROCEDURE — 99214 OFFICE O/P EST MOD 30 MIN: CPT | Performed by: INTERNAL MEDICINE

## 2021-07-19 PROCEDURE — 3023F SPIROM DOC REV: CPT | Performed by: INTERNAL MEDICINE

## 2021-07-19 PROCEDURE — 1036F TOBACCO NON-USER: CPT | Performed by: INTERNAL MEDICINE

## 2021-07-19 PROCEDURE — G8427 DOCREV CUR MEDS BY ELIG CLIN: HCPCS | Performed by: INTERNAL MEDICINE

## 2021-07-19 PROCEDURE — G8420 CALC BMI NORM PARAMETERS: HCPCS | Performed by: INTERNAL MEDICINE

## 2021-07-19 PROCEDURE — G8926 SPIRO NO PERF OR DOC: HCPCS | Performed by: INTERNAL MEDICINE

## 2021-07-19 PROCEDURE — 3017F COLORECTAL CA SCREEN DOC REV: CPT | Performed by: INTERNAL MEDICINE

## 2021-07-19 PROCEDURE — 4040F PNEUMOC VAC/ADMIN/RCVD: CPT | Performed by: INTERNAL MEDICINE

## 2021-07-19 PROCEDURE — 1123F ACP DISCUSS/DSCN MKR DOCD: CPT | Performed by: INTERNAL MEDICINE

## 2021-07-19 NOTE — PROGRESS NOTES
[address] [facility]  Internal Medicine  Patient Encounter  Negar Soria D.O., [facility]          Chief Complaint   Patient presents with    Pre-op Exam     sinus 8/6/21; EKG May 20, 2021       HPI-- 80 y.o. male presents today for a preoperative physical.  Pt diagnosed with chronic pansinusitis. Patient had presented and June 2021. In April 2020 a CAT scan revealed evidence of chronic sinusitis. At that time he was having chronic headaches. Patient had continued to complain of chronic headaches. A limited CT scan of the sinuses was obtained. This showed complete opacification of the right maxillary sinus with interval progression and a prominent area of hyperdensity within the sinus consistent with a possible fungal ball. The ostiomeatal complex was obstructed and he also was found to have bilateral ethmoid and frontal sinus disease. Patient has failed antibiotic therapy. He was referred to ENT. He was found to have a large septal spur contributing to the obstruction. Patient is now scheduled for endoscopic sinus surgery. I have been asked to see patient for pre-operative risk assessment and clearance. Surgery is scheduled for 8/6/2021, by Dr. Princess Cedillo at [facility].      CAD/NSVT--Patient recently had a stress Myoview on 6/29/2020. There was no evidence of reversible ischemia. Ejection fraction was greater than 70%. His last echo was 8/28/2020. Ejection fraction 55 to 60%. He had normal diastolic dysfunction. Mild aortic stenosis noted. Previous Cardiac event recorder was done in March 2020. This showed one episode of NSVT 4 beats long. Patient denies any exertional chest pain or tightness. He denies any orthopnea, swelling, palpitations, lightheadedness, syncope. Visited with EP for evaluation of nonsustained ventricular tachycardia. No further intervention needed beyond his current medications.     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  ARTERIAL BYPASS SURGRY  11/29/2016    Dr. Pastor Boxer - aorto-SMA bypass using 8mm PTFE    ATHERECTOMY Right 04/08/2019    Atherectomy with angioplasty right SFA and popliteal arteries, atherectomy and angioplasty right tibial peroneal trunk    COLONOSCOPY  2008    COLONOSCOPY  9/2/2015    Dr. Mynor Snider  09/20/2018    Dr. Selena Power Bilateral 1/16/13    bilateral with mesh, Dr. Oc Murdock  10/23/2020    L4-5 Decompression with anterior/posterior fusion, Dr. Mary Carlin  9/2/2015    Dr. Ella Alvarez 9/14/2018    EGD BIOPSY performed by Adelina Jensen MD at [address] [place] [address]  11/03    R Leg Vein stripping       No reported problems with anesthesia.       Medications/Allergies     Medication Sig   atorvastatin (LIPITOR) 80 MG tablet TAKE 1 TABLET BY MOUTH EVERY DAY   NIFEdipine (PROCARDIA XL) 90 MG extended release tablet TAKE 1 TABLET BY MOUTH EVERY DAY   telmisartan (MICARDIS) 80 MG tablet TAKE 1 TABLET BY MOUTH EVERY DAY   carvedilol (COREG) 25 MG tablet TAKE 1 TABLET BY MOUTH TWICE A DAY   pantoprazole (PROTONIX) 40 MG tablet Take 1 tablet by mouth daily   clopidogrel (PLAVIX) 75 MG tablet TAKE 1 TABLET BY MOUTH DAILY   ferrous sulfate (FEROSUL) 325 (65 Fe) MG tablet Take 1 tablet by mouth daily (with breakfast)   aspirin 81 MG tablet Take 1 tablet by mouth daily   Cholecalciferol (VITAMIN D) 2000 units CAPS capsule Take 1 capsule by mouth daily   loperamide (RA ANTI-DIARRHEAL) 2 MG capsule Take 2 mg by mouth 4 times daily as needed for Diarrhea   acetaminophen (TYLENOL) 325 MG tablet Take 2 tablets by mouth every 6 hours as needed for Pain   Probiotic Product (ALIGN PO) Take 1 tablet by mouth daily            No Known Allergies      Substance Use History     Social History     Tobacco Use    Smoking status: Former Smoker     Packs/day: 0.50     Years: 47.00     Pack years: 23.50     Types: Cigarettes     Start date: 1970     Quit date: 2016     Years since quittin.0    Smokeless tobacco: Never Used    Tobacco comment: Maintain cessation   Substance Use Topics    Alcohol use: Yes     Alcohol/week: 0.0 standard drinks     Comment: Non-alcoholic beer    Drug use: No          Family History     Family History   Problem Relation Age of Onset    Heart Attack Father     Heart Disease Father         MI    Cancer Brother         Brain CA    Hypertension Brother     Cancer Brother         Throat cancer        No family history of problems with general anesthesia, venous thrombosis, or bleeding dyscrasias. REVIEW OF SYSTEMS:    CONSTITUTIONAL:  Neg   Recent weight changes, fatigue, fever, chills or night sweats, anorexia, Sleep difficulties  EYES: Neg Visual disturbances. Denies coryza  EARS:  Neg Hearing loss, tinnitus, vertigo, discharge or otalgia. NOSE: + Sinus pressure, chronic headaches. Denies rhinorrhea sneezing  MOUTH/THROAT:  Neg Bleeding gums, hoarseness, sore throat, dysphagia, throat infections  RESPIRATORY:  Neg SOB ,wheeze, cough, sputum, hemoptysis.   CARDIOVASCULAR:  Neg Chest pain, palpitations, heart murmur, dyspnea on exertion, orthopnea, paroxysmal nocturnal dyspnea or edema of extremities, or claudication. Stress test 6/2020. Echo 8/2020. Cardiac monitor 3/2020. Carotid Doppler 4/8/2021, lower extremity aortoiliac and lower extremity arterial evaluation 4/8/2021  H/O Vertebral artery dissection. GASTROINTESTINAL:  Neg   Nausea, vomiting, or diarrhea, constipation hematemesis, heart burn, dysphagia,change in bowel movements or stool caliber, hematochezia, melena, abdominal pain. H/O Ischemic colitis. GENITOURINARY:  Neg  Urinary frequency, hesitancy, urgency, polyuria, dysuria, hematuria, nocturia, incontinence, change in stream, genital pain or swelling, kidney stones  HEMATOLOGIC/LYMPHATIC:  Neg  Anemia, bleeding dyscrasias, easy bruising, blood clots (DVT/PE), transfusions, or enlarged lymph nodes  MUSCULOSKELETAL: As per HPI  NEUROLOGICAL:  Neg  Loss of Consciousness, memeory loss or forgetfulness, confusion, difficulty concentrating, seizures, insomina, aphasia or dysarthria unilateral weakness or paresthesias, ataxia, syncope, tremor. + Chronic headaches. PSYCHIATRIC:  Neg  Depression, anxiety  SKIN :  Neg  Rash  ENDOCRINE:    No H/O Diabetes or Thyroid disease. Physical Exam    Vitals:    07/19/21 1047   BP: 134/74   Pulse: 90   SpO2: 94%   Weight: 138 lb (62.6 kg)   Height: 5' 6\" (1.676 m)     Body mass index is 22.27 kg/m². Wt Readings from Last 3 Encounters:   07/19/21 138 lb (62.6 kg)   06/22/21 135 lb (61.2 kg)   06/11/21 134 lb (60.8 kg)     BP Readings from Last 3 Encounters:   07/19/21 134/74   06/22/21 (!) 140/80   06/11/21 120/60      GEN:  80 y.o. male who is in NAD, A&O. He appears stated age and well nourished. He is cooperative and pleasant. HEAD:  NC/AT, no lesions. EYES:  GABY, EOMI, No scleral icterus or conjunctival injection or discharge. Visual fields in tact to confrontation. OU cataracts.     EARS:  EAC's clear, TM's normal.  NOSE:  Nasal cavity is clear.  No mucosal congestion or discharge. Sinuses are nontender. MOUTH & THROAT:  Oral cavity is clear without mucosal lesions. Tongue is midline. Dentition is in good repair. No pharyngeal erythema or exudate. NECK:  Supple. Full ROM. Trachea is midline. No increased JVD. No thyromegaly or nodules. No masses  LYMPH: No C/SC/A/F nodes  CARDIAC:  S1S2 NL. Regular rhythm. No murmur/clicks/rubs. No ectopy. PMI is non-displaced. VASC: Carotid upstrokes 2+. No carotid bruits noted. BL pedal pulses palpable, 1+. PULM:  Lungs are CTA. Symmetric breath sounds noted. AP Diameter NL. Decreased BS throughout. No crackles or wheezing. GI:  Abdomen is soft and nontender. No distension. No organomegaly. No masses. No pulsatile masses. EXT:  No Cyanosis or clubbing. No edema. SKIN: Warm and dry, normal turgor, no rash or lesions of concern. NEURO:  Cranial nerves 2-12 are NL. Speech fluent and coherent. PSYCH:  Mood and affect NL. Judgement and insight NL. Encounter Diagnoses   Name Primary?  Preop exam for internal medicine Yes    Chronic pansinusitis     Essential hypertension     Pulmonary emphysema, unspecified emphysema type ([facility] [address]     Coronary artery disease involving native coronary artery of native heart without angina pectoris     Coronary artery calcification seen on CAT scan     Carotid stenosis, asymptomatic, bilateral     Gastroesophageal reflux disease without esophagitis     Aortic valve stenosis, etiology of cardiac valve disease unspecified        Plan:  Acceptable risk for the planned procedure. No contraindications at this time    EKG--5/20/2021. Reviewed. No contraindications for surgery. Lab--ordered      Stop Aspirin and Plavix 1 week prior to surgery. Restart after.     Avoid NSAID's (Motrin, Aleve, Advil, Ibuprofen),  vitamin supplements and fish oil 1 week prior to procedure                 Electronically Signed:  Lynette Connell D. O      Copy of H&P given to pt and sent to Sx

## 2021-07-21 DIAGNOSIS — I10 ESSENTIAL HYPERTENSION: ICD-10-CM

## 2021-07-21 RX ORDER — CARVEDILOL 25 MG/1
TABLET ORAL
Qty: 180 TABLET | Refills: 1 | Status: SHIPPED | OUTPATIENT
Start: 2021-07-21 | End: 2022-01-12

## 2021-08-03 NOTE — PROGRESS NOTES
Pre op labs done 7-19 routed to surgeon. Called and spoke to Dr. Dewayne Griggs in regards to K 5.3, reviewed and approved, no new orders given.  Miguel Angel Whitfield

## 2021-08-03 NOTE — PROGRESS NOTES
procedure(s) or different procedure(s) than those set forth in Paragraph 1. I (we) authorize and request that the above-named physician, his/her assistants or his/her designees, perform procedures as necessary and desirable if deemed to be in my (our) best interest.     3. I acknowledge that health care personnel may be observing this procedure for the purpose of medical education or other specified purposes as may be necessary as requested and/or approved by my (our) physician. 4. I (we) consent to the disposal by the hospital Pathologist of the removed tissue, parts or organs in accordance with hospital policy. 5. I do ____ do not ____ consent to the use of a local infiltration pain blocking agent that will be used by my provider/surgical provider to help alleviate pain during my procedure. 6. I do ____ do not ____ consent to an emergent blood transfusion in the case of a life-threatening situation that requires blood components to be administered. This consent is valid for 24 hours from the beginning of the procedure. 7. This patient does ____ or does not ____ currently have a DNR status/order. If DNR order is in place, obtain Addendum to the Surgical Consent for ALL Patients with a DNR Order to address raman-operative status for limited intervention or DNR suspension.      8. I have read and fully understand the above Consent for Operation/Procedure and that all blanks were completed before I signed the consent.     ____________________          _____________________          ______/_____am/pm  Signature of Patient or legal representative               Printed Name / Relationship                      Date / Time      ____________________          _____________________          ______/______am/pm  Witness to Signature                             Printed Name                      Date / Time     (If patient is unable to sign or is a minor, complete the following)  Patient is a minor, ____ years of age, or unable to sign because: ________________________________________________________________    Jayy Alva If a phone consent is obtained, consent will be documented by using two health care professionals, each affirming that the consenting party has no questions and gives consent for the procedure discussed with the physician/provider.   _____________________          ____________________       ______/______am/pm   2nd witness to phone consent           Printed name                                Date / Time    Informed Consent:  I have provided the explanation described above in section 1 to the patient and/or legal representative. I have provided the patient and/or legal representative with an opportunity to ask any questions about the proposed operation/procedure.     ____________________          ____________________          ______/______am/pm  Provider / Proceduralist                            Printed name                   Date / Time    Revised 8. 2.2021          page 2 of 2

## 2021-08-03 NOTE — PROGRESS NOTES
PRE-OP INSTRUCTIONS FOR THE SURGICAL PATIENT YOU ARE UNABLE TO MAKE CONTACT FOR AN INTERVIEW:    All patients having surgery or anesthesia are required to be Covid tested OR to have been vaccinated at least 14 days prior to your procedure. It is very important to return our call to [phone] and notify the staff of your last vaccination date otherwise you will be required to complete Covid PCR test within the 5-6 days prior to surgery & quarantine. The results will need to be faxed to PreAdmission Testing at [phone]. 1. Follow all instructions provided to you from your surgeons office, including your ARRIVAL TIME. 2. Enter the MAIN entrance located on [address] and report to the desk. 3. Bring your insurance & photo ID with you. You may also be asked to pay a co-pay, as you may want to bring a check or credit card with you. 4. Leave all other valuables at home. 5. Arrange for someone to drive you home and be with you for the first 24 hours after discharge. 6. Bring your medication list with you day of surgery with doses and frequency listed (including over the counter medications)  7. You must contact your surgeon for Instructions regarding:              - ALL medication instructions, especially if taking blood thinners, aspirin, or diabetic medication.         -Bariatric patients call surgeon if on diabetic medications as some need to be stopped 1 week preop  - IF  there is a change in your physical condition such as a cold, fever, rash, cuts, sores or any other infection, especially near your surgical site. 8. A Pre-op History and Physical for surgery MUST be completed by your Physician or an Urgent Care within 30 days of your procedure date. Please bring a copy with you on the day of your procedure and along with any other testing performed. 9. DO NOT EAT ANYTHING eight hours prior to arrival for surgery.   May have up to 8 ounces of water 4 hours prior to arrival for surgery. NOTE: ALL Gastric, Bariatric and Bowel surgery patients MUST follow their surgeon's instructions. 10. No gum, candy, mints, or ice chips day of procedure. 11. Please refrain from drinking alcohol the day before or day of your procedure. 12. Please do not smoke the day of your procedure. 13. Dress in loose, comfortable clothing appropriate for redressing after your procedure. Do not wear jewelry (including body piercings), make-up, fingernail polish, lotion, powders or metal hairclips. 15. Contacts will need to be removed prior to surgery. You may want to bring your eye glasses to wear immediately before and after surgery. 14. Dentures will need to be removed before your procedure. 13. Bring cases for your glasses, contacts, dentures, or hearing aids to protect them while you are in surgery. 16. If you use a CPAP, please bring it with you on the day of your procedure. 17. Do not shave or wax for 72 hours prior to procedure near your operative site  18. FOR WOMAN OF CHILDBEARING AGE ONLY- please make sure we can collect a urine sample on arrival.     If you have further questions, you may contact your surgeon's office or us at [phone]     Left instructions on patient's voicemail.     Vickie Wilson RN.8/3/2021 .11:31 AM

## 2021-08-05 ENCOUNTER — ANESTHESIA EVENT (OUTPATIENT)
Dept: OPERATING ROOM | Age: 71
End: 2021-08-05
Payer: MEDICARE

## 2021-08-06 ENCOUNTER — ANESTHESIA (OUTPATIENT)
Dept: OPERATING ROOM | Age: 71
End: 2021-08-06
Payer: MEDICARE

## 2021-08-06 ENCOUNTER — HOSPITAL ENCOUNTER (OUTPATIENT)
Age: 71
Setting detail: OUTPATIENT SURGERY
Discharge: HOME | End: 2021-08-06
Attending: OTOLARYNGOLOGY | Admitting: OTOLARYNGOLOGY
Payer: MEDICARE

## 2021-08-06 VITALS
OXYGEN SATURATION: 96 % | TEMPERATURE: 97.8 F | RESPIRATION RATE: 16 BRPM | DIASTOLIC BLOOD PRESSURE: 72 MMHG | WEIGHT: 138 LBS | BODY MASS INDEX: 22.18 KG/M2 | HEIGHT: 66 IN | HEART RATE: 73 BPM | SYSTOLIC BLOOD PRESSURE: 148 MMHG

## 2021-08-06 VITALS — OXYGEN SATURATION: 99 % | TEMPERATURE: 97 F | SYSTOLIC BLOOD PRESSURE: 194 MMHG | DIASTOLIC BLOOD PRESSURE: 91 MMHG

## 2021-08-06 DIAGNOSIS — J32.4 CHRONIC PANSINUSITIS: ICD-10-CM

## 2021-08-06 DIAGNOSIS — J34.2 DEVIATED NASAL SEPTUM: ICD-10-CM

## 2021-08-06 PROCEDURE — 3700000000 HC ANESTHESIA ATTENDED CARE: Performed by: OTOLARYNGOLOGY

## 2021-08-06 PROCEDURE — 6370000000 HC RX 637 (ALT 250 FOR IP): Performed by: OTOLARYNGOLOGY

## 2021-08-06 PROCEDURE — 7100000010 HC PHASE II RECOVERY - FIRST 15 MIN: Performed by: OTOLARYNGOLOGY

## 2021-08-06 PROCEDURE — 6360000002 HC RX W HCPCS: Performed by: NURSE ANESTHETIST, CERTIFIED REGISTERED

## 2021-08-06 PROCEDURE — 3600000004 HC SURGERY LEVEL 4 BASE: Performed by: OTOLARYNGOLOGY

## 2021-08-06 PROCEDURE — 3700000001 HC ADD 15 MINUTES (ANESTHESIA): Performed by: OTOLARYNGOLOGY

## 2021-08-06 PROCEDURE — 3600000014 HC SURGERY LEVEL 4 ADDTL 15MIN: Performed by: OTOLARYNGOLOGY

## 2021-08-06 PROCEDURE — 7100000011 HC PHASE II RECOVERY - ADDTL 15 MIN: Performed by: OTOLARYNGOLOGY

## 2021-08-06 PROCEDURE — 2500000003 HC RX 250 WO HCPCS: Performed by: NURSE ANESTHETIST, CERTIFIED REGISTERED

## 2021-08-06 PROCEDURE — 6360000002 HC RX W HCPCS: Performed by: ANESTHESIOLOGY

## 2021-08-06 PROCEDURE — 88305 TISSUE EXAM BY PATHOLOGIST: CPT

## 2021-08-06 PROCEDURE — 2580000003 HC RX 258: Performed by: OTOLARYNGOLOGY

## 2021-08-06 PROCEDURE — 2580000003 HC RX 258: Performed by: ANESTHESIOLOGY

## 2021-08-06 PROCEDURE — 2720000010 HC SURG SUPPLY STERILE: Performed by: OTOLARYNGOLOGY

## 2021-08-06 PROCEDURE — 2580000003 HC RX 258: Performed by: NURSE ANESTHETIST, CERTIFIED REGISTERED

## 2021-08-06 PROCEDURE — 30520 REPAIR OF NASAL SEPTUM: CPT | Performed by: OTOLARYNGOLOGY

## 2021-08-06 PROCEDURE — 7100000000 HC PACU RECOVERY - FIRST 15 MIN: Performed by: OTOLARYNGOLOGY

## 2021-08-06 PROCEDURE — 2500000003 HC RX 250 WO HCPCS: Performed by: OTOLARYNGOLOGY

## 2021-08-06 PROCEDURE — 7100000001 HC PACU RECOVERY - ADDTL 15 MIN: Performed by: OTOLARYNGOLOGY

## 2021-08-06 PROCEDURE — 31267 ENDOSCOPY MAXILLARY SINUS: CPT | Performed by: OTOLARYNGOLOGY

## 2021-08-06 PROCEDURE — 31253 NSL/SINS NDSC TOTAL: CPT | Performed by: OTOLARYNGOLOGY

## 2021-08-06 PROCEDURE — 2709999900 HC NON-CHARGEABLE SUPPLY: Performed by: OTOLARYNGOLOGY

## 2021-08-06 RX ORDER — SODIUM CHLORIDE 0.9 % (FLUSH) 0.9 %
10 SYRINGE (ML) INJECTION EVERY 12 HOURS SCHEDULED
Status: DISCONTINUED | OUTPATIENT
Start: 2021-08-06 | End: 2021-08-06 | Stop reason: HOSPADM

## 2021-08-06 RX ORDER — SODIUM CHLORIDE, SODIUM LACTATE, POTASSIUM CHLORIDE, CALCIUM CHLORIDE 600; 310; 30; 20 MG/100ML; MG/100ML; MG/100ML; MG/100ML
INJECTION, SOLUTION INTRAVENOUS CONTINUOUS
Status: DISCONTINUED | OUTPATIENT
Start: 2021-08-06 | End: 2021-08-06 | Stop reason: HOSPADM

## 2021-08-06 RX ORDER — ROCURONIUM BROMIDE 10 MG/ML
INJECTION, SOLUTION INTRAVENOUS PRN
Status: DISCONTINUED | OUTPATIENT
Start: 2021-08-06 | End: 2021-08-06 | Stop reason: SDUPTHER

## 2021-08-06 RX ORDER — BACITRACIN, NEOMYCIN, POLYMYXIN B 400; 3.5; 5 [USP'U]/G; MG/G; [USP'U]/G
OINTMENT TOPICAL PRN
Status: DISCONTINUED | OUTPATIENT
Start: 2021-08-06 | End: 2021-08-06 | Stop reason: ALTCHOICE

## 2021-08-06 RX ORDER — ONDANSETRON 2 MG/ML
4 INJECTION INTRAMUSCULAR; INTRAVENOUS
Status: DISCONTINUED | OUTPATIENT
Start: 2021-08-06 | End: 2021-08-06 | Stop reason: HOSPADM

## 2021-08-06 RX ORDER — LABETALOL HYDROCHLORIDE 5 MG/ML
5 INJECTION, SOLUTION INTRAVENOUS EVERY 10 MIN PRN
Status: DISCONTINUED | OUTPATIENT
Start: 2021-08-06 | End: 2021-08-06 | Stop reason: HOSPADM

## 2021-08-06 RX ORDER — DEXAMETHASONE SODIUM PHOSPHATE 4 MG/ML
INJECTION, SOLUTION INTRA-ARTICULAR; INTRALESIONAL; INTRAMUSCULAR; INTRAVENOUS; SOFT TISSUE PRN
Status: DISCONTINUED | OUTPATIENT
Start: 2021-08-06 | End: 2021-08-06 | Stop reason: SDUPTHER

## 2021-08-06 RX ORDER — SODIUM CHLORIDE, SODIUM LACTATE, POTASSIUM CHLORIDE, CALCIUM CHLORIDE 600; 310; 30; 20 MG/100ML; MG/100ML; MG/100ML; MG/100ML
INJECTION, SOLUTION INTRAVENOUS CONTINUOUS PRN
Status: DISCONTINUED | OUTPATIENT
Start: 2021-08-06 | End: 2021-08-06 | Stop reason: SDUPTHER

## 2021-08-06 RX ORDER — LIDOCAINE HCL/PF 100 MG/5ML
SYRINGE (ML) INJECTION PRN
Status: DISCONTINUED | OUTPATIENT
Start: 2021-08-06 | End: 2021-08-06 | Stop reason: SDUPTHER

## 2021-08-06 RX ORDER — FENTANYL CITRATE 50 UG/ML
INJECTION, SOLUTION INTRAMUSCULAR; INTRAVENOUS PRN
Status: DISCONTINUED | OUTPATIENT
Start: 2021-08-06 | End: 2021-08-06 | Stop reason: SDUPTHER

## 2021-08-06 RX ORDER — SODIUM CHLORIDE 0.9 % (FLUSH) 0.9 %
10 SYRINGE (ML) INJECTION PRN
Status: DISCONTINUED | OUTPATIENT
Start: 2021-08-06 | End: 2021-08-06 | Stop reason: HOSPADM

## 2021-08-06 RX ORDER — SODIUM CHLORIDE 9 MG/ML
25 INJECTION, SOLUTION INTRAVENOUS PRN
Status: DISCONTINUED | OUTPATIENT
Start: 2021-08-06 | End: 2021-08-06 | Stop reason: HOSPADM

## 2021-08-06 RX ORDER — PROPOFOL 10 MG/ML
INJECTION, EMULSION INTRAVENOUS PRN
Status: DISCONTINUED | OUTPATIENT
Start: 2021-08-06 | End: 2021-08-06 | Stop reason: SDUPTHER

## 2021-08-06 RX ORDER — FENTANYL CITRATE 50 UG/ML
25 INJECTION, SOLUTION INTRAMUSCULAR; INTRAVENOUS EVERY 5 MIN PRN
Status: DISCONTINUED | OUTPATIENT
Start: 2021-08-06 | End: 2021-08-06 | Stop reason: HOSPADM

## 2021-08-06 RX ORDER — MAGNESIUM HYDROXIDE 1200 MG/15ML
LIQUID ORAL CONTINUOUS PRN
Status: COMPLETED | OUTPATIENT
Start: 2021-08-06 | End: 2021-08-06

## 2021-08-06 RX ORDER — FENTANYL CITRATE 50 UG/ML
50 INJECTION, SOLUTION INTRAMUSCULAR; INTRAVENOUS EVERY 5 MIN PRN
Status: DISCONTINUED | OUTPATIENT
Start: 2021-08-06 | End: 2021-08-06 | Stop reason: HOSPADM

## 2021-08-06 RX ORDER — ONDANSETRON 2 MG/ML
INJECTION INTRAMUSCULAR; INTRAVENOUS PRN
Status: DISCONTINUED | OUTPATIENT
Start: 2021-08-06 | End: 2021-08-06 | Stop reason: SDUPTHER

## 2021-08-06 RX ORDER — ENALAPRILAT 2.5 MG/2ML
1.25 INJECTION INTRAVENOUS
Status: DISCONTINUED | OUTPATIENT
Start: 2021-08-06 | End: 2021-08-06 | Stop reason: HOSPADM

## 2021-08-06 RX ORDER — LIDOCAINE HYDROCHLORIDE 10 MG/ML
1 INJECTION, SOLUTION EPIDURAL; INFILTRATION; INTRACAUDAL; PERINEURAL
Status: DISCONTINUED | OUTPATIENT
Start: 2021-08-06 | End: 2021-08-06 | Stop reason: HOSPADM

## 2021-08-06 RX ORDER — OXYCODONE HYDROCHLORIDE 5 MG/1
5 TABLET ORAL EVERY 6 HOURS PRN
Qty: 10 TABLET | Refills: 0 | Status: SHIPPED | OUTPATIENT
Start: 2021-08-06 | End: 2021-08-16

## 2021-08-06 RX ORDER — LIDOCAINE HYDROCHLORIDE AND EPINEPHRINE 10; 10 MG/ML; UG/ML
INJECTION, SOLUTION INFILTRATION; PERINEURAL PRN
Status: DISCONTINUED | OUTPATIENT
Start: 2021-08-06 | End: 2021-08-06 | Stop reason: ALTCHOICE

## 2021-08-06 RX ORDER — OXYMETAZOLINE HYDROCHLORIDE 0.05 G/100ML
SPRAY NASAL PRN
Status: DISCONTINUED | OUTPATIENT
Start: 2021-08-06 | End: 2021-08-06 | Stop reason: ALTCHOICE

## 2021-08-06 RX ORDER — HYDRALAZINE HYDROCHLORIDE 20 MG/ML
5 INJECTION INTRAMUSCULAR; INTRAVENOUS EVERY 5 MIN PRN
Status: DISCONTINUED | OUTPATIENT
Start: 2021-08-06 | End: 2021-08-06 | Stop reason: HOSPADM

## 2021-08-06 RX ORDER — EPINEPHRINE NASAL SOLUTION 1 MG/ML
SOLUTION NASAL PRN
Status: DISCONTINUED | OUTPATIENT
Start: 2021-08-06 | End: 2021-08-06 | Stop reason: ALTCHOICE

## 2021-08-06 RX ADMIN — HYDRALAZINE HYDROCHLORIDE 5 MG: 20 INJECTION INTRAMUSCULAR; INTRAVENOUS at 13:17

## 2021-08-06 RX ADMIN — PROPOFOL 150 MG: 10 INJECTION, EMULSION INTRAVENOUS at 10:07

## 2021-08-06 RX ADMIN — ROCURONIUM BROMIDE 50 MG: 10 INJECTION INTRAVENOUS at 10:07

## 2021-08-06 RX ADMIN — HYDROMORPHONE HYDROCHLORIDE 0.25 MG: 1 INJECTION, SOLUTION INTRAMUSCULAR; INTRAVENOUS; SUBCUTANEOUS at 12:56

## 2021-08-06 RX ADMIN — Medication 100 MG: at 10:07

## 2021-08-06 RX ADMIN — SODIUM CHLORIDE, POTASSIUM CHLORIDE, SODIUM LACTATE AND CALCIUM CHLORIDE: 600; 310; 30; 20 INJECTION, SOLUTION INTRAVENOUS at 08:50

## 2021-08-06 RX ADMIN — HYDROMORPHONE HYDROCHLORIDE 0.25 MG: 1 INJECTION, SOLUTION INTRAMUSCULAR; INTRAVENOUS; SUBCUTANEOUS at 12:43

## 2021-08-06 RX ADMIN — HYDRALAZINE HYDROCHLORIDE 5 MG: 20 INJECTION INTRAMUSCULAR; INTRAVENOUS at 12:10

## 2021-08-06 RX ADMIN — SUGAMMADEX 200 MG: 100 INJECTION, SOLUTION INTRAVENOUS at 11:35

## 2021-08-06 RX ADMIN — FENTANYL CITRATE 50 MCG: 50 INJECTION, SOLUTION INTRAMUSCULAR; INTRAVENOUS at 10:23

## 2021-08-06 RX ADMIN — DEXAMETHASONE SODIUM PHOSPHATE 6 MG: 4 INJECTION, SOLUTION INTRAMUSCULAR; INTRAVENOUS at 10:16

## 2021-08-06 RX ADMIN — FENTANYL CITRATE 50 MCG: 50 INJECTION, SOLUTION INTRAMUSCULAR; INTRAVENOUS at 10:07

## 2021-08-06 RX ADMIN — SODIUM CHLORIDE, SODIUM LACTATE, POTASSIUM CHLORIDE, AND CALCIUM CHLORIDE: .6; .31; .03; .02 INJECTION, SOLUTION INTRAVENOUS at 10:04

## 2021-08-06 RX ADMIN — ONDANSETRON 4 MG: 2 INJECTION INTRAMUSCULAR; INTRAVENOUS at 11:35

## 2021-08-06 RX ADMIN — PROPOFOL 50 MG: 10 INJECTION, EMULSION INTRAVENOUS at 10:48

## 2021-08-06 ASSESSMENT — PULMONARY FUNCTION TESTS
PIF_VALUE: 17
PIF_VALUE: 18
PIF_VALUE: 17
PIF_VALUE: 17
PIF_VALUE: 18
PIF_VALUE: 2
PIF_VALUE: 18
PIF_VALUE: 17
PIF_VALUE: 1
PIF_VALUE: 17
PIF_VALUE: 17
PIF_VALUE: 22
PIF_VALUE: 17
PIF_VALUE: 7
PIF_VALUE: 17
PIF_VALUE: 18
PIF_VALUE: 17
PIF_VALUE: 13
PIF_VALUE: 17
PIF_VALUE: 20
PIF_VALUE: 17
PIF_VALUE: 1
PIF_VALUE: 17
PIF_VALUE: 28
PIF_VALUE: 18
PIF_VALUE: 17
PIF_VALUE: 24
PIF_VALUE: 18
PIF_VALUE: 17
PIF_VALUE: 3
PIF_VALUE: 17
PIF_VALUE: 18
PIF_VALUE: 1
PIF_VALUE: 17
PIF_VALUE: 3
PIF_VALUE: 1
PIF_VALUE: 17
PIF_VALUE: 21
PIF_VALUE: 1
PIF_VALUE: 19
PIF_VALUE: 17
PIF_VALUE: 17
PIF_VALUE: 18
PIF_VALUE: 17
PIF_VALUE: 1
PIF_VALUE: 18
PIF_VALUE: 18
PIF_VALUE: 17
PIF_VALUE: 1
PIF_VALUE: 17
PIF_VALUE: 1
PIF_VALUE: 8
PIF_VALUE: 1
PIF_VALUE: 17
PIF_VALUE: 7
PIF_VALUE: 17
PIF_VALUE: 17
PIF_VALUE: 6
PIF_VALUE: 17
PIF_VALUE: 8
PIF_VALUE: 18
PIF_VALUE: 18
PIF_VALUE: 17
PIF_VALUE: 17
PIF_VALUE: 18
PIF_VALUE: 17
PIF_VALUE: 18
PIF_VALUE: 17
PIF_VALUE: 18
PIF_VALUE: 17
PIF_VALUE: 19
PIF_VALUE: 17

## 2021-08-06 ASSESSMENT — PAIN SCALES - GENERAL
PAINLEVEL_OUTOF10: 0
PAINLEVEL_OUTOF10: 4
PAINLEVEL_OUTOF10: 5
PAINLEVEL_OUTOF10: 6
PAINLEVEL_OUTOF10: 3

## 2021-08-06 ASSESSMENT — PAIN DESCRIPTION - LOCATION
LOCATION: NOSE

## 2021-08-06 ASSESSMENT — PAIN DESCRIPTION - ORIENTATION
ORIENTATION: OTHER (COMMENT)
ORIENTATION: OTHER (COMMENT)

## 2021-08-06 ASSESSMENT — PAIN DESCRIPTION - PAIN TYPE
TYPE: SURGICAL PAIN

## 2021-08-06 ASSESSMENT — PAIN DESCRIPTION - DESCRIPTORS
DESCRIPTORS: BURNING
DESCRIPTORS: BURNING

## 2021-08-06 ASSESSMENT — PAIN DESCRIPTION - FREQUENCY
FREQUENCY: CONTINUOUS
FREQUENCY: CONTINUOUS

## 2021-08-06 ASSESSMENT — PAIN - FUNCTIONAL ASSESSMENT: PAIN_FUNCTIONAL_ASSESSMENT: 0-10

## 2021-08-06 ASSESSMENT — PAIN DESCRIPTION - ONSET
ONSET: ON-GOING
ONSET: ON-GOING

## 2021-08-06 ASSESSMENT — PAIN DESCRIPTION - PROGRESSION
CLINICAL_PROGRESSION: GRADUALLY WORSENING
CLINICAL_PROGRESSION: GRADUALLY IMPROVING

## 2021-08-06 NOTE — ANESTHESIA PRE PROCEDURE
Department of Anesthesiology  Preprocedure Note       Name:  Leesa Calderon   Age:  80 y.o.  :  1950                                          MRN:  [id number]         Date:  2021      Surgeon: Lali Pozo):  Carmela Baltazar MD    Procedure: Procedure(s):  BILATERAL FRONTO-ETHMOIDECTOMY, BILATERAL MAXILLARY ANTROSTOMY WITH TISSUE REMOVAL AND SEPTOPLASTY NASAL SCOPE    Medications prior to admission:   Prior to Admission medications    Medication Sig Start Date End Date Taking? Authorizing Provider   carvedilol (COREG) 25 MG tablet TAKE 1 TABLET BY MOUTH TWICE A DAY 21   Pepito Leal DO   telmisartan (MICARDIS) 80 MG tablet TAKE 1 TABLET BY MOUTH EVERY DAY 21   Pepito Leal DO   Psyllium 48.57 % POWD Take 1 teaspoon daily for 3 days then 2 teaspoons daily. Mix in 8 oz of water.  21   Pepito Leal DO   atorvastatin (LIPITOR) 80 MG tablet TAKE 1 TABLET BY MOUTH EVERY DAY 3/22/21   Pepito Leal DO   pantoprazole (PROTONIX) 40 MG tablet Take 1 tablet by mouth every morning (before breakfast) 3/17/21   Pepito Leal DO   NIFEdipine (PROCARDIA XL) 90 MG extended release tablet TAKE 1 TABLET BY MOUTH EVERY DAY 3/16/21   Pepito Leal DO   clopidogrel (PLAVIX) 75 MG tablet TAKE 1 TABLET BY MOUTH EVERY DAY 2/10/21   Pepito Leal DO   loperamide (RA ANTI-DIARRHEAL) 2 MG capsule Take 2 mg by mouth 4 times daily as needed for Diarrhea    Historical Provider, MD   ferrous sulfate (FEROSUL) 325 (65 Fe) MG tablet Take 1 tablet by mouth daily (with breakfast) 19   Pepito Leal DO   acetaminophen (TYLENOL) 325 MG tablet Take 2 tablets by mouth every 6 hours as needed for Pain 9/15/18   Julio C Daniel MD   aspirin 81 MG tablet Take 1 tablet by mouth daily 18   Julio C Daniel MD   Probiotic Product (ALIGN PO) Take 1 tablet by mouth daily     Historical Provider, MD   Cholecalciferol (VITAMIN D) 2000 units CAPS capsule Take 1 capsule by mouth daily    Historical Provider, MD       Current medications:    Current Facility-Administered Medications   Medication Dose Route Frequency Provider Last Rate Last Admin    lactated ringers infusion   Intravenous Continuous Christiano Diaz DO        lactated ringers infusion   Intravenous Continuous Allen Sheehan MD        sodium chloride flush 0.9 % injection 10 mL  10 mL Intravenous 2 times per day Allen Sheehan MD        sodium chloride flush 0.9 % injection 10 mL  10 mL Intravenous PRN Allen Sheehan MD        0.9 % sodium chloride infusion  25 mL Intravenous PRN Allen Sheehan MD        lidocaine PF 1 % injection 1 mL  1 mL Intradermal Once PRN Allen Sheehan MD           Allergies:  No Known Allergies    Problem List:    Patient Active Problem List   Diagnosis Code    Venous insufficiency I87.2    Hyperlipidemia E78.5    Gastroesophageal reflux disease without esophagitis K21.9    Lesion of colon K63.9    Vitamin D deficiency E55.9    Pulmonary emphysema ([address] J43.9    DDD (degenerative disc disease), cervical M50.30    Osteoarthritis M19.90    Essential hypertension I10    Atherosclerosis of abdominal aorta ([address] I70.0    Thoracic aorta atherosclerosis ([address] I70.0    Atherosclerosis of superior mesenteric artery ([address] K55.1    Renal artery atherosclerosis ([facility]) I70.1    Carotid stenosis, asymptomatic, bilateral I65.23    Atherosclerotic PVD with intermittent claudication ([facility]) I70.219    Coronary artery disease involving native coronary artery of native heart without angina pectoris I25.10    S/P vascular bypass Z95.828    Hallux rigidus, acquired M20.20    Mesenteric artery stenosis ([facility]) K55.1    Alcohol abuse F10.10    PAD (peripheral artery disease) ([facility]) I73.9    Hydronephrosis of right kidney N13.30    Stenosis of ureteropelvic junction (UPJ) Q62.11    Elevated MCV R71.8    Aortic valve stenosis I35.0    Coronary artery calcification seen on CAT scan I25.10    Spinal stenosis GFRAA >60 05/15/2013    AGRATIO 1.5 06/11/2021    LABGLOM >60 07/19/2021    GLUCOSE 110 07/19/2021    PROT 7.8 06/11/2021    PROT 7.1 10/02/2012    CALCIUM 9.7 07/19/2021    BILITOT 1.0 06/11/2021    ALKPHOS 75 06/11/2021    AST 30 06/11/2021    ALT 11 06/11/2021       POC Tests: No results for input(s): POCGLU, POCNA, POCK, POCCL, POCBUN, POCHEMO, POCHCT in the last 72 hours. Coags:   Lab Results   Component Value Date    PROTIME 11.3 09/14/2018    INR 0.99 09/14/2018    APTT 25.4 12/23/2017       HCG (If Applicable): No results found for: PREGTESTUR, PREGSERUM, HCG, HCGQUANT     ABGs:   Lab Results   Component Value Date    PHART 7.43 12/01/2016    PO2ART 139.0 12/01/2016    LDR3OGQ 40 12/01/2016    EYL9UDS 26 12/01/2016    BEART 1.8 12/01/2016    Q9BQOREQ 99 12/01/2016        Type & Screen (If Applicable):  No results found for: LABABO, LABRH    Drug/Infectious Status (If Applicable):  No results found for: HIV, HEPCAB    COVID-19 Screening (If Applicable):   Lab Results   Component Value Date    COVID19 Negative 10/01/2020           Anesthesia Evaluation  Patient summary reviewed history of anesthetic complications (prolonged emergence):    Airway: Mallampati: II  TM distance: >3 FB   Neck ROM: full  Mouth opening: > = 3 FB Dental: normal exam         Pulmonary:                             ROS comment: emphysema   Cardiovascular:    (+) hypertension:, valvular problems/murmurs: AS, CAD:,                ROS comment: NSVT (nonsustained ventricular tachycardia)     Neuro/Psych:   (+) CVA (CVA no residual ):,              ROS comment: Lumbar and cervical DDD GI/Hepatic/Renal:   (+) renal disease: CRI,           Endo/Other:    (+) : arthritis: OA., .                  ROS comment: Thoracic aorta atherosclerosis (HCC)  Atherosclerosis of superior mesenteric artery (HCC)  Renal artery atherosclerosis (HCC)  Carotid stenosis, asymptomatic, bilateral  Atherosclerotic PVD with intermittent claudication ([address]    Pt has had bypass procedure  In lower exremities Abdominal:             Vascular: Other Findings:             Anesthesia Plan      general     ASA 3       Induction: intravenous. Anesthetic plan and risks discussed with patient.                       Breezy Holland MD   8/6/2021

## 2021-08-06 NOTE — H&P
Rodney Quinonezflako    [id number]    [facility] Same Day Surgery Update H & P  Department of General Surgery   Surgical Service   Pre-operative History and Physical  Last H & P within the last 30 days. DIAGNOSIS:   Chronic pansinusitis [J32.4]  Deviated nasal septum [J34.2]    Procedure(s):  BILATERAL FRONTO-ETHMOIDECTOMY, BILATERAL MAXILLARY ANTROSTOMY WITH TISSUE REMOVAL AND SEPTOPLASTY NASAL SCOPE    History obtained from: Patient interview and EHR      HISTORY OF PRESENT ILLNESS:   Patient with c/o nasal obstruction and congestion which has been unrelieved with medical therapy. She presents today for the above procedure. Covid 19:  Patient denies fever, chills, worsening cough, or known exposure to Covid-19.        Past Medical History:        Diagnosis Date    Abdominal aortic atherosclerosis (HCC)     Allergic rhinitis     Atherosclerosis of superior mesenteric artery (HCC)     CAD (coronary artery disease)     Cerebral artery occlusion with cerebral infarction (HCC)     Chronic kidney disease     Clostridium difficile colitis 06/2020    Colon polyps     COPD (chronic obstructive pulmonary disease) (HCC)     CVA (cerebral infarction) 5/2013    Multiple, occipital and Cerebellar     DDD (degenerative disc disease)     Cerivical DDD    Dizziness     Epicondylitis elbow, medial     GERD (gastroesophageal reflux disease)     Headache     Hyperlipidemia     Hypertension     Ischemic colitis ([address] 11/2016    Lumbar foraminal stenosis 7/20/2020    Lung disease     Osteoarthritis, hand     PVD (peripheral vascular disease) with claudication (HCC)     Left leg with mod-severe arterial ischemia    Renal artery atherosclerosis (HCC)     SMA stenosis 11/2016    Spinal stenosis of lumbar region without neurogenic claudication 7/20/2020    Thoracic aorta atherosclerosis ([address]     Venous insufficiency     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  ARTERIAL BYPASS SURGRY  2016    Dr. Bry Taylor - aorto-SMA bypass using 8mm PTFE    ATHERECTOMY Right 2019    Atherectomy with angioplasty right SFA and popliteal arteries, atherectomy and angioplasty right tibial peroneal trunk    COLONOSCOPY      COLONOSCOPY  2015    Dr. Troy Gao  2018    Dr. Hilda Mcrae Bilateral 13    bilateral with mesh, Dr. Domenic Blackman  10/23/2020    L4-5 Decompression with anterior/posterior fusion, Dr. Rasheed Bains  2015    Dr. Adrian Olmstead 2018    EGD BIOPSY performed by Rolene Canavan, MD at [address] [place] [address]      R Leg Vein stripping     Past Social History:  Social History     Socioeconomic History    Marital status:      Spouse name: None    Number of children: 3    Years of education: None    Highest education level: None   Occupational History    None   Tobacco Use    Smoking status: Former Smoker     Packs/day: 0.50     Years: 47.00     Pack years: 23.50     Types: Cigarettes     Start date: 1970     Quit date: 2016     Years since quittin.1    Smokeless tobacco: Never Used    Tobacco comment: Maintain cessation   Substance and Sexual Activity    Alcohol use:  Yes     Alcohol/week: 0.0 standard drinks     Comment: Non-alcoholic beer    Drug use: No    Sexual activity: Not Currently     Partners: Female     Comment:    Other Topics Concern    None   Social History Narrative    None     Social Determinants of Health     Financial Resource Strain:     Difficulty of Paying Living Expenses:    Food Insecurity:     Worried About Running Out of Food in the Last Year:  Ran Out of Food in the Last Year:    Transportation Needs:     Lack of Transportation (Medical):  Lack of Transportation (Non-Medical):    Physical Activity:     Days of Exercise per Week:     Minutes of Exercise per Session:    Stress:     Feeling of Stress :    Social Connections:     Frequency of Communication with Friends and Family:     Frequency of Social Gatherings with Friends and Family:     Attends [employer] Services:     Active Member of Clubs or Organizations:     Attends Club or Organization Meetings:     Marital Status:    Intimate Partner Violence:     Fear of Current or Ex-Partner:     Emotionally Abused:     Physically Abused:     Sexually Abused:          Medications Prior to Admission:      Prior to Admission medications    Medication Sig Start Date End Date Taking? Authorizing Provider   carvedilol (COREG) 25 MG tablet TAKE 1 TABLET BY MOUTH TWICE A DAY 7/21/21  Yes Pepito Leal DO   telmisartan (MICARDIS) 80 MG tablet TAKE 1 TABLET BY MOUTH EVERY DAY 6/11/21  Yes Pepito Leal DO   Psyllium 48.57 % POWD Take 1 teaspoon daily for 3 days then 2 teaspoons daily. Mix in 8 oz of water.  6/11/21  Yes Pepito Leal DO   atorvastatin (LIPITOR) 80 MG tablet TAKE 1 TABLET BY MOUTH EVERY DAY 3/22/21  Yes Pepito Leal DO   pantoprazole (PROTONIX) 40 MG tablet Take 1 tablet by mouth every morning (before breakfast) 3/17/21  Yes Pepito Leal DO   NIFEdipine (PROCARDIA XL) 90 MG extended release tablet TAKE 1 TABLET BY MOUTH EVERY DAY 3/16/21  Yes Pepito Leal DO   loperamide (RA ANTI-DIARRHEAL) 2 MG capsule Take 2 mg by mouth 4 times daily as needed for Diarrhea   Yes Historical Provider, MD   acetaminophen (TYLENOL) 325 MG tablet Take 2 tablets by mouth every 6 hours as needed for Pain 9/15/18  Yes Chandana Gurrola MD   Probiotic Product (ALIGN PO) Take 1 tablet by mouth daily    Yes Historical Provider, MD   clopidogrel (PLAVIX) 75 MG tablet TAKE 1 TABLET BY MOUTH EVERY DAY 2/10/21   Pepito Leal DO   ferrous sulfate (FEROSUL) 325 (65 Fe) MG tablet Take 1 tablet by mouth daily (with breakfast) 7/12/19   Pepito Leal DO   aspirin 81 MG tablet Take 1 tablet by mouth daily 9/22/18   Dong Vargas MD   Cholecalciferol (VITAMIN D) 2000 units CAPS capsule Take 1 capsule by mouth daily    Historical Provider, MD         Allergies:  Patient has no known allergies. PHYSICAL EXAM:      BP (!) 162/88 Comment: Hx HTN  Pulse 60   Temp 97.6 °F (36.4 °C) (Oral)   Resp 16   Ht 5' 6\" (1.676 m)   Wt 138 lb (62.6 kg)   SpO2 96%   BMI 22.27 kg/m²      Airway:  Airway patent with no audible stridor    Heart:  Regular rate and rhythm, No murmur noted    Lungs:  No increased work of breathing, good air exchange, clear to auscultation bilaterally, no crackles or wheezing    Abdomen:  Soft, non-distended, non-tender, no rebound tenderness or guarding, and no masses palpated    ASSESSMENT AND PLAN     Patient is a 80 y.o. male with above specified procedure planned. 1.  The patients history and physical was obtained and signed off by the pre-admission testing department. Patient seen and focused exam done today- no new changes since last physical exam on 7/19/21     2. Access to ancillary services are available per request of the provider.     MARY Almazan - CNP     8/6/2021

## 2021-08-06 NOTE — OP NOTE
Operative Note    Patient Name: Ashlee Troy  YOB: 1950  Medical Record Number:  [id number]  Billing Number:  [id number]  Date of Procedure: 8/6/2021  Time: 1005    Brief History: This is a 79 y/o male with chronic pansinusitis. Failed medical therapy. Pre-operative Diagnosis: chronic pansinusitis. Deviated septum to left    Post-operative Diagnosis: Same  Proc: 1. Bilateral nasal endoscopy with maxillary antrosotomy with tissue removal (65051-64)   2. Bilateral nasal endoscopy with total fronto-ethmoidectomy (88299-75)   3. Septoplasty (31816)    Circulator: Matthias Davis RN  Scrub Person First: Karen Johnston RN  Circulator Assist: Daron Dasilva RN    Anesthesia: 1. 10 cc 1% lidocaine with 1:100,000 epinephrine injected in the uncinate  and middle turbinate bilateral  EBL: 100  Specimens: Bilateral sinus contents  Findings: Diffuse inflammation and polyps. Deviated septum to left blocking middle meatus. Complications: none  Antibiotics: none    After consent was confirmed the patient came into the operating room and was placed in supine position. General IV anesthesia was obtained and the patient intubated by Anesthesiology without difficulty. The table was turned and 10 cc's of 1% lidocaine with 1:100,000 epinephrine was injected into the uncinate, middle turbinate and anterior wall of the sphenoid sinus. Surgery began on the left nasal cavity. Using a zero degree endoscope and and a caudal elevator the middle turbinate was medialized in its inferior third. The uncinate was in fractured with a frontal sinus seeker. A Flor's window was made with a back biting forceps. The uncinate was removed with a 4mm, 0 degree straight shot micro debrider in its entirety from the inferior turbinate to the skull base. The natural ostium of the maxillary sinus was identified and back-fractured through the fontanelle with a frontal sinus seeker.  A large maxillary antrostomy was then performed with the 0 degree micro debrider. Tissue from the maxillary sinus was removed with a 40 degree microdebrider,. A 0 degree micro-debrider was used to remove the ethmoid bulla lateral to the lamina papyracea , superior to the skull base and posterior to the basal lamella. A large window was made in the lamella preserving an inferior 1 cm strut. The posterior ethmoids were then removed with the micro debrider lateral to the lamina, posterior to the anterior wall of the sphenoid sinus and superior to the skull base. 1:1000 topical epinephrine pledgets were occasionally placed for hemostasis. Using a combination of 0 and 45 degree endoscopes the agar nasi cell and frontal recess was opened into the frontal sinus with 0 and 40 degree micro debriders. This allowed full visualization into the frontal recess and sinus. 1:1000 topical epinephrine pledgets were placed for hemostasis. Surgery continued on the right nasal cavity. Using a zero degree endoscope and and a caudal elevator the middle turbinate was medialized in its inferior third. The uncinate was in fractured with a frontal sinus seeker. A Flor's window was made with a back biting forceps. The uncinate was removed with a 4mm, 0 degree straight shot micro debrider in its entirety from the inferior turbinate to the skull base. The natural ostium of the maxillary sinus was identified and back-fractured through the fontanelle with a frontal sinus seeker. A large maxillary antrostomy was then performed with the 0 degree micro debrider. Tissue from the maxillary sinus was removed with a 40 degree microdebrider,     A 0 degree micro-debrider was used to remove the ethmoid bulla lateral to the lamina papyracea , superior to the skull base and posterior to the basal lamella. A large window was made in the lamella preserving an inferior 1 cm strut.  The posterior ethmoids were then removed with the micro debrider lateral to the lamina, posterior to the anterior wall of the sphenoid sinus and superior to the skull base. 1:1000 topical epinephrine pledgets were occasionally placed for hemostasis. Using a combination of 0 and 45 degree endoscopes the agar nasi cell and frontal recess was opened into the frontal sinus with 0 and 40 degree micro debriders. This allowed full visualization into the frontal recess and sinus. 1:1000 topical epinephrine pledgets were placed for hemostasis. A dennise-transfixion incision was made in the left nasal cavity. A sub-perichondrial and sub-periosteal plane was elevated with a caudal elevator to the vomer. Elevation was performed from maxillary crest to 1.5 cm from the dorsal septum. A vertical incision was made 1.5 cm posterior to the caudal septum with a caudal and a contralateral sub-perichondrial and sub-periosteal plane was elevated with a caudal elevator to the vomer. Deviated cartilage and bone was removed preserving an anterior and superior strut for nasal support. The mucosa was re-approximated with a 4-0 plane gut suture on a kateryna needle. The hem-itransfixion incision was closed with a 5-0 chromic interrupted stitch. Afrin soaked pledgets were placed for hemostasis. Pledgets were removed and [place] splints were placed between the septum and turbinates in each nostril. The splints were secured through the septum with a 3-0 prolene stitch. The patient was then awoken from general anesthesia, extubated, and sent to the post-op care unit in stable condition. I attest that I was present for and performed the key points of the operation myself.         Electronically signed by George Person MD on 8/6/2021 at 11:41 AM

## 2021-08-06 NOTE — PROGRESS NOTES
Ambulatory Surgery/Procedure Discharge Note    Vitals:    08/06/21 1336   BP: (!) 148/72   Pulse: 73   Resp: 16   Temp: 97.8 °F (36.6 °C)   SpO2: 96%       In: 935 [P.O.:60; I.V.:875]  Out: 100     Restroom use offered before discharge. yes    Pain assessment:    Pain Level: 3        Patient discharged to home/self care.  Patient discharged via wheel chair by transporter to waiting family/S.O.       8/6/2021 1:49 PM

## 2021-08-06 NOTE — PROGRESS NOTES
PACU Transfer to [facility]    Vitals:    08/06/21 1330   BP: (!) 147/73   Pulse: 69   Resp: 19   Temp: 98 °F (36.7 °C)   SpO2: 95%         Intake/Output Summary (Last 24 hours) at 8/6/2021 1332  Last data filed at 8/6/2021 1305  Gross per 24 hour   Intake 935 ml   Output 100 ml   Net 835 ml       Pain assessment:    Pain Level: 4    Patient transferred to care of [facility] RN.    8/6/2021 1:32 PM

## 2021-08-06 NOTE — BRIEF OP NOTE
Brief Postoperative Note      Patient: Allison Linares  YOB: 1950  MRN: [id number]    Date of Procedure: 8/6/2021    Pre-Op Diagnosis: Chronic pansinusitis [J32. 4]Deviated nasal septum [J34.2]    Post-Op Diagnosis: Same       Procedure(s):  BILATERAL FRONTO-ETHMOIDECTOMY, BILATERAL MAXILLARY ANTROSTOMY WITH TISSUE REMOVAL AND SEPTOPLASTY NASAL SCOPE    Surgeon(s):  Adrian Sánchez MD    Assistant:  * No surgical staff found *    Anesthesia: General    Estimated Blood Loss (mL): 980    Complications: None    Specimens:   * No specimens in log *    Implants:  * No implants in log *      Drains: * No LDAs found *    Findings: Diffuse inflammation and polyps. Deviated septum to left blocking middle meatus.      Electronically signed by Claudia Albrecht MD on 8/6/2021 at 11:40 AM

## 2021-08-06 NOTE — PROGRESS NOTES
Pt admitted to PACU s/p BILATERAL FRONTO-ETHMOIDECTOMY, BILATERAL MAXILLARY ANTROSTOMY WITH TISSUE REMOVAL AND SEPTOPLASTY NASAL SCOPE - pt awake and responsive, no nasal drainage at present, PO 96% w O2 mask 6L, no c/o pain

## 2021-08-06 NOTE — ANESTHESIA POSTPROCEDURE EVALUATION
Department of Anesthesiology  Postprocedure Note    Patient: Allison Linares  MRN: [id number]  YOB: 1950  Date of evaluation: 8/6/2021  Time:  1:41 PM     Procedure Summary     Date: 08/06/21 Room / Location: [facility] [address] [place] [address] / [facility]    Anesthesia Start: 1004 Anesthesia Stop: 4633    Procedure: BILATERAL FRONTO-ETHMOIDECTOMY, BILATERAL MAXILLARY ANTROSTOMY WITH TISSUE REMOVAL AND SEPTOPLASTY NASAL SCOPE (Bilateral Nose) Diagnosis:       Chronic pansinusitis      Deviated nasal septum      (Chronic pansinusitis [J32. 4]Deviated nasal septum [J34.2])    Surgeons: Adrian Sánchez MD Responsible Provider: Krysten Saucedo MD    Anesthesia Type: general ASA Status: 3          Anesthesia Type: general    Good Phase I: Good Score: 10    Good Phase II:      Last vitals: Reviewed and per EMR flowsheets.        Anesthesia Post Evaluation    Patient participation: complete - patient participated  Level of consciousness: awake  Airway patency: patent  Nausea & Vomiting: no nausea and no vomiting  Complications: no  Cardiovascular status: hemodynamically stable  Respiratory status: acceptable  Hydration status: stable

## 2021-08-10 ENCOUNTER — TELEPHONE (OUTPATIENT)
Dept: ENT CLINIC | Age: 71
End: 2021-08-10

## 2021-08-10 ENCOUNTER — OFFICE VISIT (OUTPATIENT)
Dept: ENT CLINIC | Age: 71
End: 2021-08-10
Payer: MEDICARE

## 2021-08-10 VITALS — TEMPERATURE: 98 F | WEIGHT: 138 LBS | BODY MASS INDEX: 22.18 KG/M2 | HEIGHT: 66 IN

## 2021-08-10 DIAGNOSIS — R09.81 NASAL CONGESTION: Primary | ICD-10-CM

## 2021-08-10 DIAGNOSIS — Z48.89 POSTOPERATIVE VISIT: ICD-10-CM

## 2021-08-10 PROCEDURE — 31237 NSL/SINS NDSC SURG BX POLYPC: CPT | Performed by: OTOLARYNGOLOGY

## 2021-08-10 PROCEDURE — 99024 POSTOP FOLLOW-UP VISIT: CPT | Performed by: OTOLARYNGOLOGY

## 2021-08-10 NOTE — PROGRESS NOTES
Status post septoplasty and FESS for nasal obstruction and sinusitis. Nasal obstruction. Splints removed. Bilateral nasal cavity and middle meatal crusts and clots. Debrided. No evidence of hematoma or perforation. Continue saline sprays and saline rinses. Follow up in 3 weeks. Nasal Endoscopy with Debridement  Pre Op Dx: Nasal congestion, post operative sinus surgery  Post Op Dx: Same  Procedure: Nasal endoscopy with debridement bilateral  Anesthesia: 2% lidocaine with afrin tiopical  EBL: None  Specimens: None  Findings: both sides nasal cavity and sinus crusting and mucus. Procedure:    After the application of afrin and pontocaine the nasal sinus cavity was debrided utilizing a zero degree joan endoscope with Kerrison rongeurs and hillman suctions on both sides. The sinus lining was healing well. No evidence of bleeding or rhinorrhea was noted. Tolerated well.     Complications: None

## 2021-08-31 ENCOUNTER — OFFICE VISIT (OUTPATIENT)
Dept: ENT CLINIC | Age: 71
End: 2021-08-31
Payer: MEDICARE

## 2021-08-31 DIAGNOSIS — J32.4 CHRONIC PANSINUSITIS: Primary | ICD-10-CM

## 2021-08-31 PROCEDURE — 99024 POSTOP FOLLOW-UP VISIT: CPT | Performed by: OTOLARYNGOLOGY

## 2021-08-31 PROCEDURE — 31237 NSL/SINS NDSC SURG BX POLYPC: CPT | Performed by: OTOLARYNGOLOGY

## 2021-08-31 RX ORDER — FLUTICASONE PROPIONATE 50 MCG
1 SPRAY, SUSPENSION (ML) NASAL DAILY
Qty: 1 BOTTLE | Refills: 5 | Status: SHIPPED | OUTPATIENT
Start: 2021-08-31 | End: 2021-12-18 | Stop reason: ALTCHOICE

## 2021-09-07 RX ORDER — TELMISARTAN 80 MG/1
TABLET ORAL
Qty: 90 TABLET | Refills: 0 | Status: SHIPPED | OUTPATIENT
Start: 2021-09-07 | End: 2021-11-24

## 2021-09-08 DIAGNOSIS — I10 ESSENTIAL HYPERTENSION: ICD-10-CM

## 2021-09-08 DIAGNOSIS — K21.9 GASTROESOPHAGEAL REFLUX DISEASE WITHOUT ESOPHAGITIS: ICD-10-CM

## 2021-09-08 RX ORDER — PANTOPRAZOLE SODIUM 40 MG/1
TABLET, DELAYED RELEASE ORAL
Qty: 90 TABLET | Refills: 1 | Status: SHIPPED | OUTPATIENT
Start: 2021-09-08 | End: 2022-03-02

## 2021-09-08 RX ORDER — NIFEDIPINE 90 MG/1
TABLET, EXTENDED RELEASE ORAL
Qty: 90 TABLET | Refills: 1 | Status: SHIPPED | OUTPATIENT
Start: 2021-09-08 | End: 2022-03-02

## 2021-09-13 RX ORDER — ATORVASTATIN CALCIUM 80 MG/1
TABLET, FILM COATED ORAL
Qty: 90 TABLET | Refills: 1 | Status: SHIPPED | OUTPATIENT
Start: 2021-09-13 | End: 2021-12-18 | Stop reason: SINTOL

## 2021-09-16 ENCOUNTER — OFFICE VISIT (OUTPATIENT)
Dept: ENT CLINIC | Age: 71
End: 2021-09-16
Payer: MEDICARE

## 2021-09-16 DIAGNOSIS — J32.4 CHRONIC PANSINUSITIS: Primary | ICD-10-CM

## 2021-09-16 PROCEDURE — 31237 NSL/SINS NDSC SURG BX POLYPC: CPT | Performed by: OTOLARYNGOLOGY

## 2021-09-16 RX ORDER — FLUTICASONE PROPIONATE 50 MCG
1 SPRAY, SUSPENSION (ML) NASAL DAILY
Qty: 16 G | Refills: 5 | Status: SHIPPED | OUTPATIENT
Start: 2021-09-16 | End: 2021-12-18

## 2021-09-16 RX ORDER — AMOXICILLIN AND CLAVULANATE POTASSIUM 875; 125 MG/1; MG/1
1 TABLET, FILM COATED ORAL 2 TIMES DAILY
Qty: 20 TABLET | Refills: 0 | Status: SHIPPED | OUTPATIENT
Start: 2021-09-16 | End: 2021-09-26

## 2021-09-16 NOTE — PROGRESS NOTES
S/P septoplasty and fess. Doing great. Breathing well. PE: Left middle metus crusting. Debrided. A/P: Augmentin   Saline rinses. Flonase. Follow up in 3 months. Nasal Endoscopy with Debridement  Pre Op Dx: Nasal congestion, post operative sinus surgery  Post Op Dx: Same  Procedure: Nasal endoscopy with debridement left  Anesthesia: 2% lidocaine with afrin tiopical  EBL: None  Specimens: None  Findings: the left side nasal cavity and sinus crusting and mucus. Purulence identified in maxillary sinus/      Procedure:    After the application of afrin and pontocaine the nasal sinus cavity was debrided utilizing a zero degree joan endoscope with Kerrison rongeurs and hillman suctions on the left side. The sinus lining was healing well. No evidence of bleeding or rhinorrhea was noted. Tolerated well.     Complications: None

## 2021-11-15 ENCOUNTER — NURSE ONLY (OUTPATIENT)
Dept: INTERNAL MEDICINE CLINIC | Age: 71
End: 2021-11-15

## 2021-11-15 ENCOUNTER — TELEPHONE (OUTPATIENT)
Dept: INTERNAL MEDICINE CLINIC | Age: 71
End: 2021-11-15

## 2021-11-15 DIAGNOSIS — Z11.59 SCREENING FOR VIRAL DISEASE: Primary | ICD-10-CM

## 2021-11-15 DIAGNOSIS — Z11.59 SCREENING FOR VIRAL DISEASE: ICD-10-CM

## 2021-11-15 NOTE — TELEPHONE ENCOUNTER
Patient is coming today at 1:30 for COVID testing at 1:30. Has had cough, headache and diarrhea for 2-3 days. Please enter an order for COVID test-symptoms so they have when he arrives.

## 2021-11-16 LAB — SARS-COV-2: DETECTED

## 2021-11-17 ENCOUNTER — HOSPITAL ENCOUNTER (OUTPATIENT)
Dept: ONCOLOGY | Age: 71
Setting detail: INFUSION SERIES
Discharge: HOME | End: 2021-11-17
Payer: MEDICARE

## 2021-11-17 VITALS
OXYGEN SATURATION: 92 % | RESPIRATION RATE: 16 BRPM | TEMPERATURE: 97.5 F | DIASTOLIC BLOOD PRESSURE: 64 MMHG | HEART RATE: 67 BPM | SYSTOLIC BLOOD PRESSURE: 103 MMHG

## 2021-11-17 DIAGNOSIS — U07.1 COVID-19: Primary | ICD-10-CM

## 2021-11-17 PROCEDURE — 6360000002 HC RX W HCPCS: Performed by: INTERNAL MEDICINE

## 2021-11-17 PROCEDURE — M0245 HC IV INFUSION BAMLANIVIMAB & ETESEVIMAB W/MONITORING: HCPCS

## 2021-11-17 PROCEDURE — 2580000003 HC RX 258: Performed by: INTERNAL MEDICINE

## 2021-11-17 RX ORDER — SODIUM CHLORIDE 9 MG/ML
25 INJECTION, SOLUTION INTRAVENOUS PRN
OUTPATIENT
Start: 2021-11-17

## 2021-11-17 RX ORDER — SODIUM CHLORIDE 9 MG/ML
INJECTION, SOLUTION INTRAVENOUS CONTINUOUS
OUTPATIENT
Start: 2021-11-17

## 2021-11-17 RX ORDER — SODIUM CHLORIDE 0.9 % (FLUSH) 0.9 %
5-40 SYRINGE (ML) INJECTION PRN
OUTPATIENT
Start: 2021-11-17

## 2021-11-17 RX ORDER — ACETAMINOPHEN 325 MG/1
650 TABLET ORAL
OUTPATIENT
Start: 2021-11-17

## 2021-11-17 RX ORDER — HEPARIN SODIUM (PORCINE) LOCK FLUSH IV SOLN 100 UNIT/ML 100 UNIT/ML
500 SOLUTION INTRAVENOUS PRN
OUTPATIENT
Start: 2021-11-17

## 2021-11-17 RX ORDER — ONDANSETRON 2 MG/ML
8 INJECTION INTRAMUSCULAR; INTRAVENOUS
OUTPATIENT
Start: 2021-11-17

## 2021-11-17 RX ORDER — DIPHENHYDRAMINE HYDROCHLORIDE 50 MG/ML
50 INJECTION INTRAMUSCULAR; INTRAVENOUS
OUTPATIENT
Start: 2021-11-17

## 2021-11-17 RX ADMIN — CASIRIVIMAB AND IMDEVIMAB: 600; 600 INJECTION, SOLUTION, CONCENTRATE INTRAVENOUS at 14:23

## 2021-11-17 NOTE — PROGRESS NOTES
Pt seen and assessed at [facility] for Regeneron infusion per orders from Dr. Darline Kuhn. Infused per [facility] policy. Monitoring completed for infusion reactions - see flowsheet. Pt tolerated infusion well and without incident. Pt verbalizes understanding of discharge instructions. Discharged via wheelchair to home with wife.     Carlitos Ohara RN

## 2021-11-24 RX ORDER — TELMISARTAN 80 MG/1
TABLET ORAL
Qty: 90 TABLET | Refills: 0 | Status: SHIPPED | OUTPATIENT
Start: 2021-11-24 | End: 2022-02-22

## 2021-11-29 ENCOUNTER — TELEPHONE (OUTPATIENT)
Dept: INTERNAL MEDICINE CLINIC | Age: 71
End: 2021-11-29

## 2021-11-29 NOTE — TELEPHONE ENCOUNTER
Patient tested positive for COVID on 11/15/21 and was tested again on 11/27 and tested positive again. He is wanting to know if this is accurate? Is he still contagious? Please contact patient to advise.

## 2021-11-29 NOTE — TELEPHONE ENCOUNTER
Patient can remove himself from isolation after 10 days as long as he is improving and no fever for 24 hours without Tylenol

## 2021-11-30 ENCOUNTER — TELEMEDICINE (OUTPATIENT)
Dept: INTERNAL MEDICINE CLINIC | Age: 71
End: 2021-11-30
Payer: MEDICARE

## 2021-11-30 DIAGNOSIS — R19.4 ALTERED BOWEL HABITS: ICD-10-CM

## 2021-11-30 DIAGNOSIS — R51.9 RECURRENT HEADACHE: ICD-10-CM

## 2021-11-30 DIAGNOSIS — U07.1 COVID-19 VIRUS INFECTION: ICD-10-CM

## 2021-11-30 DIAGNOSIS — R14.0 ABDOMINAL BLOATING: Primary | ICD-10-CM

## 2021-11-30 PROCEDURE — 99442 PR PHYS/QHP TELEPHONE EVALUATION 11-20 MIN: CPT | Performed by: INTERNAL MEDICINE

## 2021-11-30 NOTE — PROGRESS NOTES
Marlene Corona is a 80 y.o. male  evaluated via telephone on 11/30/21          [facility]) Physicians  Internal Medicine  Patient Encounter  Andi Castaneda D.O., [facility]     Consent:  She and/or health care decision maker is aware that that she may receive a bill for this telephone service, depending on her insurance coverage, and has provided verbal consent to proceed: Yes      Documentation:    Chief Complaint   Patient presents with    Headache    Abdominal Pain     diarrhea         80 y.o. male being evaluated via a phone consultation visit due to the coronavirus pandemic emergency and public health crisis and inability to see the patient face-to-face in the office. Patient tested positive on 11/15/2021. Symptoms leading up to his test included cough, headache, diarrhea. His symptoms started 11/13/2021. He was sent for Regeneron 11/17/2021. He has improved day by day. He continue to have fatigue and a mild headache. He has a H/O headaches. He is also C/O a bloated abdomen. He is having BM's. He did have some diarrhea. The diarrhea is alternating with normal BM's. He denies abd pain. With regards to the headaches, this was attributed to sinus disease. He underwent sinus surgery 8/2021 . He states he feels the same. The headache is Bitemporal in location. He was seen in June for the same exact symptoms. He denies dizziness. He is drinking fluids and eating well. Looking back, the patient has the exact same symptoms as he reported in June 2021. He was advised to start Metamucil. Patient states he does not like the sweetness.         Past Medical History:   Diagnosis Date    Abdominal aortic atherosclerosis (HCC)     Allergic rhinitis     Atherosclerosis of superior mesenteric artery (HCC)     CAD (coronary artery disease)     Cerebral artery occlusion with cerebral infarction (HCC)     Chronic kidney disease     Chronic pansinusitis 8/6/2021    Clostridium difficile colitis 06/2020    Colon polyps     COPD [facility]ch[facility] obstructive [facility] disease) ([facility]     CVA (cerebral infarction) 5/2013    Multiple, occipital and Cerebellar     DDD (degenerative disc disease)     Cerivical DDD    Dizziness     Epicondylitis elbow, medial     GERD (gastroesophageal reflux disease)     Headache     Hyperlipidemia     Hypertension     Ischemic colitis ([facility] 11/2016    Lumbar foraminal stenosis 7/20/2020    Lung disease     Osteoarthritis, hand     PVD (peripheral vascular disease) with claudication ([facility])     Left leg with mod-severe arterial ischemia    Renal artery atherosclerosis (HCC)     SMA stenosis 11/2016    Spinal stenosis of lumbar region without neurogenic claudication 7/20/2020    Thoracic aorta atherosclerosis ([facility])     Venous insufficiency     Vertebral artery dissection ([facility]) 5/2013          Medication Sig   telmisartan (MICARDIS) 80 MG tablet TAKE 1 TABLET BY MOUTH EVERY DAY   fluticasone (FLONASE) 50 MCG/ACT nasal spray 1 spray by Nasal route daily   atorvastatin (LIPITOR) 80 MG tablet TAKE 1 TABLET BY MOUTH EVERY DAY   NIFEdipine (PROCARDIA XL) 90 MG extended release tablet TAKE 1 TABLET BY MOUTH EVERY DAY   pantoprazole (PROTONIX) 40 MG tablet TAKE 1 TABLET BY MOUTH EVERY DAY BEFORE BREAKFAST   fluticasone (FLONASE) 50 MCG/ACT nasal spray 1 spray by Nasal route daily   carvedilol (COREG) 25 MG tablet TAKE 1 TABLET BY MOUTH TWICE A DAY   Psyllium 48.57 % POWD Take 1 teaspoon daily for 3 days then 2 teaspoons daily. Mix in 8 oz of water.    clopidogrel (PLAVIX) 75 MG tablet TAKE 1 TABLET BY MOUTH EVERY DAY   loperamide (RA ANTI-DIARRHEAL) 2 MG capsule Take 2 mg by mouth 4 times daily as needed for Diarrhea   ferrous sulfate (FEROSUL) 325 (65 Fe) MG tablet Take 1 tablet by mouth daily (with breakfast)   acetaminophen (TYLENOL) 325 MG tablet Take 2 tablets by mouth every 6 hours as needed for Pain   aspirin 81 MG tablet Take 1 tablet by mouth daily   Probiotic Product (ALIGN PO) Take 1 tablet by mouth daily    Cholecalciferol (VITAMIN D) 2000 units CAPS capsule Take 1 capsule by mouth daily        Patient-Reported Vitals 11/16/2020   Patient-Reported Weight 137#   Patient-Reported Height 5ft6   Patient-Reported Systolic 311   Patient-Reported Diastolic 79   Patient-Reported Pulse 78   Patient-Reported Temperature 97.3                I communicated with the patient and/or health care decision maker about irritable bowel syndrome, headaches. Details of this discussion including any medical advice provided: As below    Encounter Diagnoses   Name Primary?  Abdominal bloating Yes    Altered bowel habits     Recurrent headache     COVID-19 virus infection        PLAN  #1  Start Benefiber 2 teaspoons daily for 1 week and then 2 teaspoons twice daily  #2 stay well-hydrated  #3 return in 2 weeks for an in person visit  #4 May need MRI brain  #5 May need CT angiogram of the abdomen to ensure vascular bypass vessels are patent        I affirm this is a Patient Initiated Episode with a Patient who has not had a related appointment within my department in the past 7 days or scheduled within the next 24 hours.     Patient identification was verified at the start of the visit: Yes    Total Time: minutes: 11-20 minutes (15 minutes)    Note: not billable if this call serves to triage the patient into an appointment for the relevant concern      Yeni Toledo

## 2021-12-01 ENCOUNTER — TELEPHONE (OUTPATIENT)
Dept: INTERNAL MEDICINE CLINIC | Age: 71
End: 2021-12-01

## 2021-12-01 DIAGNOSIS — U07.1 COVID-19 VIRUS INFECTION: Primary | ICD-10-CM

## 2021-12-01 RX ORDER — BENZONATATE 200 MG/1
200 CAPSULE ORAL 3 TIMES DAILY PRN
Qty: 30 CAPSULE | Refills: 0 | Status: SHIPPED | OUTPATIENT
Start: 2021-12-01 | End: 2021-12-18

## 2021-12-01 NOTE — TELEPHONE ENCOUNTER
Patient called to see if Dr. Addison Arriaza can prescribe a cough medication to help with dry cough at night-Patient had telephone visit on 11/30/21 with Dr. Addison Arriaza. Patient agreed to cancel his 12/4 AWV and I will call him back to schedule a 2 week follow up per Dr. Brown Christo last note. Please call him at number provided to let him know if Dr. Addison Arriaza can prescribe something otherwise he will  OTC medication.

## 2021-12-01 NOTE — TELEPHONE ENCOUNTER
Patient had Telephone visit with Dr. Jennifer Hinton on 11/30 due to COVID symptoms. He is supposed to come back in 2 weeks for follow up. He has an AWV scheduled for 12/4 (this Saturday). Is it safe to assume we need to cancel this appointment for 12/4? Please let  know.

## 2021-12-01 NOTE — TELEPHONE ENCOUNTER
Patient had a telephone visit and it was mentioned he had a cough due to previous covid positive diagnoses. Would like a med called in for his cough.

## 2021-12-18 ENCOUNTER — OFFICE VISIT (OUTPATIENT)
Dept: INTERNAL MEDICINE CLINIC | Age: 71
End: 2021-12-18
Payer: MEDICARE

## 2021-12-18 VITALS
RESPIRATION RATE: 14 BRPM | HEIGHT: 66 IN | HEART RATE: 65 BPM | BODY MASS INDEX: 21.86 KG/M2 | DIASTOLIC BLOOD PRESSURE: 70 MMHG | WEIGHT: 136 LBS | OXYGEN SATURATION: 98 % | SYSTOLIC BLOOD PRESSURE: 130 MMHG

## 2021-12-18 DIAGNOSIS — Z00.00 ROUTINE GENERAL MEDICAL EXAMINATION AT A HEALTH CARE FACILITY: Primary | ICD-10-CM

## 2021-12-18 DIAGNOSIS — Z79.899 DRUG THERAPY: ICD-10-CM

## 2021-12-18 DIAGNOSIS — M79.604 PAIN IN BOTH LOWER EXTREMITIES: ICD-10-CM

## 2021-12-18 DIAGNOSIS — I70.219 ATHEROSCLEROTIC PVD WITH INTERMITTENT CLAUDICATION (HCC): ICD-10-CM

## 2021-12-18 DIAGNOSIS — K21.9 GASTROESOPHAGEAL REFLUX DISEASE WITHOUT ESOPHAGITIS: ICD-10-CM

## 2021-12-18 DIAGNOSIS — R73.01 IMPAIRED FASTING GLUCOSE: ICD-10-CM

## 2021-12-18 DIAGNOSIS — Z87.891 PERSONAL HISTORY OF TOBACCO USE: ICD-10-CM

## 2021-12-18 DIAGNOSIS — I65.23 CAROTID STENOSIS, ASYMPTOMATIC, BILATERAL: ICD-10-CM

## 2021-12-18 DIAGNOSIS — M79.605 PAIN IN BOTH LOWER EXTREMITIES: ICD-10-CM

## 2021-12-18 DIAGNOSIS — K43.2 INCISIONAL HERNIA, WITHOUT OBSTRUCTION OR GANGRENE: ICD-10-CM

## 2021-12-18 DIAGNOSIS — I25.10 CORONARY ARTERY CALCIFICATION SEEN ON CAT SCAN: ICD-10-CM

## 2021-12-18 DIAGNOSIS — Z13.6 SCREENING FOR CARDIOVASCULAR CONDITION: ICD-10-CM

## 2021-12-18 DIAGNOSIS — Z23 NEED FOR INFLUENZA VACCINATION: ICD-10-CM

## 2021-12-18 DIAGNOSIS — E78.5 HYPERLIPIDEMIA, UNSPECIFIED HYPERLIPIDEMIA TYPE: ICD-10-CM

## 2021-12-18 PROCEDURE — G0446 INTENS BEHAVE THER CARDIO DX: HCPCS | Performed by: INTERNAL MEDICINE

## 2021-12-18 PROCEDURE — 1036F TOBACCO NON-USER: CPT | Performed by: INTERNAL MEDICINE

## 2021-12-18 PROCEDURE — 99213 OFFICE O/P EST LOW 20 MIN: CPT | Performed by: INTERNAL MEDICINE

## 2021-12-18 PROCEDURE — G0439 PPPS, SUBSEQ VISIT: HCPCS | Performed by: INTERNAL MEDICINE

## 2021-12-18 PROCEDURE — G8427 DOCREV CUR MEDS BY ELIG CLIN: HCPCS | Performed by: INTERNAL MEDICINE

## 2021-12-18 PROCEDURE — G0296 VISIT TO DETERM LDCT ELIG: HCPCS | Performed by: INTERNAL MEDICINE

## 2021-12-18 PROCEDURE — 1123F ACP DISCUSS/DSCN MKR DOCD: CPT | Performed by: INTERNAL MEDICINE

## 2021-12-18 PROCEDURE — 4040F PNEUMOC VAC/ADMIN/RCVD: CPT | Performed by: INTERNAL MEDICINE

## 2021-12-18 PROCEDURE — 90694 VACC AIIV4 NO PRSRV 0.5ML IM: CPT | Performed by: INTERNAL MEDICINE

## 2021-12-18 PROCEDURE — G0008 ADMIN INFLUENZA VIRUS VAC: HCPCS | Performed by: INTERNAL MEDICINE

## 2021-12-18 PROCEDURE — G8420 CALC BMI NORM PARAMETERS: HCPCS | Performed by: INTERNAL MEDICINE

## 2021-12-18 PROCEDURE — 3017F COLORECTAL CA SCREEN DOC REV: CPT | Performed by: INTERNAL MEDICINE

## 2021-12-18 PROCEDURE — G8484 FLU IMMUNIZE NO ADMIN: HCPCS | Performed by: INTERNAL MEDICINE

## 2021-12-18 SDOH — ECONOMIC STABILITY: FOOD INSECURITY: WITHIN THE PAST 12 MONTHS, YOU WORRIED THAT YOUR FOOD WOULD RUN OUT BEFORE YOU GOT MONEY TO BUY MORE.: NEVER TRUE

## 2021-12-18 SDOH — ECONOMIC STABILITY: FOOD INSECURITY: WITHIN THE PAST 12 MONTHS, THE FOOD YOU BOUGHT JUST DIDN'T LAST AND YOU DIDN'T HAVE MONEY TO GET MORE.: NEVER TRUE

## 2021-12-18 ASSESSMENT — PATIENT HEALTH QUESTIONNAIRE - PHQ9
SUM OF ALL RESPONSES TO PHQ9 QUESTIONS 1 & 2: 0
SUM OF ALL RESPONSES TO PHQ QUESTIONS 1-9: 0
1. LITTLE INTEREST OR PLEASURE IN DOING THINGS: 0
2. FEELING DOWN, DEPRESSED OR HOPELESS: 0

## 2021-12-18 ASSESSMENT — SOCIAL DETERMINANTS OF HEALTH (SDOH): HOW HARD IS IT FOR YOU TO PAY FOR THE VERY BASICS LIKE FOOD, HOUSING, MEDICAL CARE, AND HEATING?: NOT HARD AT ALL

## 2021-12-18 NOTE — PROGRESS NOTES
[facility] [place] [place]) Physicians  Internal Medicine  Patient Encounter  Suella Bamberger, D.O., [facility]       Medicare Annual Wellness Visit  Name: Jaquelin Morgan Date: 2021   MRN: [id number] Sex: Male   Age: 80 y.o. Ethnicity: Non- / Non    : 1950 Race: White (non-)      Madina Su is here for Medicare AWV (concerned about legs cramping )    Screenings for behavioral, psychosocial and functional/safety risks, and cognitive dysfunction are all negative except as indicated below. These results, as well as other patient data from the [address] form, are documented in Flowsheets linked to this Encounter. Patient was last seen on 2021 for complaint of abdominal bloating, altered bowel habits, recurrent headaches, recent COVID-19 infection. He was seen via video visit. Patient was started on Benefiber 2 teaspoons daily for a week and then 2 teaspoons twice daily. The fiber has helped the diarrhea. He still has some bloating. He was advised to stay well-hydrated. He has a history of vascular bypass of the SMA. He has a history of sinusitis and sinus surgery. Covid infection 11/15/2021. He had a Regeneron infusion. He is feeling better. He will follow up with Dr. Jennifer Cox this week for F/U from sinus surgery. He still has mild headaches daily. He will have vascular scans this Thursday as well. He does report leg aches all the time. He states he does feel week. He wants to try off of the Lipitor. This will have to be readdressed. He should not be off statin for any length of time. If he is not able to tolerate statins, a PCSK9 inhibitor should be considered.       Medical/Surgical Histories     Past Medical History:   Diagnosis Date    Abdominal aortic atherosclerosis (HCC)     Allergic rhinitis     Atherosclerosis of superior mesenteric artery (HCC)     CAD (coronary artery disease)     Cerebral artery occlusion with cerebral infarction [facility])     Chronic kidney disease     Chronic pansinusitis 08/06/2021    Clostridium difficile colitis 06/2020    Colon polyps     COPD (chronic obstructive pulmonary disease) ([address]     COVID-19 virus infection 11/15/2021    CVA (cerebral infarction) 05/2013    Multiple, occipital and Cerebellar     DDD (degenerative disc disease)     Cerivical DDD    Dizziness     Epicondylitis elbow, medial     GERD (gastroesophageal reflux disease)     Headache     Hyperlipidemia     Hypertension     Ischemic colitis ([address] 11/2016    Lumbar foraminal stenosis 07/20/2020    Lung disease     Osteoarthritis, hand     PVD (peripheral vascular disease) with claudication (HCC)     Left leg with mod-severe arterial ischemia    Renal artery atherosclerosis (HCC)     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     ARTERIAL BYPASS SURGRY  11/29/2016    Dr. Rl Gandara - aorto-SMA bypass using 8mm PTFE    ATHERECTOMY Right 04/08/2019    Atherectomy with angioplasty right SFA and popliteal arteries, atherectomy and angioplasty right tibial peroneal trunk    COLONOSCOPY  2008    COLONOSCOPY  9/2/2015    Dr. Bayron Florez  09/20/2018    Dr. Naima Shah Bilateral 1/16/13    bilateral with mesh, Dr. Delonte Velasquez  10/23/2020    L4-5 Decompression with anterior/posterior fusion, Dr. Christi Escoto [facility] SEPTOPLASTY Bilateral 8/6/2021    BILATERAL FRONTO-ETHMOIDECTOMY, BILATERAL MAXILLARY ANTROSTOMY WITH TISSUE REMOVAL AND SEPTOPLASTY NASAL SCOPE performed by Josep Solitario Shayla Evans MD at [address] [facility]  2015    Dr. Jodi Ocampo N/A 2018    EGD BIOPSY performed by Alton Archibald MD at [address] [place] [address]      R Leg Vein stripping           Medications/Allergies     Medication Sig    telmisartan (MICARDIS) 80 MG tablet TAKE 1 TABLET BY MOUTH EVERY DAY    fluticasone (FLONASE) 50 MCG/ACT nasal spray 1 spray by Nasal route daily    atorvastatin (LIPITOR) 80 MG tablet TAKE 1 TABLET BY MOUTH EVERY DAY    NIFEdipine (PROCARDIA XL) 90 MG extended release tablet TAKE 1 TABLET BY MOUTH EVERY DAY    pantoprazole (PROTONIX) 40 MG tablet TAKE 1 TABLET BY MOUTH EVERY DAY BEFORE BREAKFAST    carvedilol (COREG) 25 MG tablet TAKE 1 TABLET BY MOUTH TWICE A DAY    clopidogrel (PLAVIX) 75 MG tablet TAKE 1 TABLET BY MOUTH EVERY DAY    ferrous sulfate (FEROSUL) 325 (65 Fe) MG tablet Take 1 tablet by mouth daily (with breakfast)    acetaminophen (TYLENOL) 325 MG tablet Take 2 tablets by mouth every 6 hours as needed for Pain    aspirin 81 MG tablet Take 1 tablet by mouth daily    Probiotic Product (ALIGN PO) Take 1 tablet by mouth daily     Cholecalciferol (VITAMIN D) 2000 units CAPS capsule Take 1 capsule by mouth daily     No current facility-administered medications for this visit. No Known Allergies      Substance Use History     Social History     Tobacco Use    Smoking status: Former Smoker     Packs/day: 0.50     Years: 47.00     Pack years: 23.50     Types: Cigarettes     Start date: 1970     Quit date: 2016     Years since quittin.4    Smokeless tobacco: Never Used    Tobacco comment: Maintain cessation   Substance Use Topics    Alcohol use:  Yes     Alcohol/week: 0.0 standard drinks     Comment: Non-alcoholic beer    Drug use: No          Family History     Family History   Problem Relation Age of Onset    Heart Attack Father     Heart Disease Father         MI    Cancer JVD.  No thyromegaly or nodules. No masses  LYMPH: No C/SC/A/F nodes  CARDIAC:  S1S2 NL. Regular rhythm. 2/6 BERONICA. VASC:  Left carotid high pitched bruit. Right carotid lower pitched bruit vs radiant cardiac murmur. PULM:  Lungs are CTA. Symmetric breath sounds noted. AP Diameter NL. GI:  Abdomen is soft and nontender. No distension. No organomegaly. No masses. No pulsatile masses. +Incisional hernia. EXT:  No Cyanosis or clubbing. No edema. SKIN: Warm and dry, normal turgor, no rash or lesions of concern. NEURO: No focal or lateralizing deficits. Gait normal without ataxia. Moves all extremities symmetrically. PSYCH:  Mood and affect NL. Judgement and insight NL. Patient's complete Health Risk Assessment and screening values have been reviewed and are found in Flowsheets. The following problems were reviewed today and where indicated follow up appointments were made and/or referrals ordered. Positive Risk Factor Screenings with Interventions:               No Positive Risk Factors identified today.     Personalized Preventive Plan   Current Health Maintenance Status  Immunization History   Administered Date(s) Administered    COVID-19, Stone Peter, PF, 30mcg/0.3mL 02/12/2021, 03/05/2021    Influenza 10/05/2011, 10/02/2012, 10/02/2013    Influenza Virus Vaccine 10/22/2008, 10/21/2009, 10/20/2010, 10/08/2014, 10/07/2015    Influenza Whole 10/22/2008, 10/21/2009    Influenza, High Dose (Fluzone 65 yrs and older) 10/14/2016, 10/10/2017, 12/06/2018    Influenza, Quadv, adjuvanted, 65 yrs +, IM, PF (Fluad) 10/19/2020, 12/18/2021    Influenza, Triv, inactivated, subunit, adjuvanted, IM (Fluad 65 yrs and older) 10/17/2019    Pneumococcal Conjugate 13-valent ([id number]) 07/08/2015    Pneumococcal Polysaccharide ([id number]) 10/02/2013, 12/06/2018    Tdap (Boostrix, Adacel) 03/27/2014    Zoster Recombinant (Shingrix) 12/04/2020, 04/05/2021        Health Maintenance   Topic Date Due  Low dose CT lung screening  07/15/2016    COVID-19 Vaccine (3 - Booster for Stone Junior series) 09/05/2021    Annual Wellness Visit (AWV)  11/17/2021    Lipid screen  03/17/2022    Potassium monitoring  07/19/2022    Creatinine monitoring  07/19/2022    Colon cancer screen colonoscopy  09/20/2023    DTaP/Tdap/Td vaccine (2 - Td or Tdap) 03/27/2024    Flu vaccine  Completed    Shingles Vaccine  Completed    Pneumococcal 65+ years Vaccine  Completed    AAA screen  Completed    Hepatitis C screen  Completed    Hepatitis A vaccine  Aged Out    Hepatitis B vaccine  Aged Out    Hib vaccine  Aged Out    Meningococcal (ACWY) vaccine  Aged Out     Recommendations for [place] Due: see orders and patient instructions/AVS.  . Recommended screening schedule for the next 5-10 years is provided to the patient in written form: see Patient Ange Brandon was seen today for medicare awv. Diagnoses and all orders for this visit:    Routine general medical examination at a health care facility  --All care gaps identified and addressed  --Due for low-dose radiation CT scan  -     ND Intens behave ther cardio dx, 15 minutes []  -     CK; Future  -     CBC Auto Differential; Future  -     Comprehensive Metabolic Panel; Future  -     Lipid Panel; Future  -     Hemoglobin A1C; Future  -     TSH without Reflex; Future  -     Vitamin D 25 Hydroxy; Future  -     Magnesium;  Future    Need for influenza vaccination  -     INFLUENZA, QUADV, ADJUVANTED, 72 YRS =, IM, PF, PREFILL SYR, 0.5ML (FLUAD)    Screening for cardiovascular condition  -     ND Intens behave ther cardio dx, 15 minutes []    Personal history of tobacco use  -     ND VISIT TO DISCUSS LUNG CA SCREEN W LDCT []  --Low-dose radiation CT scan ordered    Incisional hernia, without obstruction or gangrene  --Continue to monitor    Pain in both lower extremities  --Etiology is unclear  --Due for repeat lower extremity arterial evaluation  -     CK; Future    Carotid stenosis, asymptomatic, bilateral  --Due for repeat Doppler  -     Lipid Panel; Future    Coronary artery calcification seen on CAT scan  --Due to see cardiology  -     Lipid Panel; Future    Atherosclerotic PVD with intermittent claudication (HCC)  --Due to follow-up with vascular  --Hopefully they will check his vascular bypass in the abdomen  -     Lipid Panel; Future    Hyperlipidemia, unspecified hyperlipidemia type  -     Comprehensive Metabolic Panel; Future  -     Lipid Panel; Future    Drug therapy  -     Vitamin D 25 Hydroxy; Future  -     Magnesium; Future    Gastroesophageal reflux disease without esophagitis  -     Vitamin D 25 Hydroxy; Future  -     Magnesium; Future    Impaired fasting glucose  -     Comprehensive Metabolic Panel; Future  -     Hemoglobin A1C; Future  -     Magnesium; Future                 Cardiovascular Disease Risk Counseling: Assessed the patient's risk to develop cardiovascular disease and reviewed main risk factors. Reviewed steps to reduce disease risk including:   · Quitting tobacco use, reducing amount smoked, or not starting the habit  · Making healthy food choices  · Being physically active and gradualy increasing activity levels   · Reduce weight and determine a healthy BMI goal  · Monitor blood pressure and treat if higher than 140/90 mmHg  · Maintain blood total cholesterol levels under 5 mmol/l or 190 mg/dl  · Maintain LDL cholesterol levels under 3.0 mmol/l or 115 mg/dl   · Control blood glucose levels  · Consider taking aspirin (75 mg daily), once blood pressure is controlled   Provided a follow up plan. Time spent (minutes): 15    LDCT Screening: Discussed with patient the benefits and harms of screening, follow-up diagnostic testing, over-diagnosis, false positive rate, and total radiation exposure.  Counseled on the importance of adherence to annual lung cancer LDCT screening, impact of comorbidities, ability and willingness to undergo diagnosis and treatment. Counseled on the importance of maintaining cigarette smoking abstinence and cessation. Patient has a history of heavy tobacco use of over 30 pack years. Patient does not present signs or symptoms of lung cancer. Low Dose CT (LDCT) Lung Screening criteria met:     Age 55-77(Medicare) or 50-80 ([facility])   Pack year smoking >30 (Medicare) or >20 ([facility])   Still smoking or less than 15 year since quit   No sign or symptoms of lung cancer   > 11 months since last LDCT     Risks and benefits of lung cancer screening with LDCT scans discussed:    Significance of positive screen - False-positive LDCT results often occur. 95% of all positive results do not lead to a diagnosis of cancer. Usually further imaging can resolve most false-positive results; however, some patients may require invasive procedures. Over diagnosis risk - 10% to 12% of screen-detected lung cancer cases are over diagnosed--that is, the cancer would not have been detected in the patient's lifetime without the screening. Need for follow up screens annually to continue lung cancer screening effectiveness     Risks associated with radiation from annual LDCT- Radiation exposure is about the same as for a mammogram, which is about 1/3 of the annual background radiation exposure from everyday life. Starting screening at age 54 is not likely to increase cancer risk from radiation exposure. Patients with comorbidities resulting in life expectancy of < 10 years, or that would preclude treatment of an abnormality identified on CT, should not be screened due to lack of benefit.     To obtain maximal benefit from this screening, smoking cessation and long-term abstinence from smoking is critical

## 2021-12-18 NOTE — PATIENT INSTRUCTIONS
Advance Directives: Care Instructions  Overview  An advance directive is a legal way to state your wishes at the end of your life. It tells your family and your doctor what to do if you can't say what you want. There are two main types of advance directives. You can change them any time your wishes change. Living will. This form tells your family and your doctor your wishes about life support and other treatment. The form is also called a declaration. Medical power of . This form lets you name a person to make treatment decisions for you when you can't speak for yourself. This person is called a health care agent (health care proxy, health care surrogate). The form is also called a durable power of  for health care. If you do not have an advance directive, decisions about your medical care may be made by a family member, or by a doctor or a  who doesn't know you. It may help to think of an advance directive as a gift to the people who care for you. If you have one, they won't have to make tough decisions by themselves. Follow-up care is a key part of your treatment and safety. Be sure to make and go to all appointments, and call your doctor if you are having problems. It's also a good idea to know your test results and keep a list of the medicines you take. What should you include in an advance directive? Many states have a unique advance directive form. (It may ask you to address specific issues.) Or you might use a universal form that's approved by many states. If your form doesn't tell you what to address, it may be hard to know what to include in your advance directive. Use the questions below to help you get started. · Who do you want to make decisions about your medical care if you are not able to? · What life-support measures do you want if you have a serious illness that gets worse over time or can't be cured? · What are you most afraid of that might happen? your cancer risk. If you have other serious medical conditions (other cancers, congestive heart failure) that limit your life expectancy to less than 10 years, you should not undergo lung cancer screening with LDCT. The chance is 20%-60% that the LDCT result will show abnormalities. This would require additional testing which could include repeat imaging or even invasive procedures. Most (about 95%) of \"abnormal\" LDCT results are false in the sense that no lung cancer is ultimately found. Additionally, some (about 10%) of the cancers found would not affect your life expectancy, even if undetected and untreated. If you are still smoking, the single most important thing that you can do to reduce your risk of dying of lung cancer is to quit. For this screening to be covered by Medicare and most other insurers, strict criteria must be met. If you do not meet these criteria, but still wish to undergo LDCT testing, you will be required to sign a waiver indicating your willingness to pay for the scan. Learning About Benefits From Quitting Smoking  How does quitting smoking make you healthier? If you're thinking about quitting smoking, you may have a few reasons to be smoke-free. Your health may be one of them. · When you quit smoking, you lower your risks for cancer, lung disease, heart attack, stroke, blood vessel disease, and blindness from macular degeneration. · When you're smoke-free, you get sick less often, and you heal faster. You are less likely to get colds, flu, bronchitis, and pneumonia. · As a nonsmoker, you may find that your mood is better and you are less stressed. When and how will you feel healthier? Quitting has real health benefits that start from day 1 of being smoke-free. And the longer you stay smoke-free, the healthier you get and the better you feel. The first hours  · After just 20 minutes, your blood pressure and heart rate go down.  That means there's less stress on your heart and blood vessels. · Within 12 hours, the level of carbon monoxide in your blood drops back to normal. That makes room for more oxygen. With more oxygen in your body, you may notice that you have more energy than when you smoked. After 2 weeks  · Your lungs start to work better. · Your risk of heart attack starts to drop. After 1 month  · When your lungs are clear, you cough less and breathe deeper, so it's easier to be active. · Your sense of taste and smell return. That means you can enjoy food more than you have since you started smoking. Over the years  · Over the years, your risks of heart disease, heart attack, and stroke are lower. · After 10 years, your risk of dying from lung cancer is cut by about half. And your risk for many other types of cancer is lower too. How would quitting help others in your life? When you quit smoking, you improve the health of everyone who now breathes in your smoke. · Their heart, lung, and cancer risks drop, much like yours. · They are sick less. For babies and small children, living smoke-free means they're less likely to have ear infections, pneumonia, and bronchitis. · If you're a woman who is or will be pregnant someday, quitting smoking means a healthier . · Children who are close to you are less likely to become adult smokers. Where can you learn more? Go to https://Kailight Photonicscamrynefectivox.healthQueerfeed Media. org and sign in to your [employer] account. Enter [id number] in the [facility] box to learn more about \"Learning About Benefits From Quitting Smoking. \"     If you do not have an account, please click on the \"Sign Up Now\" link. Current as of: 2021               Content Version: 13.0  © 4566-9559 Healthwise, Incorporated. Care instructions adapted under license by [facility] ([facility]).  If you have questions about a medical condition or this instruction, always ask your healthcare professional. Blessing Hernandez disclaims any warranty or liability for your use of this information. Personalized Preventive Plan for Kirit Mejia - 12/18/2021  Medicare offers a range of preventive health benefits. Some of the tests and screenings are paid in full while other may be subject to a deductible, co-insurance, and/or copay. Some of these benefits include a comprehensive review of your medical history including lifestyle, illnesses that may run in your family, and various assessments and screenings as appropriate. After reviewing your medical record and screening and assessments performed today your provider may have ordered immunizations, labs, imaging, and/or referrals for you. A list of these orders (if applicable) as well as your Preventive Care list are included within your After Visit Summary for your review. Other Preventive Recommendations:    · A preventive eye exam performed by an eye specialist is recommended every 1-2 years to screen for glaucoma; cataracts, macular degeneration, and other eye disorders. · A preventive dental visit is recommended every 6 months. · Try to get at least 150 minutes of exercise per week or 10,000 steps per day on a pedometer . · Order or download the FREE \"Exercise & Physical Activity: Your Everyday Guide\" from [employer]. Call 0-310.648.4099 or search [employer] online. · You need 7009-1495 mg of calcium and 2087-6393 IU of vitamin D per day. It is possible to meet your calcium requirement with diet alone, but a vitamin D supplement is usually necessary to meet this goal.  · When exposed to the sun, use a sunscreen that protects against both UVA and UVB radiation with an SPF of 30 or greater. Reapply every 2 to 3 hours or after sweating, drying off with a towel, or swimming. · Always wear a seat belt when traveling in a car.    · Obtain COVID-19 booster vaccine around 2/15/2022  · Take a holiday from the Lipitor (Atorvastatin) and see what happens to the leg aches and weakness. Patient Education        Learning About Lung Cancer Screening  What is screening for lung cancer? Lung cancer screening is a way to find some lung cancers early, before a person has any symptoms of the cancer. Lung cancer screening may help those who have the highest risk for lung cancer--people age 48 and older who are or were heavy smokers. For most people, who aren't at increased risk, screening for lung cancer probably isn't helpful. Screening won't prevent cancer. And it may not find all lung cancers. Lung cancer screening may lower the risk of dying from lung cancer in a small number of people. How is it done? Lung cancer screening is done with a low-dose CT (computed tomography) scan. A CT scan uses X-rays, or radiation, to make detailed pictures of your body. Experts recommend that screening be done in medical centers that focus on finding and treating lung cancer. Who is screening recommended for? Lung cancer screening is recommended for people age 48 and older who are or were heavy smokers. That means people with a smoking history of at least 20 pack years. A pack year is a way to measure how heavy a smoker you are or were. To figure out your pack years, multiply how many packs a day on average (assuming 20 cigarettes per pack) you have smoked by how many years you have smoked. For example:  · If you smoked 1 pack a day for 20 years, that's 1 times 20. So you have a smoking history of 20 pack years. · If you smoked 2 packs a day for 10 years, that's 2 times 10. So you have a smoking history of 20 pack years. Experts agree that screening is for people who have a high risk of lung cancer. But experts don't agree on what high risk means. Some say people age 48 or older with at least a 20-pack-year smoking history are high risk. Others say it's people age 54 or older with a 30-pack-year history.   To see if you could benefit from screening, first find out if you are at high risk for lung cancer. Your doctor can help you decide your lung cancer risk. What are the risks of screening? CT screening for lung cancer isn't perfect. It can show an abnormal result when it turns out there wasn't any cancer. This is called a false-positive result. This means you may need more tests to make sure you don't have cancer. These tests can be harmful and cause a lot of worry. These tests may include more CT scans and invasive testing like a lung biopsy. In a biopsy, the doctor takes a sample of tissue from inside your lung so it can be looked at under a microscope. A biopsy is the only way to tell if you have lung cancer. If the biopsy finds cancer, you and your doctor will have to decide how or whether to treat it. Some lung cancers found on CT scans are harmless and would not have caused a problem if they had not been found through screening. But because doctors can't tell which ones will turn out to be harmless, most will be treated. This means that you may get treatment--including surgery, radiation, or chemotherapy--that you don't need. There is a risk of damage to cells or tissue from being exposed to radiation, including the small amounts used in CTs, X-rays, and other medical tests. Over time, exposure to radiation may cause cancer and other health problems. But in most cases, the risk of getting cancer from being exposed to small amounts of radiation is low. It's not a reason to avoid these tests for most people. What are the benefits of screening? Your scan may be normal (negative). For some people who are at higher risk, screening lowers the chance of dying of lung cancer. How much and how long you smoked helps to determine your risk level. Screening can find some cancers early, when treatment may be more likely to work. What happens after screening? The results of your CT scan will be sent to your doctor.  Someone from your care team will explain the results of your scan and answer any questions you may have. If you need any follow-up, he or she will help you understand what to do next. After a lung cancer screening, you can go back to your usual activities right away. A lung cancer screening test can't tell if you have lung cancer. If your results are positive, your doctor can't tell whether an abnormal finding is a harmless nodule, cancer, or something else without doing more tests. What can you do to help prevent lung cancer? Some lung cancers can't be prevented. But if you smoke, quitting smoking is the best step you can take to prevent lung cancer. If you want to quit, your doctor can recommend medicines or other ways to help. Follow-up care is a key part of your treatment and safety. Be sure to make and go to all appointments, and call your doctor if you are having problems. It's also a good idea to know your test results and keep a list of the medicines you take. Where can you learn more? Go to https://The Old Reader.The Skillery. org and sign in to your [employer] account. Enter S758 in the [employer] box to learn more about \"Learning About Lung Cancer Screening. \"     If you do not have an account, please click on the \"Sign Up Now\" link. Current as of: December 17, 2020               Content Version: 13.0  © 2006-2021 Healthwise, Incorporated. Care instructions adapted under license by [facility] ([facility]). If you have questions about a medical condition or this instruction, always ask your healthcare professional. [place] [address] any warranty or liability for your use of this information. Patient Education        Well Visit, Over 72: Care Instructions  Overview     Well visits can help you stay healthy. Your doctor has checked your overall health and may have suggested ways to take good care of yourself. Your doctor also may have recommended tests.  At home, you can help prevent illness with healthy eating, regular exercise, and other steps.  Follow-up care is a key part of your treatment and safety. Be sure to make and go to all appointments, and call your doctor if you are having problems. It's also a good idea to know your test results and keep a list of the medicines you take. How can you care for yourself at home? · Get screening tests that you and your doctor decide on. Screening helps find diseases before any symptoms appear. · Eat healthy foods. Choose fruits, vegetables, whole grains, protein, and low-fat dairy foods. Limit fat, especially saturated fat. Reduce salt in your diet. · Limit alcohol. If you are a man, have no more than 2 drinks a day or 14 drinks a week. If you are a woman, have no more than 1 drink a day or 7 drinks a week. Since alcohol affects older adults differently, you may want to limit alcohol even more. Or you may not want to drink at all. · Get at least 30 minutes of exercise on most days of the week. Walking is a good choice. You also may want to do other activities, such as running, swimming, cycling, or playing tennis or team sports. · Reach and stay at a healthy weight. This will lower your risk for many problems, such as obesity, diabetes, heart disease, and high blood pressure. · Do not smoke. Smoking can make health problems worse. If you need help quitting, talk to your doctor about stop-smoking programs and medicines. These can increase your chances of quitting for good. · Care for your mental health. It is easy to get weighed down by worry and stress. Learn strategies to manage stress, like deep breathing and mindfulness, and stay connected with your family and community. If you find you often feel sad or hopeless, talk with your doctor. Treatment can help. · Talk to your doctor about whether you have any risk factors for sexually transmitted infections (STIs). You can help prevent STIs if you wait to have sex with a new partner (or partners) until you've each been tested for STIs.  It also helps if you use condoms (male or female condoms) and if you limit your sex partners to one person who only has sex with you. Vaccines are available for some STIs. · If you think you may have a problem with alcohol or drug use, talk to your doctor. This includes prescription medicines (such as amphetamines and opioids) and illegal drugs (such as cocaine and methamphetamine). Your doctor can help you figure out what type of treatment is best for you. · Protect your skin from too much sun. When you're outdoors from 10 a.m. to 4 p.m., stay in the shade or cover up with clothing and a hat with a wide brim. Wear sunglasses that block UV rays. Even when it's cloudy, put broad-spectrum sunscreen (SPF 30 or higher) on any exposed skin. · See a dentist one or two times a year for checkups and to have your teeth cleaned. · Wear a seat belt in the car. When should you call for help? Watch closely for changes in your health, and be sure to contact your doctor if you have any problems or symptoms that concern you. Where can you learn more? Go to https://XanEdupepiceweb.healthBioCuritypartners. org and sign in to your [employer] account. Enter M606 in the [facility] box to learn more about \"Well Visit, Over 65: Care Instructions. \"     If you do not have an account, please click on the \"Sign Up Now\" link. Current as of: February 11, 2021               Content Version: 13.0  © 2006-2021 Healthwise, [facility]. Care instructions adapted under license by [facility] ([facility]). If you have questions about a medical condition or this instruction, always ask your healthcare professional. [place] [address] any warranty or liability for your use of this information. What is lung cancer screening? Lung cancer screening is a way in which doctors check the lungs for early signs of cancer in people who have no symptoms of lung cancer.   A low-dose CT scan uses much less radiation than a normal CT scan and shows a more detailed image of the lungs than a standard X-ray. The goal of lung cancer screening is to find cancer early, before it has a chance to grow, spread, or cause problems. One large study found that smokers who were screened with low-dose CT scans were less likely to die of lung cancer than those who were screened with standard X-ray. Below is a summary of the things you need to know regarding screening for lung cancer with low-dose computed tomography (LDCT). This is a screening program that involves routine annual screening with LDCT studies of the lung. The LDCTs are done using low-dose radiation that is not thought to increase your cancer risk. If you have other serious medical conditions (other cancers, congestive heart failure) that limit your life expectancy to less than 10 years, you should not undergo lung cancer screening with LDCT. The chance is 20%-60% that the LDCT result will show abnormalities. This would require additional testing which could include repeat imaging or even invasive procedures. Most (about 95%) of \"abnormal\" LDCT results are false in the sense that no lung cancer is ultimately found. Additionally, some (about 10%) of the cancers found would not affect your life expectancy, even if undetected and untreated. If you are still smoking, the single most important thing that you can do to reduce your risk of dying of lung cancer is to quit. For this screening to be covered by Medicare and most other insurers, strict criteria must be met. If you do not meet these criteria, but still wish to undergo LDCT testing, you will be required to sign a waiver indicating your willingness to pay for the scan.

## 2021-12-20 ENCOUNTER — OFFICE VISIT (OUTPATIENT)
Dept: ENT CLINIC | Age: 71
End: 2021-12-20
Payer: MEDICARE

## 2021-12-20 VITALS
HEART RATE: 63 BPM | SYSTOLIC BLOOD PRESSURE: 136 MMHG | HEIGHT: 66 IN | WEIGHT: 141 LBS | DIASTOLIC BLOOD PRESSURE: 66 MMHG | BODY MASS INDEX: 22.66 KG/M2

## 2021-12-20 DIAGNOSIS — J32.4 CHRONIC PANSINUSITIS: Primary | ICD-10-CM

## 2021-12-20 PROCEDURE — 1036F TOBACCO NON-USER: CPT | Performed by: OTOLARYNGOLOGY

## 2021-12-20 PROCEDURE — G8484 FLU IMMUNIZE NO ADMIN: HCPCS | Performed by: OTOLARYNGOLOGY

## 2021-12-20 PROCEDURE — G8420 CALC BMI NORM PARAMETERS: HCPCS | Performed by: OTOLARYNGOLOGY

## 2021-12-20 PROCEDURE — 1123F ACP DISCUSS/DSCN MKR DOCD: CPT | Performed by: OTOLARYNGOLOGY

## 2021-12-20 PROCEDURE — 3017F COLORECTAL CA SCREEN DOC REV: CPT | Performed by: OTOLARYNGOLOGY

## 2021-12-20 PROCEDURE — 31231 NASAL ENDOSCOPY DX: CPT | Performed by: OTOLARYNGOLOGY

## 2021-12-20 PROCEDURE — 4040F PNEUMOC VAC/ADMIN/RCVD: CPT | Performed by: OTOLARYNGOLOGY

## 2021-12-20 PROCEDURE — G8427 DOCREV CUR MEDS BY ELIG CLIN: HCPCS | Performed by: OTOLARYNGOLOGY

## 2021-12-20 ASSESSMENT — ENCOUNTER SYMPTOMS
EYE ITCHING: 0
DIARRHEA: 0
SINUS PAIN: 0
TROUBLE SWALLOWING: 0
RHINORRHEA: 0
NAUSEA: 0
FACIAL SWELLING: 0
EYE PAIN: 0
EYE REDNESS: 0
SORE THROAT: 0
COUGH: 0
SHORTNESS OF BREATH: 0
CHOKING: 0
VOICE CHANGE: 0
SINUS PRESSURE: 0

## 2021-12-20 NOTE — PROGRESS NOTES
Subjective:      Patient ID: Brijesh Hernandez is a 80 y.o. male. HPI  Chief Complaint   Patient presents with    CRS follow up  History of Present Illness:Nicolas is a(n) 80 y.o. male who presents with a 3 month follow up for CRS. Surgery 8-21. Nasal Discharge: none  Nasal Obstruction: No   Post Nasal Drainage: Yes  Nasal Bleeding: No  Sense of Smell: normal  Eye Symptoms: none  Previous Treatments: Surgery and BID sinus rinses. Patient Active Problem List   Diagnosis    Venous insufficiency    Hyperlipidemia    Gastroesophageal reflux disease without esophagitis    Lesion of colon    Vitamin D deficiency    Pulmonary emphysema ([address]    DDD (degenerative disc disease), cervical    Osteoarthritis    Essential hypertension    Atherosclerosis of abdominal aorta ([address]    Thoracic aorta atherosclerosis ([address]    Atherosclerosis of superior mesenteric artery ([address]    Renal artery atherosclerosis ([address]    Carotid stenosis, asymptomatic, bilateral    Atherosclerotic PVD with intermittent claudication ([address]    Coronary artery disease involving native coronary artery of native heart without angina pectoris    S/P vascular bypass    Hallux rigidus, acquired    Mesenteric artery stenosis ([address]    Alcohol abuse    PAD (peripheral artery disease) ([address]    Hydronephrosis of right kidney    Stenosis of ureteropelvic junction (UPJ)    Elevated MCV    Aortic valve stenosis    Coronary artery calcification seen on CAT scan    Spinal stenosis of lumbar region without neurogenic claudication    Lumbar foraminal stenosis    NSVT (nonsustained ventricular tachycardia) (HCC)    Chronic pansinusitis    Deviated nasal septum    COVID-19     Past Surgical History:   Procedure Laterality Date    ANGIOPLASTY Left 08/2016    left femoral -popl stent    ANGIOPLASTY  11/27/2016    SMA    ARTERIAL BYPASS SURGRY Left 09/14/2016    left femoral-popliteal bypass graft.    [address] 2016    Dr. Birch Din - aorto-SMA bypass using 8mm PTFE    ATHERECTOMY Right 2019    Atherectomy with angioplasty right SFA and popliteal arteries, atherectomy and angioplasty right tibial peroneal trunk    COLONOSCOPY      COLONOSCOPY  2015    Dr. Chavira [place]  2018    Dr. Rufus Luna Bilateral 13    bilateral with mesh, Dr. Miguel A Davis  10/23/2020    L4-5 Decompression with anterior/posterior fusion, Dr. Stevo Steve SEPTOPLASTY Bilateral 2021    BILATERAL FRONTO-ETHMOIDECTOMY, BILATERAL MAXILLARY ANTROSTOMY WITH TISSUE REMOVAL AND SEPTOPLASTY NASAL SCOPE performed by Cristian Langley MD at [facility]  2015    Dr. Mamie Ryan 2018    EGD BIOPSY performed by Christian Beatty MD at [facility]      R Leg Vein stripping     Family History   Problem Relation Age of Onset    Heart Attack Father     Heart Disease Father         MI    Cancer Brother         Brain CA    Hypertension Brother     Cancer Brother         Throat cancer     Social History     Socioeconomic History    Marital status:      Spouse name: Not on file    Number of children: 3    Years of education: Not on file    Highest education level: Not on file   Occupational History    Not on file   Tobacco Use    Smoking status: Former Smoker     Packs/day: 0.50     Years: 47.00     Pack years: 23.50     Types: Cigarettes     Start date: 1970     Quit date: 2016     Years since quittin.5    Smokeless tobacco: Never Used    Tobacco comment: Maintain cessation   Substance and Sexual Activity    Alcohol use:  Yes     Alcohol/week: 0.0 standard drinks     Comment: Non-alcoholic beer    Drug use: No    Sexual activity: Not Currently     Partners: Female     Comment:    Other Topics Concern    Not on file   Social History Narrative    Not on file     Social Determinants of Health     Financial Resource Strain: Low Risk     Difficulty of Paying Living Expenses: Not hard at all   Food Insecurity: No Food Insecurity    Worried About Running Out of Food in the Last Year: Never true    [address] in the Last Year: Never true   Transportation Needs:     Lack of Transportation (Medical): Not on file    Lack of Transportation (Non-Medical): Not on file   Physical Activity:     Days of Exercise per Week: Not on file    Minutes of Exercise per Session: Not on file   Stress:     Feeling of Stress : Not on file   Social Connections:     Frequency of Communication with Friends and Family: Not on file    Frequency of Social Gatherings with Friends and Family: Not on file    Attends Restoration Services: Not on file    Active Member of [address] [place] [address] [employer] or Organizations: Not on file    Attends Club or Organization Meetings: Not on file    Marital Status: Not on file   Intimate Partner Violence:     Fear of Current or Ex-Partner: Not on file    Emotionally Abused: Not on file    Physically Abused: Not on file    Sexually Abused: Not on file   Housing Stability:     Unable to Pay for Housing in the Last Year: Not on file    Number of [place] in the Last Year: Not on file    Unstable Housing in the Last Year: Not on file       DRUG/FOOD ALLERGIES: Patient has no known allergies. CURRENT MEDICATIONS  Prior to Admission medications    Medication Sig Start Date End Date Taking?  Authorizing Provider   telmisartan (MICARDIS) 80 MG tablet TAKE 1 TABLET BY MOUTH EVERY DAY 11/24/21  Yes Pepito Leal DO   NIFEdipine (PROCARDIA XL) 90 MG extended release tablet TAKE 1 TABLET BY MOUTH EVERY DAY 9/8/21  Yes Pepito Leal DO   pantoprazole (PROTONIX) 40 MG tablet TAKE 1 TABLET BY MOUTH EVERY DAY BEFORE BREAKFAST 9/8/21  Yes Pepito Leal DO   carvedilol (COREG) 25 MG tablet TAKE 1 TABLET BY MOUTH TWICE A DAY 7/21/21  Yes Pepito Leal DO   clopidogrel (PLAVIX) 75 MG tablet TAKE 1 TABLET BY MOUTH EVERY DAY 2/10/21  Yes Pepito Leal, DO   ferrous sulfate (FEROSUL) 325 (65 Fe) MG tablet Take 1 tablet by mouth daily (with breakfast) 7/12/19  Yes Pepito Leal DO   acetaminophen (TYLENOL) 325 MG tablet Take 2 tablets by mouth every 6 hours as needed for Pain 9/15/18  Yes Brian Segura MD   aspirin 81 MG tablet Take 1 tablet by mouth daily 9/22/18  Yes Brian Segura MD   Probiotic Product (ALIGN PO) Take 1 tablet by mouth daily    Yes Historical Provider, MD   Cholecalciferol (VITAMIN D) 2000 units CAPS capsule Take 1 capsule by mouth daily   Yes Historical Provider, MD   fluticasone (FLONASE) 50 MCG/ACT nasal spray 1 spray by Nasal route daily 9/16/21 12/18/21  Star Heller MD         Lab Studies:  Lab Results   Component Value Date    WBC 4.8 07/19/2021    HGB 13.4 (L) 07/19/2021    HCT 39.9 (L) 07/19/2021    .9 (H) 07/19/2021     07/19/2021     Lab Results   Component Value Date    GLUCOSE 110 (H) 07/19/2021    BUN 11 07/19/2021    CREATININE 1.0 07/19/2021    K 5.3 (H) 07/19/2021     (L) 07/19/2021    CL 95 (L) 07/19/2021    CALCIUM 9.7 07/19/2021     Lab Results   Component Value Date    MG 2.20 03/17/2021     Lab Results   Component Value Date    PHOS 4.0 12/14/2016     Lab Results   Component Value Date    ALKPHOS 75 06/11/2021    ALT 11 06/11/2021    AST 30 06/11/2021    BILITOT 1.0 06/11/2021    PROT 7.8 06/11/2021     Review of Systems   Constitutional: Negative for activity change, appetite change, chills, fatigue and fever. HENT: Negative for congestion, ear discharge, ear pain, facial swelling, hearing loss, nosebleeds, postnasal drip, rhinorrhea, sinus pressure, sinus pain, sneezing, sore throat, tinnitus, trouble swallowing and voice change. Eyes: Negative for pain, redness, itching and visual disturbance. Respiratory: Negative for cough, choking and shortness of breath. Gastrointestinal: Negative for diarrhea and nausea. Endocrine: Negative for cold intolerance and heat intolerance. Objective:   Physical Exam  Constitutional:       Appearance: He is well-developed. HENT:      Head: Not macrocephalic and not microcephalic. No Norris's sign, abrasion, right periorbital erythema, left periorbital erythema or laceration. Nose: No nasal deformity, septal deviation, laceration, mucosal edema or rhinorrhea. Right Nostril: No epistaxis or occlusion. Left Nostril: No epistaxis or occlusion. Right Turbinates: Not enlarged, swollen or pale. Left Turbinates: Not enlarged, swollen or pale. Right Sinus: No maxillary sinus tenderness or frontal sinus tenderness. Left Sinus: No maxillary sinus tenderness or frontal sinus tenderness. Mouth/Throat:      Lips: No lesions. Mouth: Mucous membranes are moist.      Tongue: No lesions. Palate: No mass. Pharynx: Uvula midline. No oropharyngeal exudate or posterior oropharyngeal erythema. Tonsils: No tonsillar abscesses. Eyes:      General:         Right eye: No discharge. Left eye: No discharge. Extraocular Movements:      Right eye: Normal extraocular motion. Left eye: Normal extraocular motion. Conjunctiva/sclera:      Right eye: Right conjunctiva is not injected. No chemosis or exudate. Left eye: Left conjunctiva is not injected. No chemosis or exudate. Neck:      Thyroid: No thyroid mass or thyromegaly. Cardiovascular:      Rate and Rhythm: Normal rate and regular rhythm. Pulmonary:      Effort: No tachypnea or respiratory distress. Lymphadenopathy:      Head:      Right side of head: No preauricular or posterior auricular adenopathy. Left side of head: No preauricular or posterior auricular adenopathy. Cervical:      Right cervical: No superficial, deep or posterior cervical adenopathy.      Left cervical: No superficial, deep or posterior cervical adenopathy. Neurological:      Mental Status: He is alert and oriented to person, place, and time. Due to the patients chronic sinus disease and/or history of sinonasal neoplasm for surveillance a nasal endoscopy with or without debridement will be performed to complete a significant physical examination of the patient which cannot be performed by anterior rhinoscopy alone (failure of complete examination of the paranasal sinuses). Failure to provide this procedure may lead to late detection of significant chronic benign disease, acute exacerbation, resolution or failure of early diagnosis of recurrent cancer. The procedure report is present in the body of the chart. Nasal Endoscopy    Pre OP: CRS  Post OP: Same  Reason: Surveillance exam  Procedure: Nasal endoscopy  Surgeon: Falguni Lew  Anesthesia: Afrin with 2% lidocaine  Estimated Blood Loss: None      After obtaining verbal consent from the patient 1% lidocaine with afrin was sprayed into the nasal cavities. After allowing a time for anesthesia, a nasal endoscope was placed into the nostril. The septum, inferior, and middle turbinates were examined. The middle meatus, and sphenoethmoid recess was examined bilaterally. Cultures were not obtained from the sinuses. There were no complications. Pertinent positives included: There was not edema and purulence in the left middle meatus. There was not edema and purulence at the right middle meatus. Polyps were not identified in the  sinuses. Masses were not identified. Scattered mucus crusting. Sinuses widely patent    Tolerated well without complication. I attest that I was present for and did the entire procedure myself. Assessment:       Diagnosis Orders   1. Chronic pansinusitis             Plan:      Continue saline rinses BID. Follow up as needed.      Total time spent with the patient reviewing charts, lab tests, images, outside medical visits, history, examination, answering  questions and formulating treatment plan was 12 minutes.   New 2 - 15 Return 2 - 10  New 3 - 30 Return 3 - 20  New 4 - 45 Return 4 - 30  New 5 - 60 Return 5 - 40+          Mckenna Urbina MD

## 2021-12-23 ENCOUNTER — PROCEDURE VISIT (OUTPATIENT)
Dept: SURGERY | Age: 71
End: 2021-12-23
Payer: MEDICARE

## 2021-12-23 DIAGNOSIS — I73.9 PAD (PERIPHERAL ARTERY DISEASE) (HCC): ICD-10-CM

## 2021-12-23 DIAGNOSIS — I65.23 CAROTID STENOSIS, ASYMPTOMATIC, BILATERAL: Primary | ICD-10-CM

## 2021-12-23 DIAGNOSIS — K55.1 MESENTERIC ARTERY STENOSIS (HCC): ICD-10-CM

## 2021-12-23 DIAGNOSIS — K55.1 ATHEROSCLEROSIS OF SUPERIOR MESENTERIC ARTERY (HCC): ICD-10-CM

## 2021-12-27 ENCOUNTER — HOSPITAL ENCOUNTER (OUTPATIENT)
Dept: CT IMAGING | Age: 71
Discharge: HOME | End: 2021-12-27
Payer: MEDICARE

## 2021-12-27 DIAGNOSIS — Z87.891 PERSONAL HISTORY OF TOBACCO USE: ICD-10-CM

## 2021-12-27 PROCEDURE — 93925 LOWER EXTREMITY STUDY: CPT | Performed by: SURGERY

## 2021-12-27 PROCEDURE — 93880 EXTRACRANIAL BILAT STUDY: CPT | Performed by: SURGERY

## 2021-12-27 PROCEDURE — 71271 CT THORAX LUNG CANCER SCR C-: CPT

## 2021-12-27 PROCEDURE — 93978 VASCULAR STUDY: CPT | Performed by: SURGERY

## 2021-12-29 ENCOUNTER — TELEPHONE (OUTPATIENT)
Dept: CT IMAGING | Age: 71
End: 2021-12-29

## 2022-01-05 ENCOUNTER — TELEPHONE (OUTPATIENT)
Dept: SURGERY | Age: 72
End: 2022-01-05

## 2022-01-05 NOTE — TELEPHONE ENCOUNTER
Discussed scan results. Patient has right SFA and popliteal stenosis; hx of angioplasty/atherectomy in 2018. Scheduled with Dr. Gianfranco Alvarez on 1/10/22.

## 2022-01-10 ENCOUNTER — OFFICE VISIT (OUTPATIENT)
Dept: VASCULAR SURGERY | Age: 72
End: 2022-01-10
Payer: MEDICARE

## 2022-01-10 VITALS
SYSTOLIC BLOOD PRESSURE: 138 MMHG | HEIGHT: 66 IN | DIASTOLIC BLOOD PRESSURE: 74 MMHG | HEART RATE: 65 BPM | WEIGHT: 137 LBS | BODY MASS INDEX: 22.02 KG/M2

## 2022-01-10 DIAGNOSIS — I73.9 PERIPHERAL ARTERIAL DISEASE (HCC): Primary | ICD-10-CM

## 2022-01-10 PROCEDURE — 1036F TOBACCO NON-USER: CPT | Performed by: STUDENT IN AN ORGANIZED HEALTH CARE EDUCATION/TRAINING PROGRAM

## 2022-01-10 PROCEDURE — 3017F COLORECTAL CA SCREEN DOC REV: CPT | Performed by: STUDENT IN AN ORGANIZED HEALTH CARE EDUCATION/TRAINING PROGRAM

## 2022-01-10 PROCEDURE — 4040F PNEUMOC VAC/ADMIN/RCVD: CPT | Performed by: STUDENT IN AN ORGANIZED HEALTH CARE EDUCATION/TRAINING PROGRAM

## 2022-01-10 PROCEDURE — 1123F ACP DISCUSS/DSCN MKR DOCD: CPT | Performed by: STUDENT IN AN ORGANIZED HEALTH CARE EDUCATION/TRAINING PROGRAM

## 2022-01-10 PROCEDURE — G8484 FLU IMMUNIZE NO ADMIN: HCPCS | Performed by: STUDENT IN AN ORGANIZED HEALTH CARE EDUCATION/TRAINING PROGRAM

## 2022-01-10 PROCEDURE — 99214 OFFICE O/P EST MOD 30 MIN: CPT | Performed by: STUDENT IN AN ORGANIZED HEALTH CARE EDUCATION/TRAINING PROGRAM

## 2022-01-10 PROCEDURE — G8420 CALC BMI NORM PARAMETERS: HCPCS | Performed by: STUDENT IN AN ORGANIZED HEALTH CARE EDUCATION/TRAINING PROGRAM

## 2022-01-10 PROCEDURE — G8427 DOCREV CUR MEDS BY ELIG CLIN: HCPCS | Performed by: STUDENT IN AN ORGANIZED HEALTH CARE EDUCATION/TRAINING PROGRAM

## 2022-01-10 ASSESSMENT — ENCOUNTER SYMPTOMS: SHORTNESS OF BREATH: 1

## 2022-01-10 NOTE — PROGRESS NOTES
Yoan Raman (:  1950) is a 80 y.o. male,Established patient, here for evaluation of the following chief complaint(s):  No chief complaint on file. ASSESSMENT/PLAN:  1. Peripheral arterial disease ([address]       This is a 10year-old male patient presents the office today for evaluation of bilateral lower extremity claudication symptoms. He states that both are of equal severity. His femoropopliteal bypass is patent on the left side. However there may be some underlying stenoses that are otherwise not ascertainable at rest. He wants both legs looked at at the same time. I discussed angiography with him. He has a good strong left radial pulse. We could consider treating him from a radial perspective. I discussed all the risks, benefits, alternatives with proceeding. He wants to go ahead and proceed with intervention. We will plan to go ahead and schedule him for an elective angiogram. He can go ahead and continue his aspirin Plavix for the procedure. All questions answered. Subjective   SUBJECTIVE/OBJECTIVE:  This is a 60-year-old male patient presents the office today for evaluation of bilateral lower extremity calf claudication. It is pretty consistent. It is relieved with rest. He denies any smoking currently. He is status post left femoral to tibial peroneal trunk bypass about 5 years ago. He is also status post aorto SMA bypass about 5 years ago as well for ischemic colitis. From that perspective his grafts are stable. His graft appears to be wide open on the left side. However he does endorse claudication of equal severity on the left side compared to the right side. On his right side his native system is experiencing increased velocity shift and stenosis in the proximal superficial femoral artery. He denies any rest pain or tissue loss. Denies any numbness or tingling. No chest pain or shortness of breath at this time. However he does have some shortness of breath with going up steps. He takes aspirin and Plavix. Review of Systems   Constitutional: Negative for chills and fever. Respiratory: Positive for shortness of breath (going up steps). Cardiovascular: Negative for chest pain and leg swelling. Musculoskeletal: Positive for myalgias. Skin: Negative for wound. Neurological: Negative for weakness and numbness. Hematological: Does not bruise/bleed easily. Psychiatric/Behavioral: Negative for agitation. Objective   Physical Exam  Constitutional:       Appearance: Normal appearance. Cardiovascular:      Rate and Rhythm: Normal rate and regular rhythm. Comments: Non palpable pedal pulses, palpable left graft pulse around knee  Pulmonary:      Effort: Pulmonary effort is normal. No respiratory distress. Musculoskeletal:         General: Normal range of motion. Right lower leg: No edema. Left lower leg: No edema. Skin:     General: Skin is warm and dry. Capillary Refill: Capillary refill takes less than 2 seconds. Neurological:      General: No focal deficit present. Mental Status: He is alert. Psychiatric:         Mood and Affect: Mood normal.         Behavior: Behavior normal.         Thought Content: Thought content normal.         Judgment: Judgment normal.            On this date 1/10/2022 I have spent 35 minutes reviewing previous notes, test results and face to face with the patient discussing the diagnosis and importance of compliance with the treatment plan as well as documenting on the day of the visit. Keegan Simpson DO, [address] [facility]    This document was dictated using voice recognition software.

## 2022-01-12 DIAGNOSIS — I10 ESSENTIAL HYPERTENSION: ICD-10-CM

## 2022-01-12 RX ORDER — CARVEDILOL 25 MG/1
TABLET ORAL
Qty: 180 TABLET | Refills: 1 | Status: SHIPPED | OUTPATIENT
Start: 2022-01-12

## 2022-01-14 ENCOUNTER — TELEPHONE (OUTPATIENT)
Dept: SURGERY | Age: 72
End: 2022-01-14

## 2022-01-14 NOTE — TELEPHONE ENCOUNTER
Spoke with patient regarding scheduled Angiogram on 01- with Dr. Suzy Quinn. Patient is asked to arrive by 6:30 AM @ [facility] after midnight. May take any Heart or Blood Pressure medication with a small sip of water to wash them down. Hold Aspirin and Plavix on the morning of this procedure. You will need someone to drive you home following this procedure, and to stay with you for the remainder of the day. Please bring a Photo ID & Insurance card with you, and check-in at the [facility] down the right-hand hallway on the first floor. Patient advised his will need a CBC and BMP blood draw prior to this procedure. Angiogram is scheduled to start at approx. 8:00 AM and should take approx. 90 minutes. If the hospital needs any further information, someone will give you a call. Patient expressed a verbal understanding of these instructions and had no further questions at this time. Mailed instruction letter. Call ended.

## 2022-01-17 ASSESSMENT — ENCOUNTER SYMPTOMS
VOMITING: 0
WHEEZING: 0
BACK PAIN: 0
COUGH: 0
SHORTNESS OF BREATH: 0
ABDOMINAL PAIN: 0
CHEST TIGHTNESS: 0
COLOR CHANGE: 0
FACIAL SWELLING: 0
BLOOD IN STOOL: 0
EYE DISCHARGE: 0
ABDOMINAL DISTENTION: 0

## 2022-01-17 NOTE — PROGRESS NOTES
[facility]     Outpatient Cardiology         Chief Complaint   Patient presents with    Coronary Artery Disease     one year follow up       HPI     Kristina Hubbard a 80 y.o. male here follow up for CAD. Hx of CAD seen coronary calcifications on CT, HLD, HTN, PVD, CVA, COPD, CKD. Holter monitoring that showed 4 beat of nonsustained VT in the period of a month. He is totally asymptomatic. Hypertension, well controlled on medications, no side effects will continue. Hyperlipidemia, controlled, continue statin. No side effects. NSVT, only 4 beats during 1 month monitoring. Normal echo, normal stress test.  Continue current dose of beta-blocker. No symptoms concerning for obstructive CAD or heart failure. continue risk factor modification. He is very active  Yesterday had balloon angioplasty to right femoral artery, symptoms are somewhat improved.       PMH  Past Medical History:   Diagnosis Date    Abdominal aortic atherosclerosis (HCC)     Allergic rhinitis     Atherosclerosis of superior mesenteric artery ([address]     CAD (coronary artery disease)     Cerebral artery occlusion with cerebral infarction (HCC)     Chronic kidney disease     Chronic pansinusitis 08/06/2021    Clostridium difficile colitis 06/2020    Colon polyps     COPD (chronic obstructive pulmonary disease) ([address]     COVID-19 virus infection 11/15/2021    CVA (cerebral infarction) 05/2013    Multiple, occipital and Cerebellar     DDD (degenerative disc disease)     Cerivical DDD    Dizziness     Epicondylitis elbow, medial     GERD (gastroesophageal reflux disease)     Headache     Hyperlipidemia     Hypertension     Ischemic colitis ([address] 11/2016    Lumbar foraminal stenosis 07/20/2020    Lung disease     Osteoarthritis, hand     PVD (peripheral vascular disease) with claudication (HCC)     Left leg with mod-severe arterial ischemia    Renal artery atherosclerosis (HCC)     SMA stenosis 2016    Spinal stenosis of lumbar region without neurogenic claudication 2020    Thoracic aorta atherosclerosis (HCC)     Venous insufficiency     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 Izabela Wilcox Left 2016    left femoral-popliteal bypass graft.  ARTERIAL BYPASS SURGRY  2016    Dr. Marilee Mon - aorto-SMA bypass using 8mm PTFE    ATHERECTOMY Right 2019    Atherectomy with angioplasty right SFA and popliteal arteries, atherectomy and angioplasty right tibial peroneal trunk    COLONOSCOPY  2008    COLONOSCOPY  2015    Dr. Yuni Hayes  2018    Dr. Braden Chapa Bilateral 2013    bilateral with mesh, Dr. Neo Arevalo  10/23/2020    L4-5 Decompression with anterior/posterior fusion, Dr. Chidi Perez SEPTOPLASTY Bilateral 2021    BILATERAL FRONTO-ETHMOIDECTOMY, BILATERAL MAXILLARY ANTROSTOMY WITH TISSUE REMOVAL AND SEPTOPLASTY NASAL SCOPE performed by Mandi Morales MD at [facility]  2015    Dr. Rufus Aldrich N/A 2018    EGD BIOPSY performed by Damion Araujo MD at [address] [place] [address]  2003    R Leg Vein stripping        Social HIstory  Social History     Tobacco Use    Smoking status: Former Smoker     Packs/day: 0.50     Years: 47.00     Pack years: 23.50     Types: Cigarettes     Start date: 1970     Quit date: 2016     Years since quittin.5    Smokeless tobacco: Never Used    Tobacco comment: Maintain cessation   Substance Use Topics    Alcohol use:  Yes     Alcohol/week: 0.0 standard drinks     Comment: Non-alcoholic beer    Drug use: No       Family History  Family History   Problem Relation Age of Onset    Heart Attack Father     Heart Disease Father         MI    Cancer Brother         Brain CA    Hypertension Brother     Cancer Brother         Throat cancer       Allergies   No Known Allergies    Medications:     Home Medications:  Were reviewed and are listed in nursing record. and/or listed below    Prior to Admission medications    Medication Sig Start Date End Date Taking? Authorizing Provider   carvedilol (COREG) 25 MG tablet TAKE 1 TABLET BY MOUTH TWICE A DAY 1/12/22  Yes Pepito Leal DO   telmisartan (MICARDIS) 80 MG tablet TAKE 1 TABLET BY MOUTH EVERY DAY 11/24/21  Yes Pepito Leal DO   NIFEdipine (PROCARDIA XL) 90 MG extended release tablet TAKE 1 TABLET BY MOUTH EVERY DAY 9/8/21  Yes Pepito Leal DO   pantoprazole (PROTONIX) 40 MG tablet TAKE 1 TABLET BY MOUTH EVERY DAY BEFORE BREAKFAST 9/8/21  Yes Pepito Leal DO   clopidogrel (PLAVIX) 75 MG tablet TAKE 1 TABLET BY MOUTH EVERY DAY 2/10/21  Yes Pepito Leal DO   ferrous sulfate (FEROSUL) 325 (65 Fe) MG tablet Take 1 tablet by mouth daily (with breakfast) 7/12/19  Yes Pepito Leal DO   acetaminophen (TYLENOL) 325 MG tablet Take 2 tablets by mouth every 6 hours as needed for Pain 9/15/18  Yes Silverio Franco MD   aspirin 81 MG tablet Take 1 tablet by mouth daily 9/22/18  Yes Silverio Franco MD   Probiotic Product (ALIGN PO) Take 1 tablet by mouth daily    Yes Historical Provider, MD   Cholecalciferol (VITAMIN D) 2000 units CAPS capsule Take 1 capsule by mouth daily   Yes Historical Provider, MD   fluticasone Wash Curly) 50 MCG/ACT nasal spray 1 spray by Nasal route daily 9/16/21 12/18/21  Migue Guzman MD        Review of Systems   Constitutional: Negative for activity change, appetite change, diaphoresis, fatigue, fever and unexpected weight change. HENT: Negative for congestion, facial swelling, mouth sores and nosebleeds.     Eyes: Negative for discharge and visual disturbance. Respiratory: Negative for cough, chest tightness, shortness of breath and wheezing. Cardiovascular: Negative for chest pain, palpitations and leg swelling. Gastrointestinal: Negative for abdominal distention, abdominal pain, blood in stool and vomiting. Endocrine: Negative for cold intolerance, heat intolerance and polyuria. Genitourinary: Negative for difficulty urinating, dysuria, frequency and hematuria. Musculoskeletal: Negative for back pain, joint swelling, myalgias and neck pain. Skin: Negative for color change, pallor and rash. Allergic/Immunologic: Negative for immunocompromised state. Neurological: Negative for dizziness, syncope, weakness, light-headedness, numbness and headaches. Hematological: Negative for adenopathy. Does not bruise/bleed easily. Psychiatric/Behavioral: Negative for behavioral problems, confusion, decreased concentration and suicidal ideas. The patient is not nervous/anxious. Vitals:    01/20/22 1330   BP: 104/62   Pulse: 70    Weight: 135 lb 12.8 oz (61.6 kg)       Vitals:    01/20/22 1330   BP: 104/62   Pulse: 70   Weight: 135 lb 12.8 oz (61.6 kg)   Height: 5' 6\" (1.676 m)       BP Readings from Last 3 Encounters:   01/20/22 104/62   01/19/22 128/81   01/10/22 138/74       Wt Readings from Last 3 Encounters:   01/20/22 135 lb 12.8 oz (61.6 kg)   01/19/22 134 lb (60.8 kg)   01/10/22 137 lb (62.1 kg)       Physical Exam  Constitutional:       General: He is not in acute distress. Appearance: He is well-developed. He is not diaphoretic. HENT:      Head: Normocephalic and atraumatic. Eyes:      Pupils: Pupils are equal, round, and reactive to light. Neck:      Thyroid: No thyromegaly. Vascular: No JVD. Cardiovascular:      Rate and Rhythm: Normal rate and regular rhythm. Chest Wall: PMI is not displaced. Heart sounds: Normal heart sounds, S1 normal and S2 normal. No murmur heard. No friction rub. No gallop. Pulmonary:      Effort: Pulmonary effort is normal. No respiratory distress. Breath sounds: Normal breath sounds. No stridor. No wheezing or rales. Chest:      Chest wall: No tenderness. Abdominal:      General: Bowel sounds are normal. There is no distension. Palpations: Abdomen is soft. Tenderness: There is no abdominal tenderness. There is no guarding or rebound. Musculoskeletal:         General: No tenderness. Normal range of motion. Cervical back: Normal range of motion. Lymphadenopathy:      Cervical: No cervical adenopathy. Skin:     General: Skin is warm and dry. Findings: No erythema or rash. Neurological:      Mental Status: He is alert and oriented to person, place, and time. Coordination: Coordination normal.   Psychiatric:         Behavior: Behavior normal.         Thought Content:  Thought content normal.         Judgment: Judgment normal.         Labs:       Lab Results   Component Value Date    WBC 4.3 01/17/2022    HGB 13.2 (L) 01/17/2022    HCT 39.4 (L) 01/17/2022    .0 (H) 01/17/2022     01/17/2022     Lab Results   Component Value Date     (L) 01/17/2022    K 5.6 (H) 01/17/2022    CL 92 (L) 01/17/2022    CO2 22 01/17/2022    BUN 15 01/17/2022    CREATININE 1.1 01/17/2022    GLUCOSE 120 (H) 01/17/2022    CALCIUM 9.8 01/17/2022    PROT 7.5 01/17/2022    LABALBU 4.7 01/17/2022    BILITOT 0.9 01/17/2022    ALKPHOS 65 01/17/2022    AST 25 01/17/2022    ALT 10 01/17/2022    LABGLOM >60 01/17/2022    GFRAA >60 01/17/2022    AGRATIO 1.7 01/17/2022    GLOB 3.1 06/11/2021         Lab Results   Component Value Date    CHOL 139 01/17/2022    CHOL 110 03/17/2021    CHOL 129 03/16/2020     Lab Results   Component Value Date    TRIG 87 01/17/2022    TRIG 75 03/17/2021    TRIG 91 03/16/2020     Lab Results   Component Value Date    HDL 73 (H) 01/17/2022    HDL 55 03/17/2021    HDL 63 (H) 03/16/2020     Lab Results   Component Value Date    LDLCALC 49 01/17/2022    LDLCALC 40 03/17/2021    LDLCALC 48 03/16/2020     Lab Results   Component Value Date    LABVLDL 17 01/17/2022    LABVLDL 15 03/17/2021    LABVLDL 18 03/16/2020     No results found for: [facility]    Lab Results   Component Value Date    INR 0.99 09/14/2018    INR 0.93 12/23/2017    INR 0.97 12/01/2016    PROTIME 11.3 09/14/2018    PROTIME 10.5 12/23/2017    PROTIME 11.0 12/01/2016       The 10-year ASCVD risk score (Kg Almazan, et al., 2013) is: 10.5%    Values used to calculate the score:      Age: 80 years      Sex: Male      Is Non- : No      Diabetic: No      Tobacco smoker: No      Systolic Blood Pressure: 953 mmHg      Is BP treated: Yes      HDL Cholesterol: 73 mg/dL      Total Cholesterol: 139 mg/dL      Imaging:       Last ECG (if available):    Last Monitor/Holter: 1/8/20  NSVT     Last Stress (if available): 6/29/20  Conclusions        Summary    There is normal isotope uptake at stress and rest. There is no evidence of    myocardial ischemia or scar.    The LVEF is greater than 70% with normal LV wall motion.        This is a low risk cardiac scan. Last Cath (if available):    Last TTE/GEMINI(if available): 8/28/20  Summary   Normal left ventricle size. There is mild concentric left ventricular   hypertrophy. Overall left ventricular systolic function appears normal with   an ejection fraction of 55-60%. No regional wall motion abnormalities are   noted. Diastolic filling parameters suggests normal diastolic function. Mild aortic stenosis with a peak velocity of 2.65m/s and a mean pressure   gradient of 13mmHg. Fixed non coronary cusp. Mild tricuspid regurgitation. Estimated pulmonary artery systolic pressure is 30 mmHg assuming a right   atrial pressure of 3 mmHg.     Last CMR  (if available):      Assessment / Plan:     Coronary artery calcification seen on CAT scan  Normal stress test.  Resume Lipitor given that symptoms did not improve after holding

## 2022-01-19 ENCOUNTER — HOSPITAL ENCOUNTER (OUTPATIENT)
Dept: CARDIAC CATH/INVASIVE PROCEDURES | Age: 72
Discharge: HOME | End: 2022-01-19
Payer: MEDICARE

## 2022-01-19 VITALS
TEMPERATURE: 97.4 F | SYSTOLIC BLOOD PRESSURE: 128 MMHG | DIASTOLIC BLOOD PRESSURE: 81 MMHG | HEIGHT: 66 IN | RESPIRATION RATE: 12 BRPM | WEIGHT: 134 LBS | HEART RATE: 66 BPM | OXYGEN SATURATION: 100 % | BODY MASS INDEX: 21.53 KG/M2

## 2022-01-19 DIAGNOSIS — E87.5 HYPERKALEMIA: ICD-10-CM

## 2022-01-19 DIAGNOSIS — E87.1 HYPONATREMIA: ICD-10-CM

## 2022-01-19 DIAGNOSIS — E87.1 HYPONATREMIA: Primary | ICD-10-CM

## 2022-01-19 PROCEDURE — 6360000002 HC RX W HCPCS

## 2022-01-19 PROCEDURE — 36247 INS CATH ABD/L-EXT ART 3RD: CPT

## 2022-01-19 PROCEDURE — 99152 MOD SED SAME PHYS/QHP 5/>YRS: CPT

## 2022-01-19 PROCEDURE — 2580000003 HC RX 258

## 2022-01-19 PROCEDURE — C1887 CATHETER, GUIDING: HCPCS

## 2022-01-19 PROCEDURE — C1769 GUIDE WIRE: HCPCS

## 2022-01-19 PROCEDURE — 75716 ARTERY X-RAYS ARMS/LEGS: CPT | Performed by: STUDENT IN AN ORGANIZED HEALTH CARE EDUCATION/TRAINING PROGRAM

## 2022-01-19 PROCEDURE — C1760 CLOSURE DEV, VASC: HCPCS

## 2022-01-19 PROCEDURE — 75625 CONTRAST EXAM ABDOMINL AORTA: CPT | Performed by: STUDENT IN AN ORGANIZED HEALTH CARE EDUCATION/TRAINING PROGRAM

## 2022-01-19 PROCEDURE — 75625 CONTRAST EXAM ABDOMINL AORTA: CPT

## 2022-01-19 PROCEDURE — 99153 MOD SED SAME PHYS/QHP EA: CPT

## 2022-01-19 PROCEDURE — 99152 MOD SED SAME PHYS/QHP 5/>YRS: CPT | Performed by: STUDENT IN AN ORGANIZED HEALTH CARE EDUCATION/TRAINING PROGRAM

## 2022-01-19 PROCEDURE — 2709999900 HC NON-CHARGEABLE SUPPLY

## 2022-01-19 PROCEDURE — 36245 INS CATH ABD/L-EXT ART 1ST: CPT

## 2022-01-19 PROCEDURE — 36140 INTRO NDL ICATH UPR/LXTR ART: CPT

## 2022-01-19 PROCEDURE — 75716 ARTERY X-RAYS ARMS/LEGS: CPT

## 2022-01-19 PROCEDURE — G0269 OCCLUSIVE DEVICE IN VEIN ART: HCPCS

## 2022-01-19 PROCEDURE — 2500000003 HC RX 250 WO HCPCS

## 2022-01-19 PROCEDURE — 6360000004 HC RX CONTRAST MEDICATION: Performed by: STUDENT IN AN ORGANIZED HEALTH CARE EDUCATION/TRAINING PROGRAM

## 2022-01-19 PROCEDURE — C1725 CATH, TRANSLUMIN NON-LASER: HCPCS

## 2022-01-19 PROCEDURE — C1894 INTRO/SHEATH, NON-LASER: HCPCS

## 2022-01-19 PROCEDURE — 37224 PR REVSC OPN/PRG FEM/POP W/ANGIOPLASTY UNI: CPT | Performed by: STUDENT IN AN ORGANIZED HEALTH CARE EDUCATION/TRAINING PROGRAM

## 2022-01-19 RX ORDER — SODIUM CHLORIDE 0.9 % (FLUSH) 0.9 %
5-40 SYRINGE (ML) INJECTION PRN
Status: DISCONTINUED | OUTPATIENT
Start: 2022-01-19 | End: 2022-01-20 | Stop reason: HOSPADM

## 2022-01-19 RX ORDER — SODIUM CHLORIDE 9 MG/ML
25 INJECTION, SOLUTION INTRAVENOUS PRN
Status: DISCONTINUED | OUTPATIENT
Start: 2022-01-19 | End: 2022-01-19 | Stop reason: SDUPTHER

## 2022-01-19 RX ORDER — SODIUM CHLORIDE 0.9 % (FLUSH) 0.9 %
5-40 SYRINGE (ML) INJECTION EVERY 12 HOURS SCHEDULED
Status: DISCONTINUED | OUTPATIENT
Start: 2022-01-19 | End: 2022-01-19 | Stop reason: SDUPTHER

## 2022-01-19 RX ORDER — SODIUM CHLORIDE 0.9 % (FLUSH) 0.9 %
5-40 SYRINGE (ML) INJECTION PRN
Status: DISCONTINUED | OUTPATIENT
Start: 2022-01-19 | End: 2022-01-19 | Stop reason: SDUPTHER

## 2022-01-19 RX ORDER — SODIUM CHLORIDE 9 MG/ML
INJECTION, SOLUTION INTRAVENOUS CONTINUOUS
Status: DISCONTINUED | OUTPATIENT
Start: 2022-01-19 | End: 2022-01-20 | Stop reason: HOSPADM

## 2022-01-19 RX ORDER — SODIUM CHLORIDE 9 MG/ML
25 INJECTION, SOLUTION INTRAVENOUS PRN
Status: DISCONTINUED | OUTPATIENT
Start: 2022-01-19 | End: 2022-01-20 | Stop reason: HOSPADM

## 2022-01-19 RX ORDER — SODIUM CHLORIDE 0.9 % (FLUSH) 0.9 %
5-40 SYRINGE (ML) INJECTION EVERY 12 HOURS SCHEDULED
Status: DISCONTINUED | OUTPATIENT
Start: 2022-01-19 | End: 2022-01-20 | Stop reason: HOSPADM

## 2022-01-19 RX ORDER — ACETAMINOPHEN 325 MG/1
650 TABLET ORAL EVERY 4 HOURS PRN
Status: DISCONTINUED | OUTPATIENT
Start: 2022-01-19 | End: 2022-01-20 | Stop reason: HOSPADM

## 2022-01-19 RX ADMIN — IOPAMIDOL 130 ML: 755 INJECTION, SOLUTION INTRAVENOUS at 09:28

## 2022-01-19 NOTE — H&P
H&P Update - see note from 1/10/22    I have reviewed the history and physical and examined the patient and find no relevant changes. I have reviewed with the patient and/or family the risks, benefits, and alternatives to the procedure. I HAVE PRESENTED REASONABLE ALTERNATIVES TO THE PATIENT'S PROPOSED CARE, TREATMENT, AND SERVICES. THE DISCUSSION I HAVE DONE ENCOMPASSED RISKS, BENEFITS, AND SIDE EFFECTS RELATED TO THE ALTERNATIVES AND THE RISKS RELATED TO NOT RECEIVING THE PROPOSED CARE, TREATMENT, SERVICES. Pre-sedation Assessment    Patient:  Marsha Douglas   :   1950  Intended Procedure: bilateral lower extremity angiogram    Vitals:    22 0718   BP: 128/81   Pulse: 66   Resp: 12   Temp: 97.4 °F (36.3 °C)   SpO2: 100%       Nursing notes reviewed and agreed. Medications reviewed  Allergies: No Known Allergies      Pre-Procedure Assessment/Plan:  ASA 2 - Patient with mild systemic disease with no functional limitations    Mallampati Airway Assessment:  Mallampati Class II - (soft palate, fauces & uvula are visible)    Level of Sedation Plan: Moderate sedation    Post Procedure plan: Return to same level of care    Oanh Urbina DO, [address] [facility] Vascular and Endovascular Surgery

## 2022-01-20 ENCOUNTER — OFFICE VISIT (OUTPATIENT)
Dept: CARDIOLOGY CLINIC | Age: 72
End: 2022-01-20
Payer: MEDICARE

## 2022-01-20 VITALS
HEART RATE: 70 BPM | DIASTOLIC BLOOD PRESSURE: 62 MMHG | BODY MASS INDEX: 21.83 KG/M2 | WEIGHT: 135.8 LBS | SYSTOLIC BLOOD PRESSURE: 104 MMHG | HEIGHT: 66 IN

## 2022-01-20 DIAGNOSIS — I25.10 CORONARY ARTERY CALCIFICATION SEEN ON CAT SCAN: ICD-10-CM

## 2022-01-20 DIAGNOSIS — I10 ESSENTIAL HYPERTENSION: Primary | ICD-10-CM

## 2022-01-20 DIAGNOSIS — I25.10 CORONARY ARTERY DISEASE INVOLVING NATIVE CORONARY ARTERY OF NATIVE HEART WITHOUT ANGINA PECTORIS: ICD-10-CM

## 2022-01-20 DIAGNOSIS — E78.2 MIXED HYPERLIPIDEMIA: ICD-10-CM

## 2022-01-20 DIAGNOSIS — I65.23 CAROTID STENOSIS, ASYMPTOMATIC, BILATERAL: ICD-10-CM

## 2022-01-20 DIAGNOSIS — I47.29 NSVT (NONSUSTAINED VENTRICULAR TACHYCARDIA): ICD-10-CM

## 2022-01-20 DIAGNOSIS — I70.0 ATHEROSCLEROSIS OF ABDOMINAL AORTA (HCC): ICD-10-CM

## 2022-01-20 DIAGNOSIS — Z95.828 S/P VASCULAR BYPASS: ICD-10-CM

## 2022-01-20 PROCEDURE — 3017F COLORECTAL CA SCREEN DOC REV: CPT | Performed by: INTERNAL MEDICINE

## 2022-01-20 PROCEDURE — 1036F TOBACCO NON-USER: CPT | Performed by: INTERNAL MEDICINE

## 2022-01-20 PROCEDURE — G8427 DOCREV CUR MEDS BY ELIG CLIN: HCPCS | Performed by: INTERNAL MEDICINE

## 2022-01-20 PROCEDURE — 99214 OFFICE O/P EST MOD 30 MIN: CPT | Performed by: INTERNAL MEDICINE

## 2022-01-20 PROCEDURE — G8420 CALC BMI NORM PARAMETERS: HCPCS | Performed by: INTERNAL MEDICINE

## 2022-01-20 PROCEDURE — G8484 FLU IMMUNIZE NO ADMIN: HCPCS | Performed by: INTERNAL MEDICINE

## 2022-01-20 PROCEDURE — 1123F ACP DISCUSS/DSCN MKR DOCD: CPT | Performed by: INTERNAL MEDICINE

## 2022-01-20 PROCEDURE — 4040F PNEUMOC VAC/ADMIN/RCVD: CPT | Performed by: INTERNAL MEDICINE

## 2022-01-20 PROCEDURE — 93000 ELECTROCARDIOGRAM COMPLETE: CPT | Performed by: INTERNAL MEDICINE

## 2022-01-20 NOTE — OP NOTE
[address] [place] [address] [facility]                                OPERATIVE REPORT    PATIENT NAME: Dianne Bajwa                  :        1950  MED REC NO:   [id number]                          ROOM:  ACCOUNT NO:   [de-identified]                           ADMIT DATE: 2022  PROVIDER:     Edgardo Bobby DO      DATE OF PROCEDURE:  2022    PREOPERATIVE DIAGNOSIS:  Peripheral arterial disease with  lifestyle-limiting claudication. OPERATION PERFORMED:  1. Ultrasound-guided access to the left radial artery. 2.  Ultrasound-guided access to the left common femoral artery. 3.  Abdominal aortogram.  4.  Bilateral lower extremity angiogram.  5.  Right lower extremity proximal SFA balloon angioplasty using a  shockwave intravascular lithotripsy 6 x 60 mm balloon. SURGEON:  Edgardo Bobby DO    ASSISTANT:  None. ANESTHESIA:  Local sedation. CONTRAST:  120 mL. ASA CLASSIFICATION:  II.    MALLAMPATI SCORE:  II.    ESTIMATED BLOOD LOSS:  Less than 50 mL. COMPLICATIONS:  None apparent. INDICATIONS:  A 73-year-old male patient who presented to the office for  routine followup. He followed up for repeated interventions in the  past.  He has undergone previous lower extremity endovascular  recanalizations and treatments. He presented with worsening  claudication particularly in his right lower extremity and also his left  lower extremity. He underwent a left fem-pop bypass in the past.  The  right lower extremity was probably more severe in nature. He underwent  surveillance duplex, which demonstrated increased velocities in his  proximal SFA. However, he wanted both of his legs looked at because he  was feeling similar symptoms in both legs. Once again, his fem-pop was  patent on recent duplex, but he wanted those both examined.   All the  risks, benefits, and alternatives were clearly reviewed with the  patient. The risks include pain, infection, bleeding, embolization,  thrombosis. This was clearly reviewed with the patient. OPERATIVE PROCEDURE:  The patient was brought into the cath lab and  placed supine on the table. Appropriate monitoring lines including  continuous blood pressure, EKG, and pulse oximetry were placed on the  patient. We used a combination of Versed and fentanyl for sedation. A  qualified independent observer was in the room to administer sedation  and also observe. I directly observed this as well. The total sedation  time was from 8:11 a.m. to 9:30 a.m. totalling 79 minutes. We used 5 mg  of Versed and 200 mcg of fentanyl. The bilateral groins and also the  left wrist were prepped and draped in the usual sterile fashion. A  time-out was called for indication, patient, and laterality. Left radial artery was then was accessed under ultrasound. I directly  visualized the needle entering the artery. I did give the patient a  radial cocktail which included 3000 units of heparin, 200 mcg of  fentanyl and also 2.5 mg of verapamil. I then proceeded to navigate a  wire and catheter to the access the subclavian and brachial system. I  navigated into abdominal aorta. I did this using a curved flush  catheter. After this, abdominal aortogram demonstrated that the aorta  was patent. The iliac arteries were patent. The renal arteries were  patent. I had to oblique to really visualize the terminal aorta because  the patient had a lumbar prosthesis in place. Both iliacs were patent  and both hypogastrics were patent. The external iliac arteries were  widely patent as well. I then proceeded to perform angiogram to both lower extremities. The  left lower extremity bypass was widely patent.   The right lower  extremity, there was evidence of a patent femoral artery and patent  profunda femoris but severe calcific plaque at the origin and also  tracking posteriorly along the common femoral artery. I decided that in  order to treat this, we would need to use shockwave lithotripsy  balloons. Intravascular lithotripsy was planned. Unfortunately, the  length of the radial artery was not accurate. Therefore, we decided to  abandon treating the left leg if there was a problem. However, I did  visualize the left leg, which demonstrated that the patient had widely  patent fem-pop bypass without any evidence of flow-limiting stenosis. I then proceeded to remove the radial sheath. I did this carefully and  gave 200 mcg of nitroglycerin as necessary in the brachial artery. Hemostasis was achieved with a TR band. I then used the ultrasound to  visualize the left common femoral artery. It was calcified but patent  and pulsatile. I punctured it and directly visualized the needle  entering the vessel. I placed a micro-puncture introducer followed by  Bentson wire, followed by a 6-Panamanian sheath. I then proceeded to  navigate using a flush catheter up and over into the right iliofemoral  system. I then proceeded to navigate a Glide Advantage wire past the  stenosis in the proximal SFA. I brought up and over a 6/45 Terumo  sheath. I did give the patient an extra 3000 units of heparin. I then  proceeded to exchanged this over a catheter for an 0.014 wire. I also  karan wired the profunda femoris in case the plaque began to obstruct  the profunda femoris. After this, I used a 6 x 60 mm shockwave  angioplasty balloon. This was intravascular lithotripsy. After this,  we were able to obtain a good patent lumen. We used six total shocks. Once this was completed, we had improved axial flow down to the  popliteal system. Once again, the left iliofemoral and fem-pop system  appeared to be widely patent. I therefore removed the catheter and wire. I placed a 6-Panamanian Mynx  closure device with good hemostatic effect in the left groin. There  were no immediate complications.   The patient was taken to recovery area  in stable condition. I was present and performed all critical aspects  of this procedure.         Salbador Foote DO    D: 01/19/2022 14:04:21       T: 01/19/2022 19:36:12     [email or web address]  Job#: [id number]     Doc#: [id number]    CC:

## 2022-01-21 LAB — ALDOSTERONE: 19 NG/DL

## 2022-01-28 RX ORDER — CLOPIDOGREL BISULFATE 75 MG/1
TABLET ORAL
Qty: 90 TABLET | Refills: 3 | Status: SHIPPED | OUTPATIENT
Start: 2022-01-28

## 2022-02-01 ENCOUNTER — PROCEDURE VISIT (OUTPATIENT)
Dept: SURGERY | Age: 72
End: 2022-02-01
Payer: MEDICARE

## 2022-02-01 DIAGNOSIS — I73.9 PAD (PERIPHERAL ARTERY DISEASE) (HCC): Primary | ICD-10-CM

## 2022-02-01 DIAGNOSIS — I70.219 ATHEROSCLEROTIC PVD WITH INTERMITTENT CLAUDICATION (HCC): ICD-10-CM

## 2022-02-17 ENCOUNTER — OFFICE VISIT (OUTPATIENT)
Dept: VASCULAR SURGERY | Age: 72
End: 2022-02-17
Payer: MEDICARE

## 2022-02-17 VITALS
DIASTOLIC BLOOD PRESSURE: 67 MMHG | HEART RATE: 66 BPM | HEIGHT: 66 IN | WEIGHT: 137 LBS | SYSTOLIC BLOOD PRESSURE: 121 MMHG | BODY MASS INDEX: 22.02 KG/M2

## 2022-02-17 DIAGNOSIS — I73.9 PERIPHERAL ARTERIAL DISEASE (HCC): Primary | ICD-10-CM

## 2022-02-17 PROCEDURE — 4040F PNEUMOC VAC/ADMIN/RCVD: CPT | Performed by: STUDENT IN AN ORGANIZED HEALTH CARE EDUCATION/TRAINING PROGRAM

## 2022-02-17 PROCEDURE — G8427 DOCREV CUR MEDS BY ELIG CLIN: HCPCS | Performed by: STUDENT IN AN ORGANIZED HEALTH CARE EDUCATION/TRAINING PROGRAM

## 2022-02-17 PROCEDURE — 99213 OFFICE O/P EST LOW 20 MIN: CPT | Performed by: STUDENT IN AN ORGANIZED HEALTH CARE EDUCATION/TRAINING PROGRAM

## 2022-02-17 PROCEDURE — 1123F ACP DISCUSS/DSCN MKR DOCD: CPT | Performed by: STUDENT IN AN ORGANIZED HEALTH CARE EDUCATION/TRAINING PROGRAM

## 2022-02-17 PROCEDURE — G8484 FLU IMMUNIZE NO ADMIN: HCPCS | Performed by: STUDENT IN AN ORGANIZED HEALTH CARE EDUCATION/TRAINING PROGRAM

## 2022-02-17 PROCEDURE — 1036F TOBACCO NON-USER: CPT | Performed by: STUDENT IN AN ORGANIZED HEALTH CARE EDUCATION/TRAINING PROGRAM

## 2022-02-17 PROCEDURE — 3017F COLORECTAL CA SCREEN DOC REV: CPT | Performed by: STUDENT IN AN ORGANIZED HEALTH CARE EDUCATION/TRAINING PROGRAM

## 2022-02-17 PROCEDURE — G8420 CALC BMI NORM PARAMETERS: HCPCS | Performed by: STUDENT IN AN ORGANIZED HEALTH CARE EDUCATION/TRAINING PROGRAM

## 2022-02-17 ASSESSMENT — ENCOUNTER SYMPTOMS
SHORTNESS OF BREATH: 0
BACK PAIN: 1

## 2022-02-17 NOTE — PROGRESS NOTES
Selin Winters (:  1950) is a 71 y.o. male,Established patient, here for evaluation of the following chief complaint(s):  Follow-up         ASSESSMENT/PLAN:  1. Peripheral arterial disease ([address]      Return RLE ADS and OV in 6 mos. This is a 71-year-old male patient who presents to the office to discuss his bilateral lower extremity achiness. He underwent attempted arteriogram.  He underwent proximal SFA angioplasty. However he has had no significant interval change in both extremities. Once again his bypass section looks pretty good. Suspect this may be potentially more of a pseudoclaudication type behavior. That being said he does have significant obstructive plaque at the origin of the SFA. He be a good candidate for localized endarterectomy. Should he have isolated right leg symptoms that are more consistent then we could consider that in the future. For now continue with exercise. Continue with current medical management. Continue with interval follow-up every 6 months. All questions answered. Subjective   SUBJECTIVE/OBJECTIVE:  This is a 71-year-old male patient who presents to the office today to discuss endovascular intervention. He underwent a right proximal SFA angioplasty of a significant calcified plaque. This was done using the shockwave intravascular lithotripsy balloon. Patient tolerated this well. However he said no significant change in his discomfort. He endorses bilateral leg ache. Once again his bypass looked widely patent and only on duplex but also on the arteriogram.  No fevers or chills. No rest pain or tissue loss. Review of Systems   Constitutional: Negative for chills and fever. Respiratory: Negative for shortness of breath. Cardiovascular: Negative for chest pain and leg swelling. Musculoskeletal: Positive for back pain and myalgias. Ache in both legs. Skin: Negative for wound. Neurological: Negative for weakness and numbness. Hematological: Does not bruise/bleed easily. Psychiatric/Behavioral: Negative for agitation. Objective   Physical Exam  Constitutional:       Appearance: Normal appearance. Cardiovascular:      Rate and Rhythm: Normal rate and regular rhythm. Comments: Dopplerable dorsalis pedis pulses bilaterally  Pulmonary:      Effort: Pulmonary effort is normal. No respiratory distress. Musculoskeletal:         General: Normal range of motion. Right lower leg: No edema. Left lower leg: No edema. Skin:     General: Skin is warm. Capillary Refill: Capillary refill takes less than 2 seconds. Neurological:      General: No focal deficit present. Mental Status: He is alert. Psychiatric:         Mood and Affect: Mood normal.         Behavior: Behavior normal.         Thought Content: Thought content normal.         Judgment: Judgment normal.            On this date 2/17/2022 I have spent 20 minutes reviewing previous notes, test results and face to face with the patient discussing the diagnosis and importance of compliance with the treatment plan as well as documenting on the day of the visit. Leanna Barksdale DO, [address] [facility] Vascular and Endovascular Surgery    This document was dictated using voice recognition software.

## 2022-02-18 PROCEDURE — 93926 LOWER EXTREMITY STUDY: CPT | Performed by: SURGERY

## 2022-02-22 RX ORDER — TELMISARTAN 80 MG/1
TABLET ORAL
Qty: 90 TABLET | Refills: 0 | Status: SHIPPED | OUTPATIENT
Start: 2022-02-22 | End: 2022-05-23

## 2022-03-01 DIAGNOSIS — K21.9 GASTROESOPHAGEAL REFLUX DISEASE WITHOUT ESOPHAGITIS: ICD-10-CM

## 2022-03-02 DIAGNOSIS — I10 ESSENTIAL HYPERTENSION: ICD-10-CM

## 2022-03-02 RX ORDER — NIFEDIPINE 90 MG/1
TABLET, EXTENDED RELEASE ORAL
Qty: 90 TABLET | Refills: 1 | Status: ON HOLD | OUTPATIENT
Start: 2022-03-02 | End: 2022-06-24 | Stop reason: DRUGHIGH

## 2022-03-02 RX ORDER — PANTOPRAZOLE SODIUM 40 MG/1
TABLET, DELAYED RELEASE ORAL
Qty: 90 TABLET | Refills: 1 | Status: SHIPPED | OUTPATIENT
Start: 2022-03-02

## 2022-04-12 ENCOUNTER — TELEPHONE (OUTPATIENT)
Dept: INTERNAL MEDICINE CLINIC | Age: 72
End: 2022-04-12

## 2022-04-12 NOTE — TELEPHONE ENCOUNTER
----- Message from Mehran Rosales sent at 4/12/2022  3:30 PM EDT -----  Subject: Appointment Request    Reason for Call: Urgent Abdominal Pain    QUESTIONS  Type of Appointment? Established Patient  Reason for appointment request? No appointments available during search  Additional Information for Provider? Patient states he has stomach pain an   constipation a nd wants to be seen this week. Please call to advise today   if possible.  ---------------------------------------------------------------------------  --------------  CALL BACK INFO  What is the best way for the office to contact you? OK to leave message on   voicemail  Preferred Call Back Phone Number? [phone]  ---------------------------------------------------------------------------  --------------  SCRIPT ANSWERS  Relationship to Patient? Self  Have your symptoms changed? Yes  Do you have pain that has started or worsened within the past 24 hours? No  Are you vomiting blood or have bloody or black stool? No  Have you recently (14 days) seen a provider for this pain? No  Have you been diagnosed with, awaiting test results for, or told that you   are suspected of having COVID-19 (Coronavirus)? (If patient has tested   negative or was tested as a requirement for work, school, or travel and   not based on symptoms, answer no)? No  Within the past 10 days have you developed any of the following symptoms   (answer no if symptoms have been present longer than 10 days or began   more than 10 days ago)? Fever or Chills, Cough, Shortness of breath or   difficulty breathing, Loss of taste or smell, Sore throat, Nasal   congestion, Sneezing or runny nose, Fatigue or generalized body aches   (answer no if pain is specific to a body part e.g. back pain), Diarrhea,   Headache? No  Have you had close contact with someone with COVID-19 in the last 7 days? No  (Service Expert  click yes below to proceed with [employer] Usual   Scheduling)?  Yes

## 2022-04-12 NOTE — TELEPHONE ENCOUNTER
Spoke with patient, he is having some constipation and diarrhea over the past 3 weeks. He doesn't need to be seen urgently. He has an appointment on 4/21/22. In the mean time is there an over the counter medication he can take to help this? ?

## 2022-04-13 NOTE — TELEPHONE ENCOUNTER
Hard to know what he needs.   If alternating between constipation and diarrhea he should try a fiber such as Benefiber 2 teaspoons daily for one week and the 2 teaspoons twice daily or Metamucil 1 teaspoon daily for 3 days then 2 teaspoons daily

## 2022-04-15 RX ORDER — ATORVASTATIN CALCIUM 80 MG/1
TABLET, FILM COATED ORAL
Qty: 90 TABLET | Refills: 1 | Status: SHIPPED | OUTPATIENT
Start: 2022-04-15 | End: 2022-10-10

## 2022-05-23 RX ORDER — TELMISARTAN 80 MG/1
TABLET ORAL
Qty: 90 TABLET | Refills: 0 | Status: ON HOLD | OUTPATIENT
Start: 2022-05-23 | End: 2022-06-27 | Stop reason: HOSPADM

## 2022-05-23 NOTE — TELEPHONE ENCOUNTER
Last appointment: 12/18/2021  Next appointment: Visit date not found  Last refill: 2/22/2022  no return date given at last office visit on 11/30/21

## 2022-06-13 NOTE — PROGRESS NOTES
.no discrepancies  [facility]     [facility]) Physicians  Internal Medicine  Patient Encounter  Oanh Chaudhry D.O., Lendell Flair         Chief Complaint   Patient presents with    Medication Check    Check-Up       HPI: 77 y.o. male with multiple complex medical problems seen today for a routine checkup regarding the status of his current chronic medical problems listed below along with the medication review. He underwent RF ablation through "[employer]. No correspondence as usual.        HTN-- Patient remains on nifedipine ER 30 mg daily, telmisartan 80 mg daily, carvedilol 12.5 mg twice a day. He states his BP at home has been OK. Home readings 130-140/70's-80's. He denies HA's, dizziness, lightheadedness. He denies symptoms related to stroke. Hyperlipidemia:    Lab Results   Component Value Date    LDLCALC 35 03/07/2019   He remains on high intensity statin (Lipitor 80). He's had no adverse effects from the medication. He denies any new myalgias or weakness. PAD/Carotid stenosis/superior mesenteric artery stenosis/thoracic aorta atherosclerosis/renal artery atherosclerosis/abdominal aorta atherosclerosis--   Previous history----> Patient has history of chronic mesenteric ischemia and underwent SMA bypass  11/29/2016. Patient's also had a left fem-pop bypass 9/14/2016. He also has EMELINA, carotid stenosis, abd and thoracic aortic ASVD. Last carotid doppler-- 16-49% BL, 7/2017    He is under the care of Dr. Marlene Vitale. He denies any new symptoms of claudication or abdominal pain. He denies any GI bleeding. He did have a diagnosis of ischemic colitis recently. He had full vascular evaluation 3/27 and 4/29. He was taken to the cath lab to address decreased flow in the right leg. He was not having symptoms. PROCEDURES PERFORMED:  1. Ultrasound-guided access to the left common femoral artery. 2.  Abdominal aortogram.  3.  Angiogram of the bilateral lower extremities.   4.  Selective angiography of the right popliteal and tibial arteries. 5.  Atherectomy with angioplasty, right superficial femoral and  popliteal arteries. 6.  Atherectomy with angioplasty, right tibioperoneal trunk.       CAD--Previous history---->coronary artery calcification seen on CT scan July 2014. Stress Myoview was neg 1/24/2015. Interval history-----> he denies any new symptoms of angina at rest or with exertion. He denies any palpitations, syncopal, dizziness, swelling. He also denies orthopnea. COPD/Emphysema-- He denies cough, wheezing, SOB. Patient states he continues to treat with regards to cigarette smoking. He will smoke a cigarette occasionally. He does use nicotine supplements.         Past Medical History:   Diagnosis Date    Abdominal aortic atherosclerosis ([address]     Atherosclerosis of superior mesenteric artery ([address]     CAD (coronary artery disease)     Cerebral artery occlusion with cerebral infarction (HCC)     Chronic kidney disease     Colon polyps     COPD (chronic obstructive pulmonary disease) (HCC)     CVA (cerebral infarction) 5/2013    Multiple, occipital and Cerebellar     DDD (degenerative disc disease)     Cerivical DDD    Epicondylitis elbow, medial     GERD (gastroesophageal reflux disease)     Hyperlipidemia     Hypertension     Ischemic colitis ([address] 11/2016    Osteoarthritis, hand     PVD (peripheral vascular disease) with claudication (HCC)     Left leg with mod-severe arterial ischemia    Renal artery atherosclerosis (HCC)     SMA stenosis ([address] 11/2016    Thoracic aorta atherosclerosis ([address]     Venous insufficiency     Vertebral artery dissection ([address] 5/2013     Patient Care Team:  Shayla Landeros DO as PCP - General (Internal Medicine)  Shayla Landeros DO as PCP - [facility]  Marsha Sigala MD as Surgeon (General Surgery)  Eleni Lane MD as Consulting Physician (Urology)  Feroz Feng MD (Vascular Surgery)  Rocio Walker MD as Consulting Physician (Gastroenterology)  Isacc Price MD as Consulting Physician (Orthopedic Surgery)     Prior to Visit Medications    Medication Sig Taking? Authorizing Provider   telmisartan (MICARDIS) 80 MG tablet TAKE 1 TABLET BY MOUTH DAILY Yes Pepito Leal DO   NIFEdipine (PROCARDIA XL) 30 MG extended release tablet TAKE 1 TABLET BY MOUTH DAILY Yes Pepito Leal DO   pantoprazole (PROTONIX) 40 MG tablet Take 1 tablet by mouth daily Yes Pepito Leal DO   ferrous sulfate 325 (65 Fe) MG EC tablet Take 1 tablet by mouth 3 times daily (with meals)  Patient taking differently: Take 325 mg by mouth daily (with breakfast)  Yes Pepito Leal DO   clopidogrel (PLAVIX) 75 MG tablet TAKE 1 TABLET BY MOUTH DAILY Yes Julieth Glynn MD   aspirin 81 MG tablet Take 1 tablet by mouth daily Yes Julieth Glynn MD   Probiotic Product (ALIGN PO) Take 1 tablet by mouth daily Yes Historical Provider, MD   atorvastatin (LIPITOR) 80 MG tablet TAKE 1 TABLET BY MOUTH DAILY Yes Pepito Leal DO   carvedilol (COREG) 12.5 MG tablet TAKE 1 TABLET BY MOUTH TWICE DAILY Yes Pepito Leal DO   Cholecalciferol (VITAMIN D) 2000 units CAPS capsule Take 1 capsule by mouth daily Yes Historical Provider, MD   acetaminophen (TYLENOL) 325 MG tablet Take 2 tablets by mouth every 6 hours as needed for Pain  Julieth Glynn MD         Review of Systems - As per HPI  GI:  History of ischemic colitis. No further GI upset or bleeding. OBJECTIVE:  /78   Pulse 64   Resp 12   Ht 5' 6\" (1.676 m)   Wt 133 lb (60.3 kg)   BMI 21.47 kg/m²    BP Readings from Last 3 Encounters:   06/06/19 130/78   04/29/19 (!) 155/88   03/27/19 (!) 149/78      Wt Readings from Last 3 Encounters:   06/06/19 133 lb (60.3 kg)   04/29/19 135 lb (61.2 kg)   04/08/19 132 lb 7.9 oz (60.1 kg)       GEN: NAD, A&O, Non-toxic  HEENT: NC/AT, JOCELINE, EOMI, Anicteric. TM's Nl. Oral cavity without mucosal lesions. Mucous membranes moist.   NECK:  Supple.   No thyromegaly or nodules. No JVD. LYMPH: No C/SC nodes. CV: Regular rhythm. No ectopy. Rate normal.  Soft 7-9/5 systolic murmur  PULM: CTA. EXT: No edema  GI: Abdomen is soft,  BS+, No masses. Nontender, nondistended  VASC:  Pedal pulses are palpable, 1+ and symmetrical.  + Bilateral carotid bruits. No change. Carotid upstrokes 2+  SKIN:  No rashes. Scattered ecchymosis likely from antiplatelets. NEURO: No focal deficits. Cranial nerve II through XII are intact        ASSESSMENT/PLAN:    1. Benign essential HTN  Blood pressure is fairly well controlled. He's had some lability in the past  Continue current regimen  Continue to monitor blood pressure at home  No added salt diet    2. Hyperlipidemia, unspecified hyperlipidemia type  Condition is well controlled  He will continue high intensity statin therapy    3. CAD in native artery  Addition is stable without signs of angina or anginal equivalents  Continue cardiovascular risk reduction strategies  Continue dual antiplatelet therapy. The ecchymoses are likely due to his medications  Encouraged patient to become a complete nonsmoker    4. PAD (peripheral artery disease) (HCC)  Condition is stable. He recently had an atherectomy on the right. Continue dual antiplatelet therapy  Advised patient to stop smoking    5. Spinal stenosis of lumbar region without neurogenic claudication  Under the management of [facility] 37 office notes    6. Pulmonary emphysema, unspecified emphysema type ([address]  Condition is well controlled even off of therapy  Stop smoking.   Patient counseled yet again

## 2022-06-23 ENCOUNTER — APPOINTMENT (OUTPATIENT)
Dept: CT IMAGING | Age: 72
DRG: 644 | End: 2022-06-23
Payer: MEDICARE

## 2022-06-23 ENCOUNTER — HOSPITAL ENCOUNTER (INPATIENT)
Age: 72
LOS: 4 days | Discharge: HOME | DRG: 644 | End: 2022-06-27
Attending: STUDENT IN AN ORGANIZED HEALTH CARE EDUCATION/TRAINING PROGRAM | Admitting: INTERNAL MEDICINE
Payer: MEDICARE

## 2022-06-23 DIAGNOSIS — E87.1 HYPONATREMIA: Primary | ICD-10-CM

## 2022-06-23 DIAGNOSIS — I70.1 RENAL ARTERY ATHEROSCLEROSIS (HCC): ICD-10-CM

## 2022-06-23 LAB
ANION GAP SERPL CALCULATED.3IONS-SCNC: 15 MMOL/L (ref 3–16)
ANION GAP SERPL CALCULATED.3IONS-SCNC: 8 MMOL/L (ref 3–16)
BASOPHILS ABSOLUTE: 0 K/UL (ref 0–0.2)
BASOPHILS RELATIVE PERCENT: 0.1 %
BILIRUBIN URINE: NEGATIVE
BLOOD, URINE: NEGATIVE
BUN BLDV-MCNC: 14 MG/DL (ref 7–20)
BUN BLDV-MCNC: 14 MG/DL (ref 7–20)
CALCIUM SERPL-MCNC: 8.4 MG/DL (ref 8.3–10.6)
CALCIUM SERPL-MCNC: 9.8 MG/DL (ref 8.3–10.6)
CHLORIDE BLD-SCNC: 81 MMOL/L (ref 99–110)
CHLORIDE BLD-SCNC: 84 MMOL/L (ref 99–110)
CHLORIDE URINE RANDOM: 21 MMOL/L
CLARITY: CLEAR
CO2: 24 MMOL/L (ref 21–32)
CO2: 27 MMOL/L (ref 21–32)
COLOR: YELLOW
CREAT SERPL-MCNC: 0.9 MG/DL (ref 0.8–1.3)
CREAT SERPL-MCNC: 0.9 MG/DL (ref 0.8–1.3)
EKG ATRIAL RATE: 57 BPM
EKG DIAGNOSIS: NORMAL
EKG P AXIS: 51 DEGREES
EKG P-R INTERVAL: 162 MS
EKG Q-T INTERVAL: 426 MS
EKG QRS DURATION: 82 MS
EKG QTC CALCULATION (BAZETT): 414 MS
EKG R AXIS: -17 DEGREES
EKG T AXIS: 75 DEGREES
EKG VENTRICULAR RATE: 57 BPM
EOSINOPHILS ABSOLUTE: 0 K/UL (ref 0–0.6)
EOSINOPHILS RELATIVE PERCENT: 0.1 %
GFR AFRICAN AMERICAN: >60
GFR AFRICAN AMERICAN: >60
GFR NON-AFRICAN AMERICAN: >60
GFR NON-AFRICAN AMERICAN: >60
GLUCOSE BLD-MCNC: 133 MG/DL (ref 70–99)
GLUCOSE BLD-MCNC: 135 MG/DL (ref 70–99)
GLUCOSE URINE: NEGATIVE MG/DL
HCT VFR BLD CALC: 39.2 % (ref 40.5–52.5)
HEMOGLOBIN: 13.6 G/DL (ref 13.5–17.5)
KETONES, URINE: NEGATIVE MG/DL
LEUKOCYTE ESTERASE, URINE: NEGATIVE
LYMPHOCYTES ABSOLUTE: 0.4 K/UL (ref 1–5.1)
LYMPHOCYTES RELATIVE PERCENT: 6.7 %
MCH RBC QN AUTO: 35.7 PG (ref 26–34)
MCHC RBC AUTO-ENTMCNC: 34.7 G/DL (ref 31–36)
MCV RBC AUTO: 102.8 FL (ref 80–100)
MICROSCOPIC EXAMINATION: ABNORMAL
MONOCYTES ABSOLUTE: 0.3 K/UL (ref 0–1.3)
MONOCYTES RELATIVE PERCENT: 4.3 %
MUCUS: ABNORMAL /LPF
NEUTROPHILS ABSOLUTE: 5.7 K/UL (ref 1.7–7.7)
NEUTROPHILS RELATIVE PERCENT: 88.8 %
NITRITE, URINE: NEGATIVE
PDW BLD-RTO: 13.5 % (ref 12.4–15.4)
PH UA: 7 (ref 5–8)
PLATELET # BLD: 286 K/UL (ref 135–450)
PMV BLD AUTO: 7 FL (ref 5–10.5)
POTASSIUM REFLEX MAGNESIUM: 6.4 MMOL/L (ref 3.5–5.1)
POTASSIUM SERPL-SCNC: 5.2 MMOL/L (ref 3.5–5.1)
PROTEIN UA: NEGATIVE MG/DL
RBC # BLD: 3.81 M/UL (ref 4.2–5.9)
RBC UA: ABNORMAL /HPF (ref 0–4)
SODIUM BLD-SCNC: 119 MMOL/L (ref 136–145)
SODIUM BLD-SCNC: 120 MMOL/L (ref 136–145)
SODIUM URINE: 29 MMOL/L
SPECIFIC GRAVITY UA: <=1.005 (ref 1–1.03)
TROPONIN: <0.01 NG/ML
URINE TYPE: ABNORMAL
UROBILINOGEN, URINE: 0.2 E.U./DL
WBC # BLD: 6.4 K/UL (ref 4–11)
WBC UA: ABNORMAL /HPF (ref 0–5)

## 2022-06-23 PROCEDURE — 84484 ASSAY OF TROPONIN QUANT: CPT

## 2022-06-23 PROCEDURE — 4A03X5D MEASUREMENT OF ARTERIAL FLOW, INTRACRANIAL, EXTERNAL APPROACH: ICD-10-PCS | Performed by: INTERNAL MEDICINE

## 2022-06-23 PROCEDURE — 80048 BASIC METABOLIC PNL TOTAL CA: CPT

## 2022-06-23 PROCEDURE — HZ2ZZZZ DETOXIFICATION SERVICES FOR SUBSTANCE ABUSE TREATMENT: ICD-10-PCS | Performed by: INTERNAL MEDICINE

## 2022-06-23 PROCEDURE — 93005 ELECTROCARDIOGRAM TRACING: CPT | Performed by: STUDENT IN AN ORGANIZED HEALTH CARE EDUCATION/TRAINING PROGRAM

## 2022-06-23 PROCEDURE — 70450 CT HEAD/BRAIN W/O DYE: CPT

## 2022-06-23 PROCEDURE — 70496 CT ANGIOGRAPHY HEAD: CPT

## 2022-06-23 PROCEDURE — 6360000004 HC RX CONTRAST MEDICATION: Performed by: STUDENT IN AN ORGANIZED HEALTH CARE EDUCATION/TRAINING PROGRAM

## 2022-06-23 PROCEDURE — 36415 COLL VENOUS BLD VENIPUNCTURE: CPT

## 2022-06-23 PROCEDURE — 85025 COMPLETE CBC W/AUTO DIFF WBC: CPT

## 2022-06-23 PROCEDURE — 84300 ASSAY OF URINE SODIUM: CPT

## 2022-06-23 PROCEDURE — 1200000000 HC SEMI PRIVATE

## 2022-06-23 PROCEDURE — 81001 URINALYSIS AUTO W/SCOPE: CPT

## 2022-06-23 PROCEDURE — 82436 ASSAY OF URINE CHLORIDE: CPT

## 2022-06-23 PROCEDURE — 99285 EMERGENCY DEPT VISIT HI MDM: CPT

## 2022-06-23 PROCEDURE — 82570 ASSAY OF URINE CREATININE: CPT

## 2022-06-23 RX ORDER — LIDOCAINE 50 MG/G
1 PATCH TOPICAL ONCE
Status: DISCONTINUED | OUTPATIENT
Start: 2022-06-23 | End: 2022-06-23

## 2022-06-23 RX ORDER — LIDOCAINE 4 G/G
1 PATCH TOPICAL ONCE
Status: DISCONTINUED | OUTPATIENT
Start: 2022-06-23 | End: 2022-06-24

## 2022-06-23 RX ADMIN — IOPAMIDOL 80 ML: 755 INJECTION, SOLUTION INTRAVENOUS at 19:05

## 2022-06-23 ASSESSMENT — PAIN SCALES - GENERAL: PAINLEVEL_OUTOF10: 10

## 2022-06-23 ASSESSMENT — PAIN DESCRIPTION - LOCATION: LOCATION: NECK

## 2022-06-23 ASSESSMENT — PAIN - FUNCTIONAL ASSESSMENT: PAIN_FUNCTIONAL_ASSESSMENT: 0-10

## 2022-06-23 ASSESSMENT — PAIN DESCRIPTION - ORIENTATION: ORIENTATION: LEFT

## 2022-06-23 ASSESSMENT — PAIN DESCRIPTION - PAIN TYPE: TYPE: ACUTE PAIN

## 2022-06-23 ASSESSMENT — PAIN DESCRIPTION - DESCRIPTORS: DESCRIPTORS: ACHING

## 2022-06-23 NOTE — ED PROVIDER NOTES
[facility] RESIDENT NOTE       Date of evaluation: 6/23/2022    Chief Complaint     Neck Pain (on going for about a week; denies any injury states pain is worse when turning to the left. Spoke to pcp and was given steroid pack. States has not helped and has two days left. Pt. states neck pain is getting worse. )      of Present Illness     Светлана Chow is a 71 y.o. male with past medical history of CAD, peripheral vascular disease, vertebral artery dissection, and past medical history as listed below who presents emergency department for neck pain. Patient ports he has had neck pain for the last several days. He describes it located on the posterior aspect of his neck on the left side. He notes his pain is at its worst sometimes 10/10. His pain is exacerbated by movement of his neck. He denies any precipitating factors that he can think of. No numbness, tingling, dizziness, syncope, headache, blurry vision, fevers, IVDU.      Review of Systems     Review of Systems    Past Medical, Surgical, Family, and Social History     He has a past medical history of Abdominal aortic atherosclerosis ([address] Allergic rhinitis, Atherosclerosis of superior mesenteric artery ([address] CAD (coronary artery disease), Cerebral artery occlusion with cerebral infarction ([address] Chronic kidney disease, Chronic pansinusitis, Clostridium difficile colitis, Colon polyps, COPD (chronic obstructive pulmonary disease) ([address] COVID-19 virus infection, CVA (cerebral infarction), DDD (degenerative disc disease), Dizziness, Epicondylitis elbow, medial, GERD (gastroesophageal reflux disease), Headache, Hyperlipidemia, Hypertension, Ischemic colitis ([address] Lumbar foraminal stenosis, Lung disease, Osteoarthritis, hand, PVD (peripheral vascular disease) with claudication ([address] Renal artery atherosclerosis ([address] SMA stenosis, Spinal stenosis of lumbar region without neurogenic claudication, Thoracic aorta atherosclerosis ([facility] Venous insufficiency, and Vertebral artery dissection ([facility] He has a past surgical history that includes Venous clot surgery (11/2003); Colonoscopy (2008); Inguinal hernia repair (Bilateral, 01/16/2013); Colonoscopy (09/02/2015); Upper gastrointestinal endoscopy (09/02/2015); angioplasty (Left, 08/2016); Arterial bypass surgry (Left, 09/14/2016); angioplasty (11/27/2016); Arterial bypass surgry (11/29/2016); Upper gastrointestinal endoscopy (N/A, 09/14/2018); Colonoscopy (09/20/2018); Atherectomy (Right, 04/08/2019); lumbar fusion (10/23/2020); Septoplasty (Bilateral, 08/06/2021); and angioplasty (Right, 01/19/2022). His family history includes Cancer in his brother and brother; Heart Attack in his father; Heart Disease in his father; Hypertension in his brother. He reports that he quit smoking about 6 years ago. His smoking use included cigarettes. He started smoking about 52 years ago. He has a 23.50 pack-year smoking history. He has never used smokeless tobacco. He reports current alcohol use. He reports that he does not use drugs.     Medications     Previous Medications    ACETAMINOPHEN (TYLENOL) 325 MG TABLET    Take 2 tablets by mouth every 6 hours as needed for Pain    ASPIRIN 81 MG TABLET    Take 1 tablet by mouth daily    ATORVASTATIN (LIPITOR) 80 MG TABLET    TAKE 1 TABLET BY MOUTH EVERY DAY    CARVEDILOL (COREG) 25 MG TABLET    TAKE 1 TABLET BY MOUTH TWICE A DAY    CHOLECALCIFEROL (VITAMIN D) 2000 UNITS CAPS CAPSULE    Take 1 capsule by mouth daily    CLOPIDOGREL (PLAVIX) 75 MG TABLET    TAKE 1 TABLET BY MOUTH EVERY DAY    FERROUS SULFATE (FEROSUL) 325 (65 FE) MG TABLET    Take 1 tablet by mouth daily (with breakfast)    FLUTICASONE (FLONASE) 50 MCG/ACT NASAL SPRAY    1 spray by Nasal route daily    NIFEDIPINE (PROCARDIA XL) 90 MG EXTENDED RELEASE TABLET    TAKE 1 TABLET BY MOUTH EVERY DAY    PANTOPRAZOLE (PROTONIX) 40 MG TABLET    TAKE 1 TABLET BY MOUTH EVERY DAY BEFORE BREAKFAST    PROBIOTIC PRODUCT (ALIGN PO)    Take 1 tablet by mouth daily     TELMISARTAN (MICARDIS) 80 MG TABLET    TAKE 1 TABLET BY MOUTH EVERY DAY       Allergies     He has No Known Allergies. Physical Exam     INITIAL VITALS: BP: 128/63, Temp: 98.2 °F (36.8 °C), Heart Rate: 62, Resp: 18, SpO2: 100 %   Physical Exam  Constitutional:       Appearance: He is not ill-appearing. HENT:      Head: Normocephalic. Nose: Nose normal.      Mouth/Throat:      Mouth: Mucous membranes are moist.   Eyes:      Extraocular Movements: Extraocular movements intact. Conjunctiva/sclera: Conjunctivae normal.      Pupils: Pupils are equal, round, and reactive to light. Neck:        Comments: No C/T/L spine TTP. No step-offs or deformities  Cardiovascular:      Rate and Rhythm: Normal rate and regular rhythm. Pulmonary:      Effort: No respiratory distress. Breath sounds: Normal breath sounds. Abdominal:      General: There is no distension. Palpations: Abdomen is soft. Tenderness: There is no abdominal tenderness. Musculoskeletal:      Cervical back: Normal range of motion and neck supple. No rigidity. Lymphadenopathy:      Cervical: No cervical adenopathy. Skin:     General: Skin is warm and dry. Capillary Refill: Capillary refill takes less than 2 seconds. Neurological:      General: No focal deficit present. Mental Status: He is alert and oriented to person, place, and time. Cranial Nerves: No cranial nerve deficit. Sensory: No sensory deficit. Motor: No weakness. Coordination: Coordination normal.      Gait: Gait normal.         DiagnosticResults       RADIOLOGY:  CTA HEAD NECK W CONTRAST    (Results Pending)       LABS:   No results found for this visit on 06/23/22. ED BEDSIDE ULTRASOUND:  No results found.     RECENT VITALS:  BP: 128/63, Temp: 98.2 °F (36.8 °C), Heart Rate: 62,Resp: 18, SpO2: 100 %     Procedures       ED Course     Nursing

## 2022-06-24 LAB
ALBUMIN SERPL-MCNC: 4.3 G/DL (ref 3.4–5)
ALBUMIN SERPL-MCNC: 4.4 G/DL (ref 3.4–5)
ALBUMIN SERPL-MCNC: 4.5 G/DL (ref 3.4–5)
ALP BLD-CCNC: 68 U/L (ref 40–129)
ALT SERPL-CCNC: 18 U/L (ref 10–40)
ANION GAP SERPL CALCULATED.3IONS-SCNC: 10 MMOL/L (ref 3–16)
ANION GAP SERPL CALCULATED.3IONS-SCNC: 11 MMOL/L (ref 3–16)
AST SERPL-CCNC: 22 U/L (ref 15–37)
BASOPHILS ABSOLUTE: 0.1 K/UL (ref 0–0.2)
BASOPHILS RELATIVE PERCENT: 1.3 %
BILIRUB SERPL-MCNC: 0.6 MG/DL (ref 0–1)
BILIRUBIN DIRECT: <0.2 MG/DL (ref 0–0.3)
BILIRUBIN, INDIRECT: NORMAL MG/DL (ref 0–1)
BUN BLDV-MCNC: 14 MG/DL (ref 7–20)
BUN BLDV-MCNC: 16 MG/DL (ref 7–20)
CALCIUM SERPL-MCNC: 9.2 MG/DL (ref 8.3–10.6)
CALCIUM SERPL-MCNC: 9.2 MG/DL (ref 8.3–10.6)
CHLORIDE BLD-SCNC: 92 MMOL/L (ref 99–110)
CHLORIDE BLD-SCNC: 94 MMOL/L (ref 99–110)
CO2: 27 MMOL/L (ref 21–32)
CO2: 28 MMOL/L (ref 21–32)
CREAT SERPL-MCNC: 0.8 MG/DL (ref 0.8–1.3)
CREAT SERPL-MCNC: 0.9 MG/DL (ref 0.8–1.3)
CREATININE URINE: 18.2 MG/DL (ref 39–259)
EOSINOPHILS ABSOLUTE: 0 K/UL (ref 0–0.6)
EOSINOPHILS RELATIVE PERCENT: 0.2 %
ETHANOL: NORMAL MG/DL (ref 0–0.08)
GFR AFRICAN AMERICAN: >60
GFR AFRICAN AMERICAN: >60
GFR NON-AFRICAN AMERICAN: >60
GFR NON-AFRICAN AMERICAN: >60
GLUCOSE BLD-MCNC: 109 MG/DL (ref 70–99)
GLUCOSE BLD-MCNC: 84 MG/DL (ref 70–99)
HCT VFR BLD CALC: 40.6 % (ref 40.5–52.5)
HEMOGLOBIN: 13.8 G/DL (ref 13.5–17.5)
INR BLD: 1.06 (ref 0.87–1.14)
LYMPHOCYTES ABSOLUTE: 0.8 K/UL (ref 1–5.1)
LYMPHOCYTES RELATIVE PERCENT: 11.7 %
MAGNESIUM: 2.3 MG/DL (ref 1.8–2.4)
MCH RBC QN AUTO: 35.2 PG (ref 26–34)
MCHC RBC AUTO-ENTMCNC: 34 G/DL (ref 31–36)
MCV RBC AUTO: 103.5 FL (ref 80–100)
MONOCYTES ABSOLUTE: 1.2 K/UL (ref 0–1.3)
MONOCYTES RELATIVE PERCENT: 17.2 %
NEUTROPHILS ABSOLUTE: 4.9 K/UL (ref 1.7–7.7)
NEUTROPHILS RELATIVE PERCENT: 69.6 %
OSMOLALITY: 280 MOSM/KG (ref 278–305)
PDW BLD-RTO: 13.9 % (ref 12.4–15.4)
PHOSPHORUS: 3.4 MG/DL (ref 2.5–4.9)
PHOSPHORUS: 3.4 MG/DL (ref 2.5–4.9)
PLATELET # BLD: 270 K/UL (ref 135–450)
PMV BLD AUTO: 6.6 FL (ref 5–10.5)
POTASSIUM SERPL-SCNC: 4.1 MMOL/L (ref 3.5–5.1)
POTASSIUM SERPL-SCNC: 4.7 MMOL/L (ref 3.5–5.1)
PROTHROMBIN TIME: 13.7 SEC (ref 11.7–14.5)
RBC # BLD: 3.92 M/UL (ref 4.2–5.9)
SODIUM BLD-SCNC: 120 MMOL/L (ref 136–145)
SODIUM BLD-SCNC: 128 MMOL/L (ref 136–145)
SODIUM BLD-SCNC: 130 MMOL/L (ref 136–145)
SODIUM BLD-SCNC: 131 MMOL/L (ref 136–145)
SODIUM BLD-SCNC: 131 MMOL/L (ref 136–145)
T4 FREE: 1.4 NG/DL (ref 0.9–1.8)
TOTAL PROTEIN: 7 G/DL (ref 6.4–8.2)
TSH REFLEX: 4.98 UIU/ML (ref 0.27–4.2)
WBC # BLD: 7.1 K/UL (ref 4–11)

## 2022-06-24 PROCEDURE — 6370000000 HC RX 637 (ALT 250 FOR IP): Performed by: STUDENT IN AN ORGANIZED HEALTH CARE EDUCATION/TRAINING PROGRAM

## 2022-06-24 PROCEDURE — 80069 RENAL FUNCTION PANEL: CPT

## 2022-06-24 PROCEDURE — 84443 ASSAY THYROID STIM HORMONE: CPT

## 2022-06-24 PROCEDURE — 6370000000 HC RX 637 (ALT 250 FOR IP): Performed by: INTERNAL MEDICINE

## 2022-06-24 PROCEDURE — 6360000002 HC RX W HCPCS: Performed by: STUDENT IN AN ORGANIZED HEALTH CARE EDUCATION/TRAINING PROGRAM

## 2022-06-24 PROCEDURE — 85025 COMPLETE CBC W/AUTO DIFF WBC: CPT

## 2022-06-24 PROCEDURE — 99223 1ST HOSP IP/OBS HIGH 75: CPT | Performed by: INTERNAL MEDICINE

## 2022-06-24 PROCEDURE — 83735 ASSAY OF MAGNESIUM: CPT

## 2022-06-24 PROCEDURE — 83930 ASSAY OF BLOOD OSMOLALITY: CPT

## 2022-06-24 PROCEDURE — 2580000003 HC RX 258: Performed by: STUDENT IN AN ORGANIZED HEALTH CARE EDUCATION/TRAINING PROGRAM

## 2022-06-24 PROCEDURE — 80076 HEPATIC FUNCTION PANEL: CPT

## 2022-06-24 PROCEDURE — 6360000002 HC RX W HCPCS: Performed by: INTERNAL MEDICINE

## 2022-06-24 PROCEDURE — 1200000000 HC SEMI PRIVATE

## 2022-06-24 PROCEDURE — 85610 PROTHROMBIN TIME: CPT

## 2022-06-24 PROCEDURE — 84439 ASSAY OF FREE THYROXINE: CPT

## 2022-06-24 PROCEDURE — 84295 ASSAY OF SERUM SODIUM: CPT

## 2022-06-24 PROCEDURE — 2580000003 HC RX 258: Performed by: INTERNAL MEDICINE

## 2022-06-24 PROCEDURE — 36415 COLL VENOUS BLD VENIPUNCTURE: CPT

## 2022-06-24 PROCEDURE — 82077 ASSAY SPEC XCP UR&BREATH IA: CPT

## 2022-06-24 RX ORDER — SODIUM CHLORIDE 0.9 % (FLUSH) 0.9 %
5-40 SYRINGE (ML) INJECTION PRN
Status: DISCONTINUED | OUTPATIENT
Start: 2022-06-24 | End: 2022-06-27 | Stop reason: HOSPADM

## 2022-06-24 RX ORDER — PANTOPRAZOLE SODIUM 40 MG/1
40 TABLET, DELAYED RELEASE ORAL
Status: DISCONTINUED | OUTPATIENT
Start: 2022-06-24 | End: 2022-06-27 | Stop reason: HOSPADM

## 2022-06-24 RX ORDER — ATORVASTATIN CALCIUM 80 MG/1
80 TABLET, FILM COATED ORAL DAILY
Status: DISCONTINUED | OUTPATIENT
Start: 2022-06-24 | End: 2022-06-27 | Stop reason: HOSPADM

## 2022-06-24 RX ORDER — GAUZE BANDAGE 2" X 2"
100 BANDAGE TOPICAL DAILY
Status: DISCONTINUED | OUTPATIENT
Start: 2022-06-24 | End: 2022-06-27 | Stop reason: HOSPADM

## 2022-06-24 RX ORDER — CYCLOBENZAPRINE HCL 5 MG
5 TABLET ORAL NIGHTLY PRN
Qty: 30 TABLET | Refills: 0 | Status: SHIPPED | OUTPATIENT
Start: 2022-06-24 | End: 2022-06-27 | Stop reason: SDUPTHER

## 2022-06-24 RX ORDER — CLOPIDOGREL BISULFATE 75 MG/1
75 TABLET ORAL DAILY
Status: DISCONTINUED | OUTPATIENT
Start: 2022-06-24 | End: 2022-06-27 | Stop reason: HOSPADM

## 2022-06-24 RX ORDER — LIDOCAINE 4 G/G
1 PATCH TOPICAL DAILY PRN
Qty: 30 PATCH | Refills: 1 | Status: SHIPPED | OUTPATIENT
Start: 2022-06-24 | End: 2022-07-24

## 2022-06-24 RX ORDER — SODIUM CHLORIDE 9 MG/ML
INJECTION, SOLUTION INTRAVENOUS PRN
Status: DISCONTINUED | OUTPATIENT
Start: 2022-06-24 | End: 2022-06-27 | Stop reason: HOSPADM

## 2022-06-24 RX ORDER — MELOXICAM 7.5 MG/1
7.5 TABLET ORAL DAILY
Status: DISCONTINUED | OUTPATIENT
Start: 2022-06-24 | End: 2022-06-24

## 2022-06-24 RX ORDER — MULTIVITAMIN WITH IRON
1 TABLET ORAL DAILY
Status: DISCONTINUED | OUTPATIENT
Start: 2022-06-24 | End: 2022-06-27 | Stop reason: HOSPADM

## 2022-06-24 RX ORDER — MULTIVITAMIN WITH IRON
1 TABLET ORAL DAILY
Qty: 30 TABLET | Refills: 2 | Status: SHIPPED | OUTPATIENT
Start: 2022-06-25

## 2022-06-24 RX ORDER — ONDANSETRON 2 MG/ML
4 INJECTION INTRAMUSCULAR; INTRAVENOUS EVERY 6 HOURS PRN
Status: DISCONTINUED | OUTPATIENT
Start: 2022-06-24 | End: 2022-06-27 | Stop reason: HOSPADM

## 2022-06-24 RX ORDER — ACETAMINOPHEN 325 MG/1
650 TABLET ORAL EVERY 6 HOURS PRN
Status: DISCONTINUED | OUTPATIENT
Start: 2022-06-24 | End: 2022-06-27 | Stop reason: HOSPADM

## 2022-06-24 RX ORDER — CARVEDILOL 25 MG/1
25 TABLET ORAL 2 TIMES DAILY WITH MEALS
Status: DISCONTINUED | OUTPATIENT
Start: 2022-06-24 | End: 2022-06-27 | Stop reason: HOSPADM

## 2022-06-24 RX ORDER — DEXTROSE MONOHYDRATE 50 MG/ML
INJECTION, SOLUTION INTRAVENOUS CONTINUOUS
Status: DISCONTINUED | OUTPATIENT
Start: 2022-06-24 | End: 2022-06-24

## 2022-06-24 RX ORDER — DEXTROSE MONOHYDRATE 50 MG/ML
INJECTION, SOLUTION INTRAVENOUS CONTINUOUS
Status: ACTIVE | OUTPATIENT
Start: 2022-06-24 | End: 2022-06-24

## 2022-06-24 RX ORDER — ONDANSETRON 4 MG/1
4 TABLET, ORALLY DISINTEGRATING ORAL EVERY 8 HOURS PRN
Status: DISCONTINUED | OUTPATIENT
Start: 2022-06-24 | End: 2022-06-27 | Stop reason: HOSPADM

## 2022-06-24 RX ORDER — ENOXAPARIN SODIUM 100 MG/ML
40 INJECTION SUBCUTANEOUS DAILY
Status: DISCONTINUED | OUTPATIENT
Start: 2022-06-24 | End: 2022-06-27 | Stop reason: HOSPADM

## 2022-06-24 RX ORDER — MELOXICAM 7.5 MG/1
7.5 TABLET ORAL DAILY PRN
Qty: 30 TABLET | Refills: 1 | Status: SHIPPED | OUTPATIENT
Start: 2022-06-24 | End: 2022-06-27 | Stop reason: HOSPADM

## 2022-06-24 RX ORDER — DESMOPRESSIN ACETATE 4 UG/ML
1 INJECTION, SOLUTION INTRAVENOUS; SUBCUTANEOUS ONCE
Status: COMPLETED | OUTPATIENT
Start: 2022-06-24 | End: 2022-06-24

## 2022-06-24 RX ORDER — NIFEDIPINE 30 MG/1
30 TABLET, FILM COATED, EXTENDED RELEASE ORAL DAILY
COMMUNITY

## 2022-06-24 RX ORDER — SODIUM CHLORIDE 0.9 % (FLUSH) 0.9 %
5-40 SYRINGE (ML) INJECTION EVERY 12 HOURS SCHEDULED
Status: DISCONTINUED | OUTPATIENT
Start: 2022-06-24 | End: 2022-06-27 | Stop reason: HOSPADM

## 2022-06-24 RX ORDER — ACETAMINOPHEN 650 MG/1
650 SUPPOSITORY RECTAL EVERY 6 HOURS PRN
Status: DISCONTINUED | OUTPATIENT
Start: 2022-06-24 | End: 2022-06-27 | Stop reason: HOSPADM

## 2022-06-24 RX ORDER — ASPIRIN 81 MG/1
81 TABLET ORAL DAILY
Status: DISCONTINUED | OUTPATIENT
Start: 2022-06-24 | End: 2022-06-27 | Stop reason: HOSPADM

## 2022-06-24 RX ORDER — NIFEDIPINE 30 MG/1
30 TABLET, FILM COATED, EXTENDED RELEASE ORAL DAILY
Status: DISCONTINUED | OUTPATIENT
Start: 2022-06-24 | End: 2022-06-27 | Stop reason: HOSPADM

## 2022-06-24 RX ORDER — CYCLOBENZAPRINE HCL 10 MG
5 TABLET ORAL 3 TIMES DAILY PRN
Status: DISCONTINUED | OUTPATIENT
Start: 2022-06-24 | End: 2022-06-27 | Stop reason: HOSPADM

## 2022-06-24 RX ORDER — LOSARTAN POTASSIUM 100 MG/1
100 TABLET ORAL DAILY
Status: DISCONTINUED | OUTPATIENT
Start: 2022-06-24 | End: 2022-06-27

## 2022-06-24 RX ORDER — POLYETHYLENE GLYCOL 3350 17 G/17G
17 POWDER, FOR SOLUTION ORAL DAILY PRN
Status: DISCONTINUED | OUTPATIENT
Start: 2022-06-24 | End: 2022-06-27 | Stop reason: HOSPADM

## 2022-06-24 RX ORDER — LIDOCAINE 4 G/G
1 PATCH TOPICAL DAILY PRN
Status: DISCONTINUED | OUTPATIENT
Start: 2022-06-24 | End: 2022-06-27 | Stop reason: HOSPADM

## 2022-06-24 RX ADMIN — DESMOPRESSIN ACETATE 1 MCG: 4 INJECTION, SOLUTION INTRAVENOUS; SUBCUTANEOUS at 08:13

## 2022-06-24 RX ADMIN — CLOPIDOGREL BISULFATE 75 MG: 75 TABLET ORAL at 08:13

## 2022-06-24 RX ADMIN — PANTOPRAZOLE SODIUM 40 MG: 40 TABLET, DELAYED RELEASE ORAL at 06:39

## 2022-06-24 RX ADMIN — NIFEDIPINE 30 MG: 30 TABLET, EXTENDED RELEASE ORAL at 08:13

## 2022-06-24 RX ADMIN — ATORVASTATIN CALCIUM 80 MG: 80 TABLET, FILM COATED ORAL at 08:14

## 2022-06-24 RX ADMIN — MELOXICAM 7.5 MG: 7.5 TABLET ORAL at 17:06

## 2022-06-24 RX ADMIN — DICLOFENAC SODIUM 2 G: 10 GEL TOPICAL at 23:46

## 2022-06-24 RX ADMIN — ACETAMINOPHEN 650 MG: 325 TABLET ORAL at 08:17

## 2022-06-24 RX ADMIN — CARVEDILOL 25 MG: 25 TABLET, FILM COATED ORAL at 04:20

## 2022-06-24 RX ADMIN — DEXTROSE MONOHYDRATE: 50 INJECTION, SOLUTION INTRAVENOUS at 13:41

## 2022-06-24 RX ADMIN — ASPIRIN 81 MG: 81 TABLET, COATED ORAL at 08:14

## 2022-06-24 RX ADMIN — Medication 100 MG: at 08:14

## 2022-06-24 RX ADMIN — THERA TABS 1 TABLET: TAB at 08:14

## 2022-06-24 RX ADMIN — DEXTROSE MONOHYDRATE: 50 INJECTION, SOLUTION INTRAVENOUS at 08:32

## 2022-06-24 RX ADMIN — DICLOFENAC SODIUM 2 G: 10 GEL TOPICAL at 12:21

## 2022-06-24 RX ADMIN — SODIUM CHLORIDE, PRESERVATIVE FREE 10 ML: 5 INJECTION INTRAVENOUS at 08:15

## 2022-06-24 RX ADMIN — ENOXAPARIN SODIUM 40 MG: 100 INJECTION SUBCUTANEOUS at 08:13

## 2022-06-24 RX ADMIN — CARVEDILOL 25 MG: 25 TABLET, FILM COATED ORAL at 17:06

## 2022-06-24 ASSESSMENT — PAIN DESCRIPTION - FREQUENCY
FREQUENCY: CONTINUOUS

## 2022-06-24 ASSESSMENT — PAIN DESCRIPTION - LOCATION
LOCATION: NECK

## 2022-06-24 ASSESSMENT — PAIN DESCRIPTION - PAIN TYPE
TYPE: ACUTE PAIN
TYPE: CHRONIC PAIN
TYPE: CHRONIC PAIN
TYPE: ACUTE PAIN

## 2022-06-24 ASSESSMENT — PAIN DESCRIPTION - DESCRIPTORS
DESCRIPTORS: ACHING

## 2022-06-24 ASSESSMENT — PAIN DESCRIPTION - ONSET
ONSET: ON-GOING

## 2022-06-24 ASSESSMENT — ENCOUNTER SYMPTOMS
DIARRHEA: 1
NAUSEA: 0
ABDOMINAL PAIN: 0
VOMITING: 0
SHORTNESS OF BREATH: 0
COUGH: 0

## 2022-06-24 ASSESSMENT — PAIN SCALES - GENERAL
PAINLEVEL_OUTOF10: 0
PAINLEVEL_OUTOF10: 5
PAINLEVEL_OUTOF10: 0
PAINLEVEL_OUTOF10: 6
PAINLEVEL_OUTOF10: 5
PAINLEVEL_OUTOF10: 4
PAINLEVEL_OUTOF10: 0

## 2022-06-24 ASSESSMENT — PAIN DESCRIPTION - ORIENTATION
ORIENTATION: LEFT

## 2022-06-24 ASSESSMENT — PAIN - FUNCTIONAL ASSESSMENT: PAIN_FUNCTIONAL_ASSESSMENT: ACTIVITIES ARE NOT PREVENTED

## 2022-06-24 NOTE — CONSULTS
Nephrology Consult Note                                                                                                                                                                                                                                                                                                                                                               Office : [phone]     Fax :[phone]              Patient's Name: Madina Su  11:03 AM  6/24/2022    Reason for Consult:  Hyponatremia  Requesting Physician:  Cornell Baxter DO      Chief Complaint:  Neck Pain    History of Present Ilness:    Madina Su is a 71 y.o. male with history of vertebral artery dissection (2013), CAD, CVA, PAD presented to ED with worsening L-sided posterior neck pain x 1 week . Patient was prescribed medrol dose pack by PCP for neck pain 5 days prior to presentation to ED, without alleviation. Patient notes increased thirst over the last 3 days while taking the steroids for his neck. Patient notably drinks 4-5 beers daily and drinks approximately 3 small water bottles per day (~48 oz, or ~ 1.5L) on average. He mentions that he eats 2-3 large meals a day, typically consisting of protein. In the ED, patient's labs significant for Na 119, K 5.2. CTA head/neck with contrast negative for acute vertebral dissection, LVO, or anerysm. Patient admitted for acute asymptomatic hyponatremia.     Past Medical History:   Diagnosis Date    Abdominal aortic atherosclerosis (HCC)     Allergic rhinitis     Atherosclerosis of superior mesenteric artery (HCC)     CAD (coronary artery disease)     Cerebral artery occlusion with cerebral infarction (HCC)     Chronic kidney disease     Chronic pansinusitis 08/06/2021    Clostridium difficile colitis 06/2020    Colon polyps     COPD (chronic obstructive [facility] disease) ([facility]     COVID-19 virus infection 11/15/2021    CVA (cerebral infarction) 05/2013 Multiple, occipital and Cerebellar     DDD (degenerative disc disease)     Cerivical DDD    Dizziness     Epicondylitis elbow, medial     GERD (gastroesophageal reflux disease)     Headache     Hyperlipidemia     Hypertension     Ischemic colitis ([address] 11/2016    Lumbar foraminal stenosis 07/20/2020    Lung disease     Osteoarthritis, hand     PVD (peripheral vascular disease) with claudication (HCC)     Left leg with mod-severe arterial ischemia    Renal artery atherosclerosis (HCC)     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 Jazmin Boykin Left 09/14/2016    left femoral-popliteal bypass graft.     ARTERIAL BYPASS SURGERY  11/29/2016    Dr. Aleksander Phoenix - aorto-SMA bypass using 8mm PTFE    ATHERECTOMY Right 04/08/2019    Atherectomy with angioplasty right SFA and popliteal arteries, atherectomy and angioplasty right tibial peroneal trunk    COLONOSCOPY  2008    COLONOSCOPY  09/02/2015    Dr. Raeann Mix  09/20/2018    Dr. Lizbeth Gates Bilateral 01/16/2013    bilateral with mesh, Dr. Joyce Hernandez  10/23/2020    L4-5 Decompression with anterior/posterior fusion, Dr. Abdirahman Liu SEPTOPLASTY Bilateral 08/06/2021    BILATERAL FRONTO-ETHMOIDECTOMY, BILATERAL MAXILLARY ANTROSTOMY WITH TISSUE REMOVAL AND SEPTOPLASTY NASAL SCOPE performed by Yoel Alcaraz MD at [address]  09/02/2015    Dr. Matthieu Mccall N/A 09/14/2018    EGD BIOPSY performed by Tia Crenshaw MD at [address] [facility] THROMBECTOMY  11/2003    R Leg Vein stripping       Family History   Problem Relation Age of Onset    Heart Attack Father     Heart Disease Father         MI    Cancer Brother         Brain CA    Hypertension Brother     Cancer Brother         Throat cancer        reports that he quit smoking about 6 years ago. His smoking use included cigarettes. He started smoking about 52 years ago. He has a 23.50 pack-year smoking history. He has never used smokeless tobacco. He reports current alcohol use. He reports that he does not use drugs. Allergies:  Patient has no known allergies.     Current Medications:    sodium chloride flush 0.9 % injection 5-40 mL, 2 times per day  sodium chloride flush 0.9 % injection 5-40 mL, PRN  0.9 % sodium chloride infusion, PRN  enoxaparin (LOVENOX) injection 40 mg, Daily  ondansetron (ZOFRAN-ODT) disintegrating tablet 4 mg, Q8H PRN   Or  ondansetron (ZOFRAN) injection 4 mg, Q6H PRN  polyethylene glycol (GLYCOLAX) packet 17 g, Daily PRN  acetaminophen (TYLENOL) tablet 650 mg, Q6H PRN   Or  acetaminophen (TYLENOL) suppository 650 mg, Q6H PRN  pantoprazole (PROTONIX) tablet 40 mg, QAM AC  thiamine mononitrate tablet 100 mg, Daily  lidocaine 4 % external patch 1 patch, Daily PRN  multivitamin 1 tablet, Daily  carvedilol (COREG) tablet 25 mg, BID WC  aspirin EC tablet 81 mg, Daily  clopidogrel (PLAVIX) tablet 75 mg, Daily  NIFEdipine (ADALAT CC) extended release tablet 30 mg, Daily  atorvastatin (LIPITOR) tablet 80 mg, Daily  dextrose 5 % solution, Continuous  [Held by provider] losartan (COZAAR) tablet 100 mg, Daily  diclofenac sodium (VOLTAREN) 1 % gel 2 g, BID        Review of Systems:   14 point ROS obtained but were negative except mentioned in HPI      Physical exam:     Vitals:  /76   Pulse 62   Temp 98.2 °F (36.8 °C) (Oral)   Resp 18   Ht 5' 6\" (1.676 m)   Wt 137 lb 2 oz (62.2 kg)   SpO2 99%   BMI 22.13 kg/m²   Constitutional:  OAA X3 NAD  Skin: no rash, turgor wnl  Heent:  eomi, mmm  Neck: no bruits or jvd noted  Cardiovascular:  S1, S2 without m/r/g  Respiratory: CTA B without w/r/r  Abdomen:  +bs, soft, nt, nd  Ext: trace lower extremity edema  Psychiatric: mood and affect appropriate  Musculoskeletal:  Rom, muscular strength intact    Data:   Labs:  CBC:   Recent Labs     06/23/22 1900 06/24/22  0307   WBC 6.4 7.1   HGB 13.6 13.8    270     BMP:    Recent Labs     06/23/22 1905 06/24/22  0307 06/24/22  0912   * 131* 131*  130*   K 5.2* 4.7 4.1   CL 84* 94* 92*   CO2 27 27 28   BUN 14 16 14   CREATININE 0.9 0.8 0.9   GLUCOSE 135* 84 109*     Ca/Mg/Phos:   Recent Labs     06/23/22 1905 06/24/22 0307 06/24/22  0912   CALCIUM 8.4 9.2 9.2   MG  --  2.30  --    PHOS  --  3.4 3.4     Hepatic:   Recent Labs     06/24/22  0912   AST 22   ALT 18   BILITOT 0.6   ALKPHOS 68     Troponin:   Recent Labs     06/23/22  1827   TROPONINI <0.01     BNP: No results for input(s): BNP in the last 72 hours. Lipids: No results for input(s): CHOL, TRIG, HDL, LDLCALC, LABVLDL in the last 72 hours. ABGs: No results for input(s): PHART, PO2ART, QXO3BDS in the last 72 hours. INR:   Recent Labs     06/24/22  0912   INR 1.06     UA:  Recent Labs     06/23/22 2248   COLORU Yellow   CLARITYU Clear   GLUCOSEU Negative   BILIRUBINUR Negative   KETUA Negative   SPECGRAV <=1.005   BLOODU Negative   PHUR 7.0   PROTEINU Negative   UROBILINOGEN 0.2   NITRU Negative   LEUKOCYTESUR Negative   LABMICR Not Indicated   URINETYPE Voided      Urine Microscopic:   Recent Labs     06/23/22 2248   WBCUA None seen   RBCUA None seen     Urine Culture: No results for input(s): LABURIN in the last 72 hours. Urine Chemistry:   Recent Labs     06/23/22 2248   CLUR 21   NAUR 29             IMAGING:  CTA HEAD NECK W CONTRAST   Final Result      1. No large vessel occlusion, aneurysm or dissection. 2.  60-70% stenosis in the proximal left internal carotid artery.    3.  40-50% stenosis in the proximal right internal carotid artery. 4.  At least moderate stenosis in the origin of the right vertebral artery      CT Head WO Contrast   Final Result      1. No acute hemorrhage or mass effect. 2.  Chronic bilateral maxillary sinusitis, with waxing and waning from prior study. Assessment/Plan   1. Hyponatremia  Likely 2/2 beer potomania +/- primary polydipsia. Ur NA of 32, specific gravity < 1.005. Na overcorrected from 119 to 131 this AM without intervention. Patient endorses drinking much more water than usual x3 days. - s/p 1mcg DDAVP  - on D5 @ 100mL/hr  - fluid restriction  - monitor Na closely q6 hrs  - Is and Os  - optimize nutrition, encourage solute intake    2. HTN  SBP 180s-190s, now improved to 130s. Recent reduction of nifedepine dose from 60 to 30mg daily by PCP. - continue home coreg 25 mg BID  - continue home nifedipine 30mg daily  - on telmisartan 80mg daily at home. drug is unavailable, per pharmacy recs - can start 100mg losartan QD as substitute    3. Acid- base/ Electrolyte imbalance   - replete electrolytes as needed    4. Alcohol use disorder  Per PCP note patient drinks 6 beers daily.   - encourage solute intake      Thank you for allowing us to participate in care of Vickie Patterson MD   PGY-2    Discussed with Huseyin Ellington MD    Feel free to contact me   [facility] [address]  Office : [phone]  Fax :[phone]

## 2022-06-24 NOTE — H&P
Internal Medicine  PGY 1  History & Physical      CC Neck Pain    History Obtained From:  patient, electronic medical record    HISTORY OF PRESENT ILLNESS:  Pt is a 71year old male with a PMHx CVA (2013), HTN, HLD, CAD, PAD s/p aorto to SMA bypass, vertebral artery dissection (2013), and hx of C. Difficile Colitis who presented to the ED with neck pain. The pt was sent to the ED by the recommendation of his PCP Dr. Gabriel Casper, as he had been having neck pain, had been starting on a Medrol dose pack for the neck pain, and became hypotensive which is abnormal for the pt. The pt endorses a dull aching neck pain that does not radiate, nothing exacerbates the pain, and a lidocaine patch placed in the ED provides alleviation for the pain. The pt does not endorse any other symptoms to writer during interview and assessment. The pt does not smoke cigarettes, but has a thirty year hx of smoking, formerly smoking 1/2 pack a day. The pt currently consumes ETOH, he states that on \"game\" nights he has 4 to 5 beers, and that he consumes 3 to 4 twelve ounce [employer] beers every day. His last alcoholic beverage was Thursday 6/23 at 10:00 PM. The pt endorses frequent thirst, but cannot clarify if it has been increasing as of lately. He drinks around six 8 ounce glasses of water a day. While in the ED the pt was afebrile, hemodynamically stable and sating well on room air. His labs displayed a Na 119, k 5.2, Cl 84, and Cr 0.9. A CT head was negative for acute hemorrhage, and had finding of chronic sinusitis in the BL maxillary sinuses. A CTA head and neck with contrast did not display any large vessel occlusions, aneurysms, or dissections. It did find a 60-70% stenosis in the proximal LICA, 64-84% stenosis in the proximal RAJENDRA, and moderate stenosis in the origin of the right vertebral artery. The pt was to be admitted for diagnostic workup and management of his hyponatremia.     Past Medical History:        Diagnosis Date    Abdominal aortic atherosclerosis (HCC)     Allergic rhinitis     Atherosclerosis of superior mesenteric artery (HCC)     CAD (coronary artery disease)     Cerebral artery occlusion with cerebral infarction ([address]     Chronic kidney disease     Chronic pansinusitis 08/06/2021    Clostridium difficile colitis 06/2020    Colon polyps     COPD (chronic obstructive pulmonary disease) ([address]     COVID-19 virus infection 11/15/2021    CVA (cerebral infarction) 05/2013    Multiple, occipital and Cerebellar     DDD (degenerative disc disease)     Cerivical DDD    Dizziness     Epicondylitis elbow, medial     GERD (gastroesophageal reflux disease)     Headache     Hyperlipidemia     Hypertension     Ischemic colitis ([address] 11/2016    Lumbar foraminal stenosis 07/20/2020    Lung disease     Osteoarthritis, hand     PVD (peripheral vascular disease) with claudication (HCC)     Left leg with mod-severe arterial ischemia    Renal artery atherosclerosis (HCC)     SMA stenosis 11/2016    Spinal stenosis of lumbar region without neurogenic claudication 07/20/2020    Thoracic aorta atherosclerosis ([address]     Venous insufficiency     Vertebral artery dissection ([address] 05/2013   ·     Past Surgical History:        Procedure Laterality Date    ANGIOPLASTY Left 08/2016    left femoral -popl stent    ANGIOPLASTY  11/27/2016    SMA    ANGIOPLASTY Right 01/19/2022    Right SFA, Dr. Dayami Copeland Left 09/14/2016    left femoral-popliteal bypass graft.     ARTERIAL BYPASS SURGERY  11/29/2016    Dr. Be Kerr - aorto-SMA bypass using 8mm PTFE    ATHERECTOMY Right 04/08/2019    Atherectomy with angioplasty right SFA and popliteal arteries, atherectomy and angioplasty right tibial peroneal trunk    COLONOSCOPY  2008    COLONOSCOPY  09/02/2015    Dr. Sarah Greenwood  09/20/2018    Dr. Terrell Fuentes Bilateral 01/16/2013    bilateral with mesh, Dr. Laureen Perry FUSION  10/23/2020    L4-5 Decompression with anterior/posterior fusion, Dr. Nini Gomez SEPTOPLASTY Bilateral 08/06/2021    BILATERAL FRONTO-ETHMOIDECTOMY, BILATERAL MAXILLARY ANTROSTOMY WITH TISSUE REMOVAL AND SEPTOPLASTY NASAL SCOPE performed by Adrianna Lino MD at [facility].  09/02/2015    Dr. Shree Allen 09/14/2018    EGD BIOPSY performed by Alka Rader MD at [address]  11/2003    R Leg Vein stripping   ·     Medications Priorto Admission:    · Not in a hospital admission. Allergies:  Patient has no known allergies. Social History:   · TOBACCO:   reports that he quit smoking about 6 years ago. His smoking use included cigarettes. He started smoking about 52 years ago. He has a 23.50 pack-year smoking history. He has never used smokeless tobacco.  · ETOH:   reports current alcohol use. · DRUGS : No  · Patient currently lives at home with his spouse  ·   Family History:       Problem Relation Age of Onset    Heart Attack Father     Heart Disease Father         MI    Cancer Brother         Brain CA    Hypertension Brother     Cancer Brother         Throat cancer   ·     Review of Systems   Constitutional: Negative for chills, fatigue and fever. Eyes: Negative for visual disturbance. Respiratory: Negative for cough and shortness of breath. Cardiovascular: Negative for chest pain and palpitations. Gastrointestinal: Positive for diarrhea. Negative for abdominal pain, nausea and vomiting. Endocrine: Positive for polydipsia. Musculoskeletal: Positive for neck pain. Negative for arthralgias and myalgias. Neurological: Positive for headaches. Negative for dizziness, weakness, light-headedness and numbness. ROS: A 10 point review of systems was conducted, significant findings as noted in HPI.     Physical Exam  Constitutional:       General: He is not in acute distress. Appearance: Normal appearance. He is normal weight. He is not ill-appearing, toxic-appearing or diaphoretic. HENT:      Head: Normocephalic and atraumatic. Eyes:      Extraocular Movements: Extraocular movements intact. Cardiovascular:      Rate and Rhythm: Normal rate and regular rhythm. Heart sounds: No murmur heard. No friction rub. No gallop. Pulmonary:      Effort: Pulmonary effort is normal.      Breath sounds: No wheezing, rhonchi or rales. Abdominal:      General: Abdomen is flat. Bowel sounds are normal. There is no distension. Palpations: Abdomen is soft. Tenderness: There is no abdominal tenderness. There is no guarding or rebound. Musculoskeletal:      Right lower leg: No edema. Left lower leg: No edema. Neurological:      Mental Status: He is alert and oriented to person, place, and time. Physical exam:       Vitals:    06/23/22 1602   BP: 128/63   Pulse: 62   Resp: 18   Temp: 98.2 °F (36.8 °C)   SpO2: 100%       DATA:    Labs:  CBC:   Recent Labs     06/23/22  1900   WBC 6.4   HGB 13.6   HCT 39.2*          BMP:   Recent Labs     06/23/22  1827 06/23/22  1905   * 119*   K 6.4* 5.2*   CL 81* 84*   CO2 24 27   BUN 14 14   CREATININE 0.9 0.9   GLUCOSE 133* 135*     LFT's: No results for input(s): AST, ALT, ALB, BILITOT, ALKPHOS in the last 72 hours. Troponin:   Recent Labs     06/23/22 1827   TROPONINI <0.01     BNP:No results for input(s): BNP in the last 72 hours. ABGs: No results for input(s): PHART, XBS4UDB, PO2ART in the last 72 hours. INR: No results for input(s): INR in the last 72 hours. U/A:No results for input(s): NITRITE, COLORU, PHUR, LABCAST, WBCUA, RBCUA, MUCUS, TRICHOMONAS, YEAST, BACTERIA, CLARITYU, SPECGRAV, LEUKOCYTESUR, UROBILINOGEN, BILIRUBINUR, BLOODU, GLUCOSEU, AMORPHOUS in the last 72 hours. Invalid input(s): KETONESU    CTA HEAD NECK W CONTRAST   Final Result      1.   No large vessel occlusion, aneurysm or dissection. 2.  60-70% stenosis in the proximal left internal carotid artery. 3.  40-50% stenosis in the proximal right internal carotid artery. 4.  At least moderate stenosis in the origin of the right vertebral artery      CT Head WO Contrast   Final Result      1. No acute hemorrhage or mass effect. 2.  Chronic bilateral maxillary sinusitis, with waxing and waning from prior study. ASSESSMENT AND PLAN:    Hyponatremia  Pt drinks six 8 ounce ledesma daily. Pt also drinks three to four 12 ounce beers a day. Etiology hypovolemic hyponatremia from increase PO water intake vs hypervolemic hyponatremia due to ETOH consumption. It is possible that steroid dose pack could be causing an effect.    -Restrict fluids to 2L daily  -Trend Na q6H  Labs: Serum Osmo, Urine Osmo, Urine Cl, Uriine Cr, TSH, Free T4, RFP daily, and Mg daily  -Strict I/O qShift  -Daily Weights  -Nephrology consulted    ETOH Use  Pt drinks 3 to 4 twelve ounce beers a day. PCP note states pt was drinking 6 beers per night this past spring. Last alcoholic beverage was Thursday 6/23 at 10:00 PM.     -Thiamine 100 mg daily for five days  -Multivitamin daily  -CIWAs    Chronic Medical Problems  Hx CVA: Continue home ASA 81 mg daily and Plavix 75 mg daily  HTN: Continue home Coreg 25 mg BID, Nifidipine ER 30 mg daily (recently decreased), and hold home Telmisartan 80 mg daily  HLD: Continue home Atorvastatin 80 mg daily    Will discuss with attending physician Dr. Primitivo Cruz.      Code Status:Full code  FEN: Adult Cardiac Diet, 2L fluid restriction  PPX: Lovenox  DISPO: VIJAYA Sosa DO, PGY-1  6/23/2022,  10:16 PM

## 2022-06-24 NOTE — CARE COORDINATION
Cm met with pt at bedside for initial and d/c assessment. Pt is independent and lives with his wife. Pt drives and uses no DME. Pt has hx of alcohol abuse. Substance Abuse resources added to discharge instructions. No further CM needs . Wife to transport home.      George Shaffer RN, BSN, [employer]  Case Management Department  [phone]

## 2022-06-24 NOTE — ED PROVIDER NOTES
ED Attending Attestation Note     Date of evaluation: 6/23/2022    This patient was seen by the resident. I have seen and examined the patient, agree with the workup, evaluation, management and diagnosis. The care plan has been discussed. My assessment reveals a gentelman with a reported hx of Vert art dissection presenting with L posterior neck pain. He denies numbness, weakness, paresthesias, double vision, or trauma. On exam:  Eyes: Sclera clear, pupils equally round and reactive to light  Pulm: Lungs clear to auscultation bilaterally, no increased WOB  Cor: Regular rhythm, normal rate   Extr: warm, well perfused  Neuro:  Mental Status: Awake, alert, conversant. Speech is clear and fluent, with no aphasia or dysarthria. CN: Visual fields are full to confrontation. Pupils are 4->2 mm bilaterally, round and briskly reactive to light. EOMI with no nystagmus. Remainder of CN II-XII grossly intact. Motor: No pronator drift, LE at least AG [id number] b/l  Sensation: Intact to fine touch in bilateral upper and lower extremities. Coordination: Finger-to-nose normal bilaterally. Gait/Balance/Proprioception: Gait normal without ataxia. After the resident's departure, I did follow-up the pending studies. The patient had no leukocytosis making systemic infection somewhat less likely. A troponin was nonelevated. This made ACS less likely. The potassium was spuriously elevated due to hemolysis. Hyponatremia without associated significant hyperglycemia to explain it was noted. Repeat basic metabolic panel shows a potassium of 5.2 but does confirm profound hyponatremia. He has had a history  of low sodium in the past but never this low. I discussed patient with the PCP who sent him in. We agreed that admission to the hospitalist was most appropriate. The patient reported significant improvement in his pain this with lidocaine patch. Urine electrolytes and thyroid studies were ordered.   The patient was discussed accepted with the hospitalist.  He is currently asymptomatic with respect to his hyponatremia and so I did not initiate any acute treatment for his hyponatremia     Alexandria Del Angel MD  06/23/22 3537

## 2022-06-24 NOTE — PROGRESS NOTES
Progress Note    Admit Date: 6/23/2022  Day: 1   Diet: ADULT DIET; Regular; 2000 ml    CC: neck pain    Interval history: NAOE however Na overcorrected overnight from 119>131 without prior intervention. Nephrology following and gave one dose of ddavp and started pt on 100/hr of d5. Will continue to monitor Na levels closely but patient otherwise feels great. He has 6/10 neck pain that lidocaine patch has helped. Per chart review pt also prescribed medrol pack on 6/16 which may be contributing. ROS  +polydipsia, polyuria at home    HPI:   \"Pt is a 71year old male with a PMHx CVA (2013), HTN, HLD, CAD, PAD s/p aorto to SMA bypass, vertebral artery dissection (2013), and alcohol abuse (5-6 beers daily) who presented to the ED with neck pain. \" found to be hyponatremic to 119    Medications:     Scheduled Meds:   sodium chloride flush  5-40 mL IntraVENous 2 times per day    enoxaparin  40 mg SubCUTAneous Daily    pantoprazole  40 mg Oral QAM AC    thiamine  100 mg Oral Daily    multivitamin  1 tablet Oral Daily    carvedilol  25 mg Oral BID WC    aspirin  81 mg Oral Daily    clopidogrel  75 mg Oral Daily    NIFEdipine  30 mg Oral Daily    atorvastatin  80 mg Oral Daily    [Held by provider] losartan  100 mg Oral Daily     Continuous Infusions:   sodium chloride      dextrose 100 mL/hr at 06/24/22 0832     PRN Meds:sodium chloride flush, sodium chloride, ondansetron **OR** ondansetron, polyethylene glycol, acetaminophen **OR** acetaminophen, lidocaine    Objective:   Vitals:   T-max:  Patient Vitals for the past 8 hrs:   BP Temp Temp src Pulse Resp SpO2 Height Weight   06/24/22 0757 (!) 190/67 98.2 °F (36.8 °C) Oral 62 18 -- -- --   06/24/22 0637 (!) 181/92 98.2 °F (36.8 °C) Oral 65 18 99 % -- --   06/24/22 0419 (!) 185/89 -- -- -- -- -- -- --   06/24/22 0230 (!) 196/87 97.9 °F (36.6 °C) Oral 61 18 100 % 5' 6\" (1.676 m) 137 lb 2 oz (62.2 kg)     No intake or output data in the 24 hours ending 06/24/22 5888    Review of Systems  10 point ROS completed and negative as listed above    Physical Exam  Constitutional:       General: He is not in acute distress. Appearance: Normal appearance. He is normal weight. He is not ill-appearing, toxic-appearing or diaphoretic. HENT:      Head: Normocephalic and atraumatic. Eyes:      Extraocular Movements: Extraocular movements intact. Cardiovascular:      Rate and Rhythm: Normal rate and regular rhythm. Heart sounds: No murmur heard. No friction rub. No gallop. Pulmonary:      Effort: Pulmonary effort is normal.      Breath sounds: No wheezing, rhonchi or rales. Abdominal:      General: Abdomen is flat. Bowel sounds are normal. There is no distension. Palpations: Abdomen is soft. Tenderness: There is no abdominal tenderness. There is no guarding or rebound. Musculoskeletal:      Right lower leg: No edema. Left lower leg: No edema. Neurological:      Mental Status: He is alert and oriented to person, place, and time. LABS:    CBC:   Recent Labs     06/23/22  1900 06/24/22  0307   WBC 6.4 7.1   HGB 13.6 13.8   HCT 39.2* 40.6    270   .8* 103.5*     Renal:    Recent Labs     06/23/22  1827 06/23/22  1905 06/24/22  0307   * 119* 131*   K 6.4* 5.2* 4.7   CL 81* 84* 94*   CO2 24 27 27   BUN 14 14 16   CREATININE 0.9 0.9 0.8   GLUCOSE 133* 135* 84   CALCIUM 9.8 8.4 9.2   MG  --   --  2.30   PHOS  --   --  3.4   ANIONGAP 15 8 10     Hepatic:   Recent Labs     06/24/22  0307   LABALBU 4.3     Troponin:   Recent Labs     06/23/22 1827   TROPONINI <0.01     BNP: No results for input(s): BNP in the last 72 hours. Lipids: No results for input(s): CHOL, HDL in the last 72 hours. Invalid input(s): LDLCALCU, TRIGLYCERIDE  ABGs:  No results for input(s): PHART, [id number], PO2ART, WIX9KHO, BEART, THGBART, [id number], IZH4ZUC in the last 72 hours. INR: No results for input(s): INR in the last 72 hours.   Lactate: No results for input(s): LACTATE in the last 72 hours. Cultures:  -----------------------------------------------------------------  RAD:   CTA HEAD NECK W CONTRAST   Final Result      1. No large vessel occlusion, aneurysm or dissection. 2.  60-70% stenosis in the proximal left internal carotid artery. 3.  40-50% stenosis in the proximal right internal carotid artery. 4.  At least moderate stenosis in the origin of the right vertebral artery      CT Head WO Contrast   Final Result      1. No acute hemorrhage or mass effect. 2.  Chronic bilateral maxillary sinusitis, with waxing and waning from prior study. Assessment/Plan:     Hyponatremia, unclear chronicity  Pt with recent steroid for neck pain. Also reports 5-6 beers per day and \"a lot\" of water with polyuria. Pt Given 1 dose ddavp this am and started on D5 for overcorrection from 119 to 131.    - Nephrology onboard  - fluid restriction 2L  - f/u urine studies  - q6h Na  - monitor IOs    Alcohol abuse disorder  Pt drinks 5-6 twelve ounce beers a day. Pt also reports polydipsia, polyuria  - CIWA without ordered ativan  - will send LFT, INR    Neck pain  - down to 5-6/10, continue lidocaine patch  - prn tylenol helped at home can continue this    HTN: Continue home Coreg 25 mg BID, Nifidipine ER 30 mg daily (recently decreased), and hold home Telmisartan 80 mg daily (can get 100 mg losartan per pharmacy when restarted).  Poorly controlled overnight with SBP 190s  - will recheck after getting meds this AM    Chronic Medical Problems  Hx CVA: Continue home ASA 81 mg daily and Plavix 75 mg daily  HLD: Continue home Atorvastatin 80 mg daily    Code Status: Full code  FEN: Adult Cardiac Diet, 2L fluid restriction  PPX: Lovenox  DISPO: IP, if corrects as expected could possibly discharge today    Ida Kirk MD, PGY1  06/24/22  9:51 AM    This patient has been staffed and discussed with Claudia Williamson MD.     Patient seen and examined, labs and imaging studies reviewed, agree with assessment and plan as outlined above. Continue with current care and plan. Discussed case with patients nurse, discussed case with care team, discussed plan. Has severe osteoarthritis and pain associated with it, likely contributing to neck pain as well with muscle spasms, ordered mobic risks benefits and alternatives discussed, also added flexeril I have faxed to pharmacy.      MD Wilner Tavera

## 2022-06-24 NOTE — PROGRESS NOTES
4 Eyes Admission Assessment     I agree as the admission nurse that 2 RN's have performed a thorough Head to Toe Skin Assessment on the patient. ALL assessment sites listed below have been assessed on admission. Areas assessed by both nurses: ***  [x]   Head, Face, and Ears   [x]   Shoulders, Back, and Chest  [x]   Arms, Elbows, and Hands   [x]   Coccyx, Sacrum, and Ischium  [x]   Legs, Feet, and Heels        Does the Patient have Skin Breakdown?   No         [place] Prevention initiated:  No   Wound Care Orders initiated:  No      [facility] nurse consulted for Pressure Injury (Stage 3,4, Unstageable, DTI, NWPT, and Complex wounds) or [place] score 18 or lower:  No      Nurse 1 eSignature: Electronically signed by Dionicio Mei RN on 6/24/22 at 3:03 AM EDT    **SHARE this note so that the co-signing nurse is able to place an eSignature**    Nurse 2 eSignature: {Esignature:[id number]}

## 2022-06-24 NOTE — DISCHARGE SUMMARY
[address] [facility]  DISCHARGE SUMMARY    Patient ID: Jenny Daley                                             Patient's PCP: Lucrecia Feliciano DO                                          Admitting Physician: Philly Duran MD  Admit Date: 6/23/2022   Discharge Physician: Harmony Ramírez MD MD  Discharge Date: 6/27/2022     DISCHARGE DIAGNOSES:  Patient Active Problem List   Diagnosis Code    Venous insufficiency I87.2    Hyperlipidemia E78.5    Gastroesophageal reflux disease without esophagitis K21.9    Lesion of colon K63.9    Vitamin D deficiency E55.9    Pulmonary emphysema ([address] J43.9    DDD (degenerative disc disease), cervical M50.30    Osteoarthritis M19.90    Essential hypertension I10    Atherosclerosis of abdominal aorta ([address] I70.0    Thoracic aorta atherosclerosis ([address] I70.0    Atherosclerosis of superior mesenteric artery ([address] K55.1    Renal artery atherosclerosis ([address] I70.1    Carotid stenosis, asymptomatic, bilateral I65.23    Atherosclerotic PVD with intermittent claudication ([address] I70.219    Coronary artery disease involving native coronary artery of native heart without angina pectoris I25.10    S/P vascular bypass Z95.828    Hallux rigidus, acquired M20.20    Mesenteric artery stenosis (HCC) K55.1    Alcohol abuse F10.10    Peripheral arterial disease (HCC) I73.9    Hydronephrosis of right kidney N13.30    Stenosis of ureteropelvic junction (UPJ) Q62.11    Elevated MCV R71.8    Aortic valve stenosis I35.0    Coronary artery calcification seen on CAT scan I25.10    Spinal stenosis of lumbar region without neurogenic claudication M48.061    Lumbar foraminal stenosis M48.061    NSVT (nonsustained ventricular tachycardia) (HCC) I47.2    Chronic pansinusitis J32.4    Deviated nasal septum J34.2    COVID-19 U07.1    Hyponatremia E87.1       Hospital Course:      Jenny Daley is a 71 y.o. male with pmhx of alcohol abuse, HTN, and recent steroid use for neck pain presenting from PCP office with hyponatremia in the setting of above. Patient reports that he has been drinking 5-6 beers daily and a significant amount of water with associated polyuria. Sodium 119 in ED and upon morning labs was 131. Nephrology gave DDAVP and started on D5 for this overcorrection. Patient otherwise only complaining of neck pain well controlled with voltaren gel, flexeril, and lidocaine patch. Patient was then hyponatremic similar levels to his initial presentation. He was given urea packet followed by tolvaptan and his sodium levels improved. We monitored labs closely and Na stabilized at 128. After discussion with patient and nephrology, given hyponatremia explained by fluid intake with low solute intake in setting of pain, decision made to discharge patient to home with outpatient follow up. He did have an elevation of his Cr this admission that nephrology felt was appropriate to follow up outpatient. He is safe for discharge to home. We will have him avoid NSAIDs and stop his ARB until he follow up    Physical Exam:  BP 96/60   Pulse 65   Temp 97.7 °F (36.5 °C) (Oral)   Resp 18   Ht 5' 6\" (1.676 m)   Wt 134 lb 11.2 oz (61.1 kg)   SpO2 95%   BMI 21.74 kg/m²   Wt Readings from Last 3 Encounters:   06/27/22 134 lb 11.2 oz (61.1 kg)   02/17/22 137 lb (62.1 kg)   01/20/22 135 lb 12.8 oz (61.6 kg)     Body mass index is 21.74 kg/m². · Gen - Alert, no signs of distress, appears stated age and cooperative  · HEENT - NC/AT  · Eye - Normal external eye, conjunctiva, lids cornea, PERLLA, no nystagmus  · Ears - Normal TM's bilaterally. Normal auditory canals and external ears. Non-tender  · Nose - Normal external nose, mucus membranes and septum  · Pharynx - Dental Hygiene adequate. Normal buccal mucosa. Normal pharynx  · Neck - Supple, no masses, nodes, nodules or enlargement. No adenopathy, no carotid bruit, no JVD. · CVS - Regular rate.  Regular rhythm. No mumur or rub. No edema  · Resp - No accessory muscle use. No crackles. No wheezing. No rhonchi  · GI - Non-tender. Non-distended. No masses. No organmegaly. Normal bowel sounds  · Skin - Warm and dry. No nodule on exposed extremities. No rash on exposed extremities  · Lymph - No cervical LAD. No supraclavicular LAD. · MSK - No cyanosis. No joint deformity. No clubbing  · Neuro - Awake. Follows commands. CN II-XII Grossly Intact. Sensation intact. Strength 5/5 UE and LE. Reflexes 1+ UE and LE naomi. Downgoing plantar naomi. Normal Gait. Finger-to-nose and rapidly alternating movements intact. Normal pain response  · Psych - Oriented to person, place, time. No anxiety or agitation. Consults: Nephrology  Significant Diagnostic Studies: BMP, Na checks    LABS:  CBC:   Recent Labs     06/25/22  0303 06/26/22  0308 06/27/22  0441   WBC 7.0 6.2 5.7   HGB 13.9 13.5 13.5   HCT 41.3 40.5 39.2*   .9* 104.2* 102.6*    288 284     Lab Results   Component Value Date    TSH 3.16 01/17/2022     Lab Results   Component Value Date    IRON 224 (H) 03/07/2019    TIBC 277 03/07/2019    FERRITIN 181.6 03/07/2019    FOLATE 13.32 10/17/2019    OFFYGCUA90 305 10/17/2019     BMP:  Recent Labs     06/25/22  0428 06/25/22  0952 06/26/22  0308 06/26/22  0452 06/27/22  0441 06/27/22  0838 06/27/22  1117 06/27/22  1244   *   < > 122*   < > 129* 128* 126*  --    K 4.4  --  4.6   < > 4.4  --  5.5* 4.5   CL 83*  --  85*  --  90*  --  93*  --    CO2 24  --  25  --  26  --  20*  --    BUN 12  --  43*  --  44*  --  45*  --    CREATININE 0.8  --  1.3  --  1.6*  --  1.6*  --    GLUCOSE 96  --  110*  --  89  --  108*  --    CALCIUM 9.2  --  9.4  --  9.3  --  9.2  --    MG 1.80  --  2.30  --  2.40  --   --   --    PHOS 3.4  --  5.0*  --  5.7*  --  5.4*  --     < > = values in this interval not displayed.      LFT's:  Lab Results   Component Value Date    AST 22 06/24/2022    ALT 18 06/24/2022    BILITOT 0.6 06/24/2022 ALKPHOS 68 06/24/2022    LIPASE 19.0 06/11/2021    AMYLASE 74 06/11/2021     No results found for: OCCULTBLD  Troponin:  Lab Results   Component Value Date    TROPONINI <0.01 06/23/2022    TROPONINI <0.01 08/19/2020    TROPONINI <0.01 12/24/2017     BNP:  No results found for: BNP  Lipids:  Lab Results   Component Value Date    CHOL 139 01/17/2022    HDL 73 (H) 01/17/2022    LDLCALC 49 01/17/2022    TRIG 87 01/17/2022     Lab Results   Component Value Date    LABA1C 5.7 01/17/2022     ABGs:  Lab Results   Component Value Date    PHART 7.43 12/01/2016    PHART 7.404 11/29/2016    SQH3TTI 40 12/01/2016    HDP7QMJ 40 11/29/2016    PO2ART 139.0 (H) 12/01/2016    PO2ART 496 (HH) 11/29/2016     INR:  Lab Results   Component Value Date    INR 1.06 06/24/2022    INR 0.99 09/14/2018    INR 0.93 12/23/2017     U/A:  Lab Results   Component Value Date    COLORU Yellow 06/23/2022    PHUR 7.0 06/23/2022    WBCUA None seen 06/23/2022    RBCUA None seen 06/23/2022    MUCUS 1+ (A) 06/23/2022    BACTERIA RARE (A) 08/04/2015    CLARITYU Clear 06/23/2022    SPECGRAV <=1.005 06/23/2022    LEUKOCYTESUR Negative 06/23/2022    UROBILINOGEN 0.2 06/23/2022    BILIRUBINUR Negative 06/23/2022    BLOODU Negative 06/23/2022    GLUCOSEU Negative 06/23/2022        Treatments: DDAVP, fluid restriction, tolvaptan, IVF  Disposition: home  Discharged Condition: Stable  Follow Up: Primary Care Physician in one week, nephrology 2-3 weeks    DISCHARGE MEDICATION:      Medication List      START taking these medications    cyclobenzaprine 10 MG tablet  Commonly known as: FLEXERIL  Take 1 tablet by mouth 3 times daily as needed for Muscle spasms     diclofenac sodium 1 % Gel  Commonly known as: VOLTAREN  Apply 2 g topically 4 times daily as needed for Pain     lidocaine 4 % external patch  Place 1 patch onto the skin daily as needed (neck pain)     multivitamin Tabs tablet  Take 1 tablet by mouth daily     Ure-Na 15 g Pack packet  Generic drug: urea  Take 15 g by mouth daily        CONTINUE taking these medications    acetaminophen 325 MG tablet  Commonly known as: TYLENOL  Take 2 tablets by mouth every 6 hours as needed for Pain     ALIGN PO     aspirin 81 MG tablet  Take 1 tablet by mouth daily     atorvastatin 80 MG tablet  Commonly known as: LIPITOR  TAKE 1 TABLET BY MOUTH EVERY DAY     carvedilol 25 MG tablet  Commonly known as: COREG  TAKE 1 TABLET BY MOUTH TWICE A DAY     clopidogrel 75 MG tablet  Commonly known as: PLAVIX  TAKE 1 TABLET BY MOUTH EVERY DAY     ferrous sulfate 325 (65 Fe) MG tablet  Commonly known as: FeroSul  Take 1 tablet by mouth daily (with breakfast)     fluticasone 50 MCG/ACT nasal spray  Commonly known as: FLONASE  1 spray by Nasal route daily     NIFEdipine 30 MG extended release tablet  Commonly known as: ADALAT CC     pantoprazole 40 MG tablet  Commonly known as: PROTONIX  TAKE 1 TABLET BY MOUTH EVERY DAY BEFORE BREAKFAST     vitamin D 50 MCG (2000 UT) Caps capsule        STOP taking these medications    telmisartan 80 MG tablet  Commonly known as: MICARDIS           Where to Get Your Medications      These medications were sent to [address] [facility] [phone] -  [phone]  [facility] [address]    Phone: [phone]   · diclofenac sodium 1 % Gel  · lidocaine 4 % external patch  · multivitamin Tabs tablet  · Ure-Na 15 g Pack packet     Information about where to get these medications is not yet available    Ask your nurse or doctor about these medications  · cyclobenzaprine 10 MG tablet       Activity: activity as tolerated  Diet: limit excessive fluid intake, encourage PO solute intake  Wound Care: none needed    Time Spent on discharge is more than 30 minutes    Signed:  Zander Arroyo MD,  PGY1  Internal Medicine  6/27/2022, 1:59 PM  Patient seen and examined, labs and imaging reviewed, agree with assessment and plan as outlined above.   patients condition continues to improve doing well, asked patient to monitor side effects of medications which i've discussed at length, recurrence of symptoms or new symptoms including but not limited to chest pain, shortness of breath, nausea, vomiting, fevers or chills and seek immediate medical attention or call 911. Greater than 30 minutes spent on case on day of discharge. Dc cleared by ephrology, f/u as outpatient.       Woo Godoy MD FACP

## 2022-06-24 NOTE — CONSULTS
Clinical Pharmacy Progress Note  Medication History     Admit Date: 6/23/2022    Pharmacy consulted to verify home medication list by Dr. Jean Villagran. RN reviewed medication list on admission and list appears correct based on Rx fill history. List of of current medications patient is taking is complete. Home Medication list in EPIC updated to reflect changes noted below. Source of information: Rx fill history    Patient's home pharmacy: [facility] [phone])     Changes made to medication list:   Medication doses adjusted:    Nifedipine     Current Outpatient Medications   Medication Instructions    acetaminophen (TYLENOL) 650 mg, Oral, EVERY 6 HOURS PRN    aspirin 81 mg, Oral, DAILY    atorvastatin (LIPITOR) 80 MG tablet TAKE 1 TABLET BY MOUTH EVERY DAY    carvedilol (COREG) 25 MG tablet TAKE 1 TABLET BY MOUTH TWICE A DAY    Cholecalciferol (VITAMIN D) 2000 units CAPS capsule 1 capsule, Oral, DAILY    clopidogrel (PLAVIX) 75 MG tablet TAKE 1 TABLET BY MOUTH EVERY DAY    ferrous sulfate (FEROSUL) 325 mg, Oral, DAILY WITH BREAKFAST    fluticasone (FLONASE) 50 MCG/ACT nasal spray 1 spray, Nasal, DAILY    NIFEdipine (ADALAT CC) 30 mg, Oral, DAILY    pantoprazole (PROTONIX) 40 MG tablet TAKE 1 TABLET BY MOUTH EVERY DAY BEFORE BREAKFAST    Probiotic Product (ALIGN PO) 1 tablet, Oral, DAILY    telmisartan (MICARDIS) 80 MG tablet TAKE 1 TABLET BY MOUTH EVERY DAY           Please call with any questions.   Farnaz De Luna, ArnoldD, [facility]  Wireless: [phone]   6/24/2022 10:27 AM

## 2022-06-24 NOTE — PLAN OF CARE
Problem: Discharge Planning  Goal: Discharge to home or other facility with appropriate resources  Outcome: Progressing  Flowsheets  Taken 6/24/2022 0343  Discharge to home or other facility with appropriate resources: Identify barriers to discharge with patient and caregiver  Taken 6/24/2022 0238  Discharge to home or other facility with appropriate resources:   Identify barriers to discharge with patient and caregiver   Identify discharge learning needs (meds, wound care, etc)   Arrange for needed discharge resources and transportation as appropriate   Arrange for interpreters to assist at discharge as needed   Refer to discharge planning if patient needs post-hospital services based on physician order or complex needs related to functional status, cognitive ability or social support system     Problem: Pain  Goal: Verbalizes/displays adequate comfort level or baseline comfort level  Outcome: Progressing  Flowsheets (Taken 6/24/2022 0343)  Verbalizes/displays adequate comfort level or baseline comfort level:   Encourage patient to monitor pain and request assistance   Assess pain using appropriate pain scale   Administer analgesics based on type and severity of pain and evaluate response   Implement non-pharmacological measures as appropriate and evaluate response     Problem: Safety - Adult  Goal: Free from fall injury  Outcome: Adequate for Discharge  Flowsheets (Taken 6/24/2022 0343)  Free From Fall Injury: Instruct family/caregiver on patient safety     Problem: Metabolic/Fluid and Electrolytes - Adult  Goal: Electrolytes maintained within normal limits  Outcome: Progressing  Flowsheets (Taken 6/24/2022 0343)  Electrolytes maintained within normal limits: Monitor labs and assess patient for signs and symptoms of electrolyte imbalances

## 2022-06-25 ENCOUNTER — APPOINTMENT (OUTPATIENT)
Dept: CT IMAGING | Age: 72
DRG: 644 | End: 2022-06-25
Payer: MEDICARE

## 2022-06-25 LAB
ALBUMIN SERPL-MCNC: 4.7 G/DL (ref 3.4–5)
ANION GAP SERPL CALCULATED.3IONS-SCNC: 15 MMOL/L (ref 3–16)
BASOPHILS ABSOLUTE: 0.1 K/UL (ref 0–0.2)
BASOPHILS RELATIVE PERCENT: 1 %
BUN BLDV-MCNC: 12 MG/DL (ref 7–20)
CALCIUM SERPL-MCNC: 9.2 MG/DL (ref 8.3–10.6)
CHLORIDE BLD-SCNC: 83 MMOL/L (ref 99–110)
CO2: 24 MMOL/L (ref 21–32)
CREAT SERPL-MCNC: 0.8 MG/DL (ref 0.8–1.3)
EOSINOPHILS ABSOLUTE: 0.1 K/UL (ref 0–0.6)
EOSINOPHILS RELATIVE PERCENT: 1.1 %
GFR AFRICAN AMERICAN: >60
GFR NON-AFRICAN AMERICAN: >60
GLUCOSE BLD-MCNC: 96 MG/DL (ref 70–99)
HCT VFR BLD CALC: 41.3 % (ref 40.5–52.5)
HEMOGLOBIN: 13.9 G/DL (ref 13.5–17.5)
LYMPHOCYTES ABSOLUTE: 0.9 K/UL (ref 1–5.1)
LYMPHOCYTES RELATIVE PERCENT: 12.5 %
MAGNESIUM: 1.8 MG/DL (ref 1.8–2.4)
MCH RBC QN AUTO: 35 PG (ref 26–34)
MCHC RBC AUTO-ENTMCNC: 33.6 G/DL (ref 31–36)
MCV RBC AUTO: 103.9 FL (ref 80–100)
MONOCYTES ABSOLUTE: 1.2 K/UL (ref 0–1.3)
MONOCYTES RELATIVE PERCENT: 17.5 %
NEUTROPHILS ABSOLUTE: 4.8 K/UL (ref 1.7–7.7)
NEUTROPHILS RELATIVE PERCENT: 67.9 %
OSMOLALITY URINE: 489 MOSM/KG (ref 390–1070)
PDW BLD-RTO: 13.7 % (ref 12.4–15.4)
PHOSPHORUS: 3.4 MG/DL (ref 2.5–4.9)
PLATELET # BLD: 265 K/UL (ref 135–450)
PMV BLD AUTO: 6.5 FL (ref 5–10.5)
POTASSIUM SERPL-SCNC: 4.4 MMOL/L (ref 3.5–5.1)
RBC # BLD: 3.97 M/UL (ref 4.2–5.9)
SODIUM BLD-SCNC: 118 MMOL/L (ref 136–145)
SODIUM BLD-SCNC: 119 MMOL/L (ref 136–145)
SODIUM BLD-SCNC: 119 MMOL/L (ref 136–145)
SODIUM BLD-SCNC: 120 MMOL/L (ref 136–145)
SODIUM BLD-SCNC: 122 MMOL/L (ref 136–145)
SODIUM URINE: 81 MMOL/L
WBC # BLD: 7 K/UL (ref 4–11)

## 2022-06-25 PROCEDURE — 72125 CT NECK SPINE W/O DYE: CPT

## 2022-06-25 PROCEDURE — 6370000000 HC RX 637 (ALT 250 FOR IP): Performed by: INTERNAL MEDICINE

## 2022-06-25 PROCEDURE — 83935 ASSAY OF URINE OSMOLALITY: CPT

## 2022-06-25 PROCEDURE — 2580000003 HC RX 258: Performed by: STUDENT IN AN ORGANIZED HEALTH CARE EDUCATION/TRAINING PROGRAM

## 2022-06-25 PROCEDURE — 84300 ASSAY OF URINE SODIUM: CPT

## 2022-06-25 PROCEDURE — 83735 ASSAY OF MAGNESIUM: CPT

## 2022-06-25 PROCEDURE — 6370000000 HC RX 637 (ALT 250 FOR IP): Performed by: STUDENT IN AN ORGANIZED HEALTH CARE EDUCATION/TRAINING PROGRAM

## 2022-06-25 PROCEDURE — 6360000002 HC RX W HCPCS: Performed by: STUDENT IN AN ORGANIZED HEALTH CARE EDUCATION/TRAINING PROGRAM

## 2022-06-25 PROCEDURE — 99233 SBSQ HOSP IP/OBS HIGH 50: CPT | Performed by: INTERNAL MEDICINE

## 2022-06-25 PROCEDURE — 1200000000 HC SEMI PRIVATE

## 2022-06-25 PROCEDURE — 80069 RENAL FUNCTION PANEL: CPT

## 2022-06-25 PROCEDURE — 85025 COMPLETE CBC W/AUTO DIFF WBC: CPT

## 2022-06-25 PROCEDURE — 84295 ASSAY OF SERUM SODIUM: CPT

## 2022-06-25 PROCEDURE — 36415 COLL VENOUS BLD VENIPUNCTURE: CPT

## 2022-06-25 RX ORDER — MAGNESIUM SULFATE IN WATER 40 MG/ML
2000 INJECTION, SOLUTION INTRAVENOUS ONCE
Status: COMPLETED | OUTPATIENT
Start: 2022-06-25 | End: 2022-06-25

## 2022-06-25 RX ORDER — KETOROLAC TROMETHAMINE 15 MG/ML
15 INJECTION, SOLUTION INTRAMUSCULAR; INTRAVENOUS ONCE
Status: COMPLETED | OUTPATIENT
Start: 2022-06-25 | End: 2022-06-25

## 2022-06-25 RX ORDER — HYDRALAZINE HYDROCHLORIDE 20 MG/ML
5 INJECTION INTRAMUSCULAR; INTRAVENOUS
Status: DISCONTINUED | OUTPATIENT
Start: 2022-06-25 | End: 2022-06-25

## 2022-06-25 RX ORDER — MECOBALAMIN 5000 MCG
5 TABLET,DISINTEGRATING ORAL ONCE
Status: COMPLETED | OUTPATIENT
Start: 2022-06-25 | End: 2022-06-25

## 2022-06-25 RX ORDER — TOLVAPTAN 15 MG/1
7.5 TABLET ORAL ONCE
Status: COMPLETED | OUTPATIENT
Start: 2022-06-25 | End: 2022-06-25

## 2022-06-25 RX ORDER — HYDRALAZINE HYDROCHLORIDE 20 MG/ML
10 INJECTION INTRAMUSCULAR; INTRAVENOUS
Status: COMPLETED | OUTPATIENT
Start: 2022-06-25 | End: 2022-06-25

## 2022-06-25 RX ADMIN — DICLOFENAC SODIUM 2 G: 10 GEL TOPICAL at 21:00

## 2022-06-25 RX ADMIN — MAGNESIUM SULFATE HEPTAHYDRATE 2000 MG: 2 INJECTION, SOLUTION INTRAVENOUS at 09:55

## 2022-06-25 RX ADMIN — HYDROMORPHONE HYDROCHLORIDE 0.5 MG: 1 INJECTION, SOLUTION INTRAMUSCULAR; INTRAVENOUS; SUBCUTANEOUS at 12:19

## 2022-06-25 RX ADMIN — NIFEDIPINE 30 MG: 30 TABLET, EXTENDED RELEASE ORAL at 08:33

## 2022-06-25 RX ADMIN — PANTOPRAZOLE SODIUM 40 MG: 40 TABLET, DELAYED RELEASE ORAL at 06:55

## 2022-06-25 RX ADMIN — ASPIRIN 81 MG: 81 TABLET, COATED ORAL at 08:33

## 2022-06-25 RX ADMIN — Medication 5 MG: at 10:38

## 2022-06-25 RX ADMIN — CYCLOBENZAPRINE 5 MG: 10 TABLET, FILM COATED ORAL at 22:59

## 2022-06-25 RX ADMIN — ACETAMINOPHEN 650 MG: 325 TABLET ORAL at 22:59

## 2022-06-25 RX ADMIN — LOSARTAN POTASSIUM 100 MG: 100 TABLET, FILM COATED ORAL at 08:41

## 2022-06-25 RX ADMIN — Medication 30 G: at 08:38

## 2022-06-25 RX ADMIN — HYDRALAZINE HYDROCHLORIDE 10 MG: 20 INJECTION INTRAMUSCULAR; INTRAVENOUS at 08:49

## 2022-06-25 RX ADMIN — THERA TABS 1 TABLET: TAB at 08:33

## 2022-06-25 RX ADMIN — DICLOFENAC SODIUM 2 G: 10 GEL TOPICAL at 08:35

## 2022-06-25 RX ADMIN — CYCLOBENZAPRINE 5 MG: 10 TABLET, FILM COATED ORAL at 10:16

## 2022-06-25 RX ADMIN — CARVEDILOL 25 MG: 25 TABLET, FILM COATED ORAL at 16:47

## 2022-06-25 RX ADMIN — ENOXAPARIN SODIUM 40 MG: 100 INJECTION SUBCUTANEOUS at 08:32

## 2022-06-25 RX ADMIN — CLOPIDOGREL BISULFATE 75 MG: 75 TABLET ORAL at 08:33

## 2022-06-25 RX ADMIN — SODIUM CHLORIDE, PRESERVATIVE FREE 10 ML: 5 INJECTION INTRAVENOUS at 22:48

## 2022-06-25 RX ADMIN — Medication 100 MG: at 08:33

## 2022-06-25 RX ADMIN — KETOROLAC TROMETHAMINE 15 MG: 15 INJECTION, SOLUTION INTRAMUSCULAR; INTRAVENOUS at 09:47

## 2022-06-25 RX ADMIN — Medication 7.5 MG: at 22:48

## 2022-06-25 RX ADMIN — ATORVASTATIN CALCIUM 80 MG: 80 TABLET, FILM COATED ORAL at 08:33

## 2022-06-25 RX ADMIN — ONDANSETRON 4 MG: 2 INJECTION INTRAMUSCULAR; INTRAVENOUS at 08:49

## 2022-06-25 RX ADMIN — SODIUM CHLORIDE, PRESERVATIVE FREE 10 ML: 5 INJECTION INTRAVENOUS at 08:34

## 2022-06-25 RX ADMIN — CARVEDILOL 25 MG: 25 TABLET, FILM COATED ORAL at 06:55

## 2022-06-25 RX ADMIN — ACETAMINOPHEN 650 MG: 325 TABLET ORAL at 09:50

## 2022-06-25 ASSESSMENT — PAIN SCALES - GENERAL
PAINLEVEL_OUTOF10: 0
PAINLEVEL_OUTOF10: 0
PAINLEVEL_OUTOF10: 6
PAINLEVEL_OUTOF10: 7
PAINLEVEL_OUTOF10: 10
PAINLEVEL_OUTOF10: 10
PAINLEVEL_OUTOF10: 0
PAINLEVEL_OUTOF10: 0

## 2022-06-25 ASSESSMENT — PAIN SCALES - WONG BAKER
WONGBAKER_NUMERICALRESPONSE: 0
WONGBAKER_NUMERICALRESPONSE: 0

## 2022-06-25 ASSESSMENT — PAIN DESCRIPTION - LOCATION
LOCATION: NECK

## 2022-06-25 ASSESSMENT — PAIN DESCRIPTION - ORIENTATION
ORIENTATION: LEFT
ORIENTATION: LEFT

## 2022-06-25 ASSESSMENT — PAIN DESCRIPTION - ONSET: ONSET: ON-GOING

## 2022-06-25 ASSESSMENT — PAIN DESCRIPTION - PAIN TYPE: TYPE: ACUTE PAIN

## 2022-06-25 ASSESSMENT — PAIN DESCRIPTION - DESCRIPTORS
DESCRIPTORS: THROBBING
DESCRIPTORS: THROBBING

## 2022-06-25 ASSESSMENT — PAIN - FUNCTIONAL ASSESSMENT
PAIN_FUNCTIONAL_ASSESSMENT: PREVENTS OR INTERFERES SOME ACTIVE ACTIVITIES AND ADLS
PAIN_FUNCTIONAL_ASSESSMENT: PREVENTS OR INTERFERES SOME ACTIVE ACTIVITIES AND ADLS

## 2022-06-25 ASSESSMENT — PAIN DESCRIPTION - FREQUENCY: FREQUENCY: CONTINUOUS

## 2022-06-25 NOTE — PROGRESS NOTES
Progress Note    Admit Date: 6/23/2022  Day: 2   Diet: ADULT DIET; Regular; 1200 ml    CC: neck pain    Interval history:   HTN ~ 200 in the AM. Has associated HA and N/V. Onset after taking a sip of urea. Pt reports that he did not sleep well overnight. Pt was seen at bedside today AM, was having severe HA today AM. Ambulating in the room, feels steady on his feet. Endorses appetite. Having normal BM and is voiding as usual.  Denies F/C, N/V, CP, SOB, HA, vision changes, weakness in arms and feet. HPI:   \"Pt is a 71year old male with a PMHx CVA (2013), HTN, HLD, CAD, PAD s/p aorto to SMA bypass, vertebral artery dissection (2013), and alcohol abuse (5-6 beers daily) who presented to the ED with neck pain. \" found to be hyponatremic to 119    Medications:     Scheduled Meds:   magnesium sulfate  2,000 mg IntraVENous Once    ketorolac  15 mg IntraMUSCular Once    sodium chloride flush  5-40 mL IntraVENous 2 times per day    enoxaparin  40 mg SubCUTAneous Daily    pantoprazole  40 mg Oral QAM AC    thiamine  100 mg Oral Daily    multivitamin  1 tablet Oral Daily    carvedilol  25 mg Oral BID WC    aspirin  81 mg Oral Daily    clopidogrel  75 mg Oral Daily    NIFEdipine  30 mg Oral Daily    atorvastatin  80 mg Oral Daily    losartan  100 mg Oral Daily    diclofenac sodium  2 g Topical BID     Continuous Infusions:   sodium chloride       PRN Meds:sodium chloride flush, sodium chloride, ondansetron **OR** ondansetron, polyethylene glycol, acetaminophen **OR** acetaminophen, lidocaine, cyclobenzaprine    Objective:   Vitals:   T-max:  Patient Vitals for the past 8 hrs:   BP Temp Temp src Pulse Resp SpO2 Weight   06/25/22 0821 (!) 195/90 97.7 °F (36.5 °C) Oral 61 17 95 % --   06/25/22 0420 -- -- -- -- -- -- 136 lb 14.5 oz (62.1 kg)   06/25/22 0400 (!) 193/90 97.7 °F (36.5 °C) Oral 66 18 96 % --       Intake/Output Summary (Last 24 hours) at 6/25/2022 0940  Last data filed at 6/25/2022 0400  Gross per 24 hour   Intake 760.39 ml   Output 0 ml   Net 760.39 ml     Review of Systems  10 point ROS completed and negative as listed above    Physical Exam  Constitutional:       General: He is not in acute distress. Appearance: Normal appearance. He is normal weight. He is not ill-appearing, toxic-appearing or diaphoretic. HENT:      Head: Normocephalic and atraumatic. Eyes:      Extraocular Movements: Extraocular movements intact. Cardiovascular:      Rate and Rhythm: Normal rate and regular rhythm. Heart sounds: No murmur heard. No friction rub. No gallop. Pulmonary:      Effort: Pulmonary effort is normal.      Breath sounds: No wheezing, rhonchi or rales. Abdominal:      General: Abdomen is flat. Bowel sounds are normal. There is no distension. Palpations: Abdomen is soft. Tenderness: There is no abdominal tenderness. There is no guarding or rebound. Musculoskeletal:      Right lower leg: No edema. Left lower leg: No edema. Neurological:      Mental Status: He is alert and oriented to person, place, and time. LABS:  CBC:   Recent Labs     06/23/22  1900 06/24/22  0307 06/25/22  0303   WBC 6.4 7.1 7.0   HGB 13.6 13.8 13.9   HCT 39.2* 40.6 41.3    270 265   .8* 103.5* 103.9*     Renal:    Recent Labs     06/24/22  0307 06/24/22  0307 06/24/22  0912 06/24/22  1333 06/24/22  2157 06/25/22  0303 06/25/22  0428   *   < > 131*  130*   < > 120* 119* 122*   K 4.7  --  4.1  --   --   --  4.4   CL 94*  --  92*  --   --   --  83*   CO2 27  --  28  --   --   --  24   BUN 16  --  14  --   --   --  12   CREATININE 0.8  --  0.9  --   --   --  0.8   GLUCOSE 84  --  109*  --   --   --  96   CALCIUM 9.2  --  9.2  --   --   --  9.2   MG 2.30  --   --   --   --   --  1.80   PHOS 3.4  --  3.4  --   --   --  3.4   ANIONGAP 10  --  11  --   --   --  15    < > = values in this interval not displayed.      Hepatic:   Recent Labs     06/24/22  0307 06/24/22  0912 06/25/22  9861 AST  --  22  --    ALT  --  18  --    BILITOT  --  0.6  --    BILIDIR  --  <0.2  --    PROT  --  7.0  --    LABALBU 4.3 4.5  4.4 4.7   ALKPHOS  --  68  --      Troponin:   Recent Labs     06/23/22  1827   TROPONINI <0.01     BNP: No results for input(s): BNP in the last 72 hours. Lipids: No results for input(s): CHOL, HDL in the last 72 hours. Invalid input(s): LDLCALCU, TRIGLYCERIDE  ABGs:  No results for input(s): PHART, TLT7QOJ, PO2ART, SVA1VQV, BEART, THGBART, X5CSDNRR, KOD1XRW in the last 72 hours. INR:   Recent Labs     06/24/22  0912   INR 1.06     Lactate: No results for input(s): LACTATE in the last 72 hours. Cultures:  -----------------------------------------------------------------  RAD:   CTA HEAD NECK W CONTRAST   Final Result      1. No large vessel occlusion, aneurysm or dissection. 2.  60-70% stenosis in the proximal left internal carotid artery. 3.  40-50% stenosis in the proximal right internal carotid artery. 4.  At least moderate stenosis in the origin of the right vertebral artery      CT Head WO Contrast   Final Result      1. No acute hemorrhage or mass effect. 2.  Chronic bilateral maxillary sinusitis, with waxing and waning from prior study. Assessment/Plan:     HTN urgency  Essential HTN  - 1 dose of 10mg hydralazine today AM. Also having severe HA and nausea, s/p 15mg IM ketorolac. Will monitor BP. Elevated BP likely from HA  - Continue home Coreg 25 mg BID, Nifidipine ER 30 mg daily (recently decreased), and hold home Telmisartan 80 mg daily (can get 100 mg losartan per pharmacy when restarted). Hyponatremia, Polydipsia vs poor solute intake vs SIADH  Pt with recent steroid for neck pain. Also reports 5-6 beers per day and \"a lot\" of water with polyuria. - Nephrology onboard: Was overcorrecting yesterday, hence got 1 dose of ddavp and was on d5 infusion. However, today AM, his Na dropped to 119. Now off d5.  Pt was unable to tolerate urea, will give zofran and pt will attempt again  - fluid restriction 1200mL  - q6h Na. Goal Na is > 130. Alcohol abuse disorder  Not withdrawing tierney  Pt drinks 5-6 twelve ounce beers a day.  Pt also reports polydipsia, polyuria  - CIWA without ordered ativan  - LFT, INR wnl    Neck pain, improving  - continue lidocaine patch, diclofenac gel  - prn tylenol flexiril    Chronic Medical Problems  Hx CVA: Continue home ASA 81 mg daily and Plavix 75 mg daily  HLD: Continue home Atorvastatin 80 mg daily    Code Status: Full code  FEN: Adult Cardiac Diet, 1200mL fluid restriction  PPX: Lovenox  DISPO: IP, if corrects as expected could possibly discharge today or tomorrow    Stevie Cooley MD, PGY2  06/25/22  9:40 AM    This patient has been staffed and discussed with Michelle Yin MD.

## 2022-06-25 NOTE — PROGRESS NOTES
Nephrology Consult Note                                                                                                                                                                                                                                                                                                                                                               Office : [phone]     Fax :[phone]              Patient's Name: Dwight Hodges  12:06 PM  6/25/2022    Reason for Consult:  Hyponatremia  Requesting Physician:  Cedric Chavira DO      Na dropped back to 120   No N/V/D       Past Medical History:   Diagnosis Date    Abdominal aortic atherosclerosis ([address]     Allergic rhinitis     Atherosclerosis of superior mesenteric artery ([address]     CAD (coronary artery disease)     Cerebral artery occlusion with cerebral infarction ([address]     Chronic kidney disease     Chronic pansinusitis 08/06/2021    Clostridium difficile colitis 06/2020    Colon polyps     COPD (chronic obstructive pulmonary disease) ([address]     COVID-19 virus infection 11/15/2021    CVA (cerebral infarction) 05/2013    Multiple, occipital and Cerebellar     DDD (degenerative disc disease)     Cerivical DDD    Dizziness     Epicondylitis elbow, medial     GERD (gastroesophageal reflux disease)     Headache     Hyperlipidemia     Hypertension     Ischemic colitis ([address] 11/2016    Lumbar foraminal stenosis 07/20/2020    Lung disease     Osteoarthritis, hand     PVD (peripheral vascular disease) with claudication ([address]     Left leg with mod-severe arterial ischemia    Renal artery atherosclerosis ([address]     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 Kelsi Courtney Left 09/14/2016    left femoral-popliteal bypass graft.  ARTERIAL BYPASS SURGRY  11/29/2016    Dr. Kiara Martinez - aorto-SMA bypass using 8mm PTFE    ATHERECTOMY Right 04/08/2019    Atherectomy with angioplasty right SFA and popliteal arteries, atherectomy and angioplasty right tibial peroneal trunk    COLONOSCOPY  2008    COLONOSCOPY  09/02/2015    Dr. Lani Severe  09/20/2018    Dr. Samir Mendes Bilateral 01/16/2013    bilateral with mesh, Dr. Aarti Anguiano  10/23/2020    L4-5 Decompression with anterior/posterior fusion, Dr. Pamela Zavala SEPTOPLASTY Bilateral 08/06/2021    BILATERAL FRONTO-ETHMOIDECTOMY, BILATERAL MAXILLARY ANTROSTOMY WITH TISSUE REMOVAL AND SEPTOPLASTY NASAL SCOPE performed by Messi Schneider MD at [facility]  09/02/2015    Dr. Rosamaria Mcduffie N/A 09/14/2018    EGD BIOPSY performed by Denys Jackman MD at [address] [place] [address]  11/2003    R Leg Vein stripping       Family History   Problem Relation Age of Onset    Heart Attack Father     Heart Disease Father         MI    Cancer Brother         Brain CA    Hypertension Brother     Cancer Brother         Throat cancer        reports that he quit smoking about 6 years ago. His smoking use included cigarettes. He started smoking about 52 years ago. He has a 23.50 pack-year smoking history. He has never used smokeless tobacco. He reports current alcohol use. He reports that he does not use drugs. Allergies:  Patient has no known allergies.     Current Medications:    HYDROmorphone (DILAUDID) injection 0.5 mg, Once  sodium chloride flush 0.9 % injection 5-40 mL, 2 times per day  sodium chloride flush 0.9 % injection 5-40 mL, PRN  0.9 % sodium chloride infusion, --   --  0.8  --    GLUCOSE 84  --  109*  --   --  96  --     < > = values in this interval not displayed. Ca/Mg/Phos:   Recent Labs     06/24/22  0307 06/24/22  0912 06/25/22  0428   CALCIUM 9.2 9.2 9.2   MG 2.30  --  1.80   PHOS 3.4 3.4 3.4     Hepatic:   Recent Labs     06/24/22  0912   AST 22   ALT 18   BILITOT 0.6   ALKPHOS 68     Troponin:   Recent Labs     06/23/22  1827   TROPONINI <0.01     BNP: No results for input(s): BNP in the last 72 hours. Lipids: No results for input(s): CHOL, TRIG, HDL, LDLCALC, LABVLDL in the last 72 hours. ABGs: No results for input(s): PHART, PO2ART, UEL6QZQ in the last 72 hours. INR:   Recent Labs     06/24/22  0912   INR 1.06     UA:  Recent Labs     06/23/22  2248   COLORU Yellow   CLARITYU Clear   GLUCOSEU Negative   BILIRUBINUR Negative   KETUA Negative   SPECGRAV <=1.005   BLOODU Negative   PHUR 7.0   PROTEINU Negative   UROBILINOGEN 0.2   NITRU Negative   LEUKOCYTESUR Negative   LABMICR Not Indicated   URINETYPE Voided      Urine Microscopic:   Recent Labs     06/23/22 2248   WBCUA None seen   RBCUA None seen     Urine Culture: No results for input(s): LABURIN in the last 72 hours. Urine Chemistry:   Recent Labs     06/23/22  2248   CLUR 21   LABCREA 18.2*   NAUR 29             IMAGING:  CTA HEAD NECK W CONTRAST   Final Result      1. No large vessel occlusion, aneurysm or dissection. 2.  60-70% stenosis in the proximal left internal carotid artery. 3.  40-50% stenosis in the proximal right internal carotid artery. 4.  At least moderate stenosis in the origin of the right vertebral artery      CT Head WO Contrast   Final Result      1. No acute hemorrhage or mass effect. 2.  Chronic bilateral maxillary sinusitis, with waxing and waning from prior study. CT CERVICAL SPINE WO CONTRAST    (Results Pending)       Assessment/Plan   1. Hyponatremia  Likely 2/2 beer potomania +/- primary polydipsia. Ur NA of 32, specific gravity < 1.005.  Na overcorrected from 119 to 131 this AM without intervention. Patient endorses drinking much more water than usual x3 days.  - PO urea   - monitor NA   - ok to d/c Once Na > 128       2. HTN   - continue home coreg 25 mg BID  - continue home nifedipine 30mg daily  - on telmisartan 80mg daily at home. 3. Acid- base/ Electrolyte imbalance   - replete electrolytes as needed    4. Alcohol use disorder  Per PCP note patient drinks 6 beers daily.   - encourage solute intake      Thank you for allowing us to participate in care of Giacomo Bajwa MD     Feel free to contact me   [facility] [address]  Office : [phone]  Fax :[phone]

## 2022-06-26 LAB
ALBUMIN SERPL-MCNC: 4.1 G/DL (ref 3.4–5)
ANION GAP SERPL CALCULATED.3IONS-SCNC: 12 MMOL/L (ref 3–16)
BASOPHILS ABSOLUTE: 0 K/UL (ref 0–0.2)
BASOPHILS RELATIVE PERCENT: 0.6 %
BUN BLDV-MCNC: 43 MG/DL (ref 7–20)
CALCIUM SERPL-MCNC: 9.4 MG/DL (ref 8.3–10.6)
CHLORIDE BLD-SCNC: 85 MMOL/L (ref 99–110)
CO2: 25 MMOL/L (ref 21–32)
CREAT SERPL-MCNC: 1.3 MG/DL (ref 0.8–1.3)
EOSINOPHILS ABSOLUTE: 0.1 K/UL (ref 0–0.6)
EOSINOPHILS RELATIVE PERCENT: 1.1 %
GFR AFRICAN AMERICAN: >60
GFR NON-AFRICAN AMERICAN: 54
GLUCOSE BLD-MCNC: 110 MG/DL (ref 70–99)
HCT VFR BLD CALC: 40.5 % (ref 40.5–52.5)
HEMOGLOBIN: 13.5 G/DL (ref 13.5–17.5)
LYMPHOCYTES ABSOLUTE: 1 K/UL (ref 1–5.1)
LYMPHOCYTES RELATIVE PERCENT: 16.4 %
MAGNESIUM: 2.3 MG/DL (ref 1.8–2.4)
MCH RBC QN AUTO: 34.8 PG (ref 26–34)
MCHC RBC AUTO-ENTMCNC: 33.4 G/DL (ref 31–36)
MCV RBC AUTO: 104.2 FL (ref 80–100)
MONOCYTES ABSOLUTE: 1.4 K/UL (ref 0–1.3)
MONOCYTES RELATIVE PERCENT: 21.9 %
NEUTROPHILS ABSOLUTE: 3.8 K/UL (ref 1.7–7.7)
NEUTROPHILS RELATIVE PERCENT: 60 %
PDW BLD-RTO: 13.3 % (ref 12.4–15.4)
PHOSPHORUS: 5 MG/DL (ref 2.5–4.9)
PLATELET # BLD: 288 K/UL (ref 135–450)
PMV BLD AUTO: 6.6 FL (ref 5–10.5)
POTASSIUM SERPL-SCNC: 4.6 MMOL/L (ref 3.5–5.1)
RBC # BLD: 3.89 M/UL (ref 4.2–5.9)
SODIUM BLD-SCNC: 121 MMOL/L (ref 136–145)
SODIUM BLD-SCNC: 122 MMOL/L (ref 136–145)
SODIUM BLD-SCNC: 122 MMOL/L (ref 136–145)
WBC # BLD: 6.2 K/UL (ref 4–11)

## 2022-06-26 PROCEDURE — 99233 SBSQ HOSP IP/OBS HIGH 50: CPT | Performed by: INTERNAL MEDICINE

## 2022-06-26 PROCEDURE — 36415 COLL VENOUS BLD VENIPUNCTURE: CPT

## 2022-06-26 PROCEDURE — 6370000000 HC RX 637 (ALT 250 FOR IP): Performed by: STUDENT IN AN ORGANIZED HEALTH CARE EDUCATION/TRAINING PROGRAM

## 2022-06-26 PROCEDURE — 83735 ASSAY OF MAGNESIUM: CPT

## 2022-06-26 PROCEDURE — 1200000000 HC SEMI PRIVATE

## 2022-06-26 PROCEDURE — 6370000000 HC RX 637 (ALT 250 FOR IP): Performed by: INTERNAL MEDICINE

## 2022-06-26 PROCEDURE — 85025 COMPLETE CBC W/AUTO DIFF WBC: CPT

## 2022-06-26 PROCEDURE — 80069 RENAL FUNCTION PANEL: CPT

## 2022-06-26 PROCEDURE — 6360000002 HC RX W HCPCS: Performed by: STUDENT IN AN ORGANIZED HEALTH CARE EDUCATION/TRAINING PROGRAM

## 2022-06-26 PROCEDURE — 84295 ASSAY OF SERUM SODIUM: CPT

## 2022-06-26 PROCEDURE — 2580000003 HC RX 258: Performed by: STUDENT IN AN ORGANIZED HEALTH CARE EDUCATION/TRAINING PROGRAM

## 2022-06-26 RX ORDER — TOLVAPTAN 15 MG/1
15 TABLET ORAL ONCE
Status: COMPLETED | OUTPATIENT
Start: 2022-06-26 | End: 2022-06-26

## 2022-06-26 RX ADMIN — CYCLOBENZAPRINE 5 MG: 10 TABLET, FILM COATED ORAL at 21:02

## 2022-06-26 RX ADMIN — CARVEDILOL 25 MG: 25 TABLET, FILM COATED ORAL at 09:06

## 2022-06-26 RX ADMIN — PANTOPRAZOLE SODIUM 40 MG: 40 TABLET, DELAYED RELEASE ORAL at 04:36

## 2022-06-26 RX ADMIN — ATORVASTATIN CALCIUM 80 MG: 80 TABLET, FILM COATED ORAL at 09:06

## 2022-06-26 RX ADMIN — CARVEDILOL 25 MG: 25 TABLET, FILM COATED ORAL at 17:59

## 2022-06-26 RX ADMIN — Medication 15 MG: at 11:12

## 2022-06-26 RX ADMIN — CYCLOBENZAPRINE 5 MG: 10 TABLET, FILM COATED ORAL at 10:49

## 2022-06-26 RX ADMIN — Medication 100 MG: at 09:06

## 2022-06-26 RX ADMIN — CLOPIDOGREL BISULFATE 75 MG: 75 TABLET ORAL at 09:06

## 2022-06-26 RX ADMIN — ENOXAPARIN SODIUM 40 MG: 100 INJECTION SUBCUTANEOUS at 09:06

## 2022-06-26 RX ADMIN — ACETAMINOPHEN 650 MG: 325 TABLET ORAL at 10:49

## 2022-06-26 RX ADMIN — LOSARTAN POTASSIUM 100 MG: 100 TABLET, FILM COATED ORAL at 09:06

## 2022-06-26 RX ADMIN — THERA TABS 1 TABLET: TAB at 09:06

## 2022-06-26 RX ADMIN — NIFEDIPINE 30 MG: 30 TABLET, EXTENDED RELEASE ORAL at 09:06

## 2022-06-26 RX ADMIN — DICLOFENAC SODIUM 2 G: 10 GEL TOPICAL at 21:00

## 2022-06-26 RX ADMIN — SODIUM CHLORIDE, PRESERVATIVE FREE 10 ML: 5 INJECTION INTRAVENOUS at 21:02

## 2022-06-26 RX ADMIN — ACETAMINOPHEN 650 MG: 325 TABLET ORAL at 21:02

## 2022-06-26 RX ADMIN — SODIUM CHLORIDE, PRESERVATIVE FREE 10 ML: 5 INJECTION INTRAVENOUS at 09:09

## 2022-06-26 RX ADMIN — DICLOFENAC SODIUM 2 G: 10 GEL TOPICAL at 09:09

## 2022-06-26 RX ADMIN — ASPIRIN 81 MG: 81 TABLET, COATED ORAL at 09:06

## 2022-06-26 RX ADMIN — SODIUM CHLORIDE, PRESERVATIVE FREE 10 ML: 5 INJECTION INTRAVENOUS at 21:00

## 2022-06-26 ASSESSMENT — PAIN SCALES - WONG BAKER
WONGBAKER_NUMERICALRESPONSE: 0

## 2022-06-26 ASSESSMENT — PAIN DESCRIPTION - ONSET
ONSET: ON-GOING

## 2022-06-26 ASSESSMENT — PAIN SCALES - GENERAL
PAINLEVEL_OUTOF10: 8
PAINLEVEL_OUTOF10: 0
PAINLEVEL_OUTOF10: 10
PAINLEVEL_OUTOF10: 0
PAINLEVEL_OUTOF10: 0
PAINLEVEL_OUTOF10: 6
PAINLEVEL_OUTOF10: 0
PAINLEVEL_OUTOF10: 4
PAINLEVEL_OUTOF10: 0

## 2022-06-26 ASSESSMENT — PAIN DESCRIPTION - LOCATION
LOCATION: NECK

## 2022-06-26 ASSESSMENT — PAIN DESCRIPTION - FREQUENCY
FREQUENCY: CONTINUOUS

## 2022-06-26 ASSESSMENT — PAIN - FUNCTIONAL ASSESSMENT
PAIN_FUNCTIONAL_ASSESSMENT: ACTIVITIES ARE NOT PREVENTED
PAIN_FUNCTIONAL_ASSESSMENT: PREVENTS OR INTERFERES SOME ACTIVE ACTIVITIES AND ADLS
PAIN_FUNCTIONAL_ASSESSMENT: ACTIVITIES ARE NOT PREVENTED

## 2022-06-26 ASSESSMENT — PAIN DESCRIPTION - DESCRIPTORS
DESCRIPTORS: THROBBING
DESCRIPTORS: ACHING
DESCRIPTORS: THROBBING
DESCRIPTORS: ACHING

## 2022-06-26 ASSESSMENT — PAIN DESCRIPTION - PAIN TYPE
TYPE: ACUTE PAIN

## 2022-06-26 ASSESSMENT — PAIN DESCRIPTION - ORIENTATION: ORIENTATION: LEFT

## 2022-06-26 NOTE — PLAN OF CARE
Problem: Discharge Planning  Goal: Discharge to home or other facility with appropriate resources  6/26/2022 1007 by Sadiq Catalan RN  Outcome: Progressing     Problem: Pain  Goal: Verbalizes/displays adequate comfort level or baseline comfort level  6/26/2022 1007 by Sadiq Catalan RN  Outcome: Progressing     Problem: Safety - Adult  Goal: Free from fall injury  6/26/2022 1007 by Sadiq Catalan RN  Outcome: Progressing

## 2022-06-26 NOTE — PROGRESS NOTES
Nephrology Consult Note                                                                                                                                                                                                                                                                                                                                                               Office : [phone]     Fax :[phone]              Patient's Name: Dwight Hodges  3:48 PM  6/26/2022    Reason for Consult:  Hyponatremia  Requesting Physician:  Cedric Chavira DO      Na 121  No N/V/D       Past Medical History:   Diagnosis Date    Abdominal aortic atherosclerosis ([address]     Allergic rhinitis     Atherosclerosis of superior mesenteric artery ([address]     CAD (coronary artery disease)     Cerebral artery occlusion with cerebral infarction ([address]     Chronic kidney disease     Chronic pansinusitis 08/06/2021    Clostridium difficile colitis 06/2020    Colon polyps     COPD (chronic obstructive pulmonary disease) ([address]     COVID-19 virus infection 11/15/2021    CVA (cerebral infarction) 05/2013    Multiple, occipital and Cerebellar     DDD (degenerative disc disease)     Cerivical DDD    Dizziness     Epicondylitis elbow, medial     GERD (gastroesophageal reflux disease)     Headache     Hyperlipidemia     Hypertension     Ischemic colitis ([address] 11/2016    Lumbar foraminal stenosis 07/20/2020    Lung disease     Osteoarthritis, hand     PVD (peripheral vascular disease) with claudication ([address]     Left leg with mod-severe arterial ischemia    Renal artery atherosclerosis ([address]     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 Cristian Odonnell Left 09/14/2016    left femoral-popliteal bypass graft.  ARTERIAL BYPASS SURGRY  11/29/2016    Dr. Be Kerr - aorto-SMA bypass using 8mm PTFE    ATHERECTOMY Right 04/08/2019    Atherectomy with angioplasty right SFA and popliteal arteries, atherectomy and angioplasty right tibial peroneal trunk    COLONOSCOPY  2008    COLONOSCOPY  09/02/2015    Dr. Sarah Greenwood  09/20/2018    Dr. Terrell Fuentes Bilateral 01/16/2013    bilateral with mesh, Dr. Ceci Santoyo  10/23/2020    L4-5 Decompression with anterior/posterior fusion, Dr. Peggi Castleman SEPTOPLASTY Bilateral 08/06/2021    BILATERAL FRONTO-ETHMOIDECTOMY, BILATERAL MAXILLARY ANTROSTOMY WITH TISSUE REMOVAL AND SEPTOPLASTY NASAL SCOPE performed by Sergio Chappell MD at [facility]  09/02/2015    Dr. Lauren Bailey N/A 09/14/2018    EGD BIOPSY performed by Roberta Ureña MD at [facility]  11/2003    R Leg Vein stripping       Family History   Problem Relation Age of Onset    Heart Attack Father     Heart Disease Father         MI    Cancer Brother         Brain CA    Hypertension Brother     Cancer Brother         Throat cancer        reports that he quit smoking about 6 years ago. His smoking use included cigarettes. He started smoking about 52 years ago. He has a 23.50 pack-year smoking history. He has never used smokeless tobacco. He reports current alcohol use. He reports that he does not use drugs. Allergies:  Patient has no known allergies.     Current Medications:    sodium chloride flush 0.9 % injection 5-40 mL, 2 times per day  sodium chloride flush 0.9 % injection 5-40 mL, PRN  0.9 % sodium chloride infusion, PRN  enoxaparin (LOVENOX) injection 40 mg, Daily  ondansetron (ZOFRAN-ODT) disintegrating tablet 4 mg, Q8H PRN   Or  ondansetron (ZOFRAN) injection 4 mg, Q6H PRN  polyethylene glycol (GLYCOLAX) packet 17 g, Daily PRN  acetaminophen (TYLENOL) tablet 650 mg, Q6H PRN   Or  acetaminophen (TYLENOL) suppository 650 mg, Q6H PRN  pantoprazole (PROTONIX) tablet 40 mg, QAM AC  thiamine mononitrate tablet 100 mg, Daily  lidocaine 4 % external patch 1 patch, Daily PRN  multivitamin 1 tablet, Daily  carvedilol (COREG) tablet 25 mg, BID WC  aspirin EC tablet 81 mg, Daily  clopidogrel (PLAVIX) tablet 75 mg, Daily  NIFEdipine (ADALAT CC) extended release tablet 30 mg, Daily  atorvastatin (LIPITOR) tablet 80 mg, Daily  losartan (COZAAR) tablet 100 mg, Daily  diclofenac sodium (VOLTAREN) 1 % gel 2 g, BID  cyclobenzaprine (FLEXERIL) tablet 5 mg, TID PRN        Review of Systems:   14 point ROS obtained but were negative except mentioned in HPI      Physical exam:     Vitals:  /73   Pulse 60   Temp 97.7 °F (36.5 °C) (Oral)   Resp 17   Ht 5' 6\" (1.676 m)   Wt 127 lb 13.9 oz (58 kg)   SpO2 94%   BMI 20.64 kg/m²   Constitutional:  OAA X3 NAD  Skin: no rash, turgor wnl  Heent:  eomi, mmm  Neck: no bruits or jvd noted  Cardiovascular:  S1, S2 without m/r/g  Respiratory: CTA B without w/r/r  Abdomen:  +bs, soft, nt, nd  Ext: trace lower extremity edema  Psychiatric: mood and affect appropriate  Musculoskeletal:  Rom, muscular strength intact    Data:   Labs:  CBC:   Recent Labs     06/24/22  0307 06/25/22  0303 06/26/22  0308   WBC 7.1 7.0 6.2   HGB 13.8 13.9 13.5    265 288     BMP:    Recent Labs     06/24/22  0912 06/24/22  1333 06/25/22  0428 06/25/22  0952 06/25/22  2213 06/26/22  0308 06/26/22  0452   *  130*   < > 122*   < > 119* 122* 121*   K 4.1  --  4.4  --   --  4.6  --    CL 92*  --  83*  --   --  85*  --    CO2 28  --  24  --   --  25  --    BUN 14  --  12  --   --  43*  --    CREATININE 0.9  --  0.8  --   --  1.3  --    GLUCOSE 109*  --  96  --   --  110*  -- < > = values in this interval not displayed. Ca/Mg/Phos:   Recent Labs     06/24/22  0307 06/24/22  0307 06/24/22  0912 06/25/22  0428 06/26/22  0308   CALCIUM 9.2   < > 9.2 9.2 9.4   MG 2.30  --   --  1.80 2.30   PHOS 3.4   < > 3.4 3.4 5.0*    < > = values in this interval not displayed. Hepatic:   Recent Labs     06/24/22  0912   AST 22   ALT 18   BILITOT 0.6   ALKPHOS 68     Troponin:   Recent Labs     06/23/22  1827   TROPONINI <0.01     BNP: No results for input(s): BNP in the last 72 hours. Lipids: No results for input(s): CHOL, TRIG, HDL, LDLCALC, LABVLDL in the last 72 hours. ABGs: No results for input(s): PHART, PO2ART, TWH4WYW in the last 72 hours. INR:   Recent Labs     06/24/22  0912   INR 1.06     UA:  Recent Labs     06/23/22 2248   COLORU Yellow   CLARITYU Clear   GLUCOSEU Negative   BILIRUBINUR Negative   KETUA Negative   SPECGRAV <=1.005   BLOODU Negative   PHUR 7.0   PROTEINU Negative   UROBILINOGEN 0.2   NITRU Negative   LEUKOCYTESUR Negative   LABMICR Not Indicated   URINETYPE Voided      Urine Microscopic:   Recent Labs     06/23/22 2248   WBCUA None seen   RBCUA None seen     Urine Culture: No results for input(s): LABURIN in the last 72 hours. Urine Chemistry:   Recent Labs     06/23/22 2248 06/25/22  1330   CLUR 21  --    LABCREA 18.2*  --    NAUR 29 81             IMAGING:  CT CERVICAL SPINE WO CONTRAST   Final Result   1. No evidence of acute fracture     2. Moderate to severe spondylosis with multilevel degenerative disc space narrowing and bilateral foraminal narrowing with facet arthropathy. 3. No significant central canal stenosis            CTA HEAD NECK W CONTRAST   Final Result      1. No large vessel occlusion, aneurysm or dissection. 2.  60-70% stenosis in the proximal left internal carotid artery. 3.  40-50% stenosis in the proximal right internal carotid artery.    4.  At least moderate stenosis in the origin of the right vertebral artery      CT Head WO Contrast   Final Result      1. No acute hemorrhage or mass effect. 2.  Chronic bilateral maxillary sinusitis, with waxing and waning from prior study. Assessment/Plan   1. Hyponatremia  Likely 2/2 beer potomania +/- primary polydipsia. Ur NA of 32, specific gravity < 1.005. Na overcorrected from 119 to 131 this AM without intervention. Patient endorses drinking much more water than usual x3 days.  - PO samsca   - monitor NA   - ok to d/c Once Na > 128       2. HTN   - continue home coreg 25 mg BID  - continue home nifedipine 30mg daily  - on telmisartan 80mg daily at home. 3. Acid- base/ Electrolyte imbalance   - replete electrolytes as needed    4. Alcohol use disorder  Per PCP note patient drinks 6 beers daily.   - encourage solute intake      Thank you for allowing us to participate in care of Kandis Calderon MD     Feel free to contact me   [facility] [address]  Office : [phone]  Fax :[phone]

## 2022-06-26 NOTE — PROGRESS NOTES
Progress Note    Admit Date: 6/23/2022  Day: 3   Diet: ADULT DIET; Regular; 1200 ml    CC: neck pain    Interval history:   NAOE. Seen and examined at bedside. bp better controlled overnight and patient reports pain much better controlled. Na increased to 121. Nephrology ordered dose of tolvaptan this AM.     Endorses appetite. Having normal BM and is voiding as usual.  Denies F/C, N/V, CP, SOB, HA, vision changes, weakness in arms and feet. HPI:   \"Pt is a 71year old male with a PMHx CVA (2013), HTN, HLD, CAD, PAD s/p aorto to SMA bypass, vertebral artery dissection (2013), and alcohol abuse (5-6 beers daily) who presented to the ED with neck pain. \" found to be hyponatremic to 119    Medications:     Scheduled Meds:   sodium chloride flush  5-40 mL IntraVENous 2 times per day    enoxaparin  40 mg SubCUTAneous Daily    pantoprazole  40 mg Oral QAM AC    thiamine  100 mg Oral Daily    multivitamin  1 tablet Oral Daily    carvedilol  25 mg Oral BID WC    aspirin  81 mg Oral Daily    clopidogrel  75 mg Oral Daily    NIFEdipine  30 mg Oral Daily    atorvastatin  80 mg Oral Daily    losartan  100 mg Oral Daily    diclofenac sodium  2 g Topical BID     Continuous Infusions:   sodium chloride       PRN Meds:sodium chloride flush, sodium chloride, ondansetron **OR** ondansetron, polyethylene glycol, acetaminophen **OR** acetaminophen, lidocaine, cyclobenzaprine    Objective:   Vitals:   T-max:  Patient Vitals for the past 8 hrs:   BP Temp Temp src Pulse Resp SpO2 Weight   06/26/22 0425 114/60 98.5 °F (36.9 °C) Oral 60 17 93 % 127 lb 13.9 oz (58 kg)   06/25/22 2310 (!) 146/86 98.4 °F (36.9 °C) Oral 61 18 95 % --       Intake/Output Summary (Last 24 hours) at 6/26/2022 3518  Last data filed at 6/26/2022 0425  Gross per 24 hour   Intake 529.37 ml   Output 950 ml   Net -420.63 ml     Review of Systems  10 point ROS completed and negative as listed above    Physical Exam  Constitutional:       General: He is not in acute distress. Appearance: Normal appearance. He is normal weight. He is not ill-appearing, toxic-appearing or diaphoretic. HENT:      Head: Normocephalic and atraumatic. Eyes:      Extraocular Movements: Extraocular movements intact. Cardiovascular:      Rate and Rhythm: Normal rate and regular rhythm. Heart sounds: No murmur heard. No friction rub. No gallop. Pulmonary:      Effort: Pulmonary effort is normal.      Breath sounds: No wheezing, rhonchi or rales. Abdominal:      General: Abdomen is flat. Bowel sounds are normal. There is no distension. Palpations: Abdomen is soft. Tenderness: There is no abdominal tenderness. There is no guarding or rebound. Musculoskeletal:      Right lower leg: No edema. Left lower leg: No edema. Neurological:      Mental Status: He is alert and oriented to person, place, and time. LABS:  CBC:   Recent Labs     06/24/22  0307 06/25/22  0303 06/26/22  0308   WBC 7.1 7.0 6.2   HGB 13.8 13.9 13.5   HCT 40.6 41.3 40.5    265 288   .5* 103.9* 104.2*     Renal:    Recent Labs     06/24/22  0307 06/24/22  0307 06/24/22  0912 06/24/22  1333 06/25/22  0428 06/25/22  0952 06/25/22  1443 06/25/22  2213 06/26/22  0308   *   < > 131*  130*   < > 122*   < > 118* 119* 122*   K 4.7   < > 4.1  --  4.4  --   --   --  4.6   CL 94*   < > 92*  --  83*  --   --   --  85*   CO2 27   < > 28  --  24  --   --   --  25   BUN 16   < > 14  --  12  --   --   --  43*   CREATININE 0.8   < > 0.9  --  0.8  --   --   --  1.3   GLUCOSE 84   < > 109*  --  96  --   --   --  110*   CALCIUM 9.2   < > 9.2  --  9.2  --   --   --  9.4   MG 2.30  --   --   --  1.80  --   --   --  2.30   PHOS 3.4   < > 3.4  --  3.4  --   --   --  5.0*   ANIONGAP 10   < > 11  --  15  --   --   --  12    < > = values in this interval not displayed.      Hepatic:   Recent Labs     06/24/22  0912 06/25/22  0428 06/26/22  0308   AST 22  --   --    ALT 18  --   --    BILITOT 0.6  --   --    BILIDIR <0.2  --   --    PROT 7.0  --   --    LABALBU 4.5  4.4 4.7 4.1   ALKPHOS 68  --   --      Troponin:   Recent Labs     06/23/22  1827   TROPONINI <0.01     BNP: No results for input(s): BNP in the last 72 hours. Lipids: No results for input(s): CHOL, HDL in the last 72 hours. Invalid input(s): LDLCALCU, TRIGLYCERIDE  ABGs:  No results for input(s): PHART, ICN9MVH, PO2ART, MWA6DCW, BEART, THGBART, W2MBKUQQ, DAK6WRR in the last 72 hours. INR:   Recent Labs     06/24/22  0912   INR 1.06     Lactate: No results for input(s): LACTATE in the last 72 hours. Cultures:  -----------------------------------------------------------------  RAD:   CT CERVICAL SPINE WO CONTRAST   Final Result   1. No evidence of acute fracture     2. Moderate to severe spondylosis with multilevel degenerative disc space narrowing and bilateral foraminal narrowing with facet arthropathy. 3. No significant central canal stenosis            CTA HEAD NECK W CONTRAST   Final Result      1. No large vessel occlusion, aneurysm or dissection. 2.  60-70% stenosis in the proximal left internal carotid artery. 3.  40-50% stenosis in the proximal right internal carotid artery. 4.  At least moderate stenosis in the origin of the right vertebral artery      CT Head WO Contrast   Final Result      1. No acute hemorrhage or mass effect. 2.  Chronic bilateral maxillary sinusitis, with waxing and waning from prior study. Assessment/Plan:     Hyponatremia, Polydipsia vs poor solute intake vs SIADH  Pt initially over corrected to 130 and was started on ddavp and d5 before dropping back to 119. Got urea packet yesterday with fluid restriction and now 121.   - Nephrology onboard  - fluid restriction 1200mL  - q6h Na.  Goal Na >128  - got urea yesterday  - nephrology following   - dose of tolvaptan this AM  - if not improving consider Ct chest    HTN urgency - resolved  Essential HTN  - coreg 25mg BID  - losartan 100 mg nightly  - nifedipine 30 mg daily    Neck pain, improving  - continue lidocaine patch, diclofenac gel  - prn tylenol, flexiril    Alcohol abuse disorder  Not withdrawing tierney  Pt drinks 5-6 twelve ounce beers a day. Pt also reports polydipsia, polyuria.   - CIWA without ordered ativan  - LFT, INR wnl    Chronic Medical Problems  Hx CVA: Continue home ASA 81 mg daily and Plavix 75 mg daily  HLD: Continue home Atorvastatin 80 mg daily    Code Status: Full code  FEN: Adult Cardiac Diet, 1200mL fluid restriction  PPX: Lovenox  DISPO: Renny Balderrama MD, PGY1  06/26/22  6:35 AM    This patient has been staffed and discussed with Joshua Jeffers MD

## 2022-06-26 NOTE — PLAN OF CARE
Problem: Discharge Planning  Goal: Discharge to home or other facility with appropriate resources  Outcome: Progressing  Flowsheets (Taken 6/25/2022 2030)  Discharge to home or other facility with appropriate resources:   Identify barriers to discharge with patient and caregiver   Arrange for needed discharge resources and transportation as appropriate   Identify discharge learning needs (meds, wound care, etc)     Problem: Pain  Goal: Verbalizes/displays adequate comfort level or baseline comfort level  Outcome: Progressing  Flowsheets (Taken 6/25/2022 2030)  Verbalizes/displays adequate comfort level or baseline comfort level:   Encourage patient to monitor pain and request assistance   Assess pain using appropriate pain scale   Administer analgesics based on type and severity of pain and evaluate response   Implement non-pharmacological measures as appropriate and evaluate response     Problem: Safety - Adult  Goal: Free from fall injury  Outcome: Progressing  Flowsheets (Taken 6/25/2022 2310)  Free From Fall Injury:   Instruct family/caregiver on patient safety   Based on caregiver fall risk screen, instruct family/caregiver to ask for assistance with transferring infant if caregiver noted to have fall risk factors     Problem: Metabolic/Fluid and Electrolytes - Adult  Goal: Electrolytes maintained within normal limits  Outcome: Progressing  Flowsheets (Taken 6/25/2022 2030)  Electrolytes maintained within normal limits:   Monitor labs and assess patient for signs and symptoms of electrolyte imbalances   Administer electrolyte replacement as ordered   Monitor response to electrolyte replacements, including repeat lab results as appropriate   Fluid restriction as ordered   Instruct patient on fluid and nutrition restrictions as appropriate  Goal: Hemodynamic stability and optimal renal function maintained  Outcome: Progressing  Flowsheets (Taken 6/25/2022 2030)  Hemodynamic stability and optimal renal function

## 2022-06-27 VITALS
RESPIRATION RATE: 18 BRPM | OXYGEN SATURATION: 95 % | SYSTOLIC BLOOD PRESSURE: 96 MMHG | TEMPERATURE: 97.7 F | HEART RATE: 65 BPM | BODY MASS INDEX: 21.65 KG/M2 | HEIGHT: 66 IN | WEIGHT: 134.7 LBS | DIASTOLIC BLOOD PRESSURE: 60 MMHG

## 2022-06-27 LAB
ALBUMIN SERPL-MCNC: 4.1 G/DL (ref 3.4–5)
ALBUMIN SERPL-MCNC: 4.5 G/DL (ref 3.4–5)
ANION GAP SERPL CALCULATED.3IONS-SCNC: 13 MMOL/L (ref 3–16)
ANION GAP SERPL CALCULATED.3IONS-SCNC: 13 MMOL/L (ref 3–16)
BASOPHILS ABSOLUTE: 0 K/UL (ref 0–0.2)
BASOPHILS RELATIVE PERCENT: 0.9 %
BUN BLDV-MCNC: 44 MG/DL (ref 7–20)
BUN BLDV-MCNC: 45 MG/DL (ref 7–20)
CALCIUM SERPL-MCNC: 9.2 MG/DL (ref 8.3–10.6)
CALCIUM SERPL-MCNC: 9.3 MG/DL (ref 8.3–10.6)
CHLORIDE BLD-SCNC: 90 MMOL/L (ref 99–110)
CHLORIDE BLD-SCNC: 93 MMOL/L (ref 99–110)
CO2: 20 MMOL/L (ref 21–32)
CO2: 26 MMOL/L (ref 21–32)
CREAT SERPL-MCNC: 1.6 MG/DL (ref 0.8–1.3)
CREAT SERPL-MCNC: 1.6 MG/DL (ref 0.8–1.3)
CREATININE URINE: 35.3 MG/DL (ref 39–259)
EOSINOPHILS ABSOLUTE: 0.1 K/UL (ref 0–0.6)
EOSINOPHILS RELATIVE PERCENT: 1.8 %
GFR AFRICAN AMERICAN: 52
GFR AFRICAN AMERICAN: 52
GFR NON-AFRICAN AMERICAN: 43
GFR NON-AFRICAN AMERICAN: 43
GLUCOSE BLD-MCNC: 108 MG/DL (ref 70–99)
GLUCOSE BLD-MCNC: 89 MG/DL (ref 70–99)
HCT VFR BLD CALC: 39.2 % (ref 40.5–52.5)
HEMOGLOBIN: 13.5 G/DL (ref 13.5–17.5)
LYMPHOCYTES ABSOLUTE: 0.9 K/UL (ref 1–5.1)
LYMPHOCYTES RELATIVE PERCENT: 15.9 %
MAGNESIUM: 2.4 MG/DL (ref 1.8–2.4)
MCH RBC QN AUTO: 35.4 PG (ref 26–34)
MCHC RBC AUTO-ENTMCNC: 34.5 G/DL (ref 31–36)
MCV RBC AUTO: 102.6 FL (ref 80–100)
MONOCYTES ABSOLUTE: 0.9 K/UL (ref 0–1.3)
MONOCYTES RELATIVE PERCENT: 15.7 %
NEUTROPHILS ABSOLUTE: 3.7 K/UL (ref 1.7–7.7)
NEUTROPHILS RELATIVE PERCENT: 65.7 %
PDW BLD-RTO: 13.6 % (ref 12.4–15.4)
PHOSPHORUS: 5.4 MG/DL (ref 2.5–4.9)
PHOSPHORUS: 5.7 MG/DL (ref 2.5–4.9)
PLATELET # BLD: 284 K/UL (ref 135–450)
PMV BLD AUTO: 6.8 FL (ref 5–10.5)
POTASSIUM SERPL-SCNC: 4.4 MMOL/L (ref 3.5–5.1)
POTASSIUM SERPL-SCNC: 4.5 MMOL/L (ref 3.5–5.1)
POTASSIUM SERPL-SCNC: 5.5 MMOL/L (ref 3.5–5.1)
RBC # BLD: 3.82 M/UL (ref 4.2–5.9)
SODIUM BLD-SCNC: 120 MMOL/L (ref 136–145)
SODIUM BLD-SCNC: 126 MMOL/L (ref 136–145)
SODIUM BLD-SCNC: 128 MMOL/L (ref 136–145)
SODIUM BLD-SCNC: 129 MMOL/L (ref 136–145)
SODIUM URINE: <20 MMOL/L
WBC # BLD: 5.7 K/UL (ref 4–11)

## 2022-06-27 PROCEDURE — 6370000000 HC RX 637 (ALT 250 FOR IP): Performed by: STUDENT IN AN ORGANIZED HEALTH CARE EDUCATION/TRAINING PROGRAM

## 2022-06-27 PROCEDURE — 82570 ASSAY OF URINE CREATININE: CPT

## 2022-06-27 PROCEDURE — 84300 ASSAY OF URINE SODIUM: CPT

## 2022-06-27 PROCEDURE — 2580000003 HC RX 258: Performed by: STUDENT IN AN ORGANIZED HEALTH CARE EDUCATION/TRAINING PROGRAM

## 2022-06-27 PROCEDURE — 36415 COLL VENOUS BLD VENIPUNCTURE: CPT

## 2022-06-27 PROCEDURE — 83735 ASSAY OF MAGNESIUM: CPT

## 2022-06-27 PROCEDURE — 84295 ASSAY OF SERUM SODIUM: CPT

## 2022-06-27 PROCEDURE — 85025 COMPLETE CBC W/AUTO DIFF WBC: CPT

## 2022-06-27 PROCEDURE — 99233 SBSQ HOSP IP/OBS HIGH 50: CPT | Performed by: INTERNAL MEDICINE

## 2022-06-27 PROCEDURE — 6370000000 HC RX 637 (ALT 250 FOR IP): Performed by: INTERNAL MEDICINE

## 2022-06-27 PROCEDURE — 84132 ASSAY OF SERUM POTASSIUM: CPT

## 2022-06-27 PROCEDURE — 6360000002 HC RX W HCPCS: Performed by: STUDENT IN AN ORGANIZED HEALTH CARE EDUCATION/TRAINING PROGRAM

## 2022-06-27 PROCEDURE — 80069 RENAL FUNCTION PANEL: CPT

## 2022-06-27 RX ORDER — CYCLOBENZAPRINE HCL 5 MG
5 TABLET ORAL 3 TIMES DAILY PRN
Qty: 15 TABLET | Refills: 0 | Status: SHIPPED | OUTPATIENT
Start: 2022-06-27 | End: 2022-06-27 | Stop reason: SDUPTHER

## 2022-06-27 RX ORDER — CYCLOBENZAPRINE HCL 10 MG
10 TABLET ORAL 3 TIMES DAILY PRN
Qty: 15 TABLET | Refills: 0 | Status: SHIPPED | OUTPATIENT
Start: 2022-06-27 | End: 2022-07-02

## 2022-06-27 RX ORDER — 0.9 % SODIUM CHLORIDE 0.9 %
500 INTRAVENOUS SOLUTION INTRAVENOUS ONCE
Status: COMPLETED | OUTPATIENT
Start: 2022-06-27 | End: 2022-06-27

## 2022-06-27 RX ORDER — LOSARTAN POTASSIUM 100 MG/1
100 TABLET ORAL DAILY
Status: DISCONTINUED | OUTPATIENT
Start: 2022-06-27 | End: 2022-06-27 | Stop reason: HOSPADM

## 2022-06-27 RX ORDER — UREA 15 G
15 POWDER IN PACKET (EA) ORAL DAILY
Qty: 30 EACH | Refills: 0 | Status: SHIPPED | OUTPATIENT
Start: 2022-06-27 | End: 2022-07-27

## 2022-06-27 RX ADMIN — ACETAMINOPHEN 650 MG: 325 TABLET ORAL at 09:01

## 2022-06-27 RX ADMIN — PANTOPRAZOLE SODIUM 40 MG: 40 TABLET, DELAYED RELEASE ORAL at 09:07

## 2022-06-27 RX ADMIN — THERA TABS 1 TABLET: TAB at 09:01

## 2022-06-27 RX ADMIN — Medication 100 MG: at 09:00

## 2022-06-27 RX ADMIN — SODIUM CHLORIDE 500 ML: 9 INJECTION, SOLUTION INTRAVENOUS at 11:29

## 2022-06-27 RX ADMIN — CARVEDILOL 25 MG: 25 TABLET, FILM COATED ORAL at 09:00

## 2022-06-27 RX ADMIN — DICLOFENAC SODIUM 2 G: 10 GEL TOPICAL at 09:03

## 2022-06-27 RX ADMIN — ASPIRIN 81 MG: 81 TABLET, COATED ORAL at 09:01

## 2022-06-27 RX ADMIN — CYCLOBENZAPRINE 5 MG: 10 TABLET, FILM COATED ORAL at 09:01

## 2022-06-27 RX ADMIN — ENOXAPARIN SODIUM 40 MG: 100 INJECTION SUBCUTANEOUS at 09:00

## 2022-06-27 RX ADMIN — SODIUM CHLORIDE, PRESERVATIVE FREE 10 ML: 5 INJECTION INTRAVENOUS at 09:04

## 2022-06-27 RX ADMIN — NIFEDIPINE 30 MG: 30 TABLET, EXTENDED RELEASE ORAL at 09:01

## 2022-06-27 RX ADMIN — CLOPIDOGREL BISULFATE 75 MG: 75 TABLET ORAL at 09:00

## 2022-06-27 RX ADMIN — ATORVASTATIN CALCIUM 80 MG: 80 TABLET, FILM COATED ORAL at 09:01

## 2022-06-27 ASSESSMENT — PAIN DESCRIPTION - FREQUENCY: FREQUENCY: CONTINUOUS

## 2022-06-27 ASSESSMENT — PAIN SCALES - GENERAL
PAINLEVEL_OUTOF10: 4
PAINLEVEL_OUTOF10: 0
PAINLEVEL_OUTOF10: 0
PAINLEVEL_OUTOF10: 7
PAINLEVEL_OUTOF10: 7
PAINLEVEL_OUTOF10: 0
PAINLEVEL_OUTOF10: 8

## 2022-06-27 ASSESSMENT — PAIN SCALES - WONG BAKER
WONGBAKER_NUMERICALRESPONSE: 0

## 2022-06-27 ASSESSMENT — PAIN DESCRIPTION - LOCATION
LOCATION: NECK
LOCATION: NECK

## 2022-06-27 ASSESSMENT — PAIN DESCRIPTION - PAIN TYPE: TYPE: ACUTE PAIN

## 2022-06-27 ASSESSMENT — PAIN DESCRIPTION - ONSET: ONSET: ON-GOING

## 2022-06-27 ASSESSMENT — PAIN DESCRIPTION - DESCRIPTORS: DESCRIPTORS: ACHING

## 2022-06-27 ASSESSMENT — PAIN - FUNCTIONAL ASSESSMENT: PAIN_FUNCTIONAL_ASSESSMENT: ACTIVITIES ARE NOT PREVENTED

## 2022-06-27 NOTE — PLAN OF CARE
Problem: Discharge Planning  Goal: Discharge to home or other facility with appropriate resources  Outcome: Progressing  Flowsheets (Taken 6/26/2022 2040)  Discharge to home or other facility with appropriate resources:   Identify barriers to discharge with patient and caregiver   Arrange for needed discharge resources and transportation as appropriate   Identify discharge learning needs (meds, wound care, etc)     Problem: Pain  Goal: Verbalizes/displays adequate comfort level or baseline comfort level  Outcome: Progressing  Flowsheets (Taken 6/26/2022 2040)  Verbalizes/displays adequate comfort level or baseline comfort level:   Encourage patient to monitor pain and request assistance   Assess pain using appropriate pain scale   Administer analgesics based on type and severity of pain and evaluate response   Implement non-pharmacological measures as appropriate and evaluate response     Problem: Safety - Adult  Goal: Free from fall injury  Outcome: Progressing  Flowsheets (Taken 6/26/2022 2330)  Free From Fall Injury:   Instruct family/caregiver on patient safety   Based on caregiver fall risk screen, instruct family/caregiver to ask for assistance with transferring infant if caregiver noted to have fall risk factors     Problem: Metabolic/Fluid and Electrolytes - Adult  Goal: Electrolytes maintained within normal limits  Outcome: Progressing  Flowsheets (Taken 6/26/2022 2040)  Electrolytes maintained within normal limits:   Monitor labs and assess patient for signs and symptoms of electrolyte imbalances   Fluid restriction as ordered   Instruct patient on fluid and nutrition restrictions as appropriate  Goal: Hemodynamic stability and optimal renal function maintained  Outcome: Progressing  Flowsheets (Taken 6/26/2022 2040)  Hemodynamic stability and optimal renal function maintained:   Monitor labs and assess for signs and symptoms of volume excess or deficit   Monitor intake, output and patient weight Monitor response to interventions for patient's volume status, including labs, urine output, blood pressure (other measures as available)   Encourage oral intake as appropriate   Instruct patient on fluid and nutrition restrictions as appropriate  Goal: Glucose maintained within prescribed range  Outcome: Progressing  Flowsheets (Taken 6/26/2022 2040)  Glucose maintained within prescribed range:   Assess for signs and symptoms of hyperglycemia and hypoglycemia   Assess barriers to adequate nutritional intake and initiate nutrition consult as needed   Instruct patient on self management of diabetes and initiate consult as needed     Problem: Chronic Conditions and Co-morbidities  Goal: Patient's chronic conditions and co-morbidity symptoms are monitored and maintained or improved  Outcome: Progressing  Flowsheets (Taken 6/26/2022 2040)  Care Plan - Patient's Chronic Conditions and Co-Morbidity Symptoms are Monitored and Maintained or Improved:   Monitor and assess patient's chronic conditions and comorbid symptoms for stability, deterioration, or improvement   Collaborate with multidisciplinary team to address chronic and comorbid conditions and prevent exacerbation or deterioration   Update acute care plan with appropriate goals if chronic or comorbid symptoms are exacerbated and prevent overall improvement and discharge

## 2022-06-27 NOTE — PROGRESS NOTES
left femoral -popl stent    ANGIOPLASTY  11/27/2016    SMA    ANGIOPLASTY Right 01/19/2022    Right SFA, Dr. Wally Fishman Left 09/14/2016    left femoral-popliteal bypass graft.  ARTERIAL BYPASS SURGRY  11/29/2016    Dr. Alba Godinez - aorto-SMA bypass using 8mm PTFE    ATHERECTOMY Right 04/08/2019    Atherectomy with angioplasty right SFA and popliteal arteries, atherectomy and angioplasty right tibial peroneal trunk    COLONOSCOPY  2008    COLONOSCOPY  09/02/2015    Dr. Edward Nathan  09/20/2018    Dr. Genevieve Wren Bilateral 01/16/2013    bilateral with mesh, Dr. Lonnie Burch  10/23/2020    L4-5 Decompression with anterior/posterior fusion, Dr. Breanne Croral SEPTOPLASTY Bilateral 08/06/2021    BILATERAL FRONTO-ETHMOIDECTOMY, BILATERAL MAXILLARY ANTROSTOMY WITH TISSUE REMOVAL AND SEPTOPLASTY NASAL SCOPE performed by Sinan Acosta MD at [address]  09/02/2015    Dr. Nitin Alcala N/A 09/14/2018    EGD BIOPSY performed by Krysten Fisher MD at [facility]  11/2003    R Leg Vein stripping       Family History   Problem Relation Age of Onset    Heart Attack Father     Heart Disease Father         MI    Cancer Brother         Brain CA    Hypertension Brother     Cancer Brother         Throat cancer        reports that he quit smoking about 6 years ago. His smoking use included cigarettes. He started smoking about 52 years ago. He has a 23.50 pack-year smoking history. He has never used smokeless tobacco. He reports current alcohol use. He reports that he does not use drugs. Allergies:  Patient has no known allergies.     Current Medications:    [Held by provider] losartan (COZAAR) tablet 100 mg, Daily  0.9 % sodium chloride bolus, Once  sodium chloride flush 0.9 % injection 5-40 mL, 2 times per day  sodium chloride flush 0.9 % injection 5-40 mL, PRN  0.9 % sodium chloride infusion, PRN  enoxaparin (LOVENOX) injection 40 mg, Daily  ondansetron (ZOFRAN-ODT) disintegrating tablet 4 mg, Q8H PRN   Or  ondansetron (ZOFRAN) injection 4 mg, Q6H PRN  polyethylene glycol (GLYCOLAX) packet 17 g, Daily PRN  acetaminophen (TYLENOL) tablet 650 mg, Q6H PRN   Or  acetaminophen (TYLENOL) suppository 650 mg, Q6H PRN  pantoprazole (PROTONIX) tablet 40 mg, QAM AC  thiamine mononitrate tablet 100 mg, Daily  lidocaine 4 % external patch 1 patch, Daily PRN  multivitamin 1 tablet, Daily  carvedilol (COREG) tablet 25 mg, BID WC  aspirin EC tablet 81 mg, Daily  clopidogrel (PLAVIX) tablet 75 mg, Daily  NIFEdipine (ADALAT CC) extended release tablet 30 mg, Daily  atorvastatin (LIPITOR) tablet 80 mg, Daily  diclofenac sodium (VOLTAREN) 1 % gel 2 g, BID  cyclobenzaprine (FLEXERIL) tablet 5 mg, TID PRN        Review of Systems:   14 point ROS obtained but were negative except mentioned in HPI      Physical exam:     Vitals:  BP (!) 144/73   Pulse 76   Temp 98 °F (36.7 °C) (Oral)   Resp 18   Ht 5' 6\" (1.676 m)   Wt 134 lb 11.2 oz (61.1 kg)   SpO2 95%   BMI 21.74 kg/m²   Constitutional:  OAA X3 NAD  Skin: no rash, turgor wnl  Heent:  eomi, mmm  Neck: no bruits or jvd noted  Cardiovascular:  S1, S2 without m/r/g  Respiratory: CTA B without w/r/r  Abdomen:  +bs, soft, nt, nd  Ext: trace lower extremity edema  Psychiatric: mood and affect appropriate  Musculoskeletal:  Rom, muscular strength intact    Data:   Labs:  CBC:   Recent Labs     06/25/22  0303 06/26/22  0308 06/27/22  0441   WBC 7.0 6.2 5.7   HGB 13.9 13.5 13.5    288 284     BMP:    Recent Labs     06/25/22  0428 06/25/22  0952 06/26/22  0308 06/26/22  0452 06/26/22  2320 06/27/22  0441 06/27/22  0838   *   < > 122*   < > 120* 129* 128*   K 4.4  --  4.6  --   --  4.4  --    CL 83*  --  85*  --   --  90*  --    CO2 24  --  25  --   --  26  --    BUN 12  --  43*  --   --  44*  --    CREATININE 0.8  --  1.3  --   --  1.6*  --    GLUCOSE 96  --  110*  --   --  89  --     < > = values in this interval not displayed. Ca/Mg/Phos:   Recent Labs     06/25/22  0428 06/26/22  0308 06/27/22  0441   CALCIUM 9.2 9.4 9.3   MG 1.80 2.30 2.40   PHOS 3.4 5.0* 5.7*     Hepatic:   No results for input(s): AST, ALT, ALB, BILITOT, ALKPHOS in the last 72 hours. Troponin:   No results for input(s): TROPONINI in the last 72 hours. BNP: No results for input(s): BNP in the last 72 hours. Lipids: No results for input(s): CHOL, TRIG, HDL, LDLCALC, LABVLDL in the last 72 hours. ABGs: No results for input(s): PHART, PO2ART, RPS4MVS in the last 72 hours. INR:   No results for input(s): INR in the last 72 hours. UA:  No results for input(s): Delene Belt, GLUCOSEU, BILIRUBINUR, KETUA, SPECGRAV, BLOODU, PHUR, PROTEINU, UROBILINOGEN, NITRU, LEUKOCYTESUR, Sandi Room in the last 72 hours. Urine Microscopic:   No results for input(s): LABCAST, BACTERIA, COMU, HYALCAST, WBCUA, RBCUA, EPIU in the last 72 hours. Urine Culture: No results for input(s): LABURIN in the last 72 hours. Urine Chemistry:   Recent Labs     06/25/22  1330   NAUR 81             IMAGING:  CT CERVICAL SPINE WO CONTRAST   Final Result   1. No evidence of acute fracture     2. Moderate to severe spondylosis with multilevel degenerative disc space narrowing and bilateral foraminal narrowing with facet arthropathy. 3. No significant central canal stenosis            CTA HEAD NECK W CONTRAST   Final Result      1. No large vessel occlusion, aneurysm or dissection. 2.  60-70% stenosis in the proximal left internal carotid artery. 3.  40-50% stenosis in the proximal right internal carotid artery. 4.  At least moderate stenosis in the origin of the right vertebral artery      CT Head WO Contrast   Final Result      1. No acute hemorrhage or mass effect. 2.  Chronic bilateral maxillary sinusitis, with waxing and waning from prior study. Assessment/Plan   1. Hyponatremia  Likely 2/2 beer potomania +/- primary polydipsia. Ur NA of 32, specific gravity < 1.005. Na overcorrected from 119 to 131 this AM without intervention. Patient endorses drinking much more water than usual x3 days. - s/p 15mg PO samsca yesterday  - monitor Na    2. FARSHAD  Cr baseline ~0.9, up to 1.6 today. - avoid nephrotoxic medications  - no NSAIDs  - monitor urine output  - monitor BMP  - hold ARB at discharge   - urine studies pending  - will give IV fluid and recheck creatinine around 1pm    3. HTN   - continue home coreg 25 mg BID  - continue home nifedipine 30mg daily  - hold losartan in setting of FARSHAD    4. Acid- base/ Electrolyte imbalance   - replete electrolytes as needed    5. Alcohol use disorder  Per PCP note patient drinks 6 beers daily.   - encourage solute intake  - receiving thiamine supplementation, multivitamin      Thank you for allowing us to participate in care of Isa Delgado MD   PGY-2    Discussed with Grant Ryan MD    Feel free to contact me   [facility] [id number]  Office : [phone]  Fax :[phone]

## 2022-06-27 NOTE — PROGRESS NOTES
Progress Note    Admit Date: 6/23/2022  Day: 4   Diet: ADULT DIET; Regular; 1200 ml    CC: neck pain    Interval history:   NAOE. Seen and examined at bedside. BP well controlled overnight. Na up to 129 on am labs. Pt does have increase to 1.6 of Cr from 0.8 two days ago. This is in the setting of fluid restriction. Pt also got toradol for pain on Saturday. Nephrology ordered urine studies and we will hold his ARB. Endorses appetite. Having normal BM and is voiding as usual.  Denies F/C, N/V, CP, SOB, HA, vision changes, weakness in arms and feet. HPI:   \"Pt is a 71year old male with a PMHx CVA (2013), HTN, HLD, CAD, PAD s/p aorto to SMA bypass, vertebral artery dissection (2013), and alcohol abuse (5-6 beers daily) who presented to the ED with neck pain. \" found to be hyponatremic to 119    Medications:     Scheduled Meds:   losartan  100 mg Oral Daily    sodium chloride flush  5-40 mL IntraVENous 2 times per day    enoxaparin  40 mg SubCUTAneous Daily    pantoprazole  40 mg Oral QAM AC    thiamine  100 mg Oral Daily    multivitamin  1 tablet Oral Daily    carvedilol  25 mg Oral BID WC    aspirin  81 mg Oral Daily    clopidogrel  75 mg Oral Daily    NIFEdipine  30 mg Oral Daily    atorvastatin  80 mg Oral Daily    diclofenac sodium  2 g Topical BID     Continuous Infusions:   sodium chloride       PRN Meds:sodium chloride flush, sodium chloride, ondansetron **OR** ondansetron, polyethylene glycol, acetaminophen **OR** acetaminophen, lidocaine, cyclobenzaprine    Objective:   Vitals:   T-max:  Patient Vitals for the past 8 hrs:   BP Temp Temp src Pulse Resp SpO2 Weight   06/27/22 0340 129/81 98.2 °F (36.8 °C) Oral 58 18 95 % 134 lb 11.2 oz (61.1 kg)       Intake/Output Summary (Last 24 hours) at 6/27/2022 6961  Last data filed at 6/27/2022 0700  Gross per 24 hour   Intake 1080 ml   Output 2775 ml   Net -1695 ml     Review of Systems  10 point ROS completed and negative as listed above    Physical Exam  Constitutional:       General: He is not in acute distress. Appearance: Normal appearance. He is normal weight. He is not ill-appearing, toxic-appearing or diaphoretic. HENT:      Head: Normocephalic and atraumatic. Eyes:      Extraocular Movements: Extraocular movements intact. Cardiovascular:      Rate and Rhythm: Normal rate and regular rhythm. Heart sounds: No murmur heard. No friction rub. No gallop. Pulmonary:      Effort: Pulmonary effort is normal.      Breath sounds: No wheezing, rhonchi or rales. Abdominal:      General: Abdomen is flat. Bowel sounds are normal. There is no distension. Palpations: Abdomen is soft. Tenderness: There is no abdominal tenderness. There is no guarding or rebound. Musculoskeletal:      Right lower leg: No edema. Left lower leg: No edema. Neurological:      Mental Status: He is alert and oriented to person, place, and time. LABS:  CBC:   Recent Labs     06/25/22  0303 06/26/22  0308 06/27/22  0441   WBC 7.0 6.2 5.7   HGB 13.9 13.5 13.5   HCT 41.3 40.5 39.2*    288 284   .9* 104.2* 102.6*     Renal:    Recent Labs     06/25/22  0428 06/25/22  0952 06/26/22  0308 06/26/22  0452 06/26/22  1621 06/26/22  2320 06/27/22  0441   *   < > 122*   < > 122* 120* 129*   K 4.4  --  4.6  --   --   --  4.4   CL 83*  --  85*  --   --   --  90*   CO2 24  --  25  --   --   --  26   BUN 12  --  43*  --   --   --  44*   CREATININE 0.8  --  1.3  --   --   --  1.6*   GLUCOSE 96  --  110*  --   --   --  89   CALCIUM 9.2  --  9.4  --   --   --  9.3   MG 1.80  --  2.30  --   --   --  2.40   PHOS 3.4  --  5.0*  --   --   --  5.7*   ANIONGAP 15  --  12  --   --   --  13    < > = values in this interval not displayed.      Hepatic:   Recent Labs     06/24/22  0912 06/24/22  0912 06/25/22  0428 06/26/22  0308 06/27/22  0441   AST 22  --   --   --   --    ALT 18  --   --   --   --    BILITOT 0.6  --   --   --   --    Noreen Sarmiento <0. 2  --   --   --   --    PROT 7.0  --   --   --   --    LABALBU 4.5  4.4   < > 4.7 4.1 4.5   ALKPHOS 68  --   --   --   --     < > = values in this interval not displayed. Troponin:   No results for input(s): TROPONINI in the last 72 hours. BNP: No results for input(s): BNP in the last 72 hours. Lipids: No results for input(s): CHOL, HDL in the last 72 hours. Invalid input(s): LDLCALCU, TRIGLYCERIDE  ABGs:  No results for input(s): PHART, BGP5LHX, PO2ART, LLK0UMU, BEART, THGBART, A7BJAGWH, FXX3QEY in the last 72 hours. INR:   Recent Labs     06/24/22  0912   INR 1.06     Lactate: No results for input(s): LACTATE in the last 72 hours. Cultures:  -----------------------------------------------------------------  RAD:   CT CERVICAL SPINE WO CONTRAST   Final Result   1. No evidence of acute fracture     2. Moderate to severe spondylosis with multilevel degenerative disc space narrowing and bilateral foraminal narrowing with facet arthropathy. 3. No significant central canal stenosis            CTA HEAD NECK W CONTRAST   Final Result      1. No large vessel occlusion, aneurysm or dissection. 2.  60-70% stenosis in the proximal left internal carotid artery. 3.  40-50% stenosis in the proximal right internal carotid artery. 4.  At least moderate stenosis in the origin of the right vertebral artery      CT Head WO Contrast   Final Result      1. No acute hemorrhage or mass effect. 2.  Chronic bilateral maxillary sinusitis, with waxing and waning from prior study. Assessment/Plan:     Hyponatremia, Polydipsia vs poor solute intake vs SIADH  Pt initially over corrected to 130 and was started on ddavp and d5 before dropping back to 119. S/p tolvaptan yesterday and improved to 129 this am  - Nephrology onboard  - q6h Na.  Na now at goal  - will reach out to nephro regarding fluid restriction given new FARSHAD, continue 1200 ml for now    FARSHAD  Likely prerenal in the setting of fluid restriction but patient also with poorly controlled htn and toradol given this admission   - hold ARB  - avoid NSAIDs and other nephrotoxic meds  - will give 500 ml NS bolus  - f/u urine studies  - will recheck RFP this afternoon    HTN urgency - resolved  Essential HTN  - coreg 25mg BID  - losartan 100 mg nightly (held)  - nifedipine 30 mg daily    Neck pain, improving  - continue lidocaine patch, diclofenac gel  - prn tylenol, flexiril    Alcohol abuse disorder  Pt drinks 5-6 twelve ounce beers a day. Pt also reports polydipsia, polyuria.  Likely out of window for DT  - CIWA without ordered ativan, hasn't required    Chronic Medical Problems  Hx CVA: Continue home ASA 81 mg daily and Plavix 75 mg daily  HLD: Continue home Atorvastatin 80 mg daily    Code Status: Full code  FEN: Adult Cardiac Diet, 1200mL fluid restriction  PPX: Lovenox  DISPO: Bernardino Pascal MD, PGY1  06/27/22  8:22 AM    This patient has been staffed and discussed with Nevaeh Herzog MD

## 2022-06-27 NOTE — PLAN OF CARE
Problem: Discharge Planning  Goal: Discharge to home or other facility with appropriate resources  6/27/2022 1204 by Carmela Ormond, RN  Outcome: Adequate for Discharge  6/27/2022 0126 by Dc Nevarez RN  Outcome: Progressing  Flowsheets (Taken 6/26/2022 2040)  Discharge to home or other facility with appropriate resources:   Identify barriers to discharge with patient and caregiver   Arrange for needed discharge resources and transportation as appropriate   Identify discharge learning needs (meds, wound care, etc)     Problem: Pain  Goal: Verbalizes/displays adequate comfort level or baseline comfort level  6/27/2022 1204 by Carmela Ormond, RN  Outcome: Adequate for Discharge  6/27/2022 0126 by Dc Nevarez RN  Outcome: Progressing  Flowsheets (Taken 6/26/2022 2040)  Verbalizes/displays adequate comfort level or baseline comfort level:   Encourage patient to monitor pain and request assistance   Assess pain using appropriate pain scale   Administer analgesics based on type and severity of pain and evaluate response   Implement non-pharmacological measures as appropriate and evaluate response     Problem: Safety - Adult  Goal: Free from fall injury  6/27/2022 1204 by Carmela Ormond, RN  Outcome: Adequate for Discharge  6/27/2022 0126 by Dc Nevarez RN  Outcome: Progressing  Flowsheets (Taken 6/26/2022 2330)  Free From Fall Injury:   Instruct family/caregiver on patient safety   Based on caregiver fall risk screen, instruct family/caregiver to ask for assistance with transferring infant if caregiver noted to have fall risk factors     Problem: Metabolic/Fluid and Electrolytes - Adult  Goal: Electrolytes maintained within normal limits  6/27/2022 1204 by Carmela Ormond, RN  Outcome: Adequate for Discharge  6/27/2022 0126 by Dc Nevarez RN  Outcome: Progressing  Flowsheets (Taken 6/26/2022 2040)  Electrolytes maintained within normal limits:   Monitor labs and assess patient for signs and symptoms of electrolyte imbalances   Fluid restriction as ordered   Instruct patient on fluid and nutrition restrictions as appropriate  Goal: Hemodynamic stability and optimal renal function maintained  6/27/2022 1204 by Ludy Ohara RN  Outcome: Adequate for Discharge  6/27/2022 0126 by Zachary Paulino RN  Outcome: Progressing  Flowsheets (Taken 6/26/2022 2040)  Hemodynamic stability and optimal renal function maintained:   Monitor labs and assess for signs and symptoms of volume excess or deficit   Monitor intake, output and patient weight   Monitor response to interventions for patient's volume status, including labs, urine output, blood pressure (other measures as available)   Encourage oral intake as appropriate   Instruct patient on fluid and nutrition restrictions as appropriate  Goal: Glucose maintained within prescribed range  6/27/2022 1204 by Ludy Ohara RN  Outcome: Adequate for Discharge  6/27/2022 0126 by Zachary Paulino RN  Outcome: Progressing  Flowsheets (Taken 6/26/2022 2040)  Glucose maintained within prescribed range:   Assess for signs and symptoms of hyperglycemia and hypoglycemia   Assess barriers to adequate nutritional intake and initiate nutrition consult as needed   Instruct patient on self management of diabetes and initiate consult as needed     Problem: Chronic Conditions and Co-morbidities  Goal: Patient's chronic conditions and co-morbidity symptoms are monitored and maintained or improved  6/27/2022 1204 by Ludy Ohara RN  Outcome: Adequate for Discharge  6/27/2022 0126 by Zachary Paulino RN  Outcome: Progressing  Flowsheets (Taken 6/26/2022 2040)  Care Plan - Patient's Chronic Conditions and Co-Morbidity Symptoms are Monitored and Maintained or Improved:   Monitor and assess patient's chronic conditions and comorbid symptoms for stability, deterioration, or improvement   Collaborate with multidisciplinary team to address chronic and comorbid conditions and prevent exacerbation or deterioration   Update acute care plan with appropriate goals if chronic or comorbid symptoms are exacerbated and prevent overall improvement and discharge

## 2022-06-28 ENCOUNTER — CARE COORDINATION (OUTPATIENT)
Dept: CASE MANAGEMENT | Age: 72
End: 2022-06-28

## 2022-06-28 NOTE — CARE COORDINATION
[facility] Transitions Initial Follow Up Call    Call within 2 business days of discharge: Yes    Patient: Raimundo Nunez Patient : 1950   MRN: [id number]  Reason for Admission: hyponatremia  Discharge Date: 22 RARS: Readmission Risk Score: 12 ( )      Last Discharge [facility]       Complaint Diagnosis Description Type Department Provider    22 Neck Pain Hyponatremia . .. ED to Hosp-Admission (Discharged) (ADMITTED) Yair Maya 4 U Dameon Tan MD; Delicia Love, ... Spoke with: KATRIN    Facility: [facility]    Attempted to reach patient via phone for initial post hospital transition call. PETER left stating purpose of call along with my contact information requesting a return call.    Connie Reeves LPN [address] [place] [address]  Care Transitions  [phone]    Care Transitions 24 Hour Call    Do you have all of your prescriptions and are they filled?: Yes  Do you have support at home?: Partner/Spouse/SO  Are you an active caregiver in your home?: No  Care Transitions Interventions         Follow Up  Future Appointments   Date Time Provider Virgilio Souza   2022  1:00 PM SCHEDULE, VASCULAR LAB 1 [place] [facility]/EN [facility]   2023  1:00 PM MD Ana Mckinney [facility] Kam [address]

## 2022-06-29 ENCOUNTER — CARE COORDINATION (OUTPATIENT)
Dept: CASE MANAGEMENT | Age: 72
End: 2022-06-29

## 2022-06-29 NOTE — CARE COORDINATION
needs identified to be addressed with provider: No  none             Method of communication with provider : none    Advance Care Planning:   Does patient have an Advance Directive: not on file; education provided     Advance Care Planning   Healthcare Decision Maker:    Primary Decision Maker: Adrian Gregg - [phone]      LPN Care Coordinator contacted the patient by telephone to perform post hospital discharge assessment. Verified name and  with patient as identifiers. Provided introduction to self, and explanation of the LPN CC role. LPN CC reviewed discharge instructions, medical action plan and red flags with patient who verbalized understanding. Patient given an opportunity to ask questions and does not have any further questions or concerns at this time. Were discharge instructions available to patient? Yes. Reviewed appropriate site of care based on symptoms and resources available to patient including: PCP  Specialist  When to call 911. The patient agrees to contact the PCP office for questions related to their healthcare. Medication reconciliation was performed with patient, who verbalizes understanding of administration of home medications. Advised obtaining a 90-day supply of all daily and as-needed medications. Was patient discharged with a pulse oximeter? no    LPN CC provided contact information. Plan for follow-up call in 5-7 days based on severity of symptoms and risk factors.   Plan for next call: symptom management-.      Care Transitions 24 Hour Call    Do you have a copy of your discharge instructions?: Yes  Do you have all of your prescriptions and are they filled?: Yes  Have you been contacted by a [address]?: No  Have you scheduled your follow up appointment?: Yes  How are you going to get to your appointment?: Car - drive self  Do you have support at home?: Partner/Spouse/SO  Do you feel like you have everything you need to keep you well at home?: Yes  Are you an active caregiver in your home?: No  Care Transitions Interventions  No Identified Needs         Follow Up  Future Appointments   Date Time Provider Virgilio Souza   6/30/2022  1:00 PM SCHEDULE, VASCULAR LAB 1 [facility]/LARISA ABERNATHY   1/19/2023  1:00 PM [facility] JOSEMANUEL Chua LPN

## 2022-06-29 NOTE — PROGRESS NOTES
Physician Progress Note      Olena Bassett  CSN #:                  [id number]  :                       1950  ADMIT DATE:       2022 4:23 PM  [address] [place] DATE:        2022 3:34 PM  RESPONDING  PROVIDER #:        Mare Davila MD          QUERY TEXT:    Patient admitted  with hyponatremia. Per  Hospitalist PN, noted   documentation of repeat urine studies consistent with SIADH. Per    Hospitalist PN: Hyponatremia, Polydipsia vs poor solute intake vs SIADH. Per   DC Summary, no mention of SIADH. Per Nephrology consult, no mention of SIADH. PLease clarify the status of SIADH such as: The medical record reflects the following:  Risk Factors: 71 y.o. male with hyponatemia, hypovolemia, FARSHAD, Hypertensive   urgency, alcohol abuse, possible beer potomania  Clinical Indicators: Presented with Serum NA of 120, then dropped to 119. Per    Nephrology consult: Ur NA of 32, specific gravity < 1.005. Na   overcorrected from 119 to 131 this morning without intervention  Treatment: Nephrology consult daily labs monitoring  Options provided:  -- SIADH ruled out  -- SIADH confirmed as evidenced by, Please document supporting evidence of   SIADH. -- Other - I will add my own diagnosis  -- Disagree - Not applicable / Not valid  -- Disagree - Clinically unable to determine / Unknown  -- Refer to Clinical Documentation Reviewer    PROVIDER RESPONSE TEXT:    This patient has SIADH as evidenced by Presented with Serum NA of 120, then   dropped to 119. Per  Nephrology consult: Ur NA of 32, specific gravity <   1.005.     Query created by: Jas Eugene on 2022 7:03 PM      Electronically signed by:  Mare Davila MD 2022 5:36 PM

## 2022-06-30 ENCOUNTER — PROCEDURE VISIT (OUTPATIENT)
Dept: SURGERY | Age: 72
End: 2022-06-30
Payer: MEDICARE

## 2022-06-30 DIAGNOSIS — I73.9 PAD (PERIPHERAL ARTERY DISEASE) (HCC): Primary | ICD-10-CM

## 2022-06-30 DIAGNOSIS — I65.23 CAROTID STENOSIS, ASYMPTOMATIC, BILATERAL: ICD-10-CM

## 2022-06-30 DIAGNOSIS — K55.1 ATHEROSCLEROSIS OF SUPERIOR MESENTERIC ARTERY (HCC): ICD-10-CM

## 2022-06-30 PROCEDURE — 93925 LOWER EXTREMITY STUDY: CPT | Performed by: SURGERY

## 2022-06-30 PROCEDURE — 93976 VASCULAR STUDY: CPT | Performed by: SURGERY

## 2022-07-04 DIAGNOSIS — I10 ESSENTIAL HYPERTENSION: ICD-10-CM

## 2022-07-05 RX ORDER — CARVEDILOL 25 MG/1
TABLET ORAL
Qty: 180 TABLET | Refills: 1 | OUTPATIENT
Start: 2022-07-05

## 2022-07-06 ENCOUNTER — TELEPHONE (OUTPATIENT)
Dept: SURGERY | Age: 72
End: 2022-07-06

## 2022-07-06 ENCOUNTER — CARE COORDINATION (OUTPATIENT)
Dept: CASE MANAGEMENT | Age: 72
End: 2022-07-06

## 2022-07-06 NOTE — CARE COORDINATION
[facility] 45 Transitions Follow Up Call    2022    Patient: Yakov Umanzor  Patient : 1950   MRN: [id number]  Reason for Admission: hyponatremia  Discharge Date: 22 RARS: Readmission Risk Score: 12 ( )         Spoke with: Jocelyndima Gravess  Patient reports he is doing really well. He denies cp,sob, cough, fever, chills, abdominal pain, h/a, dizziness or nvd. Appetite and fluid intake is sufficient, no problems with bowel or bladder. Has medication and takeing as prescribed. No questions, needs or concerns at this time. Will continue to follow. Care Transitions Follow Up Call    Needs to be reviewed by the provider   Additional needs identified to be addressed with provider: No  none             Method of communication with provider : none      Care Transition Nurse (CTN) contacted the patient by telephone to follow up after admission on 2022. Verified name and  with patient as identifiers. Addressed changes since last contact: none  Discussed follow-up appointments. If no appointment was previously scheduled, appointment scheduling offered: No.   Is follow up appointment scheduled within 7 days of discharge? Na. Advance Care Planning:   Does patient have an Advance Directive: not on file    CTN reviewed discharge instructions, medical action plan and red flags with patient and discussed any barriers to care and/or understanding of plan of care after discharge. Discussed appropriate site of care based on symptoms and resources available to patient including: PCP. The patient agrees to contact the PCP office for questions related to their healthcare. Patients top risk factors for readmission: ineffective coping, medical condition  Interventions to address risk factors: Education of patient/family/caregiver/guardian to support self-management--      Non-[facility] follow up appointment(s):     CTN provided contact information for future needs.  Plan for follow-up call in 5-7 days based on severity of symptoms and risk factors. Plan for next call: self management--          Care Transitions Subsequent and Final Call    Subsequent and Final Calls  Care Transitions Interventions  Other Interventions:            Follow Up  Future Appointments   Date Time Provider Virgilio Souza   12/30/2022  1:00 PM SCHEDULE, VASCULAR LAB 1 [place] VASC/EN [facility]   12/30/2022  2:45 PM MARY Lawton - CNP [place] [facility]ASC/EN [facility]   1/19/2023  1:00 PM MD Ana Blackman 50 [facility]       Jez Klein LPN

## 2022-07-06 NOTE — TELEPHONE ENCOUNTER
Yuliana from [facility] would like to have a call regarding the scans the PT had done.

## 2022-07-12 ENCOUNTER — CARE COORDINATION (OUTPATIENT)
Dept: CASE MANAGEMENT | Age: 72
End: 2022-07-12

## 2022-07-12 NOTE — CARE COORDINATION
[facility] Transitions Initial Follow Up Call    Call within 2 business days of discharge: Yes    Patient:  Christina Varghese   Patient :  1950  MRN:  [id number]     Reason for Admission: Hyponatremia  Discharge Date:  22    RARS:       Transitions of Care Initial Call    Was this an external facility discharge? Discharge Facility: [address] [place] [address] to be reviewed by the provider   Additional needs identified to be addressed with provider:    tai    AMB CC Provider Discharge Needs: none            Non-face-to-face services provided:    1ST CTC attempt to reach Pt regarding recent hospital discharge. CTC left voice recording with call back number requesting a call back.     Follow up appointments:    Future Appointments   Date Time Provider Virgilio Souza   2022  1:00 PM SCHEDULE, VASCULAR LAB 1 [place] VASC/EN [facility]   2022  2:45 PM MARY Wick - CNP [place] [facility]ASC/[facility]   2023  1:00 PM MD Phillip Santoyo [facility]       Thank You,    Steven Gonzalez RN  Care Transition Coordinator  Contact [facility]:[phone]

## 2022-07-12 NOTE — CARE COORDINATION
[facility] 45 Transitions Follow Up Call    2022    Patient: Светлана Chow  Patient : 1950   MRN: [id number]   Reason for Admission: Hyponatremia  Discharge Date: 22 RARS: Readmission Risk Score: 12 ( )         Spoke with: Светлана Chow     CTN received return phone call from patient this afternoon. Patient states he is doing well, no reports of any issues or concerns, has follow up with PCP scheduled for 2022. Patient continuing to monitor for any fever, chills, nausea, vomiting, chest pain, SOB or cough. As well as any other issues that may arise. Care Transitions Subsequent and Final Call    Schedule Follow Up Appointment with PCP: Declined  Subsequent and Final Calls  Do you have any ongoing symptoms?: No  Have your medications changed?: No  Do you have any questions related to your medications?: No  Do you currently have any active services?: No  Do you have any needs or concerns that I can assist you with?: No  Identified Barriers: None  Care Transitions Interventions  No Identified Needs  Other Interventions:            Follow Up  Future Appointments   Date Time Provider Virgilio Souza   2022  1:00 PM SCHEDULE, VASCULAR LAB 1 [place] [facility]   2022  2:45 PM MARY Morin - CNP [place] VASC/EN [facility]   2023  1:00 PM MD Phillip Espana [facility]     Thank You,    Donna Rodriguez RN  Care Transition Coordinator  Contact TKWVKT:[phone]

## 2022-07-20 ENCOUNTER — CARE COORDINATION (OUTPATIENT)
Dept: CASE MANAGEMENT | Age: 72
End: 2022-07-20

## 2022-07-20 NOTE — CARE COORDINATION
[facility] 45 Transitions Follow Up Call    2022    Patient: Gustavo Dawkins  Patient : 1950   MRN: [id number]   Reason for Admission: Hyponatremia  Discharge Date: 22 RARS: Readmission Risk Score: 12 ( )         Spoke with: Gustavo Dawkins     CTN spoke with patient this afternoon for follow up CTN call. Patient states he is continuing to improve, every day. Patient doing really well, no reports of any fever, chills, nausea, vomiting, chest pain, SOB or cough. Care Transitions Follow Up Call    Needs to be reviewed by the provider   Additional needs identified to be addressed with provider: No  none             Method of communication with provider : none      Care Transition Nurse contacted the patient by telephone to follow up after admission on 2022. Verified name and  with patient as identifiers. Addressed changes since last contact: none  Discussed follow-up appointments. If no appointment was previously scheduled, appointment scheduling offered: Yes. Is follow up appointment scheduled within 7 days of discharge? Yes. Advance Care Planning:   Does patient have an Advance Directive: reviewed and current. CTN reviewed discharge instructions, medical action plan and red flags with patient and discussed any barriers to care and/or understanding of plan of care after discharge. Discussed appropriate site of care based on symptoms and resources available to patient including: PCP  Specialist  Urgent care clinics  [facility]   When to call 911. The patient agrees to contact the PCP office for questions related to their healthcare.      Patients top risk factors for readmission: medication management  multiple health system providers  polypharmacy  Interventions to address risk factors: Education of patient/family/caregiver/guardian to support self-management-Patient instructed to continue to monitor  for any of the above noted s/s, reporting any that may present,  to MD immediately. CTN provided contact information for future needs. Plan for follow-up call in 5-7 days based on severity of symptoms and risk factors. Plan for next call: Any issues or concerns that may arise. Care Transitions Subsequent and Final Call    Schedule Follow Up Appointment with PCP: Declined  Subsequent and Final Calls  Do you have any ongoing symptoms?: No  Have your medications changed?: No  Do you have any questions related to your medications?: No  Do you currently have any active services?: No  Do you have any needs or concerns that I can assist you with?: No  Identified Barriers: None  Care Transitions Interventions  No Identified Needs  Other Interventions:            Follow Up  Future Appointments   Date Time Provider Virgilio Souza   12/30/2022  1:00 PM SCHEDULE, VASCULAR LAB 1 [place] VASC/EN [facility]   12/30/2022  2:45 PM MARY Romero - [facility]   1/19/2023  1:00 PM Alice Kincaid MD [facility]     Thank You,    Judy Vasquez RN  Care Transition Coordinator  Contact [facility]:[phone]

## 2022-07-21 ENCOUNTER — TELEPHONE (OUTPATIENT)
Dept: SURGERY | Age: 72
End: 2022-07-21

## 2022-07-21 NOTE — TELEPHONE ENCOUNTER
Patient would like a call from Dr. Chase Morales to go over his vascular studies performed on 6/30/22, and discuss his treatment plan moving forward.

## 2022-07-21 NOTE — TELEPHONE ENCOUNTER
Dr Edgardo De La Cruz returned the patients call. He is agreeable to proceed with a right lower extremity angiogram with possible angioplasty. Will call pt to schedule. Normal

## 2022-07-22 ENCOUNTER — TELEPHONE (OUTPATIENT)
Dept: VASCULAR SURGERY | Age: 72
End: 2022-07-22

## 2022-07-22 DIAGNOSIS — I10 ESSENTIAL HYPERTENSION: ICD-10-CM

## 2022-07-22 DIAGNOSIS — I73.9 PERIPHERAL ARTERIAL DISEASE (HCC): Primary | ICD-10-CM

## 2022-07-22 NOTE — TELEPHONE ENCOUNTER
Message on VM to check if pt is available on 8-10-22 for an angiogram.  Will need CBC abd BMP prior to procedure (orders placed). NPO after midnight. Must have a . Hold Plavix and ASA the morning of the procedure.

## 2022-07-27 ENCOUNTER — CARE COORDINATION (OUTPATIENT)
Dept: CASE MANAGEMENT | Age: 72
End: 2022-07-27

## 2022-07-27 NOTE — CARE COORDINATION
[facility] 45 Transitions Initial Follow Up Call    Call within 2 business days of discharge: Yes    Patient:  Angélica Perry   Patient :  1950  MRN:  [id number]     Reason for Admission: Hyponatremia  Discharge Date:  22    RARS:       Transitions of Care Initial Call    Was this an external facility discharge? Discharge Facility: [address] [place] [address] to be reviewed by the provider   Additional needs identified to be addressed with provider:    tai    AMB CC Provider Discharge Needs: none            Non-face-to-face services provided:    1ST CTC attempt to reach Pt regarding recent hospital discharge. CTC left voice recording with call back number requesting a call back. CTN will close out CTN episode, as today was scheduled for final follow up CTN call.     Follow up appointments:    Future Appointments   Date Time Provider Virgilio Souza   2022  1:00 PM SCHEDULE, VASCULAR LAB 1 [place] VASC/EN [facility]   2022  2:45 PM Zygmunt Liter, APRN - CNP [place] VASC/EN [facility]   2023  1:00 PM Jania Colunga MD [facility]       Thank You,    Elan Dubois RN  Care Transition Coordinator  Contact [facility]:[phone]

## 2022-08-03 DIAGNOSIS — I73.9 PERIPHERAL ARTERIAL DISEASE (HCC): Primary | ICD-10-CM

## 2022-08-08 ENCOUNTER — TELEPHONE (OUTPATIENT)
Dept: SURGERY | Age: 72
End: 2022-08-08

## 2022-08-11 DIAGNOSIS — I73.9 PERIPHERAL ARTERIAL DISEASE (HCC): ICD-10-CM

## 2022-08-11 DIAGNOSIS — I10 ESSENTIAL HYPERTENSION: ICD-10-CM

## 2022-08-11 LAB
ANION GAP SERPL CALCULATED.3IONS-SCNC: 14 MMOL/L (ref 3–16)
BASOPHILS ABSOLUTE: 0.1 K/UL (ref 0–0.2)
BASOPHILS RELATIVE PERCENT: 1.9 %
BUN BLDV-MCNC: 13 MG/DL (ref 7–20)
CALCIUM SERPL-MCNC: 9.6 MG/DL (ref 8.3–10.6)
CHLORIDE BLD-SCNC: 97 MMOL/L (ref 99–110)
CO2: 26 MMOL/L (ref 21–32)
CREAT SERPL-MCNC: 1 MG/DL (ref 0.8–1.3)
EOSINOPHILS ABSOLUTE: 0.3 K/UL (ref 0–0.6)
EOSINOPHILS RELATIVE PERCENT: 5.4 %
GFR AFRICAN AMERICAN: >60
GFR NON-AFRICAN AMERICAN: >60
GLUCOSE BLD-MCNC: 99 MG/DL (ref 70–99)
HCT VFR BLD CALC: 39.3 % (ref 40.5–52.5)
HEMOGLOBIN: 13 G/DL (ref 13.5–17.5)
LYMPHOCYTES ABSOLUTE: 0.8 K/UL (ref 1–5.1)
LYMPHOCYTES RELATIVE PERCENT: 16 %
MCH RBC QN AUTO: 34.5 PG (ref 26–34)
MCHC RBC AUTO-ENTMCNC: 33.2 G/DL (ref 31–36)
MCV RBC AUTO: 104.1 FL (ref 80–100)
MONOCYTES ABSOLUTE: 0.8 K/UL (ref 0–1.3)
MONOCYTES RELATIVE PERCENT: 15.9 %
NEUTROPHILS ABSOLUTE: 3 K/UL (ref 1.7–7.7)
NEUTROPHILS RELATIVE PERCENT: 60.8 %
PDW BLD-RTO: 13.6 % (ref 12.4–15.4)
PLATELET # BLD: 205 K/UL (ref 135–450)
PMV BLD AUTO: 7.4 FL (ref 5–10.5)
POTASSIUM SERPL-SCNC: 5 MMOL/L (ref 3.5–5.1)
RBC # BLD: 3.77 M/UL (ref 4.2–5.9)
SODIUM BLD-SCNC: 137 MMOL/L (ref 136–145)
WBC # BLD: 4.9 K/UL (ref 4–11)

## 2022-08-17 ENCOUNTER — HOSPITAL ENCOUNTER (OUTPATIENT)
Dept: CARDIAC CATH/INVASIVE PROCEDURES | Age: 72
Discharge: HOME | End: 2022-08-17
Payer: MEDICARE

## 2022-08-17 ENCOUNTER — TELEPHONE (OUTPATIENT)
Dept: CARDIOLOGY CLINIC | Age: 72
End: 2022-08-17

## 2022-08-17 LAB
POC ACT LR: 189 SEC
POC ACT LR: 189 SEC
POC ACT LR: 223 SEC
POC ACT LR: 223 SEC
POC ACT LR: 240 SEC

## 2022-08-17 PROCEDURE — C1769 GUIDE WIRE: HCPCS

## 2022-08-17 PROCEDURE — 99152 MOD SED SAME PHYS/QHP 5/>YRS: CPT

## 2022-08-17 PROCEDURE — 2500000003 HC RX 250 WO HCPCS

## 2022-08-17 PROCEDURE — 6360000002 HC RX W HCPCS

## 2022-08-17 PROCEDURE — 6360000004 HC RX CONTRAST MEDICATION: Performed by: STUDENT IN AN ORGANIZED HEALTH CARE EDUCATION/TRAINING PROGRAM

## 2022-08-17 PROCEDURE — 75774 ARTERY X-RAY EACH VESSEL: CPT

## 2022-08-17 PROCEDURE — 85347 COAGULATION TIME ACTIVATED: CPT

## 2022-08-17 PROCEDURE — 2580000003 HC RX 258

## 2022-08-17 PROCEDURE — 99152 MOD SED SAME PHYS/QHP 5/>YRS: CPT | Performed by: STUDENT IN AN ORGANIZED HEALTH CARE EDUCATION/TRAINING PROGRAM

## 2022-08-17 PROCEDURE — 75625 CONTRAST EXAM ABDOMINL AORTA: CPT

## 2022-08-17 PROCEDURE — 75710 ARTERY X-RAYS ARM/LEG: CPT

## 2022-08-17 PROCEDURE — 37224 PR REVSC OPN/PRG FEM/POP W/ANGIOPLASTY UNI: CPT | Performed by: STUDENT IN AN ORGANIZED HEALTH CARE EDUCATION/TRAINING PROGRAM

## 2022-08-17 PROCEDURE — C1894 INTRO/SHEATH, NON-LASER: HCPCS

## 2022-08-17 PROCEDURE — C1725 CATH, TRANSLUMIN NON-LASER: HCPCS

## 2022-08-17 PROCEDURE — 2709999900 HC NON-CHARGEABLE SUPPLY

## 2022-08-17 PROCEDURE — 99153 MOD SED SAME PHYS/QHP EA: CPT

## 2022-08-17 PROCEDURE — C9764 REVASC INTRAVASC LITHOTRIPSY: HCPCS

## 2022-08-17 PROCEDURE — 75716 ARTERY X-RAYS ARMS/LEGS: CPT | Performed by: STUDENT IN AN ORGANIZED HEALTH CARE EDUCATION/TRAINING PROGRAM

## 2022-08-17 PROCEDURE — 75625 CONTRAST EXAM ABDOMINL AORTA: CPT | Performed by: STUDENT IN AN ORGANIZED HEALTH CARE EDUCATION/TRAINING PROGRAM

## 2022-08-17 PROCEDURE — 37228 PR REVSC OPN/PRQ TIB/PERO W/ANGIOPLASTY UNI: CPT | Performed by: STUDENT IN AN ORGANIZED HEALTH CARE EDUCATION/TRAINING PROGRAM

## 2022-08-17 RX ORDER — SODIUM CHLORIDE 0.9 % (FLUSH) 0.9 %
5-40 SYRINGE (ML) INJECTION PRN
Status: DISCONTINUED | OUTPATIENT
Start: 2022-08-17 | End: 2022-08-18 | Stop reason: HOSPADM

## 2022-08-17 RX ORDER — SODIUM CHLORIDE 0.9 % (FLUSH) 0.9 %
5-40 SYRINGE (ML) INJECTION EVERY 12 HOURS SCHEDULED
Status: DISCONTINUED | OUTPATIENT
Start: 2022-08-17 | End: 2022-08-18 | Stop reason: HOSPADM

## 2022-08-17 RX ORDER — SODIUM CHLORIDE 9 MG/ML
INJECTION, SOLUTION INTRAVENOUS PRN
Status: DISCONTINUED | OUTPATIENT
Start: 2022-08-17 | End: 2022-08-18 | Stop reason: HOSPADM

## 2022-08-17 RX ORDER — ACETAMINOPHEN 325 MG/1
650 TABLET ORAL EVERY 4 HOURS PRN
Status: DISCONTINUED | OUTPATIENT
Start: 2022-08-17 | End: 2022-08-18 | Stop reason: HOSPADM

## 2022-08-17 RX ADMIN — IOPAMIDOL 120 ML: 755 INJECTION, SOLUTION INTRAVENOUS at 09:02

## 2022-08-17 ASSESSMENT — ENCOUNTER SYMPTOMS
BACK PAIN: 0
ABDOMINAL PAIN: 0
SHORTNESS OF BREATH: 0

## 2022-08-17 NOTE — OP NOTE
[address] [facility]                                OPERATIVE REPORT    PATIENT NAME: Parag Perdue                  :        1950  MED REC NO:   [id number]                          ROOM:  ACCOUNT NO:   [de-identified]                           ADMIT DATE: 2022  PROVIDER:     Marcello Lehman DO    DATE OF PROCEDURE:  2022    PREOPERATIVE DIAGNOSES:  1. Peripheral arterial disease. 2.  Lifestyle-limiting claudication. POSTOPERATIVE DIAGNOSES:  1. Peripheral arterial disease. 2.  Lifestyle-limiting claudication. OPERATIONS PERFORMED:  1. Ultrasound-guided access to the left common femoral artery. 2.  Abdominal aortogram.  3.  Bilateral lower extremity angiogram.  4.  Balloon angioplasty of the P2 and P3 segment of the popliteal artery  using a 4.5 x 60 mm intravascular lithotripsy and shockwave balloon with  fragmentation, followed by 4 x 40 mm standard angioplasty balloon. 5.  Angioplasty of the distal popliteal and proximal tibioperoneal  artery trunk using a 3 x 40 mm balloon. SURGEON:  Marcello Lehman DO    ASSISTANT:  None. ANESTHESIA:  Local with sedation. ESTIMATED BLOOD LOSS:  Less than 50 mL. ASA CLASSIFICATION:  II.    MALLAMPATI SCORE:  II.    INDICATIONS:  This is a 36-year-old male patient who presents to the  office for surveillance duplex imaging. Of note, his ALISTAIR on the left  side is 1, but it might be falsely elevated. He had significant  calcific plaque. He has a fem-pop bypass that is patent. He has  evidence of concerning for stenosis in the right popliteal artery based  on surveillance duplex. He does have bilateral lower extremity  claudication that is nondescript, but does appear to be related to  arterial pathology.   On the right side, he has had interval increase in  velocity of stenosis in his popliteal arteries; therefore, he was  referred for revascularization. All the risks, benefits, and  alternatives were clearly reviewed with the patient. The risks include  pain, infection, bleeding, embolization, thrombosis, and pseudoaneurysm. He understood and gave informed consent. OPERATIVE PROCEDURE:  The patient was brought into the cath lab and  placed supine on the table. Appropriate monitoring lines including  continuous blood pressure, EKG, and pulse oximetry were placed on the  patient. The bilateral groins were prepped and draped in the usual  sterile fashion. A time-out was called for indication, patient, and  laterality. Once again, a qualified nurse observer was in the room to  administer sedation. We used a combination of Versed and fentanyl. Total time was from 8:05 a.m. to 8:55 a.m. This totaled 50 minutes. Total sedation used was 4 mg of Versed and 200 mcg of fentanyl. The left femoral artery was then visualized under ultrasound. I  directly accessed using micropuncture needle. I directly visualized the  needle entering the vessel. I was able to also visualize the bypass to  make sure to puncture above it. Because of a very little scar tissue, I  had to serially dilate in order to place a wire and a sheath. I  actually had placed a 5-Maori sheath in the left groin over a Bentson  wire. I placed a flush catheter into the abdominal aorta. The  abdominal aorta was widely patent. The renal arteries were widely  patent following the angiogram.  Bilateral common iliac arteries were  widely patent. The hypogastrics were patent. The external iliac  arteries were widely patent. I, therefore, selected the right lower extremity. I placed a flush  catheter into the distal external iliac artery. I shot a right lower  extremity arteriogram.  The common femoral was patent, the profunda was  patent, and the SFA was patent proximally.   The SFA had some residual  mild, less than 50% stenosis at the proximal segment of the SFA and it  was actually very much still improved from previous intravascular  lithotripsy to treat that lesion. Beyond that, the SFA had some  calcification, but was mostly patent. The P3 segment of the popliteal  artery was patent. We found evidence of disease distally on the P2 and  P3 segments of the popliteal artery. The proximal tibioperoneal trunk  also had evidence of greater than 50% stenosis. There appeared to be  greater than 75% stenosis in the mid portion of the P2 segment of the  popliteal artery. There was lots of significant calcification present. The peroneal artery and posterior tibial artery were patent down  to the level of the ankle and into the foot. The dorsalis pedis does reconstitute via collaterals. The patient was given 3000 units of heparin. We brought up and over a  6/45 sheath and placed in the common femoral artery. From here, we then  proceeded to navigate a 0.014 wire down through the actual popliteal  artery stenosis. We then brought up and over a 4.5 x 60 mm  intravascular lithotripsy shockwave balloon. We then performed  shockwave intravascular lithotripsy and fragmentation and angioplasty. Following this, I then postdilated this lesion with a 4-mm balloon. On  the distal popliteal artery and proximal posterior tibioperoneal trunk  area, we used a 3-mm balloon to see we could get any improvement in some  of the outflow. Following this, we had subsequently improved brisk flow  throughout the popliteal segments. There was angiographic improvement;  therefore, we were reasonably satisfied. We decided to go ahead and  discontinue our treatment at this point in time. We withdrew our sheath and wire and then placed a short 6-Armenian sheath  in the left groin. I then shot the left lower extremity. The common  femoral was patent. There was a mild-to-moderate stenosis in the mid  common femoral artery, likely from previous fem-pop segments.   However,  the fem-popliteal artery bypass that goes to the below-knee popliteal  artery was widely patent throughout its entire course. The patient's  runoff was via two vessels down to the foot on the left side as well via  the peroneal and posterior tibial artery without hemodynamically significant stenosis. We, therefore,  withdrew the sheath and held manual pressure with good hemostatic  effect. There were no immediate complications. The patient will remain on  bedrest for four hours. I was present and performed all critical  aspects of this procedure.         Kim Kendall DO    D: 08/17/2022 10:43:49       T: 08/17/2022 14:16:15     [email or web address]  Job#: [id number]     Doc#: [id number]    CC:

## 2022-08-17 NOTE — DISCHARGE INSTRUCTIONS
PERIPHERAL ANGIOGRAM    Care of your puncture site:  Remove bandage 24 hours after the procedure. May shower in 24 hours but do not sit in a bathtub/pool of water for 5 days or until the wound is healed. Gently clean groin using soap and water. Dry thoroughly and apply a Band-Aid that covers the entire site. Use Band-Aid until skin heals over in about 3-5 days. Do not apply powder or lotion. Normal Observations:  Soreness or tenderness which may last one week. Mild oozing from the incision site. Possible bruising that could last 2 weeks. Activity:  You may resume driving 24 hours following the procedure. Do not make important / legal decisions within 24 hours after procedure. You may resume normal activity in 5 days or after the wound heals. Avoid lifting more than 10 pounds for 5 days or until the wound heals. Avoid strenuous exercise or activity for 1 week. You may return to work in {NUMBER 1-10:4808980026} {Time; MYD/XV/UC/OE(C):8639}, if applicable. Nutrition:  Regular diet or ***. Drink at least 8 to 10 glasses of decaffeinated, non-alcoholic fluid for the next 24 hours to flush the x-ray dye used for your angiogram out of your body. Call your doctor immediately if your condition worsens, for any other concerns, for a follow-up appointment or if you experience any of the following:  Increased swelling on the groin or leg. Unusual pain, numbness, or tingling of the groin or down the leg. Any signs of infection such as: redness, yellow drainage at the site, swelling or pain. IF GROIN STARTS BLEEDING SIGNIFICANTLY:   LAY FLAT, HOLD FIRM DIRECT PRESSURE, AND CALL 911    Other Instructions:  Hold Metformin or Metformin containing drugs for 48 hours after procedure. Electronically signed by Laina Kamara RN on 8/17/2022 at 9:23 AM   [address] of your puncture site:  Remove bandage 24 hours after the procedure.   May shower in 24 hours but do not sit in a bathtub/pool of water for 5 days or until the wound is healed. Gently clean groin using soap and water. Dry thoroughly and apply a Band-Aid that covers the entire site. Use Band-Aid until skin heals over in about 3-5 days. Do not apply powder or lotion. Normal Observations:  Soreness or tenderness which may last one week. Mild oozing from the incision site. Possible bruising that could last 2 weeks. Activity:  You may resume driving 24 hours following the procedure. Do not make important / legal decisions within 24 hours after procedure. You may resume normal activity in 5 days or after the wound heals. Avoid lifting more than 10 pounds for 5 days or until the wound heals. Avoid strenuous exercise or activity for 1 week. You may return to work in {NUMBER 1-10:3409352535} {Time; NARCISO/DELORIS/MICHELLE/CAROL(P):7662}, if applicable. Nutrition:  Regular diet or ***. Drink at least 8 to 10 glasses of decaffeinated, non-alcoholic fluid for the next 24 hours to flush the x-ray dye used for your angiogram out of your body. Call your doctor immediately if your condition worsens, for any other concerns, for a follow-up appointment or if you experience any of the following:  Increased swelling on the groin or leg. Unusual pain, numbness, or tingling of the groin or down the leg. Any signs of infection such as: redness, yellow drainage at the site, swelling or pain. IF GROIN STARTS BLEEDING SIGNIFICANTLY:   LAY FLAT, HOLD FIRM DIRECT PRESSURE, AND CALL 911    Other Instructions:  Hold Metformin or Metformin containing drugs for 48 hours after procedure.

## 2022-08-17 NOTE — H&P
insufficiency     Vertebral artery dissection ([facility] [address] 2013       Past Surgical History:   Procedure Laterality Date    ANGIOPLASTY Left 2016    left femoral -popl stent    ANGIOPLASTY  2016    SMA    ANGIOPLASTY Right 2022    Right SFA, Dr. Dat Red Left 2016    left femoral-popliteal bypass graft. ARTERIAL BYPASS SURGRY  2016    Dr. Chelo Encarnacion - aorto-SMA bypass using 8mm PTFE    ATHERECTOMY Right 2019    Atherectomy with angioplasty right SFA and popliteal arteries, atherectomy and angioplasty right tibial peroneal trunk    COLONOSCOPY  2008    COLONOSCOPY  2015    Dr. Babs Mcmullen  2018    Dr. Daisy Hays Bilateral 2013    bilateral with mesh, Dr. Tosha Mauro  10/23/2020    L4-5 Decompression with anterior/posterior fusion, Dr. Solomon Hylton    SEPTOPLASTY Bilateral 2021    BILATERAL FRONTO-ETHMOIDECTOMY, BILATERAL MAXILLARY ANTROSTOMY WITH TISSUE REMOVAL AND SEPTOPLASTY NASAL SCOPE performed by Nicole Fine MD at [address] [place] [address]  2015    Dr. Stef Matamoros 2018    EGD BIOPSY performed by Stevo Rand MD at [facility]  2003    R Leg Vein stripping       No Known Allergies    Social History     Socioeconomic History    Marital status:      Spouse name: Not on file    Number of children: 3    Years of education: Not on file    Highest education level: Not on file   Occupational History    Not on file   Tobacco Use    Smoking status: Former     Packs/day: 0.50     Years: 47.00     Pack years: 23.50     Types: Cigarettes     Start date: 1970     Quit date: 2016     Years since quittin.1    Smokeless tobacco: Never    Tobacco comments:     Maintain cessation   Substance and Sexual Activity    Alcohol use:  Yes     Alcohol/week: 0.0 standard drinks Comment: Non-alcoholic beer    Drug use: No    Sexual activity: Not Currently     Partners: Female     Comment:    Other Topics Concern    Not on file   Social History Narrative    Not on file     Social Determinants of Health     Financial Resource Strain: Low Risk     Difficulty of Paying Living Expenses: Not hard at all   Food Insecurity: No Food Insecurity    Worried About Running Out of Food in the Last Year: Never true    Ran Out of Food in the Last Year: Never true   Transportation Needs: Not on file   Physical Activity: Not on file   Stress: Not on file   Social Connections: Not on file   Intimate Partner Violence: Not on file   Housing Stability: Not on file       Family History   Problem Relation Age of Onset    Heart Attack Father     Heart Disease Father         MI    Cancer Brother         Brain CA    Hypertension Brother     Cancer Brother         Throat cancer     - No history of bleeding or clotting disorders    Vital Signs  There were no vitals filed for this visit.     Physical Examination  General: No acute distress  Psychiatric: affect appropriate  Head/Eyes/Ears/Nose/Throat:  Atraumatic, vision and hearing intact, face symmetric  Neck:  supple  Chest/Lungs: no tachypnea, retractions or cyanosis noted  Cardiac:  Regular rate and rhythm  Abdomen: soft, nontender  Extremities: warm and well perfused  - bilateral upper extremity motorsensory intact  - bilateral lower extremity motorsensory intact  Vascular exam:  - R femoral: palp  - L femoral: palp  Skin: no wounds    Labs  Lab Results   Component Value Date/Time    WBC 4.9 08/11/2022 02:09 PM    HGB 13.0 08/11/2022 02:09 PM    HCT 39.3 08/11/2022 02:09 PM    .1 08/11/2022 02:09 PM     08/11/2022 02:09 PM     Lab Results   Component Value Date/Time     08/11/2022 02:09 PM    K 5.0 08/11/2022 02:09 PM    K 6.4 06/23/2022 06:27 PM    CL 97 08/11/2022 02:09 PM    CO2 26 08/11/2022 02:09 PM    PHOS 5.4 06/27/2022 11:17 AM BUN 13 2022 02:09 PM    CREATININE 1.0 2022 02:09 PM      No results found for: GLU    Imaging: arterial duplex  Suggestive of popliteal artery stenosis    Assessment:   PAD      Plan:  Patient is a 71 y.o. male presenting with PAD concerns. His duplex suggests popliteal stenosis. There was concern on duplex about the right leg per the technologist and patient wishes to evaluate right leg. I have reviewed the history and physical and examined the patient and find no relevant changes. I have reviewed with the patient and/or family the risks, benefits, and alternatives to the procedure. I HAVE PRESENTED REASONABLE ALTERNATIVES TO THE PATIENT'S PROPOSED CARE, TREATMENT, AND SERVICES. THE DISCUSSION I HAVE DONE ENCOMPASSED RISKS, BENEFITS, AND SIDE EFFECTS RELATED TO THE ALTERNATIVES AND THE RISKS RELATED TO NOT RECEIVING THE PROPOSED CARE, TREATMENT, SERVICES. Pre-sedation Assessment    Patient:  Gustavo Dawkins   :   1950  Intended Procedure: bilateral lower extremity angiogram    There were no vitals filed for this visit. Nursing notes reviewed and agreed. Medications reviewed  Allergies: No Known Allergies      Pre-Procedure Assessment/Plan:  ASA 2 - Patient with mild systemic disease with no functional limitations    Mallampati Airway Assessment:  Mallampati Class II - (soft palate, fauces & uvula are visible)    Level of Sedation Plan: Moderate sedation    Post Procedure plan: Return to same level of care    Jani Ross DO, FSVS, [facility]  2022  7:56 AM

## 2022-08-22 NOTE — TELEPHONE ENCOUNTER
He needs to double check with vascular.   They started Plavix on him  I would think is okay although he does need to double check with vascular

## 2022-09-01 ENCOUNTER — PROCEDURE VISIT (OUTPATIENT)
Dept: SURGERY | Age: 72
End: 2022-09-01
Payer: MEDICARE

## 2022-09-01 DIAGNOSIS — I73.9 PERIPHERAL ARTERIAL DISEASE (HCC): ICD-10-CM

## 2022-09-01 PROCEDURE — 93926 LOWER EXTREMITY STUDY: CPT | Performed by: STUDENT IN AN ORGANIZED HEALTH CARE EDUCATION/TRAINING PROGRAM

## 2022-09-12 DIAGNOSIS — K21.9 GASTROESOPHAGEAL REFLUX DISEASE WITHOUT ESOPHAGITIS: ICD-10-CM

## 2022-09-12 RX ORDER — PANTOPRAZOLE SODIUM 40 MG/1
TABLET, DELAYED RELEASE ORAL
Qty: 90 TABLET | Refills: 1 | OUTPATIENT
Start: 2022-09-12

## 2022-10-10 RX ORDER — ATORVASTATIN CALCIUM 80 MG/1
TABLET, FILM COATED ORAL
Qty: 30 TABLET | Refills: 0 | Status: SHIPPED | OUTPATIENT
Start: 2022-10-10

## 2022-10-10 NOTE — TELEPHONE ENCOUNTER
Last appointment: 12/18/2021  Next appointment: Visit date not found  Last refill: 04/15/2022 # 90 x 1 refill

## 2022-11-09 RX ORDER — ATORVASTATIN CALCIUM 80 MG/1
TABLET, FILM COATED ORAL
Qty: 30 TABLET | Refills: 0 | OUTPATIENT
Start: 2022-11-09

## 2022-11-09 NOTE — TELEPHONE ENCOUNTER
Last appointment: 12/18/2021  Next appointment: Visit date not found  Last refill: 10/10/2022    I have reached out to the patient a second time to get him scheduled but had to leave a message

## 2022-12-12 ENCOUNTER — TELEPHONE (OUTPATIENT)
Dept: CASE MANAGEMENT | Age: 72
End: 2022-12-12

## 2022-12-12 NOTE — TELEPHONE ENCOUNTER
Patient due for annual CT Lung Screening. Reminder letter mailed.       Wei BATESN, RN   Lung Nodule Navigator  [facility]  [phone]

## 2022-12-30 ENCOUNTER — PROCEDURE VISIT (OUTPATIENT)
Dept: SURGERY | Age: 72
End: 2022-12-30
Payer: MEDICARE

## 2022-12-30 DIAGNOSIS — K55.1 ATHEROSCLEROSIS OF SUPERIOR MESENTERIC ARTERY (HCC): Primary | ICD-10-CM

## 2022-12-30 DIAGNOSIS — K55.1 MESENTERIC ARTERY STENOSIS (HCC): ICD-10-CM

## 2022-12-30 DIAGNOSIS — I65.23 CAROTID STENOSIS, ASYMPTOMATIC, BILATERAL: ICD-10-CM

## 2022-12-30 PROCEDURE — 93978 VASCULAR STUDY: CPT | Performed by: STUDENT IN AN ORGANIZED HEALTH CARE EDUCATION/TRAINING PROGRAM

## 2022-12-30 PROCEDURE — 93880 EXTRACRANIAL BILAT STUDY: CPT | Performed by: STUDENT IN AN ORGANIZED HEALTH CARE EDUCATION/TRAINING PROGRAM

## 2023-01-04 RX ORDER — CLOPIDOGREL BISULFATE 75 MG/1
TABLET ORAL
Qty: 90 TABLET | Refills: 3 | OUTPATIENT
Start: 2023-01-04

## 2023-01-04 NOTE — TELEPHONE ENCOUNTER
Last appointment: 12/18/2021  Next appointment: Visit date not found    Spoke with patient. He switched pcp.  Now under the care of Dr. Asotn Rivas

## 2023-01-12 ENCOUNTER — HOSPITAL ENCOUNTER (OUTPATIENT)
Dept: CT IMAGING | Age: 73
Discharge: HOME | End: 2023-01-12
Payer: MEDICARE

## 2023-01-12 DIAGNOSIS — F17.201 TOBACCO ABUSE, IN REMISSION: ICD-10-CM

## 2023-01-12 PROCEDURE — 71271 CT THORAX LUNG CANCER SCR C-: CPT

## 2023-01-16 ENCOUNTER — TELEPHONE (OUTPATIENT)
Dept: CASE MANAGEMENT | Age: 73
End: 2023-01-16

## 2023-01-16 NOTE — TELEPHONE ENCOUNTER
Annual lung screen on 1/12/23. LRAD1. Recommended screen in one year. Report routed to PCP via [email or web address]. Results letter mailed to pt.       Luz CUNHA, RN   Lung Nodule Navigator  [facility]  [phone]

## 2023-01-26 ASSESSMENT — ENCOUNTER SYMPTOMS
BLOOD IN STOOL: 0
COLOR CHANGE: 0
COUGH: 0
FACIAL SWELLING: 0
ABDOMINAL PAIN: 0
SHORTNESS OF BREATH: 0
BACK PAIN: 0
VOMITING: 0
ABDOMINAL DISTENTION: 0
EYE DISCHARGE: 0
CHEST TIGHTNESS: 0
WHEEZING: 0

## 2023-01-26 NOTE — PROGRESS NOTES
[facility]     Outpatient Cardiology         Chief Complaint   Patient presents with    Follow-up     1 Year/ LVM    Aortic stenosis/hypertension/CAD    HPI     Charles Medina a 68 y.o. male here follow up for CAD. Hx of CAD seen coronary calcifications on CT, HLD, HTN, PVD, CVA, COPD, CKD, balloon angioplasty to right femoral artery. Coronary calcification seen on CT, abnormal stress test, no chest pain. Continue risk factor modification with aspirin and Lipitor. Hypertension, well-controlled current medications. NSVT, normal echo normal monitor. Aortic stenosis, remains asymptomatic although murmur appears louder. Discussed getting an echo.     PMH  Past Medical History:   Diagnosis Date    Abdominal aortic atherosclerosis (HCC)     Allergic rhinitis     Atherosclerosis of superior mesenteric artery (HCC)     CAD (coronary artery disease)     Cerebral artery occlusion with cerebral infarction ([address]     Chronic kidney disease     Chronic pansinusitis 08/06/2021    Clostridium difficile colitis 06/2020    Colon polyps     COPD (chronic obstructive pulmonary disease) ([address]     COVID-19 virus infection 11/15/2021    CVA (cerebral infarction) 05/2013    Multiple, occipital and Cerebellar     DDD (degenerative disc disease)     Cerivical DDD    Dizziness     Epicondylitis elbow, medial     GERD (gastroesophageal reflux disease)     Headache     Hyperlipidemia     Hypertension     Ischemic colitis ([address] 11/2016    Lumbar foraminal stenosis 07/20/2020    Lung disease     Osteoarthritis, hand     PVD (peripheral vascular disease) with claudication (HCC)     Left leg with mod-severe arterial ischemia    Renal artery atherosclerosis (HCC)     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 Zehra Varghese Left 2016    left femoral-popliteal bypass graft. ARTERIAL BYPASS SURGRY  2016    Dr. Handy Wiseman - aorto-SMA bypass using 8mm PTFE    ATHERECTOMY Right 2019    Atherectomy with angioplasty right SFA and popliteal arteries, atherectomy and angioplasty right tibial peroneal trunk    COLONOSCOPY  2008    COLONOSCOPY  2015    Dr. Stevens Median  2018    Dr. Suha Melendez Bilateral 2013    bilateral with mesh, Dr. Yohana Garcia  10/23/2020    L4-5 Decompression with anterior/posterior fusion, Dr. Baca Flank    SEPTOPLASTY Bilateral 2021    BILATERAL FRONTO-ETHMOIDECTOMY, BILATERAL MAXILLARY ANTROSTOMY WITH TISSUE REMOVAL AND SEPTOPLASTY NASAL SCOPE performed by Gracia Arana MD at [address] [place] [address]  2015     [address] N/A 2018    EGD BIOPSY performed by Ramírez Matias MD at [facility]  2003    R Leg Vein stripping        Social HIstory  Social History     Tobacco Use    Smoking status: Former     Packs/day: 0.50     Years: 47.00     Pack years: 23.50     Types: Cigarettes     Start date: 1970     Quit date: 2016     Years since quittin.6    Smokeless tobacco: Never    Tobacco comments:     Maintain cessation   Substance Use Topics    Alcohol use:  Yes     Alcohol/week: 0.0 standard drinks     Comment: Non-alcoholic beer    Drug use: No       Family History  Family History   Problem Relation Age of Onset    Heart Attack Father     Heart Disease Father         MI    Cancer Brother         Brain CA    Hypertension Brother     Cancer Brother         Throat cancer       Allergies   No Known Allergies    Medications:     Home Medications:  Were reviewed and are listed in nursing record. and/or listed below    Prior to Admission medications    Medication Sig Start Date End Date Taking? Authorizing Provider   atorvastatin (LIPITOR) 80 MG tablet TAKE 1 TABLET BY MOUTH EVERY DAY 10/10/22  Yes Pepito Leal DO   NIFEdipine (ADALAT CC) 30 MG extended release tablet Take 30 mg by mouth daily   Yes Historical Provider, MD   Multiple Vitamin (MULTIVITAMIN) TABS tablet Take 1 tablet by mouth daily 6/25/22  Yes Mendel Mimes, MD   diclofenac sodium (VOLTAREN) 1 % GEL Apply 2 g topically 4 times daily as needed for Pain 6/24/22  Yes Mendel Mimes, MD   pantoprazole (PROTONIX) 40 MG tablet TAKE 1 TABLET BY MOUTH EVERY DAY BEFORE BREAKFAST 3/2/22  Yes Pepito Leal DO   clopidogrel (PLAVIX) 75 MG tablet TAKE 1 TABLET BY MOUTH EVERY DAY 1/28/22  Yes Pepito Leal DO   carvedilol (COREG) 25 MG tablet TAKE 1 TABLET BY MOUTH TWICE A DAY 1/12/22  Yes Pepito Leal DO   ferrous sulfate (FEROSUL) 325 (65 Fe) MG tablet Take 1 tablet by mouth daily (with breakfast) 7/12/19  Yes Pepito Leal DO   acetaminophen (TYLENOL) 325 MG tablet Take 2 tablets by mouth every 6 hours as needed for Pain 9/15/18  Yes Pamela Thorne MD   aspirin 81 MG tablet Take 1 tablet by mouth daily 9/22/18  Yes Pamela Thorne MD   Probiotic Product (ALIGN PO) Take 1 tablet by mouth daily    Yes Historical Provider, MD   Cholecalciferol (VITAMIN D) 2000 units CAPS capsule Take 1 capsule by mouth daily   Yes Historical Provider, MD   fluticasone [facility]) 50 MCG/ACT nasal spray 1 spray by Nasal route daily 9/16/21 12/18/21  Obdulia Chatman MD        Review of Systems   Constitutional:  Negative for activity change, appetite change, diaphoresis, fatigue, fever and unexpected weight change. HENT:  Negative for congestion, facial swelling, mouth sores and nosebleeds. Eyes:  Negative for discharge and visual disturbance. Respiratory:  Negative for cough, chest tightness, shortness of breath and wheezing. Cardiovascular:  Negative for chest pain, palpitations and leg swelling. Gastrointestinal:  Negative for abdominal distention, abdominal pain, blood in stool and vomiting. Endocrine: Negative for cold intolerance, heat intolerance and polyuria. Genitourinary:  Negative for difficulty urinating, dysuria, frequency and hematuria. Musculoskeletal:  Negative for back pain, joint swelling, myalgias and neck pain. Skin:  Negative for color change, pallor and rash. Allergic/Immunologic: Negative for immunocompromised state. Neurological:  Negative for dizziness, syncope, weakness, light-headedness, numbness and headaches. Hematological:  Negative for adenopathy. Does not bruise/bleed easily. Psychiatric/Behavioral:  Negative for behavioral problems, confusion, decreased concentration and suicidal ideas. The patient is not nervous/anxious. Vitals:    01/30/23 1335   BP: 120/60   Pulse: 69    Weight: 142 lb (64.4 kg)       Vitals:    01/30/23 1335   BP: 120/60   Pulse: 69   Weight: 142 lb (64.4 kg)       BP Readings from Last 3 Encounters:   01/30/23 120/60   06/27/22 96/60   02/17/22 121/67       Wt Readings from Last 3 Encounters:   01/30/23 142 lb (64.4 kg)   06/27/22 134 lb 11.2 oz (61.1 kg)   02/17/22 137 lb (62.1 kg)       Physical Exam  Constitutional:       General: He is not in acute distress. Appearance: He is well-developed. He is not diaphoretic. HENT:      Head: Normocephalic and atraumatic. Eyes:      Pupils: Pupils are equal, round, and reactive to light. Neck:      Thyroid: No thyromegaly. Vascular: No JVD. Cardiovascular:      Rate and Rhythm: Normal rate and regular rhythm. Chest Wall: PMI is not displaced. Heart sounds: Normal heart sounds, S1 normal and S2 normal. No murmur heard. No friction rub. No gallop.    Pulmonary:      Effort: Pulmonary effort is normal. No respiratory distress.      Breath sounds: Normal breath sounds. No stridor. No wheezing or rales.   Chest:      Chest wall: No tenderness.   Abdominal:      General: Bowel sounds are normal. There is no distension.      Palpations: Abdomen is soft.      Tenderness: There is no abdominal tenderness. There is no guarding or rebound.   Musculoskeletal:         General: No tenderness. Normal range of motion.      Cervical back: Normal range of motion.   Lymphadenopathy:      Cervical: No cervical adenopathy.   Skin:     General: Skin is warm and dry.      Findings: No erythema or rash.   Neurological:      Mental Status: He is alert and oriented to person, place, and time.      Coordination: Coordination normal.   Psychiatric:         Behavior: Behavior normal.         Thought Content: Thought content normal.         Judgment: Judgment normal.       Labs:       Lab Results   Component Value Date    WBC 4.9 08/11/2022    HGB 13.0 (L) 08/11/2022    HCT 39.3 (L) 08/11/2022    .1 (H) 08/11/2022     08/11/2022     Lab Results   Component Value Date     08/11/2022    K 5.0 08/11/2022    CL 97 (L) 08/11/2022    CO2 26 08/11/2022    BUN 13 08/11/2022    CREATININE 1.0 08/11/2022    GLUCOSE 99 08/11/2022    CALCIUM 9.6 08/11/2022    PROT 7.0 06/24/2022    LABALBU 4.1 06/27/2022    BILITOT 0.6 06/24/2022    ALKPHOS 68 06/24/2022    AST 22 06/24/2022    ALT 18 06/24/2022    LABGLOM >60 08/11/2022    GFRAA >60 08/11/2022    AGRATIO 1.7 01/17/2022    GLOB 3.1 06/11/2021         Lab Results   Component Value Date    CHOL 139 01/17/2022    CHOL 110 03/17/2021    CHOL 129 03/16/2020     Lab Results   Component Value Date    TRIG 87 01/17/2022    TRIG 75 03/17/2021    TRIG 91 03/16/2020     Lab Results   Component Value Date    HDL 73 (H) 01/17/2022    HDL 55 03/17/2021    HDL 63 (H) 03/16/2020     Lab Results   Component Value Date    LDLCALC 49 01/17/2022    LDLCALC 40 03/17/2021    LDLCALC 48 03/16/2020     Lab  Results   Component Value Date    LABVLDL 17 01/17/2022    LABVLDL 15 03/17/2021    LABVLDL 18 03/16/2020     No results found for: [facility]    Lab Results   Component Value Date    INR 1.06 06/24/2022    INR 0.99 09/14/2018    INR 0.93 12/23/2017    PROTIME 13.7 06/24/2022    PROTIME 11.3 09/14/2018    PROTIME 10.5 12/23/2017       The 10-year ASCVD risk score (Ranjan VELASQUEZ, et al., 2019) is: 15.9%    Values used to calculate the score:      Age: 68 years      Sex: Male      Is Non- : No      Diabetic: No      Tobacco smoker: No      Systolic Blood Pressure: 460 mmHg      Is BP treated: Yes      HDL Cholesterol: 73 mg/dL      Total Cholesterol: 139 mg/dL      Imaging:       Last ECG (if available):  Normal sinus rhythm, no ischemic changes    Last Monitor/Holter: 1/8/20  NSVT     Last Stress (if available): 6/29/20  Conclusions        Summary    There is normal isotope uptake at stress and rest. There is no evidence of    myocardial ischemia or scar. The LVEF is greater than 70% with normal LV wall motion. This is a low risk cardiac scan. Last Cath (if available):    Last TTE/GEMINI(if available): 8/28/20  Summary   Normal left ventricle size. There is mild concentric left ventricular   hypertrophy. Overall left ventricular systolic function appears normal with   an ejection fraction of 55-60%. No regional wall motion abnormalities are   noted. Diastolic filling parameters suggests normal diastolic function. Mild aortic stenosis with a peak velocity of 2.65m/s and a mean pressure   gradient of 13mmHg. Fixed non coronary cusp. Mild tricuspid regurgitation. Estimated pulmonary artery systolic pressure is 30 mmHg assuming a right   atrial pressure of 3 mmHg.     Last CMR  (if available):      Assessment / Plan:     Coronary artery calcification seen on CAT scan  Normal stress test.  Continue reflector modification with Lipitor and aspirin    Aortic valve stenosis  Murmur appears louder  Plan for repeat echo    Essential hypertension  Well-controlled on carvedilol and nifedipine    Follow up in 12 months. I had the opportunity to review the clinical symptoms and presentation of Coreen Eric. Patient's allergies and medications were reviewed and updated. Patient's past medical, surgical, social and family history were reviewed and updated. Patient's testing including laboratory, ECGs, monitor, imaging (TTE,GEMINI,CMR,cath) were reviewed. Tobacco use was discussed with the patient and educated on the negative effects. I have asked the patient to not utilize these agents. All questions and concerns were addressed to the patient/family. Alternatives to my treatment were discussed. The note was completed using EMR. Every effort wasmade to ensure accuracy; however, inadvertent computerized transcription errors may be present. Thank you for allowing me to participate in thecare or [address].  Kenneth Blanco MD, [facility], [facility]

## 2023-01-30 ENCOUNTER — OFFICE VISIT (OUTPATIENT)
Dept: CARDIOLOGY CLINIC | Age: 73
End: 2023-01-30
Payer: MEDICARE

## 2023-01-30 VITALS
HEART RATE: 69 BPM | DIASTOLIC BLOOD PRESSURE: 60 MMHG | WEIGHT: 142 LBS | SYSTOLIC BLOOD PRESSURE: 120 MMHG | BODY MASS INDEX: 22.92 KG/M2

## 2023-01-30 DIAGNOSIS — I35.0 AORTIC VALVE STENOSIS, ETIOLOGY OF CARDIAC VALVE DISEASE UNSPECIFIED: Primary | ICD-10-CM

## 2023-01-30 DIAGNOSIS — I10 ESSENTIAL HYPERTENSION: ICD-10-CM

## 2023-01-30 DIAGNOSIS — I70.1 RENAL ARTERY ATHEROSCLEROSIS (HCC): ICD-10-CM

## 2023-01-30 DIAGNOSIS — R01.1 MURMUR: ICD-10-CM

## 2023-01-30 DIAGNOSIS — R07.9 CHEST PAIN, UNSPECIFIED TYPE: ICD-10-CM

## 2023-01-30 PROCEDURE — G8427 DOCREV CUR MEDS BY ELIG CLIN: HCPCS | Performed by: INTERNAL MEDICINE

## 2023-01-30 PROCEDURE — 1036F TOBACCO NON-USER: CPT | Performed by: INTERNAL MEDICINE

## 2023-01-30 PROCEDURE — 99214 OFFICE O/P EST MOD 30 MIN: CPT | Performed by: INTERNAL MEDICINE

## 2023-01-30 PROCEDURE — 93000 ELECTROCARDIOGRAM COMPLETE: CPT | Performed by: INTERNAL MEDICINE

## 2023-01-30 PROCEDURE — 3017F COLORECTAL CA SCREEN DOC REV: CPT | Performed by: INTERNAL MEDICINE

## 2023-01-30 PROCEDURE — 3074F SYST BP LT 130 MM HG: CPT | Performed by: INTERNAL MEDICINE

## 2023-01-30 PROCEDURE — 3078F DIAST BP <80 MM HG: CPT | Performed by: INTERNAL MEDICINE

## 2023-01-30 PROCEDURE — G8484 FLU IMMUNIZE NO ADMIN: HCPCS | Performed by: INTERNAL MEDICINE

## 2023-01-30 PROCEDURE — G8420 CALC BMI NORM PARAMETERS: HCPCS | Performed by: INTERNAL MEDICINE

## 2023-01-30 PROCEDURE — 1123F ACP DISCUSS/DSCN MKR DOCD: CPT | Performed by: INTERNAL MEDICINE

## 2023-02-16 ENCOUNTER — HOSPITAL ENCOUNTER (OUTPATIENT)
Dept: NON INVASIVE DIAGNOSTICS | Age: 73
Discharge: HOME | End: 2023-02-16
Payer: MEDICARE

## 2023-02-16 DIAGNOSIS — R01.1 MURMUR: ICD-10-CM

## 2023-02-16 DIAGNOSIS — I35.0 AORTIC VALVE STENOSIS, ETIOLOGY OF CARDIAC VALVE DISEASE UNSPECIFIED: ICD-10-CM

## 2023-02-16 LAB
LV EF: 63 %
LVEF MODALITY: NORMAL

## 2023-02-16 PROCEDURE — 93306 TTE W/DOPPLER COMPLETE: CPT

## 2023-02-28 RX ORDER — CLOPIDOGREL BISULFATE 75 MG/1
TABLET ORAL
Qty: 90 TABLET | Refills: 3 | OUTPATIENT
Start: 2023-02-28

## 2023-03-08 ENCOUNTER — TELEPHONE (OUTPATIENT)
Dept: CARDIOLOGY CLINIC | Age: 73
End: 2023-03-08

## 2023-03-08 NOTE — TELEPHONE ENCOUNTER
Patient passed out at the [employer] game. He was transported to [facility] and kept overnight. They ran test such as EKG, etc. They want to give him a Holter monitor to wear. He's inquiring if that's something Dr. Jimy Yadav would recommend.  Call back [phone]

## 2023-03-09 NOTE — TELEPHONE ENCOUNTER
Spoke with patient,  is sending him a 30 day monitor. Called Gladys and HALEY. Will have results faxed to our office. Follow up appointment made.

## 2023-04-26 ASSESSMENT — ENCOUNTER SYMPTOMS
CHEST TIGHTNESS: 0
FACIAL SWELLING: 0
COUGH: 0
BLOOD IN STOOL: 0
COLOR CHANGE: 0
ABDOMINAL PAIN: 0
ABDOMINAL DISTENTION: 0
EYE DISCHARGE: 0
BACK PAIN: 0
VOMITING: 0
SHORTNESS OF BREATH: 0
WHEEZING: 0

## 2023-04-26 NOTE — PROGRESS NOTES
Value Date    LDLCALC 49 01/17/2022    LDLCALC 40 03/17/2021    LDLCALC 48 03/16/2020     Lab Results   Component Value Date    LABVLDL 17 01/17/2022    LABVLDL 15 03/17/2021    LABVLDL 18 03/16/2020     No results found for: [facility]    Lab Results   Component Value Date    INR 1.06 06/24/2022    INR 0.99 09/14/2018    INR 0.93 12/23/2017    PROTIME 13.7 06/24/2022    PROTIME 11.3 09/14/2018    PROTIME 10.5 12/23/2017       The 10-year ASCVD risk score (Ranjan VELASQUEZ, et al., 2019) is: 9.8%    Values used to calculate the score:      Age: 68 years      Sex: Male      Is Non- : No      Diabetic: No      Tobacco smoker: No      Systolic Blood Pressure: 90 mmHg      Is BP treated: Yes      HDL Cholesterol: 73 mg/dL      Total Cholesterol: 139 mg/dL      Imaging:       Last ECG (if available):  Normal sinus rhythm, no ischemic changes    Last Monitor/Holter:    From: 03/18/23 To: 04/16/23     Quality of Tracing:  good     Basic Rhythm:  sinus     Events/Symptoms:  Pt reported no symptoms     CONCLUSION:   SINUS RHYTHM   SINUS TACHYCARDIA   ISOLATED PAC'S AND PVC'S   NO SYMPTOMS REPORTED     1/8/20  NSVT     Last Stress (if available): 6/29/20  Conclusions        Summary    There is normal isotope uptake at stress and rest. There is no evidence of    myocardial ischemia or scar. The LVEF is greater than 70% with normal LV wall motion. This is a low risk cardiac scan. Last Cath (if available):    Last TTE/GEMINI(if available): 1/30/23  Summary   Normal left ventricle size and systolic function with an estimated ejection   fraction of 60%-65%. Mild concentric left ventricular hypertrophy. No regional wall motion abnormalities are seen. Normal diastolic function. Mild mitral regurgitation. Mild aortic stenosis with a peak velocity of 2.50m/s and a mean pressure   gradient of 14mmHg. Trivial tricuspid regurgitation. Trivial pulmonic regurgitation present.    Estimated

## 2023-04-27 ENCOUNTER — OFFICE VISIT (OUTPATIENT)
Dept: CARDIOLOGY CLINIC | Age: 73
End: 2023-04-27
Payer: MEDICARE

## 2023-04-27 VITALS
SYSTOLIC BLOOD PRESSURE: 90 MMHG | BODY MASS INDEX: 23.05 KG/M2 | OXYGEN SATURATION: 95 % | DIASTOLIC BLOOD PRESSURE: 60 MMHG | WEIGHT: 142.8 LBS | HEART RATE: 73 BPM

## 2023-04-27 DIAGNOSIS — I10 ESSENTIAL HYPERTENSION: ICD-10-CM

## 2023-04-27 DIAGNOSIS — E78.2 MIXED HYPERLIPIDEMIA: ICD-10-CM

## 2023-04-27 DIAGNOSIS — I47.29 NSVT (NONSUSTAINED VENTRICULAR TACHYCARDIA) (HCC): ICD-10-CM

## 2023-04-27 DIAGNOSIS — I25.10 CORONARY ARTERY CALCIFICATION SEEN ON CAT SCAN: ICD-10-CM

## 2023-04-27 DIAGNOSIS — I35.0 AORTIC VALVE STENOSIS, ETIOLOGY OF CARDIAC VALVE DISEASE UNSPECIFIED: ICD-10-CM

## 2023-04-27 PROCEDURE — G8420 CALC BMI NORM PARAMETERS: HCPCS | Performed by: INTERNAL MEDICINE

## 2023-04-27 PROCEDURE — 1036F TOBACCO NON-USER: CPT | Performed by: INTERNAL MEDICINE

## 2023-04-27 PROCEDURE — 3078F DIAST BP <80 MM HG: CPT | Performed by: INTERNAL MEDICINE

## 2023-04-27 PROCEDURE — 3017F COLORECTAL CA SCREEN DOC REV: CPT | Performed by: INTERNAL MEDICINE

## 2023-04-27 PROCEDURE — G8427 DOCREV CUR MEDS BY ELIG CLIN: HCPCS | Performed by: INTERNAL MEDICINE

## 2023-04-27 PROCEDURE — 3074F SYST BP LT 130 MM HG: CPT | Performed by: INTERNAL MEDICINE

## 2023-04-27 PROCEDURE — 99214 OFFICE O/P EST MOD 30 MIN: CPT | Performed by: INTERNAL MEDICINE

## 2023-04-27 PROCEDURE — 1123F ACP DISCUSS/DSCN MKR DOCD: CPT | Performed by: INTERNAL MEDICINE

## 2023-04-27 RX ORDER — CARVEDILOL 12.5 MG/1
12.5 TABLET ORAL 2 TIMES DAILY
Qty: 60 TABLET | Refills: 5 | Status: SHIPPED | OUTPATIENT
Start: 2023-04-27

## 2023-07-13 ENCOUNTER — PROCEDURE VISIT (OUTPATIENT)
Dept: SURGERY | Age: 73
End: 2023-07-13
Payer: MEDICARE

## 2023-07-13 ENCOUNTER — OFFICE VISIT (OUTPATIENT)
Dept: VASCULAR SURGERY | Age: 73
End: 2023-07-13
Payer: MEDICARE

## 2023-07-13 VITALS
HEIGHT: 66 IN | SYSTOLIC BLOOD PRESSURE: 158 MMHG | DIASTOLIC BLOOD PRESSURE: 80 MMHG | WEIGHT: 140 LBS | BODY MASS INDEX: 22.5 KG/M2

## 2023-07-13 DIAGNOSIS — K55.1 ATHEROSCLEROSIS OF SUPERIOR MESENTERIC ARTERY (HCC): ICD-10-CM

## 2023-07-13 DIAGNOSIS — K55.1 ATHEROSCLEROSIS OF SUPERIOR MESENTERIC ARTERY (HCC): Primary | ICD-10-CM

## 2023-07-13 DIAGNOSIS — I73.9 PERIPHERAL ARTERIAL DISEASE (HCC): ICD-10-CM

## 2023-07-13 DIAGNOSIS — I65.23 CAROTID STENOSIS, ASYMPTOMATIC, BILATERAL: Primary | ICD-10-CM

## 2023-07-13 DIAGNOSIS — E78.2 MIXED HYPERLIPIDEMIA: ICD-10-CM

## 2023-07-13 DIAGNOSIS — I65.23 CAROTID STENOSIS, ASYMPTOMATIC, BILATERAL: ICD-10-CM

## 2023-07-13 DIAGNOSIS — I73.9 PAD (PERIPHERAL ARTERY DISEASE) (HCC): ICD-10-CM

## 2023-07-13 PROCEDURE — 93880 EXTRACRANIAL BILAT STUDY: CPT | Performed by: STUDENT IN AN ORGANIZED HEALTH CARE EDUCATION/TRAINING PROGRAM

## 2023-07-13 PROCEDURE — 1123F ACP DISCUSS/DSCN MKR DOCD: CPT | Performed by: NURSE PRACTITIONER

## 2023-07-13 PROCEDURE — 3074F SYST BP LT 130 MM HG: CPT | Performed by: NURSE PRACTITIONER

## 2023-07-13 PROCEDURE — 3078F DIAST BP <80 MM HG: CPT | Performed by: NURSE PRACTITIONER

## 2023-07-13 PROCEDURE — 93925 LOWER EXTREMITY STUDY: CPT | Performed by: STUDENT IN AN ORGANIZED HEALTH CARE EDUCATION/TRAINING PROGRAM

## 2023-07-13 PROCEDURE — 3017F COLORECTAL CA SCREEN DOC REV: CPT | Performed by: NURSE PRACTITIONER

## 2023-07-13 PROCEDURE — 93978 VASCULAR STUDY: CPT | Performed by: STUDENT IN AN ORGANIZED HEALTH CARE EDUCATION/TRAINING PROGRAM

## 2023-07-13 PROCEDURE — G8420 CALC BMI NORM PARAMETERS: HCPCS | Performed by: NURSE PRACTITIONER

## 2023-07-13 PROCEDURE — G8427 DOCREV CUR MEDS BY ELIG CLIN: HCPCS | Performed by: NURSE PRACTITIONER

## 2023-07-13 PROCEDURE — 99214 OFFICE O/P EST MOD 30 MIN: CPT | Performed by: NURSE PRACTITIONER

## 2023-07-13 PROCEDURE — 1036F TOBACCO NON-USER: CPT | Performed by: NURSE PRACTITIONER

## 2023-07-17 ASSESSMENT — ENCOUNTER SYMPTOMS
COLOR CHANGE: 0
ABDOMINAL PAIN: 0
BACK PAIN: 1

## 2023-07-17 NOTE — PROGRESS NOTES
(VITAMIN D) 2000 units CAPS capsule Take 1 capsule by mouth daily Yes Historical Provider, MD       History reviewed. Physical Exam  Constitutional:       General: He is not in acute distress. Appearance: Normal appearance. HENT:      Head: Normocephalic. Right Ear: Hearing normal.      Left Ear: Hearing normal.   Eyes:      General: Lids are normal.      Conjunctiva/sclera: Conjunctivae normal.   Neck:      Trachea: Trachea normal. No tracheal deviation. Cardiovascular:      Rate and Rhythm: Normal rate and regular rhythm. Heart sounds: Normal heart sounds. Comments:   ALISTAIR'S TODAY:  Right:  P.T.: 220 D. P.: 220 ARM BP: 158         P. I.: 1.39+/1.39+  Left:  P.T.: 220 D. P.: 220 ARM BP: ---         P. I.: 1.39+/1.39+    Doppler 7/13/2023  Right DP monophasic  Right PT monophasic    Left DP monophasic  Left PT monophasic    Pulmonary:      Effort: Pulmonary effort is normal.      Breath sounds: Normal breath sounds. Musculoskeletal:         General: Normal range of motion. Cervical back: Normal range of motion and neck supple. Skin:     General: Skin is warm and dry. Neurological:      Mental Status: He is alert and oriented to person, place, and time. Psychiatric:         Judgment: Judgment normal.       ASSESSMENT/PLAN:     Diagnosis   1. Carotid stenosis, asymptomatic, bilateral   The patient has 50-80% RAJENDAR stenosis by velocity criteria; RAJENDRA 62% by image. The patient has 36-67% LICA stenosis by velocity criteria. The patient has not experienced any new symptoms related to his carotid disease. There has been an increase in right ICA velocity compared to previous study of 12/30/22. Follow up in 6 months with a carotid doppler scan and office visit. 2. PAD (peripheral artery disease) ([address]) - see scan results below   3. 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   No significant change from

## 2023-08-11 ENCOUNTER — TELEPHONE (OUTPATIENT)
Dept: ENT CLINIC | Age: 73
End: 2023-08-11

## 2023-08-14 ENCOUNTER — HOSPITAL ENCOUNTER (OUTPATIENT)
Dept: GENERAL RADIOLOGY | Age: 73
Discharge: HOME | End: 2023-08-14
Payer: MEDICARE

## 2023-08-14 ENCOUNTER — HOSPITAL ENCOUNTER (OUTPATIENT)
Age: 73
Discharge: HOME | End: 2023-08-14
Payer: MEDICARE

## 2023-08-14 PROCEDURE — 74018 RADEX ABDOMEN 1 VIEW: CPT

## 2023-08-20 ENCOUNTER — HOSPITAL ENCOUNTER (EMERGENCY)
Age: 73
Discharge: HOME | End: 2023-08-20
Attending: STUDENT IN AN ORGANIZED HEALTH CARE EDUCATION/TRAINING PROGRAM
Payer: MEDICARE

## 2023-08-20 VITALS
RESPIRATION RATE: 18 BRPM | HEIGHT: 66 IN | BODY MASS INDEX: 22.71 KG/M2 | SYSTOLIC BLOOD PRESSURE: 181 MMHG | TEMPERATURE: 97.3 F | WEIGHT: 141.3 LBS | DIASTOLIC BLOOD PRESSURE: 82 MMHG | OXYGEN SATURATION: 96 % | HEART RATE: 62 BPM

## 2023-08-20 DIAGNOSIS — R51.9 ACUTE NONINTRACTABLE HEADACHE, UNSPECIFIED HEADACHE TYPE: Primary | ICD-10-CM

## 2023-08-20 LAB
CHP ED QC CHECK: YES
GLUCOSE BLD-MCNC: 78 MG/DL
GLUCOSE BLD-MCNC: 78 MG/DL (ref 70–99)
PERFORMED ON: NORMAL

## 2023-08-20 PROCEDURE — 6360000002 HC RX W HCPCS: Performed by: STUDENT IN AN ORGANIZED HEALTH CARE EDUCATION/TRAINING PROGRAM

## 2023-08-20 PROCEDURE — 99284 EMERGENCY DEPT VISIT MOD MDM: CPT

## 2023-08-20 PROCEDURE — 2580000003 HC RX 258: Performed by: STUDENT IN AN ORGANIZED HEALTH CARE EDUCATION/TRAINING PROGRAM

## 2023-08-20 PROCEDURE — 6370000000 HC RX 637 (ALT 250 FOR IP): Performed by: STUDENT IN AN ORGANIZED HEALTH CARE EDUCATION/TRAINING PROGRAM

## 2023-08-20 PROCEDURE — 96374 THER/PROPH/DIAG INJ IV PUSH: CPT

## 2023-08-20 PROCEDURE — 96375 TX/PRO/DX INJ NEW DRUG ADDON: CPT

## 2023-08-20 PROCEDURE — 96361 HYDRATE IV INFUSION ADD-ON: CPT

## 2023-08-20 RX ORDER — PROCHLORPERAZINE EDISYLATE 5 MG/ML
10 INJECTION INTRAMUSCULAR; INTRAVENOUS ONCE
Status: COMPLETED | OUTPATIENT
Start: 2023-08-20 | End: 2023-08-20

## 2023-08-20 RX ORDER — SODIUM CHLORIDE, SODIUM LACTATE, POTASSIUM CHLORIDE, AND CALCIUM CHLORIDE .6; .31; .03; .02 G/100ML; G/100ML; G/100ML; G/100ML
1000 INJECTION, SOLUTION INTRAVENOUS ONCE
Status: COMPLETED | OUTPATIENT
Start: 2023-08-20 | End: 2023-08-20

## 2023-08-20 RX ORDER — ACETAMINOPHEN 500 MG
1000 TABLET ORAL ONCE
Status: COMPLETED | OUTPATIENT
Start: 2023-08-20 | End: 2023-08-20

## 2023-08-20 RX ORDER — DIPHENHYDRAMINE HYDROCHLORIDE 50 MG/ML
25 INJECTION INTRAMUSCULAR; INTRAVENOUS ONCE
Status: COMPLETED | OUTPATIENT
Start: 2023-08-20 | End: 2023-08-20

## 2023-08-20 RX ADMIN — SODIUM CHLORIDE, POTASSIUM CHLORIDE, SODIUM LACTATE AND CALCIUM CHLORIDE 1000 ML: 600; 310; 30; 20 INJECTION, SOLUTION INTRAVENOUS at 12:14

## 2023-08-20 RX ADMIN — ACETAMINOPHEN 1000 MG: 500 TABLET ORAL at 12:05

## 2023-08-20 RX ADMIN — DIPHENHYDRAMINE HYDROCHLORIDE 25 MG: 50 INJECTION INTRAMUSCULAR; INTRAVENOUS at 12:05

## 2023-08-20 RX ADMIN — PROCHLORPERAZINE EDISYLATE 10 MG: 5 INJECTION INTRAMUSCULAR; INTRAVENOUS at 12:05

## 2023-08-20 ASSESSMENT — LIFESTYLE VARIABLES
HOW MANY STANDARD DRINKS CONTAINING ALCOHOL DO YOU HAVE ON A TYPICAL DAY: 1 OR 2
HOW OFTEN DO YOU HAVE A DRINK CONTAINING ALCOHOL: 4 OR MORE TIMES A WEEK

## 2023-08-20 ASSESSMENT — PAIN - FUNCTIONAL ASSESSMENT
PAIN_FUNCTIONAL_ASSESSMENT: 0-10
PAIN_FUNCTIONAL_ASSESSMENT: NONE - DENIES PAIN

## 2023-08-20 ASSESSMENT — PAIN DESCRIPTION - DESCRIPTORS: DESCRIPTORS: ACHING

## 2023-08-20 ASSESSMENT — PAIN SCALES - GENERAL: PAINLEVEL_OUTOF10: 7

## 2023-08-20 ASSESSMENT — PAIN DESCRIPTION - LOCATION: LOCATION: HEAD

## 2023-08-20 NOTE — ED NOTES
Pt ambulated approx 30 feet without difficulty to the restroom and back. Pt had even and steady gait.       Elder Arboleda RN  08/20/23 [id number]

## 2023-08-20 NOTE — ED PROVIDER NOTES
tablet, R-1Normal      acetaminophen (TYLENOL) 325 MG tablet Take 2 tablets by mouth every 6 hours as needed for Pain, Disp-120 tablet, R-3Normal      aspirin 81 MG tablet Take 1 tablet by mouth daily, Disp-30 tablet, R-3Normal      Probiotic Product (ALIGN PO) Take 1 tablet by mouth daily Historical Med      Cholecalciferol (VITAMIN D) 2000 units CAPS capsule Take 1 capsule by mouth dailyHistorical Med             Allergies     He has No Known Allergies.                Chas Angel MD  08/20/23 4599 [facility] MD [place]em  08/20/23 4599 [facility] MD [place]em  08/24/23 [id number]

## 2023-08-20 NOTE — DISCHARGE INSTRUCTIONS
Pain and please keep well-hydrated and well rested over the next few days. Please follow with primary care provider. Please return to the emergency room for worsening headaches.

## 2023-08-21 ENCOUNTER — TELEPHONE (OUTPATIENT)
Dept: ENT CLINIC | Age: 73
End: 2023-08-21

## 2023-08-21 NOTE — TELEPHONE ENCOUNTER
Patient informed that he will need to get the results from the provider who ordered the test.  He also wants to schedule an appointment with Dr Rona Edmond.   eliane

## 2023-08-21 NOTE — TELEPHONE ENCOUNTER
Patient's wife Randalyn Canton called. They would like someone to call and go over his results on 8/11 Imaging results were scanned in from an MRI and today I put more imaging results in the basket for review. Please give the patient a call back with suggestions/ next steps. Thanks! Please call Mr. Angeles Shape cell: [phone].

## 2023-08-24 ENCOUNTER — OFFICE VISIT (OUTPATIENT)
Dept: ENT CLINIC | Age: 73
End: 2023-08-24
Payer: MEDICARE

## 2023-08-24 VITALS
HEART RATE: 71 BPM | BODY MASS INDEX: 22.4 KG/M2 | TEMPERATURE: 98.8 F | SYSTOLIC BLOOD PRESSURE: 120 MMHG | WEIGHT: 139.4 LBS | DIASTOLIC BLOOD PRESSURE: 72 MMHG | HEIGHT: 66 IN

## 2023-08-24 DIAGNOSIS — J32.4 CHRONIC PANSINUSITIS: Primary | ICD-10-CM

## 2023-08-24 PROCEDURE — G8428 CUR MEDS NOT DOCUMENT: HCPCS | Performed by: OTOLARYNGOLOGY

## 2023-08-24 PROCEDURE — 3074F SYST BP LT 130 MM HG: CPT | Performed by: OTOLARYNGOLOGY

## 2023-08-24 PROCEDURE — 1036F TOBACCO NON-USER: CPT | Performed by: OTOLARYNGOLOGY

## 2023-08-24 PROCEDURE — G8420 CALC BMI NORM PARAMETERS: HCPCS | Performed by: OTOLARYNGOLOGY

## 2023-08-24 PROCEDURE — 1123F ACP DISCUSS/DSCN MKR DOCD: CPT | Performed by: OTOLARYNGOLOGY

## 2023-08-24 PROCEDURE — 3017F COLORECTAL CA SCREEN DOC REV: CPT | Performed by: OTOLARYNGOLOGY

## 2023-08-24 PROCEDURE — 3078F DIAST BP <80 MM HG: CPT | Performed by: OTOLARYNGOLOGY

## 2023-08-24 PROCEDURE — 99214 OFFICE O/P EST MOD 30 MIN: CPT | Performed by: OTOLARYNGOLOGY

## 2023-08-24 ASSESSMENT — ENCOUNTER SYMPTOMS
NAUSEA: 0
SINUS PRESSURE: 0
EYE ITCHING: 0
CHOKING: 0
VOICE CHANGE: 0
SORE THROAT: 0
SINUS PAIN: 0
TROUBLE SWALLOWING: 0
SHORTNESS OF BREATH: 0
EYE PAIN: 0
DIARRHEA: 0
RHINORRHEA: 0
COUGH: 0
EYE REDNESS: 0
FACIAL SWELLING: 0

## 2023-08-24 NOTE — PROGRESS NOTES
Mucous membranes are moist.      Tongue: No lesions. Palate: No mass. Pharynx: Uvula midline. No oropharyngeal exudate or posterior oropharyngeal erythema. Tonsils: No tonsillar abscesses. Eyes:      General:         Right eye: No discharge. Left eye: No discharge. Extraocular Movements:      Right eye: Normal extraocular motion. Left eye: Normal extraocular motion. Conjunctiva/sclera:      Right eye: Right conjunctiva is not injected. No chemosis or exudate. Left eye: Left conjunctiva is not injected. No chemosis or exudate. Neck:      Thyroid: No thyroid mass or thyromegaly. Cardiovascular:      Rate and Rhythm: Normal rate and regular rhythm. Pulmonary:      Effort: No tachypnea or respiratory distress. Lymphadenopathy:      Head:      Right side of head: No preauricular or posterior auricular adenopathy. Left side of head: No preauricular or posterior auricular adenopathy. Cervical:      Right cervical: No superficial, deep or posterior cervical adenopathy. Left cervical: No superficial, deep or posterior cervical adenopathy. Neurological:      Mental Status: He is alert and oriented to person, place, and time. Due to the patients chronic sinus disease and/or history of sinonasal neoplasm for surveillance a nasal endoscopy with or without debridement will be performed to complete a significant physical examination of the patient which cannot be performed by anterior rhinoscopy alone (failure of complete examination of the paranasal sinuses). Failure to provide this procedure may lead to late detection of significant chronic benign disease, acute exacerbation, resolution or failure of early diagnosis of recurrent cancer. The procedure report is present in the body of the chart. Nasal Endoscopy    Pre OP: CRS  Post OP: Patent sinuses without active disease  Reason: new temporal headaches and MRI findings.    Procedure: Nasal

## 2023-08-31 ENCOUNTER — HOSPITAL ENCOUNTER (OUTPATIENT)
Dept: CT IMAGING | Age: 73
Discharge: HOME | End: 2023-08-31
Attending: OTOLARYNGOLOGY
Payer: MEDICARE

## 2023-08-31 DIAGNOSIS — J32.4 CHRONIC PANSINUSITIS: ICD-10-CM

## 2023-08-31 PROCEDURE — 70486 CT MAXILLOFACIAL W/O DYE: CPT

## 2023-09-06 ENCOUNTER — TELEPHONE (OUTPATIENT)
Dept: ENT CLINIC | Age: 73
End: 2023-09-06

## 2023-09-06 NOTE — TELEPHONE ENCOUNTER
Spoke to informed him of findings and was happy with overall results.  Mentioned that he has appointment scheduled

## 2023-09-06 NOTE — TELEPHONE ENCOUNTER
----- Message from Rafita Nova MD sent at 9/5/2023  1:08 PM EDT -----  Please let patient know his CT sinuses showed stable inflammation with no evidence of acute sinusitis. All sinuses are patent.  No indication for revision surgery

## 2023-10-12 ENCOUNTER — HOSPITAL ENCOUNTER (OUTPATIENT)
Dept: GENERAL RADIOLOGY | Age: 73
Discharge: HOME | End: 2023-10-12
Payer: MEDICARE

## 2023-10-12 DIAGNOSIS — K59.09 CHRONIC CONSTIPATION: ICD-10-CM

## 2023-10-12 PROCEDURE — 74018 RADEX ABDOMEN 1 VIEW: CPT

## 2023-12-06 ENCOUNTER — TRANSCRIBE ORDERS (OUTPATIENT)
Dept: ADMINISTRATIVE | Age: 73
End: 2023-12-06

## 2023-12-06 DIAGNOSIS — R91.1 PULMONARY NODULE: Primary | ICD-10-CM

## 2024-01-15 ENCOUNTER — HOSPITAL ENCOUNTER (OUTPATIENT)
Dept: CT IMAGING | Age: 74
Discharge: HOME | End: 2024-01-15
Payer: MEDICARE

## 2024-01-15 DIAGNOSIS — R91.1 SOLITARY PULMONARY NODULE: ICD-10-CM

## 2024-01-15 PROCEDURE — 71250 CT THORAX DX C-: CPT

## 2024-01-25 ENCOUNTER — PROCEDURE VISIT (OUTPATIENT)
Dept: SURGERY | Age: 74
End: 2024-01-25
Payer: MEDICARE

## 2024-01-25 ENCOUNTER — OFFICE VISIT (OUTPATIENT)
Dept: VASCULAR SURGERY | Age: 74
End: 2024-01-25
Payer: MEDICARE

## 2024-01-25 VITALS — SYSTOLIC BLOOD PRESSURE: 174 MMHG | DIASTOLIC BLOOD PRESSURE: 70 MMHG | WEIGHT: 139 LBS | BODY MASS INDEX: 22.44 KG/M2

## 2024-01-25 DIAGNOSIS — E78.2 MIXED HYPERLIPIDEMIA: ICD-10-CM

## 2024-01-25 DIAGNOSIS — K55.1 ATHEROSCLEROSIS OF SUPERIOR MESENTERIC ARTERY (HCC): Primary | ICD-10-CM

## 2024-01-25 DIAGNOSIS — I73.9 PAD (PERIPHERAL ARTERY DISEASE) (HCC): ICD-10-CM

## 2024-01-25 DIAGNOSIS — I65.23 CAROTID STENOSIS, ASYMPTOMATIC, BILATERAL: Primary | ICD-10-CM

## 2024-01-25 DIAGNOSIS — I73.9 PERIPHERAL ARTERIAL DISEASE (HCC): ICD-10-CM

## 2024-01-25 DIAGNOSIS — K55.1 MESENTERIC ARTERY STENOSIS (HCC): ICD-10-CM

## 2024-01-25 DIAGNOSIS — I65.23 CAROTID STENOSIS, ASYMPTOMATIC, BILATERAL: ICD-10-CM

## 2024-01-25 PROCEDURE — 93978 VASCULAR STUDY: CPT | Performed by: SURGERY

## 2024-01-25 PROCEDURE — 3017F COLORECTAL CA SCREEN DOC REV: CPT | Performed by: NURSE PRACTITIONER

## 2024-01-25 PROCEDURE — 3077F SYST BP >= 140 MM HG: CPT | Performed by: NURSE PRACTITIONER

## 2024-01-25 PROCEDURE — 99214 OFFICE O/P EST MOD 30 MIN: CPT | Performed by: NURSE PRACTITIONER

## 2024-01-25 PROCEDURE — 3078F DIAST BP <80 MM HG: CPT | Performed by: NURSE PRACTITIONER

## 2024-01-25 PROCEDURE — 1123F ACP DISCUSS/DSCN MKR DOCD: CPT | Performed by: NURSE PRACTITIONER

## 2024-01-25 PROCEDURE — 93880 EXTRACRANIAL BILAT STUDY: CPT | Performed by: SURGERY

## 2024-01-25 PROCEDURE — 93925 LOWER EXTREMITY STUDY: CPT | Performed by: SURGERY

## 2024-01-25 PROCEDURE — 1036F TOBACCO NON-USER: CPT | Performed by: NURSE PRACTITIONER

## 2024-01-25 PROCEDURE — G8420 CALC BMI NORM PARAMETERS: HCPCS | Performed by: NURSE PRACTITIONER

## 2024-01-25 PROCEDURE — G8484 FLU IMMUNIZE NO ADMIN: HCPCS | Performed by: NURSE PRACTITIONER

## 2024-01-25 PROCEDURE — G8427 DOCREV CUR MEDS BY ELIG CLIN: HCPCS | Performed by: NURSE PRACTITIONER

## 2024-01-25 RX ORDER — MEMANTINE HYDROCHLORIDE 5 MG/1
5 TABLET ORAL 2 TIMES DAILY
COMMUNITY
Start: 2024-01-12

## 2024-01-25 ASSESSMENT — ENCOUNTER SYMPTOMS
ABDOMINAL PAIN: 0
COLOR CHANGE: 0

## 2024-01-25 NOTE — PATIENT INSTRUCTIONS
Return in about 6 months (around 7/25/2024) for CDS, mesenteric artery bypass graft and bilateral LE's.    PATIENT EDUCATION focused on signs/symptoms of stroke:  - Watch for one eye going dark like a shade was pulled down.  - Watch for one side of the body or face not functioning correctly.  - Difficulty with speech, such as slurring your words.  - Difficulty finding the right words, such as calling an object by the wrong name when you know the real name.       If you have any of these symptoms, do not call us or your family doctor.  Call 911 and go immediately to the hospital.  You have a 4-6 hour window from the point when symptoms start to get to the hospital, get a CT scan done and initiate clot dissolving drugs.

## 2024-01-29 ENCOUNTER — OFFICE VISIT (OUTPATIENT)
Dept: CARDIOLOGY CLINIC | Age: 74
End: 2024-01-29
Payer: MEDICARE

## 2024-01-29 VITALS
BODY MASS INDEX: 22.14 KG/M2 | WEIGHT: 137.2 LBS | OXYGEN SATURATION: 98 % | HEART RATE: 72 BPM | SYSTOLIC BLOOD PRESSURE: 138 MMHG | DIASTOLIC BLOOD PRESSURE: 80 MMHG

## 2024-01-29 DIAGNOSIS — E78.2 MIXED HYPERLIPIDEMIA: Primary | ICD-10-CM

## 2024-01-29 DIAGNOSIS — I10 ESSENTIAL HYPERTENSION: ICD-10-CM

## 2024-01-29 DIAGNOSIS — I25.10 CORONARY ARTERY DISEASE INVOLVING NATIVE CORONARY ARTERY OF NATIVE HEART WITHOUT ANGINA PECTORIS: ICD-10-CM

## 2024-01-29 DIAGNOSIS — I35.0 AORTIC VALVE STENOSIS, ETIOLOGY OF CARDIAC VALVE DISEASE UNSPECIFIED: ICD-10-CM

## 2024-01-29 PROCEDURE — G8427 DOCREV CUR MEDS BY ELIG CLIN: HCPCS | Performed by: INTERNAL MEDICINE

## 2024-01-29 PROCEDURE — G8484 FLU IMMUNIZE NO ADMIN: HCPCS | Performed by: INTERNAL MEDICINE

## 2024-01-29 PROCEDURE — G8420 CALC BMI NORM PARAMETERS: HCPCS | Performed by: INTERNAL MEDICINE

## 2024-01-29 PROCEDURE — 1036F TOBACCO NON-USER: CPT | Performed by: INTERNAL MEDICINE

## 2024-01-29 PROCEDURE — 1123F ACP DISCUSS/DSCN MKR DOCD: CPT | Performed by: INTERNAL MEDICINE

## 2024-01-29 PROCEDURE — 3017F COLORECTAL CA SCREEN DOC REV: CPT | Performed by: INTERNAL MEDICINE

## 2024-01-29 PROCEDURE — 99214 OFFICE O/P EST MOD 30 MIN: CPT | Performed by: INTERNAL MEDICINE

## 2024-01-29 PROCEDURE — 3079F DIAST BP 80-89 MM HG: CPT | Performed by: INTERNAL MEDICINE

## 2024-01-29 PROCEDURE — 3075F SYST BP GE 130 - 139MM HG: CPT | Performed by: INTERNAL MEDICINE

## 2024-01-29 RX ORDER — LOSARTAN POTASSIUM 50 MG/1
50 TABLET ORAL DAILY
COMMUNITY
Start: 2023-12-26

## 2024-01-29 ASSESSMENT — ENCOUNTER SYMPTOMS
COLOR CHANGE: 0
EYE DISCHARGE: 0
ABDOMINAL DISTENTION: 0
VOMITING: 0
BLOOD IN STOOL: 0
CHEST TIGHTNESS: 0
COUGH: 0
BACK PAIN: 0
WHEEZING: 0
ABDOMINAL PAIN: 0
FACIAL SWELLING: 0
SHORTNESS OF BREATH: 0

## 2024-01-29 NOTE — PROGRESS NOTES
Cancer Brother         Brain CA    Hypertension Brother     Cancer Brother         Throat cancer       Allergies   No Known Allergies    Medications:     Home Medications:  Were reviewed and are listed in nursing record. and/or listed below    Prior to Admission medications    Medication Sig Start Date End Date Taking? Authorizing Provider   losartan (COZAAR) 50 MG tablet Take 1 tablet by mouth daily 12/26/23  Yes Елена Montanez MD   memantine (NAMENDA) 5 MG tablet Take 1 tablet by mouth 2 times daily 1/12/24  Yes Елена Montanez MD   carvedilol (COREG) 12.5 MG tablet Take 1 tablet by mouth 2 times daily 4/27/23  Yes Benitez Jensen MD   atorvastatin (LIPITOR) 80 MG tablet TAKE 1 TABLET BY MOUTH EVERY DAY 10/10/22  Yes Pepito Leal DO   NIFEdipine (ADALAT CC) 30 MG extended release tablet Take 1 tablet by mouth daily   Yes Елена Montanez MD   Multiple Vitamin (MULTIVITAMIN) TABS tablet Take 1 tablet by mouth daily 6/25/22  Yes Serge Yeung MD   pantoprazole (PROTONIX) 40 MG tablet TAKE 1 TABLET BY MOUTH EVERY DAY BEFORE BREAKFAST 3/2/22  Yes Pepito Leal DO   clopidogrel (PLAVIX) 75 MG tablet TAKE 1 TABLET BY MOUTH EVERY DAY 1/28/22  Yes Pepito Leal DO   ferrous sulfate (FEROSUL) 325 (65 Fe) MG tablet Take 1 tablet by mouth daily (with breakfast) 7/12/19  Yes Pepito Leal DO   acetaminophen (TYLENOL) 325 MG tablet Take 2 tablets by mouth every 6 hours as needed for Pain 9/15/18  Yes Gunner Grace MD   aspirin 81 MG tablet Take 1 tablet by mouth daily 9/22/18  Yes Gunner Grace MD   Probiotic Product (ALIGN PO) Take 1 tablet by mouth daily    Yes Елена Montanez MD   Cholecalciferol (VITAMIN D) 2000 units CAPS capsule Take 1 capsule by mouth daily   Yes Елена Montanez MD        Review of Systems   Constitutional:  Negative for activity change, appetite change, diaphoresis, fatigue, fever and unexpected weight change.   HENT:  Negative for congestion,

## 2024-02-25 ENCOUNTER — HOSPITAL ENCOUNTER (OUTPATIENT)
Age: 74
Setting detail: OBSERVATION
Discharge: HOME | End: 2024-02-27
Attending: EMERGENCY MEDICINE | Admitting: INTERNAL MEDICINE
Payer: MEDICARE

## 2024-02-25 ENCOUNTER — APPOINTMENT (OUTPATIENT)
Dept: GENERAL RADIOLOGY | Age: 74
End: 2024-02-25
Payer: MEDICARE

## 2024-02-25 ENCOUNTER — APPOINTMENT (OUTPATIENT)
Dept: CT IMAGING | Age: 74
End: 2024-02-25
Payer: MEDICARE

## 2024-02-25 DIAGNOSIS — I35.0 AORTIC VALVE STENOSIS, ETIOLOGY OF CARDIAC VALVE DISEASE UNSPECIFIED: ICD-10-CM

## 2024-02-25 DIAGNOSIS — I95.9 HYPOTENSION, UNSPECIFIED HYPOTENSION TYPE: ICD-10-CM

## 2024-02-25 DIAGNOSIS — E87.1 HYPONATREMIA: ICD-10-CM

## 2024-02-25 DIAGNOSIS — R55 SYNCOPE AND COLLAPSE: Primary | ICD-10-CM

## 2024-02-25 PROBLEM — R51.9 HEADACHE: Status: ACTIVE | Noted: 2024-02-25

## 2024-02-25 LAB
ALBUMIN SERPL-MCNC: 4.1 G/DL (ref 3.4–5)
ALBUMIN/GLOB SERPL: 1.3 {RATIO} (ref 1.1–2.2)
ALP SERPL-CCNC: 79 U/L (ref 40–129)
ALT SERPL-CCNC: 19 U/L (ref 10–40)
ANION GAP SERPL CALCULATED.3IONS-SCNC: 15 MMOL/L (ref 3–16)
AST SERPL-CCNC: 37 U/L (ref 15–37)
BASOPHILS # BLD: 0 K/UL (ref 0–0.2)
BASOPHILS NFR BLD: 0.5 %
BILIRUB SERPL-MCNC: 1.1 MG/DL (ref 0–1)
BUN SERPL-MCNC: 13 MG/DL (ref 7–20)
CALCIUM SERPL-MCNC: 9.2 MG/DL (ref 8.3–10.6)
CHLORIDE SERPL-SCNC: 87 MMOL/L (ref 99–110)
CO2 SERPL-SCNC: 23 MMOL/L (ref 21–32)
CREAT SERPL-MCNC: 0.9 MG/DL (ref 0.8–1.3)
DEPRECATED RDW RBC AUTO: 14.8 % (ref 12.4–15.4)
EOSINOPHIL # BLD: 0 K/UL (ref 0–0.6)
EOSINOPHIL NFR BLD: 0.5 %
GFR SERPLBLD CREATININE-BSD FMLA CKD-EPI: >60 ML/MIN/{1.73_M2}
GLUCOSE SERPL-MCNC: 79 MG/DL (ref 70–99)
HCT VFR BLD AUTO: 41.1 % (ref 40.5–52.5)
HGB BLD-MCNC: 14.1 G/DL (ref 13.5–17.5)
LYMPHOCYTES # BLD: 0.3 K/UL (ref 1–5.1)
LYMPHOCYTES NFR BLD: 3.3 %
MCH RBC QN AUTO: 35.8 PG (ref 26–34)
MCHC RBC AUTO-ENTMCNC: 34.4 G/DL (ref 31–36)
MCV RBC AUTO: 104.3 FL (ref 80–100)
MONOCYTES # BLD: 1.1 K/UL (ref 0–1.3)
MONOCYTES NFR BLD: 10.8 %
NEUTROPHILS # BLD: 8.4 K/UL (ref 1.7–7.7)
NEUTROPHILS NFR BLD: 84.9 %
PLATELET # BLD AUTO: 170 K/UL (ref 135–450)
PMV BLD AUTO: 6.7 FL (ref 5–10.5)
POTASSIUM SERPL-SCNC: 5.1 MMOL/L (ref 3.5–5.1)
PROT SERPL-MCNC: 7.3 G/DL (ref 6.4–8.2)
RBC # BLD AUTO: 3.94 M/UL (ref 4.2–5.9)
SODIUM SERPL-SCNC: 125 MMOL/L (ref 136–145)
TROPONIN, HIGH SENSITIVITY: 11 NG/L (ref 0–22)
TROPONIN, HIGH SENSITIVITY: 9 NG/L (ref 0–22)
WBC # BLD AUTO: 9.9 K/UL (ref 4–11)

## 2024-02-25 PROCEDURE — 6360000002 HC RX W HCPCS: Performed by: INTERNAL MEDICINE

## 2024-02-25 PROCEDURE — G0378 HOSPITAL OBSERVATION PER HR: HCPCS

## 2024-02-25 PROCEDURE — 93005 ELECTROCARDIOGRAM TRACING: CPT | Performed by: EMERGENCY MEDICINE

## 2024-02-25 PROCEDURE — 96372 THER/PROPH/DIAG INJ SC/IM: CPT

## 2024-02-25 PROCEDURE — 85025 COMPLETE CBC W/AUTO DIFF WBC: CPT

## 2024-02-25 PROCEDURE — 70450 CT HEAD/BRAIN W/O DYE: CPT

## 2024-02-25 PROCEDURE — 84484 ASSAY OF TROPONIN QUANT: CPT

## 2024-02-25 PROCEDURE — 6370000000 HC RX 637 (ALT 250 FOR IP): Performed by: NURSE PRACTITIONER

## 2024-02-25 PROCEDURE — 96360 HYDRATION IV INFUSION INIT: CPT

## 2024-02-25 PROCEDURE — 71045 X-RAY EXAM CHEST 1 VIEW: CPT

## 2024-02-25 PROCEDURE — 6370000000 HC RX 637 (ALT 250 FOR IP): Performed by: INTERNAL MEDICINE

## 2024-02-25 PROCEDURE — 2580000003 HC RX 258: Performed by: INTERNAL MEDICINE

## 2024-02-25 PROCEDURE — 80053 COMPREHEN METABOLIC PANEL: CPT

## 2024-02-25 PROCEDURE — 99285 EMERGENCY DEPT VISIT HI MDM: CPT

## 2024-02-25 PROCEDURE — 96361 HYDRATE IV INFUSION ADD-ON: CPT

## 2024-02-25 PROCEDURE — 36415 COLL VENOUS BLD VENIPUNCTURE: CPT

## 2024-02-25 RX ORDER — PANTOPRAZOLE SODIUM 40 MG/1
40 TABLET, DELAYED RELEASE ORAL
Status: DISCONTINUED | OUTPATIENT
Start: 2024-02-26 | End: 2024-02-27 | Stop reason: HOSPADM

## 2024-02-25 RX ORDER — POLYETHYLENE GLYCOL 3350 17 G/17G
17 POWDER, FOR SOLUTION ORAL DAILY PRN
Status: DISCONTINUED | OUTPATIENT
Start: 2024-02-25 | End: 2024-02-27 | Stop reason: HOSPADM

## 2024-02-25 RX ORDER — FERROUS SULFATE 325(65) MG
325 TABLET ORAL
Status: DISCONTINUED | OUTPATIENT
Start: 2024-02-26 | End: 2024-02-27 | Stop reason: HOSPADM

## 2024-02-25 RX ORDER — ACETAMINOPHEN 325 MG/1
650 TABLET ORAL EVERY 6 HOURS PRN
Status: DISCONTINUED | OUTPATIENT
Start: 2024-02-25 | End: 2024-02-27 | Stop reason: HOSPADM

## 2024-02-25 RX ORDER — SODIUM CHLORIDE 0.9 % (FLUSH) 0.9 %
5-40 SYRINGE (ML) INJECTION PRN
Status: DISCONTINUED | OUTPATIENT
Start: 2024-02-25 | End: 2024-02-27 | Stop reason: HOSPADM

## 2024-02-25 RX ORDER — MAGNESIUM SULFATE IN WATER 40 MG/ML
2000 INJECTION, SOLUTION INTRAVENOUS PRN
Status: DISCONTINUED | OUTPATIENT
Start: 2024-02-25 | End: 2024-02-27 | Stop reason: HOSPADM

## 2024-02-25 RX ORDER — NIFEDIPINE 30 MG
30 TABLET, EXTENDED RELEASE ORAL DAILY
Status: DISCONTINUED | OUTPATIENT
Start: 2024-02-25 | End: 2024-02-26

## 2024-02-25 RX ORDER — ASPIRIN 81 MG/1
81 TABLET ORAL DAILY
Status: DISCONTINUED | OUTPATIENT
Start: 2024-02-25 | End: 2024-02-27 | Stop reason: HOSPADM

## 2024-02-25 RX ORDER — CARVEDILOL 12.5 MG/1
12.5 TABLET ORAL 2 TIMES DAILY
Status: DISCONTINUED | OUTPATIENT
Start: 2024-02-26 | End: 2024-02-25

## 2024-02-25 RX ORDER — VITAMIN B COMPLEX
2000 TABLET ORAL DAILY
Status: DISCONTINUED | OUTPATIENT
Start: 2024-02-25 | End: 2024-02-27 | Stop reason: HOSPADM

## 2024-02-25 RX ORDER — ACETAMINOPHEN 650 MG/1
650 SUPPOSITORY RECTAL EVERY 6 HOURS PRN
Status: DISCONTINUED | OUTPATIENT
Start: 2024-02-25 | End: 2024-02-27 | Stop reason: HOSPADM

## 2024-02-25 RX ORDER — POTASSIUM CHLORIDE 20 MEQ/1
40 TABLET, EXTENDED RELEASE ORAL PRN
Status: DISCONTINUED | OUTPATIENT
Start: 2024-02-25 | End: 2024-02-27 | Stop reason: HOSPADM

## 2024-02-25 RX ORDER — ATORVASTATIN CALCIUM 80 MG/1
80 TABLET, FILM COATED ORAL DAILY
Status: DISCONTINUED | OUTPATIENT
Start: 2024-02-25 | End: 2024-02-27 | Stop reason: HOSPADM

## 2024-02-25 RX ORDER — MEMANTINE HYDROCHLORIDE 10 MG/1
5 TABLET ORAL 2 TIMES DAILY
Status: DISCONTINUED | OUTPATIENT
Start: 2024-02-25 | End: 2024-02-27 | Stop reason: HOSPADM

## 2024-02-25 RX ORDER — MULTIVITAMIN WITH IRON
1 TABLET ORAL DAILY
Status: DISCONTINUED | OUTPATIENT
Start: 2024-02-25 | End: 2024-02-27 | Stop reason: HOSPADM

## 2024-02-25 RX ORDER — SODIUM CHLORIDE 9 MG/ML
INJECTION, SOLUTION INTRAVENOUS PRN
Status: DISCONTINUED | OUTPATIENT
Start: 2024-02-25 | End: 2024-02-27 | Stop reason: HOSPADM

## 2024-02-25 RX ORDER — SODIUM CHLORIDE 9 MG/ML
INJECTION, SOLUTION INTRAVENOUS CONTINUOUS
Status: DISCONTINUED | OUTPATIENT
Start: 2024-02-25 | End: 2024-02-27

## 2024-02-25 RX ORDER — SODIUM CHLORIDE 0.9 % (FLUSH) 0.9 %
5-40 SYRINGE (ML) INJECTION EVERY 12 HOURS SCHEDULED
Status: DISCONTINUED | OUTPATIENT
Start: 2024-02-25 | End: 2024-02-27 | Stop reason: HOSPADM

## 2024-02-25 RX ORDER — ENOXAPARIN SODIUM 100 MG/ML
40 INJECTION SUBCUTANEOUS NIGHTLY
Status: DISCONTINUED | OUTPATIENT
Start: 2024-02-25 | End: 2024-02-27 | Stop reason: HOSPADM

## 2024-02-25 RX ORDER — ONDANSETRON 4 MG/1
4 TABLET, ORALLY DISINTEGRATING ORAL EVERY 8 HOURS PRN
Status: DISCONTINUED | OUTPATIENT
Start: 2024-02-25 | End: 2024-02-27 | Stop reason: HOSPADM

## 2024-02-25 RX ORDER — CARVEDILOL 12.5 MG/1
12.5 TABLET ORAL 2 TIMES DAILY
Status: DISCONTINUED | OUTPATIENT
Start: 2024-02-25 | End: 2024-02-25

## 2024-02-25 RX ORDER — CLOPIDOGREL BISULFATE 75 MG/1
75 TABLET ORAL DAILY
Status: DISCONTINUED | OUTPATIENT
Start: 2024-02-25 | End: 2024-02-27 | Stop reason: HOSPADM

## 2024-02-25 RX ORDER — CARVEDILOL 12.5 MG/1
12.5 TABLET ORAL 2 TIMES DAILY
Status: DISCONTINUED | OUTPATIENT
Start: 2024-02-25 | End: 2024-02-27 | Stop reason: HOSPADM

## 2024-02-25 RX ORDER — ONDANSETRON 2 MG/ML
4 INJECTION INTRAMUSCULAR; INTRAVENOUS EVERY 6 HOURS PRN
Status: DISCONTINUED | OUTPATIENT
Start: 2024-02-25 | End: 2024-02-27 | Stop reason: HOSPADM

## 2024-02-25 RX ORDER — POTASSIUM CHLORIDE 7.45 MG/ML
10 INJECTION INTRAVENOUS PRN
Status: DISCONTINUED | OUTPATIENT
Start: 2024-02-25 | End: 2024-02-27 | Stop reason: HOSPADM

## 2024-02-25 RX ADMIN — CARVEDILOL 12.5 MG: 12.5 TABLET, FILM COATED ORAL at 23:25

## 2024-02-25 RX ADMIN — ENOXAPARIN SODIUM 40 MG: 100 INJECTION SUBCUTANEOUS at 20:46

## 2024-02-25 RX ADMIN — SODIUM CHLORIDE: 9 INJECTION, SOLUTION INTRAVENOUS at 17:45

## 2024-02-25 RX ADMIN — MEMANTINE HYDROCHLORIDE 5 MG: 10 TABLET ORAL at 20:46

## 2024-02-25 NOTE — ED PROVIDER NOTES
THE [facility]  EMERGENCY DEPARTMENT ENCOUNTER          Attending Physician Note     Date of evaluation: 2/25/2024    Chief Complaint     Loss of Consciousness      History of Present Illness     Nicolas Groves is a 74 y.o. male who comes in after what sounds to be a syncopal episode at home around 10 AM.  Patient ports he was in bed this morning for a while because he was tired, eventually got up out of bed around 10 AM to go the bathroom which is when he fell.  He does completely fall to the ground and hit his head no bowel or bladder he had a syncopal episode in March with no specific cause found.  Does have aortic stenosis that is mild as well as carotid artery stenosis that is mild to moderate.  He denies any chest pain with this.  Denies any weakness of either side of his body or strokelike symptoms.    His wife states that when she came upstairs after hearing him fall to the ground, he had his eyes partially open but was not responsive.  She yelled his name, opened his eyes some more, try to talk but could not at that time.  Quickly returned back to his baseline.  States has been drinking fluids recently, does not seem to be eating or feels that he is dehydrated.      Review of Systems     Pertinent positive and negative findings as documented above in HPI, otherwise all other systems were reviewed and negative     Past Medical, Surgical, Family, and Social History     Medical History:   Past Medical History:   Diagnosis Date    Abdominal aortic atherosclerosis (HCC)     Allergic rhinitis     Atherosclerosis of superior mesenteric artery (HCC)     CAD (coronary artery disease)     Cerebral artery occlusion with cerebral infarction (HCC)     Chronic kidney disease     Chronic pansinusitis 08/06/2021    Clostridium difficile colitis 06/2020    Colon polyps     COPD (chronic obstructive pulmonary disease) (HCC)     COVID-19 virus infection 11/15/2021    CVA (cerebral infarction) 05/2013    Multiple,

## 2024-02-25 NOTE — PLAN OF CARE
Problem: Safety - Adult  Goal: Free from fall injury  Outcome: Progressing  Flowsheets (Taken 2/25/2024 5296)  Free From Fall Injury: Instruct family/caregiver on patient safety  Note: Pt in bed with bed alarm on, instructed to call for assistance. Verbalized understanding. All fall precautions in place.      Problem: ABCDS Injury Assessment  Goal: Absence of physical injury  Outcome: Progressing

## 2024-02-25 NOTE — H&P
Disease Father         MI    Cancer Brother         Brain CA    Hypertension Brother     Cancer Brother         Throat cancer       REVIEW OF SYSTEMS:   Positive for lightheadedness on standing and as noted in the HPI. All other systems reviewed and negative.    PHYSICAL EXAM:  /65   Pulse 73   Temp 98.4 °F (36.9 °C) (Oral)   Resp 19   SpO2 93%     General appearance:  Alert and oriented, No apparent distress and cooperative.  HEENT Normal cephalic, atraumatic without obvious deformity.  Conjunctivae/corneas clear.  Neck: Supple, No jugular venous distention/bruits.    Lungs: Clear to auscultation, bilaterally without Rales/Wheezes/Rhonchi with good respiratory effort.  Heart: Regular rate and rhythm with Normal S1/S2 without murmurs, rubs or gallops  Abdomen: Soft, non-tender or non-distended without rigidity or guarding and positive bowel sounds  Extremities: No clubbing, cyanosis, or edema bilaterally.    Skin: Skin color, texture, turgor normal.  No rashes or lesions.  Neurologic:, neurovascularly intact with sensory/motor intact upper extremities/lower extremities, bilaterally.  Cranial nerves: II-XII intact, grossly non-focal. Pupils equal, round, and reactive to light.  Extra ocular muscles intact.        CBC   Recent Labs     02/25/24  1406   WBC 9.9   HGB 14.1   HCT 41.1         RENAL  Recent Labs     02/25/24  1406   *   K 5.1   CL 87*   CO2 23   BUN 13   CREATININE 0.9     LFT'S  Recent Labs     02/25/24  1406   AST 37   ALT 19   BILITOT 1.1*   ALKPHOS 79         Active Hospital Problems    Diagnosis Date Noted    Hyponatremia [E87.1] 06/23/2022     Priority: Medium    Syncope and collapse [R55] 02/25/2024    Headache [R51.9] 02/25/2024    Aortic valve stenosis [I35.0] 07/20/2020    Coronary artery disease involving native coronary artery of native heart without angina pectoris [I25.10] 01/13/2016    Essential hypertension [I10] 10/02/2013    Gastroesophageal reflux disease without

## 2024-02-25 NOTE — PROGRESS NOTES
Advanced Care Planning Note.     Purpose of Encounter: Advanced care planning in light of syncope and collapse, headache  Parties In Attendance: Patient  Decisional Capacity: Yes  Subjective: Patient with headache and associated lightheadedness with syncope and collapse, history of aortic stenosis    Objective: Sodium 125, chest x-ray with clear lungs to  Goals of Care Determination: Patient wishes to focus on treatment and return to home  Plan:  Interventions and consult  Code Status: Full code            Time spent on Advanced care Plannin minutes  Advanced Care Planning Documents: Completed advanced directives on chart, wife is the POA.     Christian Cantor MD, MD  2024 4:17 PM

## 2024-02-25 NOTE — ED NOTES
ED TO INPATIENT SBAR HANDOFF    Patient Name: Nicolas Groves   :  1950  74 y.o.   MRN:  [id number]  Preferred Name  Paulino  ED Room #:  A10/A10-10  Family/Caregiver Present yes   Restraints no   Sitter no   Sepsis Risk Score Sepsis Risk Score: 1.84    Situation  Code Status: Prior No additional code details.    Allergies: Patient has no known allergies.  Weight: No data found.  Arrived from: home  Chief Complaint:   Chief Complaint   Patient presents with    Loss of Consciousness     Hospital Problem/Diagnosis:  Principal Problem:    Syncope and collapse  Active Problems:    Hyponatremia    Hyperlipidemia    Gastroesophageal reflux disease without esophagitis    Essential hypertension    Coronary artery disease involving native coronary artery of native heart without angina pectoris    Aortic valve stenosis    Headache  Resolved Problems:    * No resolved hospital problems. *    Imaging:   CT Head W/O Contrast   Final Result      No acute intracranial injury.      Age-appropriate global atrophy with associated very mild chronic small vessel ischemic changes.      Chronic bilateral maxillary sinus disease.      Electronically signed by Sotero Almaraz MD      XR CHEST PORTABLE   Final Result      No evidence of acute cardiopulmonary disease.        Abnormal labs:   Abnormal Labs Reviewed   CBC WITH AUTO DIFFERENTIAL - Abnormal; Notable for the following components:       Result Value    RBC 3.94 (*)     .3 (*)     MCH 35.8 (*)     Neutrophils Absolute 8.4 (*)     Lymphocytes Absolute 0.3 (*)     All other components within normal limits   COMPREHENSIVE METABOLIC PANEL W/ REFLEX TO MG FOR LOW K - Abnormal; Notable for the following components:    Sodium 125 (*)     Chloride 87 (*)     Total Bilirubin 1.1 (*)     All other components within normal limits     Critical values:      Abnormal Assessment Findings:     Background  History:   Past Medical History:   Diagnosis Date    Abdominal aortic

## 2024-02-25 NOTE — PROGRESS NOTES
4 Eyes Skin Assessment     NAME:  Nicolas NEW Groves  YOB: 1950  MEDICAL RECORD NUMBER:  [id number]    The patient is being assessed for  Admission    I agree that at least one RN has performed a thorough Head to Toe Skin Assessment on the patient. ALL assessment sites listed below have been assessed.      Areas assessed by both nurses:    Head, Face, Ears, Shoulders, Back, Chest, Arms, Elbows, Hands, Sacrum. Buttock, Coccyx, Ischium, Legs. Feet and Heels, and Under Medical Devices         Does the Patient have a Wound? No noted wound(s)       [place] Prevention initiated by RN: No  Wound Care Orders initiated by RN: No    Pressure Injury (Stage 3,4, Unstageable, DTI, NWPT, and Complex wounds) if present, place Wound referral order by RN under : No    New Ostomies, if present place, Ostomy referral order under : No     Nurse 1 eSignature: Electronically signed by Eunice Lee RN on 2/25/24 at 6:05 PM EST    **SHARE this note so that the co-signing nurse can place an eSignature**    Nurse 2 eSignature: Electronically signed by Patito Ackerman RN on 2/25/24 at 6:21 PM EST    Enlarged tonsils     Generalized abdominal pain     Abnormal ultrasound of gallbladder     Nausea & vomiting     Rectal bleeding     Colitis     BMI 35.0-35.9,adult     S/P laparoscopic cholecystectomy     Symptomatic cholelithiasis     Post-operative nausea and vomiting     Hypothyroidism     Adenocarcinoma in situ (AIS) of uterine cervix

## 2024-02-26 LAB
ANION GAP SERPL CALCULATED.3IONS-SCNC: 12 MMOL/L (ref 3–16)
BUN SERPL-MCNC: 16 MG/DL (ref 7–20)
CALCIUM SERPL-MCNC: 8.9 MG/DL (ref 8.3–10.6)
CHLORIDE SERPL-SCNC: 91 MMOL/L (ref 99–110)
CO2 SERPL-SCNC: 25 MMOL/L (ref 21–32)
CREAT SERPL-MCNC: 1 MG/DL (ref 0.8–1.3)
EKG ATRIAL RATE: 78 BPM
EKG DIAGNOSIS: NORMAL
EKG P AXIS: 48 DEGREES
EKG P-R INTERVAL: 172 MS
EKG Q-T INTERVAL: 380 MS
EKG QRS DURATION: 76 MS
EKG QTC CALCULATION (BAZETT): 433 MS
EKG R AXIS: 55 DEGREES
EKG T AXIS: 80 DEGREES
EKG VENTRICULAR RATE: 78 BPM
GFR SERPLBLD CREATININE-BSD FMLA CKD-EPI: >60 ML/MIN/{1.73_M2}
GLUCOSE SERPL-MCNC: 92 MG/DL (ref 70–99)
OSMOLALITY SERPL: 278 MOSM/KG (ref 278–305)
OSMOLALITY UR: 196 MOSM/KG (ref 390–1070)
POTASSIUM SERPL-SCNC: 4.5 MMOL/L (ref 3.5–5.1)
SODIUM SERPL-SCNC: 128 MMOL/L (ref 136–145)
SODIUM SERPL-SCNC: 128 MMOL/L (ref 136–145)
SODIUM UR-SCNC: 34 MMOL/L
TROPONIN, HIGH SENSITIVITY: 12 NG/L (ref 0–22)
TROPONIN, HIGH SENSITIVITY: 14 NG/L (ref 0–22)
TROPONIN, HIGH SENSITIVITY: 14 NG/L (ref 0–22)
TSH SERPL DL<=0.005 MIU/L-ACNC: 2.07 UIU/ML (ref 0.27–4.2)

## 2024-02-26 PROCEDURE — 84443 ASSAY THYROID STIM HORMONE: CPT

## 2024-02-26 PROCEDURE — 6370000000 HC RX 637 (ALT 250 FOR IP): Performed by: NURSE PRACTITIONER

## 2024-02-26 PROCEDURE — 82533 TOTAL CORTISOL: CPT

## 2024-02-26 PROCEDURE — G0378 HOSPITAL OBSERVATION PER HR: HCPCS

## 2024-02-26 PROCEDURE — 97165 OT EVAL LOW COMPLEX 30 MIN: CPT

## 2024-02-26 PROCEDURE — 6360000002 HC RX W HCPCS: Performed by: INTERNAL MEDICINE

## 2024-02-26 PROCEDURE — 80048 BASIC METABOLIC PNL TOTAL CA: CPT

## 2024-02-26 PROCEDURE — 2580000003 HC RX 258: Performed by: INTERNAL MEDICINE

## 2024-02-26 PROCEDURE — 82570 ASSAY OF URINE CREATININE: CPT

## 2024-02-26 PROCEDURE — 97535 SELF CARE MNGMENT TRAINING: CPT

## 2024-02-26 PROCEDURE — 96361 HYDRATE IV INFUSION ADD-ON: CPT

## 2024-02-26 PROCEDURE — 83935 ASSAY OF URINE OSMOLALITY: CPT

## 2024-02-26 PROCEDURE — 83930 ASSAY OF BLOOD OSMOLALITY: CPT

## 2024-02-26 PROCEDURE — 84295 ASSAY OF SERUM SODIUM: CPT

## 2024-02-26 PROCEDURE — 96372 THER/PROPH/DIAG INJ SC/IM: CPT

## 2024-02-26 PROCEDURE — 97162 PT EVAL MOD COMPLEX 30 MIN: CPT

## 2024-02-26 PROCEDURE — 97116 GAIT TRAINING THERAPY: CPT

## 2024-02-26 PROCEDURE — 6370000000 HC RX 637 (ALT 250 FOR IP): Performed by: INTERNAL MEDICINE

## 2024-02-26 PROCEDURE — 93306 TTE W/DOPPLER COMPLETE: CPT

## 2024-02-26 PROCEDURE — 84484 ASSAY OF TROPONIN QUANT: CPT

## 2024-02-26 PROCEDURE — 36415 COLL VENOUS BLD VENIPUNCTURE: CPT

## 2024-02-26 PROCEDURE — 84300 ASSAY OF URINE SODIUM: CPT

## 2024-02-26 RX ORDER — HYDRALAZINE HYDROCHLORIDE 20 MG/ML
5 INJECTION INTRAMUSCULAR; INTRAVENOUS EVERY 6 HOURS PRN
Status: DISCONTINUED | OUTPATIENT
Start: 2024-02-26 | End: 2024-02-27 | Stop reason: HOSPADM

## 2024-02-26 RX ADMIN — ENOXAPARIN SODIUM 40 MG: 100 INJECTION SUBCUTANEOUS at 20:46

## 2024-02-26 RX ADMIN — SODIUM CHLORIDE, PRESERVATIVE FREE 10 ML: 5 INJECTION INTRAVENOUS at 07:53

## 2024-02-26 RX ADMIN — THERA TABS 1 TABLET: TAB at 09:25

## 2024-02-26 RX ADMIN — CARVEDILOL 12.5 MG: 12.5 TABLET, FILM COATED ORAL at 09:25

## 2024-02-26 RX ADMIN — ATORVASTATIN CALCIUM 80 MG: 80 TABLET, FILM COATED ORAL at 09:26

## 2024-02-26 RX ADMIN — MEMANTINE HYDROCHLORIDE 5 MG: 10 TABLET ORAL at 20:46

## 2024-02-26 RX ADMIN — Medication 2000 UNITS: at 09:23

## 2024-02-26 RX ADMIN — ACETAMINOPHEN 650 MG: 325 TABLET ORAL at 16:04

## 2024-02-26 RX ADMIN — CLOPIDOGREL BISULFATE 75 MG: 75 TABLET ORAL at 09:23

## 2024-02-26 RX ADMIN — PANTOPRAZOLE SODIUM 40 MG: 40 TABLET, DELAYED RELEASE ORAL at 07:20

## 2024-02-26 RX ADMIN — FERROUS SULFATE TAB 325 MG (65 MG ELEMENTAL FE) 325 MG: 325 (65 FE) TAB at 07:20

## 2024-02-26 RX ADMIN — MEMANTINE HYDROCHLORIDE 5 MG: 10 TABLET ORAL at 09:22

## 2024-02-26 RX ADMIN — SODIUM CHLORIDE, PRESERVATIVE FREE 10 ML: 5 INJECTION INTRAVENOUS at 20:46

## 2024-02-26 RX ADMIN — NIFEDIPINE 30 MG: 30 TABLET, EXTENDED RELEASE ORAL at 09:27

## 2024-02-26 RX ADMIN — CARVEDILOL 12.5 MG: 12.5 TABLET, FILM COATED ORAL at 20:46

## 2024-02-26 RX ADMIN — ASPIRIN 81 MG: 81 TABLET, COATED ORAL at 09:23

## 2024-02-26 ASSESSMENT — PAIN DESCRIPTION - PAIN TYPE: TYPE: ACUTE PAIN

## 2024-02-26 ASSESSMENT — PAIN DESCRIPTION - ORIENTATION: ORIENTATION: INNER;MID

## 2024-02-26 ASSESSMENT — PAIN DESCRIPTION - ONSET: ONSET: ON-GOING

## 2024-02-26 ASSESSMENT — PAIN SCALES - GENERAL: PAINLEVEL_OUTOF10: 5

## 2024-02-26 ASSESSMENT — PAIN DESCRIPTION - DESCRIPTORS: DESCRIPTORS: ACHING

## 2024-02-26 ASSESSMENT — PAIN DESCRIPTION - FREQUENCY: FREQUENCY: INTERMITTENT

## 2024-02-26 ASSESSMENT — PAIN - FUNCTIONAL ASSESSMENT: PAIN_FUNCTIONAL_ASSESSMENT: ACTIVITIES ARE NOT PREVENTED

## 2024-02-26 ASSESSMENT — PAIN DESCRIPTION - LOCATION: LOCATION: HEAD

## 2024-02-26 NOTE — PROGRESS NOTES
Occupational Therapy  Facility/Department: [address]  Occupational Therapy Initial Assessment, Treatment and D/c Note     Name: Nicolas Groves  : 1950  MRN: [id number]  Date of Service: 2024    Discharge Recommendations:  Home with assist PRN  OT Equipment Recommendations  Equipment Needed: No       Patient Diagnosis(es): The primary encounter diagnosis was Syncope and collapse. Diagnoses of Hypotension, unspecified hypotension type, Hyponatremia, and Aortic valve stenosis, etiology of cardiac valve disease unspecified were also pertinent to this visit.  Past Medical History:  has a past medical history of Abdominal aortic atherosclerosis (HCC), Allergic rhinitis, Atherosclerosis of superior mesenteric artery (HCC), CAD (coronary artery disease), Cerebral artery occlusion with cerebral infarction (HCC), Chronic kidney disease, Chronic pansinusitis, Clostridium difficile colitis, Colon polyps, COPD (chronic obstructive pulmonary disease) (HCC), COVID-19 virus infection, CVA (cerebral infarction), DDD (degenerative disc disease), Dizziness, Epicondylitis elbow, medial, GERD (gastroesophageal reflux disease), Headache, Hyperlipidemia, Hypertension, Ischemic colitis (HCC), Lumbar foraminal stenosis, Lung disease, Osteoarthritis, hand, PVD (peripheral vascular disease) with claudication (HCC), Renal artery atherosclerosis (HCC), SMA stenosis, Spinal stenosis of lumbar region without neurogenic claudication, Thoracic aorta atherosclerosis (HCC), Venous insufficiency, and Vertebral artery dissection (HCC).  Past Surgical History:  has a past surgical history that includes Venous clot surgery (2003); Colonoscopy (); Inguinal hernia repair (Bilateral, 2013); Colonoscopy (2015); Upper gastrointestinal endoscopy (2015); angioplasty (Left, 2016); Arterial bypass surgry (Left, 2016); angioplasty (2016); Arterial bypass surgry (2016); Upper

## 2024-02-26 NOTE — PROGRESS NOTES
V2.0    [facility] Progress Note      Name:  Nicolas Groves /Age/Sex: 1950  (74 y.o. male)   MRN & CSN:  [id number] & [id number] Encounter Date/Time: 2024 12:39 PM EST   [facility]  [facility] PCP: Km Johns III, MD     Attending:Clyde Almaguer MD       Hospital Day: 2    Assessment and Recommendations   74 y.o. male with history of peripheral arterial disease with dementia, carotid artery stenosis bilaterally, history of renal artery atherosclerosis, aortic atherosclerosis, mesenteric artery atherosclerosis, GERD, hypertension, CAD, COPD, history of aortic stenosis who presented following syncopal episode at home today.     Hyponatremia  Syncope and fall  PAD with dementia  Hypertension  GERD  CAD  COPD not in exacerbation  Aortic stenosis  GERD    Plan:   Will send urine osmolality, serum osmolality urine sodium, urine creatinine random cortisol and TSH  Nephrology consulted for hyponatremia workup  Continue gentle IV fluid  Continue to check orthostatic  Cardiology consult  Resume other home medications for comorbidities  Labs in a.m.      Likely discharge tomorrow morning if cleared by nephrology        Comment: Please note this report has been produced using speech recognition software and may contain errors related to that system including errors in grammar, punctuation, and spelling, as well as words and phrases that may be inappropriate. If there are any questions or concerns please feel free to contact the dictating provider for clarification.   Diet ADULT DIET; Regular; Low Fat/Low Chol/High Fiber/ANGELA; 1200 ml   DVT Prophylaxis [] Lovenox, []  Heparin, [x] SCDs, [] Ambulation,  [] Eliquis, [] Xarelto   Code Status Full Code   Disposition From: Home  Expected Disposition:  Home  Estimated Date of Discharge:      Surrogate Decision Maker/ POA       Personally reviewed Lab Studies and Imaging     Discussed management of the case with     Imaging that was interpreted personally     Drugs

## 2024-02-26 NOTE — PLAN OF CARE
Problem: Safety - Adult  Goal: Free from fall injury  Outcome: Progressing  Flowsheets (Taken 2/26/2024 1720)  Free From Fall Injury: Instruct family/caregiver on patient safety  Note: Pt is a high fall risk (see Hernandez Fall Risk assessment).  Oriented pt to room and call light. Instructed pt to call for help when needed.  Call light and personal items within reach.  Bed in lowest position, brakes on, and 2/4 side rails up.  Non-skid footwear on. Falls risk stop sign on door; hourly visual checks implemented.  Falls risk arm band on pt.  Bed alarm is on.  Pt using call light appropriately.  Will continue to monitor.       Problem: Pain  Goal: Verbalizes/displays adequate comfort level or baseline comfort level  Outcome: Progressing  Flowsheets (Taken 2/26/2024 1720)  Verbalizes/displays adequate comfort level or baseline comfort level:   Encourage patient to monitor pain and request assistance   Assess pain using appropriate pain scale   Administer analgesics based on type and severity of pain and evaluate response  Note: Pt encouraged to be active participant in pain management during shift. Pt complained of 5/10 headache alleviated with PRN acetaminophen.      Problem: Metabolic/Fluid and Electrolytes - Adult  Goal: Electrolytes maintained within normal limits  Outcome: Progressing  Flowsheets (Taken 2/26/2024 1721)  Electrolytes maintained within normal limits: Monitor labs and assess patient for signs and symptoms of electrolyte imbalances  Note:   Lab Results   Component Value Date     (L) 02/26/2024    K 4.5 02/26/2024    CL 91 (L) 02/26/2024    CO2 25 02/26/2024     Lab Results   Component Value Date/Time    MG 2.40 06/27/2022 04:41 AM      Problem: Hematologic - Adult  Goal: Maintains hematologic stability  Outcome: Progressing  Flowsheets (Taken 2/26/2024 1721)  Maintains hematologic stability:   Assess for signs and symptoms of bleeding or hemorrhage   Monitor labs for bleeding or clotting

## 2024-02-26 NOTE — PROGRESS NOTES
Peripheral IV assessed.  20 gauge in the righ wrist.  Site is leaking. Dressing shows new drainage.  IV removed and replaced.  Staff RNs to assess per floor policy.    Peripheral IV placed in patient.  Site cleansed with chlorhexidine, 20 Gauge IV placed in right forearm. Positive blood return noted, IV flushed, normal saline locked and capped.    Keyur Serrano RN

## 2024-02-26 NOTE — PROGRESS NOTES
Spoke with pt and wife at length re pt's frequent syncopal episodes. Both stated they usually happen on a Sunday. Looking back at the last 3 admissions including this one, they had been on Sundays. Asked wife what pt does differently on this day of the week. Usually pt wakes at 3314-0683 daily except Sunday where he wakes at 0800. Does not eat anything until after [facility] (lunch time). Also takes his extended release BP med and BID BP med earlier on Sunday before [facility] when he usually takes it around 1100 all the other days. Discussed possibility maybe pt is taking his BP meds hours too soon on Sunday in combination with not eating and sugars being low on these days during the AM. Sugars the last two Sunday admissions were in the 70s. Wife told info and discussion would be noted to let MD know.

## 2024-02-26 NOTE — PROGRESS NOTES
Physical Therapy  Facility/Department: [address]  Physical Therapy Initial Assessment, Treatment and DC Summary     Name: Nicolas Groves  : 1950  MRN: [id number]  Date of Service: 2024    Discharge Recommendations:  Home with assist PRN   PT Equipment Recommendations  Equipment Needed: No      Patient Diagnosis(es): The primary encounter diagnosis was Syncope and collapse. Diagnoses of Hypotension, unspecified hypotension type, Hyponatremia, and Aortic valve stenosis, etiology of cardiac valve disease unspecified were also pertinent to this visit.  Past Medical History:  has a past medical history of Abdominal aortic atherosclerosis (HCC), Allergic rhinitis, Atherosclerosis of superior mesenteric artery (HCC), CAD (coronary artery disease), Cerebral artery occlusion with cerebral infarction (HCC), Chronic kidney disease, Chronic pansinusitis, Clostridium difficile colitis, Colon polyps, COPD (chronic obstructive pulmonary disease) (HCC), COVID-19 virus infection, CVA (cerebral infarction), DDD (degenerative disc disease), Dizziness, Epicondylitis elbow, medial, GERD (gastroesophageal reflux disease), Headache, Hyperlipidemia, Hypertension, Ischemic colitis (HCC), Lumbar foraminal stenosis, Lung disease, Osteoarthritis, hand, PVD (peripheral vascular disease) with claudication (HCC), Renal artery atherosclerosis (HCC), SMA stenosis, Spinal stenosis of lumbar region without neurogenic claudication, Thoracic aorta atherosclerosis (HCC), Venous insufficiency, and Vertebral artery dissection (HCC).  Past Surgical History:  has a past surgical history that includes Venous clot surgery (2003); Colonoscopy (); Inguinal hernia repair (Bilateral, 2013); Colonoscopy (2015); Upper gastrointestinal endoscopy (2015); angioplasty (Left, 2016); Arterial bypass surgry (Left, 2016); angioplasty (2016); Arterial bypass surgry (2016); Upper gastrointestinal

## 2024-02-26 NOTE — ACP (ADVANCE CARE PLANNING)
Advance Care Planning     General Advance Care Planning (ACP) Conversation    Date of Conversation: 2/26/2024  Conducted with: Patient with Decision Making Capacity    Healthcare Decision Maker:    Primary Decision Maker: Annamaria Groves - Spouse - [phone]  Click here to complete Healthcare Decision Makers including selection of the Healthcare Decision Maker Relationship (ie \"Primary\").   Today we documented Decision Maker(s) consistent with Legal Next of Kin hierarchy.    Length of Voluntary ACP Conversation in minutes:  <16 minutes (Non-Billable)    NATAN Sinha   for [facility] ([facility])  Office Phone: [phone]  [employer] Mobile: [phone]

## 2024-02-27 VITALS
SYSTOLIC BLOOD PRESSURE: 131 MMHG | OXYGEN SATURATION: 99 % | TEMPERATURE: 97.5 F | HEART RATE: 82 BPM | RESPIRATION RATE: 16 BRPM | DIASTOLIC BLOOD PRESSURE: 73 MMHG | WEIGHT: 133.82 LBS | BODY MASS INDEX: 21.51 KG/M2 | HEIGHT: 66 IN

## 2024-02-27 PROBLEM — R51.9 HEADACHE: Status: RESOLVED | Noted: 2024-02-25 | Resolved: 2024-02-27

## 2024-02-27 PROBLEM — R55 SYNCOPE AND COLLAPSE: Status: RESOLVED | Noted: 2024-02-25 | Resolved: 2024-02-27

## 2024-02-27 PROBLEM — I95.9 HYPOTENSION: Status: ACTIVE | Noted: 2024-02-27

## 2024-02-27 PROBLEM — E87.1 HYPONATREMIA: Status: RESOLVED | Noted: 2022-06-23 | Resolved: 2024-02-27

## 2024-02-27 PROBLEM — I95.9 HYPOTENSION: Status: RESOLVED | Noted: 2024-02-27 | Resolved: 2024-02-27

## 2024-02-27 LAB
ANION GAP SERPL CALCULATED.3IONS-SCNC: 14 MMOL/L (ref 3–16)
BUN SERPL-MCNC: 9 MG/DL (ref 7–20)
CALCIUM SERPL-MCNC: 9.1 MG/DL (ref 8.3–10.6)
CHLORIDE SERPL-SCNC: 94 MMOL/L (ref 99–110)
CO2 SERPL-SCNC: 24 MMOL/L (ref 21–32)
CREAT SERPL-MCNC: 0.9 MG/DL (ref 0.8–1.3)
CREAT UR-MCNC: 33.3 MG/DL (ref 39–259)
CREAT UR-MCNC: 67.2 MG/DL (ref 39–259)
GFR SERPLBLD CREATININE-BSD FMLA CKD-EPI: >60 ML/MIN/{1.73_M2}
GLUCOSE SERPL-MCNC: 94 MG/DL (ref 70–99)
POTASSIUM SERPL-SCNC: 3.9 MMOL/L (ref 3.5–5.1)
PROT UR-MCNC: 21 MG/DL
PROT/CREAT UR-RTO: 0.3 MG/DL
REASON FOR REJECTION: NORMAL
REJECTED TEST: NORMAL
SODIUM SERPL-SCNC: 130 MMOL/L (ref 136–145)
TROPONIN, HIGH SENSITIVITY: 15 NG/L (ref 0–22)
TROPONIN, HIGH SENSITIVITY: 15 NG/L (ref 0–22)
TROPONIN, HIGH SENSITIVITY: ABNORMAL NG/L (ref 0–22)

## 2024-02-27 PROCEDURE — 84484 ASSAY OF TROPONIN QUANT: CPT

## 2024-02-27 PROCEDURE — 82570 ASSAY OF URINE CREATININE: CPT

## 2024-02-27 PROCEDURE — 2580000003 HC RX 258: Performed by: INTERNAL MEDICINE

## 2024-02-27 PROCEDURE — 36415 COLL VENOUS BLD VENIPUNCTURE: CPT

## 2024-02-27 PROCEDURE — 6370000000 HC RX 637 (ALT 250 FOR IP): Performed by: STUDENT IN AN ORGANIZED HEALTH CARE EDUCATION/TRAINING PROGRAM

## 2024-02-27 PROCEDURE — 84295 ASSAY OF SERUM SODIUM: CPT

## 2024-02-27 PROCEDURE — 6370000000 HC RX 637 (ALT 250 FOR IP): Performed by: INTERNAL MEDICINE

## 2024-02-27 PROCEDURE — 6370000000 HC RX 637 (ALT 250 FOR IP): Performed by: NURSE PRACTITIONER

## 2024-02-27 PROCEDURE — G0378 HOSPITAL OBSERVATION PER HR: HCPCS

## 2024-02-27 PROCEDURE — 96361 HYDRATE IV INFUSION ADD-ON: CPT

## 2024-02-27 PROCEDURE — 84156 ASSAY OF PROTEIN URINE: CPT

## 2024-02-27 PROCEDURE — 80048 BASIC METABOLIC PNL TOTAL CA: CPT

## 2024-02-27 RX ORDER — NIFEDIPINE 30 MG
30 TABLET, EXTENDED RELEASE ORAL DAILY
Status: DISCONTINUED | OUTPATIENT
Start: 2024-02-27 | End: 2024-02-27 | Stop reason: HOSPADM

## 2024-02-27 RX ADMIN — Medication 2000 UNITS: at 08:12

## 2024-02-27 RX ADMIN — SODIUM CHLORIDE, PRESERVATIVE FREE 10 ML: 5 INJECTION INTRAVENOUS at 08:17

## 2024-02-27 RX ADMIN — NIFEDIPINE 30 MG: 30 TABLET, EXTENDED RELEASE ORAL at 16:26

## 2024-02-27 RX ADMIN — FERROUS SULFATE TAB 325 MG (65 MG ELEMENTAL FE) 325 MG: 325 (65 FE) TAB at 08:12

## 2024-02-27 RX ADMIN — THERA TABS 1 TABLET: TAB at 08:12

## 2024-02-27 RX ADMIN — ATORVASTATIN CALCIUM 80 MG: 80 TABLET, FILM COATED ORAL at 08:11

## 2024-02-27 RX ADMIN — MEMANTINE HYDROCHLORIDE 5 MG: 10 TABLET ORAL at 08:11

## 2024-02-27 RX ADMIN — CLOPIDOGREL BISULFATE 75 MG: 75 TABLET ORAL at 08:11

## 2024-02-27 RX ADMIN — ASPIRIN 81 MG: 81 TABLET, COATED ORAL at 08:11

## 2024-02-27 RX ADMIN — CARVEDILOL 12.5 MG: 12.5 TABLET, FILM COATED ORAL at 08:12

## 2024-02-27 RX ADMIN — PANTOPRAZOLE SODIUM 40 MG: 40 TABLET, DELAYED RELEASE ORAL at 06:57

## 2024-02-27 NOTE — CARE COORDINATION
Addendum at 3:57pm: Discharge order noted. CM/SW not following. No HHC or DME orders entered in epic. Remains on reoom air per vitals in epic at 99%.     No discharge needs identified.     7:45am: Chart review completed. SW met with pt yesterday at bedside and he plans on returning home with no needs.     97% on room air per vitals in epic.     CM/SW continues to not follow. Please consult if needs arise.    Jennifer SPRING, NATAN   for [facility] ([facility])  Office Phone: [phone]  [employer] Mobile: [phone]      
discuss the discharge plan with any other family members/significant others, and if so, who? Yes  Plans to Return to Present Housing: Yes  Other Identified Issues/Barriers to RETURNING to current housing: n/a  Potential Assistance needed at discharge: N/A            Potential DME:    Patient expects to discharge to: House  Plan for transportation at discharge:      Financial    Payor: MEDICARE / Plan: MEDICARE PART A AND B / Product Type: *No Product type* /     Does insurance require precert for SNF: No    Potential assistance Purchasing Medications: No  Meds-to-Beds request:        [id number] - [place], [place] - [address] -  [phone] - F [phone]  [address] [address]  Phone: [phone] Fax: [phone]      Notes:    Factors facilitating achievement of predicted outcomes: Family support, Caregiver support, Motivated, Cooperative, Pleasant, and Good insight into deficits    Barriers to discharge: observation status    Additional Case Management Notes: Chart review completed. Spoke with pt at bedside this AM. Pt stated he is independent with his ADL's and will return home when able. Discussed skilled home care and he declined, along with all needs from SW on discharge.     Pt was provided with his MOON per request of CM Management. Explanation provided and original given to pt.     Pt is on room air at 91% per vitals in epic.     CM/SW not following. Please consult if needs arise.     The Plan for Transition of Care is related to the following treatment goals of Syncope and collapse [R55]  Hyponatremia [E87.1]  Hypotension, unspecified hypotension type [I95.9]  Aortic valve stenosis, etiology of cardiac valve disease unspecified [I35.0]    NATAN Sinha   for [facility] ([facility])  Office Phone: [phone]  [employer] Mobile: [phone]

## 2024-02-27 NOTE — PROGRESS NOTES
Echo 2/26/2024: Normal LV size and function, EF 60%, no regional wall motion abnormalities, normal diastolic function, mild aortic stenosis, normal RV.    In view of above unremarkable echo, no further cardiac testing is necessary.  Patient can be released home if okay with participating teams.  Nifedipine could be resumed if BP remains elevated.  There are no further recommendations at this time and we will sign off.      I would like to thank you for providing me the opportunity to participate in the care of your patient. If you have any questions, please do not hesitate to contact me.     Adam Beckman MD, FAC, [facility]  [facility] [place] [address]  Ph: [phone]  Fax: [phone]

## 2024-02-27 NOTE — PLAN OF CARE
Problem: Safety - Adult  Goal: Free from fall injury  Outcome: Progressing  Free From Fall Injury:   Instruct family/caregiver on patient safety   Based on caregiver fall risk screen, instruct family/caregiver to ask for assistance with transferring infant if caregiver noted to have fall risk factors  Note: Patient verbalizes understanding using call light for assistance. Call light within reach with bed in lowest position and bed alarm on.     Problem: ABCDS Injury Assessment  Goal: Absence of physical injury  Outcome: Progressing  Absence of Physical Injury: Implement safety measures based on patient assessment  Note: Orthostatic vital signs obtained at start of shift - see flowsheet for details.  Pt meets criteria for orthostasis.  Pt is a Med fall risk. See Chadwick Fall Score and ABCDS Injury Risk assessments.   + Screening for Orthostasis and/or + High Fall Risk per CHADWICK/ABCDS: Explained fall risk precautions to pt and family and rationale behind their use to keep the patient safe. Pt bed is in low position, side rails up, call light and belongings are in reach. Fall wristband applied and present on pts wrist.  Bed alarm on.  Pt encouraged to call for assistance. Will continue with hourly rounds for PO intake, pain needs, toileting and repositioning as needed.     Problem: Pain  Goal: Verbalizes/displays adequate comfort level or baseline comfort level  Outcome: Progressing  Verbalizes/displays adequate comfort level or baseline comfort level:   Encourage patient to monitor pain and request assistance   Assess pain using appropriate pain scale   Administer analgesics based on type and severity of pain and evaluate response  Note: Pt encouraged to be active participant in pain management during shift. Pt complained of 5/10 headache alleviated with PRN acetaminophen.     Problem: Metabolic/Fluid and Electrolytes - Adult  Goal: Electrolytes maintained within normal limits  Outcome: Progressing  Electrolytes

## 2024-02-27 NOTE — PROGRESS NOTES
BP rechecked one hour post PO nifedipine. Pt PIV removed. Pt read discharge instructions and given opportunity to ask questions, no further questions at this time. All pt belongings gathered and escorted to front lobby with pt via whee chair. Pt assisted into private vehicle driven by pt friend to be driven to private residence. Room torn down.

## 2024-02-27 NOTE — DISCHARGE SUMMARY
V2.0  Discharge Summary    Name:  Nicolas Groves /Age/Sex: 1950 (74 y.o. male)   Admit Date: 2024  Discharge Date: 24    MRN & CSN:  [id number] & [id number] Encounter Date and Time 24 3:53 PM EST    Attending:  Oly Contreras MD Discharging Provider: Oly Contreras MD       Hospital Course:     Brief HPI: 74 y.o. male with history of peripheral arterial disease with dementia, carotid artery stenosis bilaterally, history of renal artery atherosclerosis, aortic atherosclerosis, mesenteric artery atherosclerosis, GERD, hypertension, CAD, COPD, history of aortic stenosis who presented following syncopal episode. reports preceding lightheadedness on standing trying to get to the bathroom followed by a fall. The wife had a thud and came to see the patient and found him on the floor facing up with no significant injuries did not called EMS. According to the wife this was a brief loss of consciousness. In the ER, chest x-ray with clear lung fields CT brain with no acute pathology Troponins negative Laboratory workup noted for sodium of 125.    Brief Problem Based Course:   Vasovagal syncope/fall  Hypertension  Hyponatremia  Mild aortic stenosis/GERD/CAD/PAD    Plan:  *Presented with vasovagal syncope and fall  Orthostatic vitals on presentation positive  Antihypertensives held on presentation  Received IV fluids resuscitation with subsequent resolution of orthostatic vitals  High-sensitivity BRANDEN negative  TSH 2.07  Cardiology consulted  Transthoracic echo shows LVEF of 60 to 65%, mild AS  No further cardiac workup per cardiology    *Patient's blood pressure slowly increased  Currently hypertensive  Cardiology okay with resumption of nifedipine due to persistently elevated blood pressure  Outpatient follow-up with PCP    *Hyponatremia likely hypovolemic in nature  Seen by nephrology  Resolved after IV fluid resuscitation  TSH 2.07  Follow-up as an outpatient  Continue home aspirin, statin,

## 2024-02-28 NOTE — CONSULTS
Nephrology Consult Note                                                                                                                                                                                                                                                                                                                                                               Office : [phone]     Fax :[phone]    Patient's Name: Nicolas Groves  9:43 AM  2/27/2024    Reason for Consult:  Hyponatremia   Requesting Physician:  Km Johns III, MD  Chief Complaint:    Chief Complaint   Patient presents with    Loss of Consciousness       Assessment/Plan     # Hyponatremia - Improving   - Na improving. On IVF   - Urine studies reviewed   - Cortisol pending, TSH normal   - Fluid restrict 1.2L daily for now  - Monitor Na daily     # Orthostatic hypotension   - On IVF  - Nifedipine stopped  - Cards consulted   - Monitor BP     # Syncope and collapse  - In the setting of orthostatic hypotension  - Echo with EF 60-65%, normal DD, mild AS  - On IVF, as above    # CAD, mild AS  - On BB, Plavix, and ASA       History of Present Ilness:    Nicolas Groves is a 74 y.o. male with a PMH of PAD, dementia, CAD, GERD, COPD, and aortic stenosis, who presented to the ED following a syncopal episode at home. Patient reported feeling lightheaded on standing when trying to get to the bathroom followed by a fall. Of note, patient hadn't been drinking many fluids at home. In the ER, CT head was without any acute issues. Lab work-up was remarkable for a sodium level of 125. We are consulted for further evaluation and management of hyponatremia.     Interval hx:    Sitting up in a chair  Feeling better  On IVF   Eating well   Reports history of beer drinking     Na improving   BP's improved  Good UOP  Urine studies reviewed  Echo reviewed       Past Medical History:   Diagnosis Date    Abdominal aortic atherosclerosis (HCC)     
Nephrology Consult Note                                                                                                                                                                                                                                                                                                                                                               Office : [phone]     Fax :[phone]    Patient's Name: Nicolas Groves  10:23 PM  2/27/2024    Reason for Consult:  Hyponatremia   Requesting Physician:  Km Johns III, MD  Chief Complaint:    Chief Complaint   Patient presents with    Loss of Consciousness       Assessment/Plan     # Hyponatremia   - On IVF   - Urine studies reviewed   - Cortisol pending, TSH normal   - Fluid restrict 1.2L daily for now  - Monitor Na daily     # Orthostatic hypotension   - On IVF  - Nifedipine stopped  - Cards consulted   - Monitor BP     # Syncope and collapse  - In the setting of orthostatic hypotension  - Echo with EF 60-65%, normal DD, mild AS  - On IVF, as above    # CAD, mild AS  - On BB, Plavix, and ASA       History of Present Ilness:    Nicolas Groves is a 74 y.o. male with a PMH of PAD, dementia, CAD, GERD, COPD, and aortic stenosis, who presented to the ED following a syncopal episode at home. Patient reported feeling lightheaded on standing when trying to get to the bathroom followed by a fall. Of note, patient hadn't been drinking many fluids at home. In the ER, CT head was without any acute issues. Lab work-up was remarkable for a sodium level of 125. We are consulted for further evaluation and management of hyponatremia.     Interval hx:        Past Medical History:   Diagnosis Date    Abdominal aortic atherosclerosis (HCC)     Allergic rhinitis     Atherosclerosis of superior mesenteric artery (HCC)     CAD (coronary artery disease)     Cerebral artery occlusion with cerebral infarction (HCC)     Chronic kidney disease     Chronic 
    All 12 point review of symptoms completed. Pertinent positives identified in the HPI, all other review of symptoms negative as below.    CONSTITUTIONAL: No fatigue  SKIN: No rash or pruritis.  ENT: No Headaches, hearing loss or vertigo. No mouth sores or sore throat.  CARDIOVASCULAR: No chest pain/chest pressure/chest discomfort. No palpitations. No edema.   RESPIRATORY: No dyspnea. No cough or wheezing, no sputum production.  GASTROINTESTINAL: No N/V/D. No abdominal pain, appetite loss, blood in stools.  GENITOURINARY: No dysuria, trouble voiding, or hematuria.  MUSCULOSKELETAL:  No gait disturbance, weakness or joint complaints.  NEUROLOGICAL: +syncope  ENDOCRINE: No excessive thirst, fluid intake, or urination. No tremor.  HEMATOLOGIC: No abnormal bruising or bleeding.  ALLERGY: No nasal congestion or hives.      Physical Examination:     Vitals:    02/26/24 0000 02/26/24 0303 02/26/24 0911 02/26/24 1112   BP: (!) 150/58 (!) 155/67 (!) 145/65 119/63   Pulse: 74 86 74 71   Resp: 17 21 14 16   Temp:  98 °F (36.7 °C) 98.5 °F (36.9 °C) 98.1 °F (36.7 °C)   TempSrc:  Oral Oral Oral   SpO2: 93% 91% 93% 91%   Weight:   60.7 kg (133 lb 13.1 oz)    Height:           Wt Readings from Last 3 Encounters:   02/26/24 60.7 kg (133 lb 13.1 oz)   01/29/24 62.2 kg (137 lb 3.2 oz)   01/25/24 63 kg (139 lb)         General Appearance:  Alert, cooperative, no distress, appears stated age, appropriate weight   Head:  Normocephalic, without obvious abnormality, atraumatic   Neck: Supple, symmetrical, trachea midline, no adenopathy, thyroid: not enlarged, symmetric, no tenderness/mass/nodules, no carotid bruit.        Lungs:   Respirations unlabored, clear to auscultation bilaterally, without any wheezes, rubs or ronchi.    Chest Wall:  No tenderness or deformity   Heart:  Regular rhythm, rate is controlled, S1, S2 normal, there is II/VI RUSB systolic murmur, there is no rub or gallop, cannot assess jvd, no bilateral lower extremity

## 2024-05-08 ENCOUNTER — HOSPITAL ENCOUNTER (OUTPATIENT)
Dept: GENERAL RADIOLOGY | Age: 74
Discharge: HOME | End: 2024-05-08
Payer: MEDICARE

## 2024-05-08 DIAGNOSIS — R07.89 OTHER CHEST PAIN: ICD-10-CM

## 2024-05-08 DIAGNOSIS — R07.9 RIGHT-SIDED CHEST PAIN: ICD-10-CM

## 2024-05-08 PROCEDURE — 71046 X-RAY EXAM CHEST 2 VIEWS: CPT

## 2024-09-19 ENCOUNTER — OFFICE VISIT (OUTPATIENT)
Dept: VASCULAR SURGERY | Age: 74
End: 2024-09-19
Payer: MEDICARE

## 2024-09-19 VITALS
HEIGHT: 66 IN | SYSTOLIC BLOOD PRESSURE: 158 MMHG | DIASTOLIC BLOOD PRESSURE: 80 MMHG | BODY MASS INDEX: 21.69 KG/M2 | WEIGHT: 135 LBS

## 2024-09-19 DIAGNOSIS — K55.1 ATHEROSCLEROSIS OF SUPERIOR MESENTERIC ARTERY (HCC): Primary | ICD-10-CM

## 2024-09-19 DIAGNOSIS — I79.8 OTHER DISORDERS OF ARTERIES, ARTERIOLES AND CAPILLARIES IN DISEASES CLASSIFIED ELSEWHERE (HCC): ICD-10-CM

## 2024-09-19 DIAGNOSIS — Z95.828 S/P VASCULAR BYPASS: ICD-10-CM

## 2024-09-19 DIAGNOSIS — I70.0 ATHEROSCLEROSIS OF ABDOMINAL AORTA (HCC): ICD-10-CM

## 2024-09-19 DIAGNOSIS — I65.23 CAROTID STENOSIS, ASYMPTOMATIC, BILATERAL: ICD-10-CM

## 2024-09-19 DIAGNOSIS — R09.89 OTHER SPECIFIED SYMPTOMS AND SIGNS INVOLVING THE CIRCULATORY AND RESPIRATORY SYSTEMS: ICD-10-CM

## 2024-09-19 DIAGNOSIS — I70.213 ATHEROSCLEROSIS OF NATIVE ARTERY OF BOTH LOWER EXTREMITIES WITH INTERMITTENT CLAUDICATION (HCC): ICD-10-CM

## 2024-09-19 PROCEDURE — 1123F ACP DISCUSS/DSCN MKR DOCD: CPT | Performed by: SURGERY

## 2024-09-19 PROCEDURE — 3077F SYST BP >= 140 MM HG: CPT | Performed by: SURGERY

## 2024-09-19 PROCEDURE — 99214 OFFICE O/P EST MOD 30 MIN: CPT | Performed by: SURGERY

## 2024-09-19 PROCEDURE — 3017F COLORECTAL CA SCREEN DOC REV: CPT | Performed by: SURGERY

## 2024-09-19 PROCEDURE — G8427 DOCREV CUR MEDS BY ELIG CLIN: HCPCS | Performed by: SURGERY

## 2024-09-19 PROCEDURE — 1036F TOBACCO NON-USER: CPT | Performed by: SURGERY

## 2024-09-19 PROCEDURE — G8420 CALC BMI NORM PARAMETERS: HCPCS | Performed by: SURGERY

## 2024-09-19 PROCEDURE — 3079F DIAST BP 80-89 MM HG: CPT | Performed by: SURGERY

## 2024-11-16 ENCOUNTER — HOSPITAL ENCOUNTER (INPATIENT)
Age: 74
LOS: 3 days | Discharge: ANOTHER HOSPITAL | DRG: 190 | End: 2024-11-19
Attending: STUDENT IN AN ORGANIZED HEALTH CARE EDUCATION/TRAINING PROGRAM | Admitting: INTERNAL MEDICINE
Payer: MEDICARE

## 2024-11-16 ENCOUNTER — APPOINTMENT (OUTPATIENT)
Dept: GENERAL RADIOLOGY | Age: 74
DRG: 190 | End: 2024-11-16
Payer: MEDICARE

## 2024-11-16 ENCOUNTER — APPOINTMENT (OUTPATIENT)
Dept: CT IMAGING | Age: 74
DRG: 190 | End: 2024-11-16
Payer: MEDICARE

## 2024-11-16 DIAGNOSIS — J81.0 ACUTE PULMONARY EDEMA: Primary | ICD-10-CM

## 2024-11-16 DIAGNOSIS — R07.9 CHEST PAIN, UNSPECIFIED TYPE: ICD-10-CM

## 2024-11-16 DIAGNOSIS — R79.89 ELEVATED TROPONIN: ICD-10-CM

## 2024-11-16 DIAGNOSIS — R09.02 HYPOXEMIA: ICD-10-CM

## 2024-11-16 DIAGNOSIS — I21.4 NSTEMI (NON-ST ELEVATED MYOCARDIAL INFARCTION) (HCC): ICD-10-CM

## 2024-11-16 LAB
ANION GAP SERPL CALCULATED.3IONS-SCNC: 12 MMOL/L (ref 3–16)
ANTI-XA UNFRAC HEPARIN: 0.43 IU/ML (ref 0.3–0.7)
APTT BLD: >180 SEC (ref 22.1–36.4)
BASE EXCESS BLDV CALC-SCNC: 3.7 MMOL/L (ref -2–3)
BASOPHILS # BLD: 0.1 K/UL (ref 0–0.2)
BASOPHILS NFR BLD: 0.7 %
BUN SERPL-MCNC: 11 MG/DL (ref 7–20)
CALCIUM SERPL-MCNC: 9.3 MG/DL (ref 8.3–10.6)
CHLORIDE SERPL-SCNC: 93 MMOL/L (ref 99–110)
CO2 BLDV-SCNC: 33 MMOL/L
CO2 SERPL-SCNC: 27 MMOL/L (ref 21–32)
COHGB MFR BLDV: 1.2 % (ref 0–1.5)
CREAT SERPL-MCNC: 1 MG/DL (ref 0.8–1.3)
DEPRECATED RDW RBC AUTO: 15.6 % (ref 12.4–15.4)
DEPRECATED RDW RBC AUTO: 15.8 % (ref 12.4–15.4)
DO-HGB MFR BLDV: 64.8 %
EOSINOPHIL # BLD: 0.1 K/UL (ref 0–0.6)
EOSINOPHIL NFR BLD: 1.7 %
FERRITIN SERPL IA-MCNC: 410 NG/ML (ref 30–400)
FLUAV RNA RESP QL NAA+PROBE: NOT DETECTED
FLUBV RNA RESP QL NAA+PROBE: NOT DETECTED
GFR SERPLBLD CREATININE-BSD FMLA CKD-EPI: 79 ML/MIN/{1.73_M2}
GLUCOSE SERPL-MCNC: 94 MG/DL (ref 70–99)
HCO3 BLDV-SCNC: 31 MMOL/L (ref 24–28)
HCT VFR BLD AUTO: 43.1 % (ref 40.5–52.5)
HCT VFR BLD AUTO: 45 % (ref 40.5–52.5)
HGB BLD-MCNC: 14.5 G/DL (ref 13.5–17.5)
HGB BLD-MCNC: 15 G/DL (ref 13.5–17.5)
LYMPHOCYTES # BLD: 0.4 K/UL (ref 1–5.1)
LYMPHOCYTES NFR BLD: 5 %
MAGNESIUM SERPL-MCNC: 1.74 MG/DL (ref 1.8–2.4)
MCH RBC QN AUTO: 34.2 PG (ref 26–34)
MCH RBC QN AUTO: 34.6 PG (ref 26–34)
MCHC RBC AUTO-ENTMCNC: 33.4 G/DL (ref 31–36)
MCHC RBC AUTO-ENTMCNC: 33.7 G/DL (ref 31–36)
MCV RBC AUTO: 102.6 FL (ref 80–100)
MCV RBC AUTO: 102.7 FL (ref 80–100)
METHGB MFR BLDV: 0.3 % (ref 0–1.5)
MONOCYTES # BLD: 0.8 K/UL (ref 0–1.3)
MONOCYTES NFR BLD: 9.8 %
NEUTROPHILS # BLD: 6.8 K/UL (ref 1.7–7.7)
NEUTROPHILS NFR BLD: 82.8 %
NT-PROBNP SERPL-MCNC: 2316 PG/ML (ref 0–124)
PCO2 BLDV: 56.4 MMHG (ref 41–51)
PH BLDV: 7.35 [PH] (ref 7.35–7.45)
PLATELET # BLD AUTO: 184 K/UL (ref 135–450)
PLATELET # BLD AUTO: 210 K/UL (ref 135–450)
PMV BLD AUTO: 7.4 FL (ref 5–10.5)
PMV BLD AUTO: 7.8 FL (ref 5–10.5)
PO2 BLDV: <30 MMHG (ref 25–40)
POTASSIUM SERPL-SCNC: 4.6 MMOL/L (ref 3.5–5.1)
RBC # BLD AUTO: 4.2 M/UL (ref 4.2–5.9)
RBC # BLD AUTO: 4.38 M/UL (ref 4.2–5.9)
SAO2 % BLDV: 34 %
SARS-COV-2 RNA RESP QL NAA+PROBE: NOT DETECTED
SODIUM SERPL-SCNC: 132 MMOL/L (ref 136–145)
TROPONIN, HIGH SENSITIVITY: 23 NG/L (ref 0–22)
TROPONIN, HIGH SENSITIVITY: 41 NG/L (ref 0–22)
TROPONIN, HIGH SENSITIVITY: 65 NG/L (ref 0–22)
TROPONIN, HIGH SENSITIVITY: 74 NG/L (ref 0–22)
TSH SERPL DL<=0.005 MIU/L-ACNC: 2.86 UIU/ML (ref 0.27–4.2)
WBC # BLD AUTO: 10.2 K/UL (ref 4–11)
WBC # BLD AUTO: 8.2 K/UL (ref 4–11)

## 2024-11-16 PROCEDURE — 6360000002 HC RX W HCPCS: Performed by: FAMILY MEDICINE

## 2024-11-16 PROCEDURE — 6360000002 HC RX W HCPCS

## 2024-11-16 PROCEDURE — 71046 X-RAY EXAM CHEST 2 VIEWS: CPT

## 2024-11-16 PROCEDURE — 87636 SARSCOV2 & INF A&B AMP PRB: CPT

## 2024-11-16 PROCEDURE — 83735 ASSAY OF MAGNESIUM: CPT

## 2024-11-16 PROCEDURE — 99285 EMERGENCY DEPT VISIT HI MDM: CPT

## 2024-11-16 PROCEDURE — 82803 BLOOD GASES ANY COMBINATION: CPT

## 2024-11-16 PROCEDURE — 2060000000 HC ICU INTERMEDIATE R&B

## 2024-11-16 PROCEDURE — 4A023N7 MEASUREMENT OF CARDIAC SAMPLING AND PRESSURE, LEFT HEART, PERCUTANEOUS APPROACH: ICD-10-PCS | Performed by: INTERNAL MEDICINE

## 2024-11-16 PROCEDURE — 6360000004 HC RX CONTRAST MEDICATION: Performed by: NURSE PRACTITIONER

## 2024-11-16 PROCEDURE — 2580000003 HC RX 258: Performed by: FAMILY MEDICINE

## 2024-11-16 PROCEDURE — 85730 THROMBOPLASTIN TIME PARTIAL: CPT

## 2024-11-16 PROCEDURE — B2111ZZ FLUOROSCOPY OF MULTIPLE CORONARY ARTERIES USING LOW OSMOLAR CONTRAST: ICD-10-PCS | Performed by: INTERNAL MEDICINE

## 2024-11-16 PROCEDURE — 85027 COMPLETE CBC AUTOMATED: CPT

## 2024-11-16 PROCEDURE — 70450 CT HEAD/BRAIN W/O DYE: CPT

## 2024-11-16 PROCEDURE — 36415 COLL VENOUS BLD VENIPUNCTURE: CPT

## 2024-11-16 PROCEDURE — 84443 ASSAY THYROID STIM HORMONE: CPT

## 2024-11-16 PROCEDURE — 6370000000 HC RX 637 (ALT 250 FOR IP)

## 2024-11-16 PROCEDURE — 93005 ELECTROCARDIOGRAM TRACING: CPT | Performed by: NURSE PRACTITIONER

## 2024-11-16 PROCEDURE — 84484 ASSAY OF TROPONIN QUANT: CPT

## 2024-11-16 PROCEDURE — 71260 CT THORAX DX C+: CPT

## 2024-11-16 PROCEDURE — 83550 IRON BINDING TEST: CPT

## 2024-11-16 PROCEDURE — 83540 ASSAY OF IRON: CPT

## 2024-11-16 PROCEDURE — 80048 BASIC METABOLIC PNL TOTAL CA: CPT

## 2024-11-16 PROCEDURE — 85520 HEPARIN ASSAY: CPT

## 2024-11-16 PROCEDURE — 93005 ELECTROCARDIOGRAM TRACING: CPT | Performed by: STUDENT IN AN ORGANIZED HEALTH CARE EDUCATION/TRAINING PROGRAM

## 2024-11-16 PROCEDURE — 85025 COMPLETE CBC W/AUTO DIFF WBC: CPT

## 2024-11-16 PROCEDURE — 82728 ASSAY OF FERRITIN: CPT

## 2024-11-16 PROCEDURE — 83880 ASSAY OF NATRIURETIC PEPTIDE: CPT

## 2024-11-16 PROCEDURE — 6360000002 HC RX W HCPCS: Performed by: NURSE PRACTITIONER

## 2024-11-16 RX ORDER — FUROSEMIDE 10 MG/ML
40 INJECTION INTRAMUSCULAR; INTRAVENOUS ONCE
Status: COMPLETED | OUTPATIENT
Start: 2024-11-16 | End: 2024-11-16

## 2024-11-16 RX ORDER — SODIUM CHLORIDE 9 MG/ML
INJECTION, SOLUTION INTRAVENOUS PRN
Status: DISCONTINUED | OUTPATIENT
Start: 2024-11-16 | End: 2024-11-19 | Stop reason: HOSPADM

## 2024-11-16 RX ORDER — ALBUTEROL SULFATE 0.83 MG/ML
2.5 SOLUTION RESPIRATORY (INHALATION) EVERY 6 HOURS PRN
Status: DISCONTINUED | OUTPATIENT
Start: 2024-11-16 | End: 2024-11-19 | Stop reason: HOSPADM

## 2024-11-16 RX ORDER — CARVEDILOL 12.5 MG/1
12.5 TABLET ORAL 2 TIMES DAILY
Status: DISCONTINUED | OUTPATIENT
Start: 2024-11-16 | End: 2024-11-19 | Stop reason: HOSPADM

## 2024-11-16 RX ORDER — ACETAMINOPHEN 325 MG/1
650 TABLET ORAL EVERY 6 HOURS PRN
Status: DISCONTINUED | OUTPATIENT
Start: 2024-11-16 | End: 2024-11-19 | Stop reason: HOSPADM

## 2024-11-16 RX ORDER — ASPIRIN 81 MG/1
81 TABLET, CHEWABLE ORAL DAILY
Status: DISCONTINUED | OUTPATIENT
Start: 2024-11-17 | End: 2024-11-17

## 2024-11-16 RX ORDER — HEPARIN SODIUM 1000 [USP'U]/ML
60 INJECTION, SOLUTION INTRAVENOUS; SUBCUTANEOUS PRN
Status: DISCONTINUED | OUTPATIENT
Start: 2024-11-16 | End: 2024-11-17

## 2024-11-16 RX ORDER — IPRATROPIUM BROMIDE AND ALBUTEROL SULFATE 2.5; .5 MG/3ML; MG/3ML
1 SOLUTION RESPIRATORY (INHALATION)
Status: DISCONTINUED | OUTPATIENT
Start: 2024-11-16 | End: 2024-11-17

## 2024-11-16 RX ORDER — GLUCAGON 1 MG/ML
1 KIT INJECTION PRN
Status: DISCONTINUED | OUTPATIENT
Start: 2024-11-16 | End: 2024-11-19 | Stop reason: HOSPADM

## 2024-11-16 RX ORDER — VITAMIN B COMPLEX
1 CAPSULE ORAL DAILY
COMMUNITY

## 2024-11-16 RX ORDER — POLYETHYLENE GLYCOL 3350 17 G/17G
17 POWDER, FOR SOLUTION ORAL DAILY PRN
Status: DISCONTINUED | OUTPATIENT
Start: 2024-11-16 | End: 2024-11-19 | Stop reason: HOSPADM

## 2024-11-16 RX ORDER — SODIUM CHLORIDE 0.9 % (FLUSH) 0.9 %
5-40 SYRINGE (ML) INJECTION PRN
Status: DISCONTINUED | OUTPATIENT
Start: 2024-11-16 | End: 2024-11-19 | Stop reason: HOSPADM

## 2024-11-16 RX ORDER — IPRATROPIUM BROMIDE AND ALBUTEROL SULFATE 2.5; .5 MG/3ML; MG/3ML
1 SOLUTION RESPIRATORY (INHALATION)
Status: CANCELLED | OUTPATIENT
Start: 2024-11-16

## 2024-11-16 RX ORDER — ATORVASTATIN CALCIUM 80 MG/1
80 TABLET, FILM COATED ORAL NIGHTLY
Status: DISCONTINUED | OUTPATIENT
Start: 2024-11-16 | End: 2024-11-16

## 2024-11-16 RX ORDER — IOPAMIDOL 755 MG/ML
75 INJECTION, SOLUTION INTRAVASCULAR
Status: COMPLETED | OUTPATIENT
Start: 2024-11-16 | End: 2024-11-16

## 2024-11-16 RX ORDER — ACETAMINOPHEN 650 MG/1
650 SUPPOSITORY RECTAL EVERY 6 HOURS PRN
Status: DISCONTINUED | OUTPATIENT
Start: 2024-11-16 | End: 2024-11-19 | Stop reason: HOSPADM

## 2024-11-16 RX ORDER — ATORVASTATIN CALCIUM 80 MG/1
80 TABLET, FILM COATED ORAL NIGHTLY
Status: DISCONTINUED | OUTPATIENT
Start: 2024-11-16 | End: 2024-11-19 | Stop reason: HOSPADM

## 2024-11-16 RX ORDER — HEPARIN SODIUM 10000 [USP'U]/100ML
5-30 INJECTION, SOLUTION INTRAVENOUS CONTINUOUS
Status: DISCONTINUED | OUTPATIENT
Start: 2024-11-16 | End: 2024-11-17

## 2024-11-16 RX ORDER — HEPARIN SODIUM 1000 [USP'U]/ML
60 INJECTION, SOLUTION INTRAVENOUS; SUBCUTANEOUS ONCE
Status: COMPLETED | OUTPATIENT
Start: 2024-11-16 | End: 2024-11-16

## 2024-11-16 RX ORDER — FUROSEMIDE 10 MG/ML
40 INJECTION INTRAMUSCULAR; INTRAVENOUS 2 TIMES DAILY
Status: DISCONTINUED | OUTPATIENT
Start: 2024-11-16 | End: 2024-11-17

## 2024-11-16 RX ORDER — LANOLIN ALCOHOL/MO/W.PET/CERES
1000 CREAM (GRAM) TOPICAL DAILY
COMMUNITY

## 2024-11-16 RX ORDER — DEXTROSE MONOHYDRATE 100 MG/ML
INJECTION, SOLUTION INTRAVENOUS CONTINUOUS PRN
Status: DISCONTINUED | OUTPATIENT
Start: 2024-11-16 | End: 2024-11-19 | Stop reason: HOSPADM

## 2024-11-16 RX ORDER — ALBUTEROL SULFATE 90 UG/1
2 INHALANT RESPIRATORY (INHALATION) EVERY 6 HOURS PRN
Status: DISCONTINUED | OUTPATIENT
Start: 2024-11-16 | End: 2024-11-16 | Stop reason: CLARIF

## 2024-11-16 RX ORDER — SODIUM CHLORIDE 0.9 % (FLUSH) 0.9 %
5-40 SYRINGE (ML) INJECTION EVERY 12 HOURS SCHEDULED
Status: DISCONTINUED | OUTPATIENT
Start: 2024-11-16 | End: 2024-11-19 | Stop reason: HOSPADM

## 2024-11-16 RX ORDER — MAGNESIUM SULFATE IN WATER 40 MG/ML
2000 INJECTION, SOLUTION INTRAVENOUS ONCE
Status: COMPLETED | OUTPATIENT
Start: 2024-11-16 | End: 2024-11-16

## 2024-11-16 RX ORDER — PANTOPRAZOLE SODIUM 40 MG/1
40 TABLET, DELAYED RELEASE ORAL
Status: DISCONTINUED | OUTPATIENT
Start: 2024-11-17 | End: 2024-11-17

## 2024-11-16 RX ORDER — HEPARIN SODIUM 1000 [USP'U]/ML
30 INJECTION, SOLUTION INTRAVENOUS; SUBCUTANEOUS PRN
Status: DISCONTINUED | OUTPATIENT
Start: 2024-11-16 | End: 2024-11-17

## 2024-11-16 RX ADMIN — FUROSEMIDE 40 MG: 10 INJECTION, SOLUTION INTRAMUSCULAR; INTRAVENOUS at 15:15

## 2024-11-16 RX ADMIN — HEPARIN SODIUM 3600 UNITS: 1000 INJECTION INTRAVENOUS; SUBCUTANEOUS at 15:48

## 2024-11-16 RX ADMIN — IOPAMIDOL 75 ML: 755 INJECTION, SOLUTION INTRAVENOUS at 13:21

## 2024-11-16 RX ADMIN — FUROSEMIDE 40 MG: 10 INJECTION, SOLUTION INTRAMUSCULAR; INTRAVENOUS at 18:14

## 2024-11-16 RX ADMIN — CARVEDILOL 12.5 MG: 12.5 TABLET, FILM COATED ORAL at 19:57

## 2024-11-16 RX ADMIN — MAGNESIUM SULFATE HEPTAHYDRATE 2000 MG: 40 INJECTION, SOLUTION INTRAVENOUS at 19:06

## 2024-11-16 RX ADMIN — SODIUM CHLORIDE, PRESERVATIVE FREE 10 ML: 5 INJECTION INTRAVENOUS at 19:57

## 2024-11-16 RX ADMIN — HEPARIN SODIUM 12 UNITS/KG/HR: 10000 INJECTION, SOLUTION INTRAVENOUS at 15:47

## 2024-11-16 RX ADMIN — ATORVASTATIN CALCIUM 80 MG: 80 TABLET, FILM COATED ORAL at 19:57

## 2024-11-16 NOTE — ED NOTES
02 Sat 98%, titrated down to 4L NC. Remains asymtptomatic, NAD. Will continue to monitor     Sabine Leal RN  11/16/24 1058    
RA sat down to 75%. Mask removed, set in high fowlers, placed on 2L O2. Increase to 4L O2 then 6L 02 per NC with max Sat 83%. Placed on NRB temporarily.        Sabine Leal RN  11/16/24 1143    
To xray per tech via Sabine Price RN  11/16/24 3904    
110

## 2024-11-16 NOTE — ED PROVIDER NOTES
ED Attending Attestation Note     Date of evaluation: 11/16/2024    This patient was seen by the advance practice provider.  I have seen and examined the patient, agree with the workup, evaluation, management and diagnosis. The care plan has been discussed.  I have reviewed the ECG and concur with the NERI's interpretation.  My assessment reveals a gentleman who is coughing frequently and reports some difficulty breathing.  I performed a bedside ultrasound that shows diffuse ejection fraction and some possible but unclear abnormalities of movement at the apex of the heart.  Given his workup and presentation there is concern for potential PE given his hypoxia.  However his CTPA is negative.  His tropes are elevated and then had a significant downturn concerning for reversible ischemia.  Other laboratory workup is inconsistent with infection.  He has not been cardiac rate stratified with stress test or cath recently.  Thus we will proceed with admission, heparinization, and plan for cardiovascular evaluation.        Avis Clemente MD  11/16/24 1700    
reactive to light.   Cardiovascular:      Rate and Rhythm: Normal rate and regular rhythm.      Pulses: Normal pulses.      Heart sounds: Normal heart sounds.   Pulmonary:      Effort: Pulmonary effort is normal.      Breath sounds: Wheezing and rhonchi present.      Comments: Desatted to 75% on room air during my evaluation  Musculoskeletal:         General: Normal range of motion.      Cervical back: Normal range of motion and neck supple.   Skin:     General: Skin is warm and dry.      Capillary Refill: Capillary refill takes less than 2 seconds.   Neurological:      General: No focal deficit present.      Mental Status: He is alert and oriented to person, place, and time.   Psychiatric:         Mood and Affect: Mood normal.         Behavior: Behavior normal.             Gladys Mauricio, APRN - CNP  11/16/24 4228

## 2024-11-17 ENCOUNTER — APPOINTMENT (OUTPATIENT)
Dept: MRI IMAGING | Age: 74
DRG: 190 | End: 2024-11-17
Payer: MEDICARE

## 2024-11-17 LAB
ALBUMIN SERPL-MCNC: 4.1 G/DL (ref 3.4–5)
ANION GAP SERPL CALCULATED.3IONS-SCNC: 10 MMOL/L (ref 3–16)
ANTI-XA UNFRAC HEPARIN: 0.51 IU/ML (ref 0.3–0.7)
ANTI-XA UNFRAC HEPARIN: 0.96 IU/ML (ref 0.3–0.7)
APTT BLD: 34.2 SEC (ref 22.1–36.4)
BUN SERPL-MCNC: 16 MG/DL (ref 7–20)
CALCIUM SERPL-MCNC: 9.3 MG/DL (ref 8.3–10.6)
CHLORIDE SERPL-SCNC: 93 MMOL/L (ref 99–110)
CHOLEST SERPL-MCNC: 111 MG/DL (ref 0–199)
CO2 SERPL-SCNC: 31 MMOL/L (ref 21–32)
CREAT SERPL-MCNC: 1.3 MG/DL (ref 0.8–1.3)
DEPRECATED RDW RBC AUTO: 15.6 % (ref 12.4–15.4)
EKG ATRIAL RATE: 74 BPM
EKG ATRIAL RATE: 81 BPM
EKG ATRIAL RATE: 82 BPM
EKG DIAGNOSIS: NORMAL
EKG P AXIS: 51 DEGREES
EKG P AXIS: 54 DEGREES
EKG P AXIS: 78 DEGREES
EKG P-R INTERVAL: 160 MS
EKG P-R INTERVAL: 166 MS
EKG P-R INTERVAL: 198 MS
EKG Q-T INTERVAL: 354 MS
EKG Q-T INTERVAL: 372 MS
EKG Q-T INTERVAL: 374 MS
EKG QRS DURATION: 76 MS
EKG QRS DURATION: 76 MS
EKG QRS DURATION: 78 MS
EKG QTC CALCULATION (BAZETT): 413 MS
EKG QTC CALCULATION (BAZETT): 415 MS
EKG QTC CALCULATION (BAZETT): 432 MS
EKG R AXIS: 63 DEGREES
EKG R AXIS: 73 DEGREES
EKG R AXIS: 78 DEGREES
EKG T AXIS: 175 DEGREES
EKG T AXIS: 94 DEGREES
EKG T AXIS: 96 DEGREES
EKG VENTRICULAR RATE: 74 BPM
EKG VENTRICULAR RATE: 81 BPM
EKG VENTRICULAR RATE: 82 BPM
FOLATE SERPL-MCNC: 12.9 NG/ML (ref 4.78–24.2)
GFR SERPLBLD CREATININE-BSD FMLA CKD-EPI: 58 ML/MIN/{1.73_M2}
GLUCOSE SERPL-MCNC: 108 MG/DL (ref 70–99)
HCT VFR BLD AUTO: 39.6 % (ref 40.5–52.5)
HDLC SERPL-MCNC: 64 MG/DL (ref 40–60)
HGB BLD-MCNC: 13.4 G/DL (ref 13.5–17.5)
INR PPP: 1.07 (ref 0.85–1.15)
IRON SATN MFR SERPL: 11 % (ref 20–50)
IRON SERPL-MCNC: 35 UG/DL (ref 59–158)
LDLC SERPL CALC-MCNC: 36 MG/DL
MAGNESIUM SERPL-MCNC: 2.5 MG/DL (ref 1.8–2.4)
MCH RBC QN AUTO: 34.5 PG (ref 26–34)
MCHC RBC AUTO-ENTMCNC: 33.8 G/DL (ref 31–36)
MCV RBC AUTO: 102.2 FL (ref 80–100)
ORGANISM: ABNORMAL
PHOSPHATE SERPL-MCNC: 4.7 MG/DL (ref 2.5–4.9)
PLATELET # BLD AUTO: 182 K/UL (ref 135–450)
PMV BLD AUTO: 7.5 FL (ref 5–10.5)
POTASSIUM SERPL-SCNC: 3.9 MMOL/L (ref 3.5–5.1)
PROTHROMBIN TIME: 14.1 SEC (ref 11.9–14.9)
RBC # BLD AUTO: 3.88 M/UL (ref 4.2–5.9)
REPORT: NORMAL
RESP PATH DNA+RNA PNL L RESP NAA+NON-PRB: ABNORMAL
SODIUM SERPL-SCNC: 134 MMOL/L (ref 136–145)
TIBC SERPL-MCNC: 325 UG/DL (ref 260–445)
TRIGL SERPL-MCNC: 55 MG/DL (ref 0–150)
TROPONIN, HIGH SENSITIVITY: 53 NG/L (ref 0–22)
TROPONIN, HIGH SENSITIVITY: 85 NG/L (ref 0–22)
VIT B12 SERPL-MCNC: 1318 PG/ML (ref 211–911)
VLDLC SERPL CALC-MCNC: 11 MG/DL
WBC # BLD AUTO: 7.4 K/UL (ref 4–11)

## 2024-11-17 PROCEDURE — 2060000000 HC ICU INTERMEDIATE R&B

## 2024-11-17 PROCEDURE — 6370000000 HC RX 637 (ALT 250 FOR IP): Performed by: HOSPITALIST

## 2024-11-17 PROCEDURE — 85027 COMPLETE CBC AUTOMATED: CPT

## 2024-11-17 PROCEDURE — 72148 MRI LUMBAR SPINE W/O DYE: CPT

## 2024-11-17 PROCEDURE — 85520 HEPARIN ASSAY: CPT

## 2024-11-17 PROCEDURE — 87081 CULTURE SCREEN ONLY: CPT

## 2024-11-17 PROCEDURE — 87581 M.PNEUMON DNA AMP PROBE: CPT

## 2024-11-17 PROCEDURE — 6370000000 HC RX 637 (ALT 250 FOR IP)

## 2024-11-17 PROCEDURE — 84484 ASSAY OF TROPONIN QUANT: CPT

## 2024-11-17 PROCEDURE — 6370000000 HC RX 637 (ALT 250 FOR IP): Performed by: INTERNAL MEDICINE

## 2024-11-17 PROCEDURE — 36415 COLL VENOUS BLD VENIPUNCTURE: CPT

## 2024-11-17 PROCEDURE — 87633 RESP VIRUS 12-25 TARGETS: CPT

## 2024-11-17 PROCEDURE — 80061 LIPID PANEL: CPT

## 2024-11-17 PROCEDURE — 85730 THROMBOPLASTIN TIME PARTIAL: CPT

## 2024-11-17 PROCEDURE — 93005 ELECTROCARDIOGRAM TRACING: CPT | Performed by: FAMILY MEDICINE

## 2024-11-17 PROCEDURE — 83735 ASSAY OF MAGNESIUM: CPT

## 2024-11-17 PROCEDURE — 2580000003 HC RX 258

## 2024-11-17 PROCEDURE — 80069 RENAL FUNCTION PANEL: CPT

## 2024-11-17 PROCEDURE — 82746 ASSAY OF FOLIC ACID SERUM: CPT

## 2024-11-17 PROCEDURE — 6360000002 HC RX W HCPCS

## 2024-11-17 PROCEDURE — 94761 N-INVAS EAR/PLS OXIMETRY MLT: CPT

## 2024-11-17 PROCEDURE — 72146 MRI CHEST SPINE W/O DYE: CPT

## 2024-11-17 PROCEDURE — 94640 AIRWAY INHALATION TREATMENT: CPT

## 2024-11-17 PROCEDURE — 2700000000 HC OXYGEN THERAPY PER DAY

## 2024-11-17 PROCEDURE — 2580000003 HC RX 258: Performed by: FAMILY MEDICINE

## 2024-11-17 PROCEDURE — 85610 PROTHROMBIN TIME: CPT

## 2024-11-17 PROCEDURE — 82607 VITAMIN B-12: CPT

## 2024-11-17 RX ORDER — ASPIRIN 325 MG
325 TABLET, DELAYED RELEASE (ENTERIC COATED) ORAL ONCE
Status: COMPLETED | OUTPATIENT
Start: 2024-11-17 | End: 2024-11-17

## 2024-11-17 RX ORDER — CLOPIDOGREL BISULFATE 75 MG/1
75 TABLET ORAL DAILY
Status: DISCONTINUED | OUTPATIENT
Start: 2024-11-17 | End: 2024-11-17

## 2024-11-17 RX ORDER — ASPIRIN 81 MG/1
81 TABLET, CHEWABLE ORAL DAILY
Status: DISCONTINUED | OUTPATIENT
Start: 2024-11-18 | End: 2024-11-19 | Stop reason: HOSPADM

## 2024-11-17 RX ORDER — HEPARIN SODIUM 10000 [USP'U]/100ML
5-30 INJECTION, SOLUTION INTRAVENOUS CONTINUOUS
Status: DISCONTINUED | OUTPATIENT
Start: 2024-11-17 | End: 2024-11-19 | Stop reason: HOSPADM

## 2024-11-17 RX ORDER — PANTOPRAZOLE SODIUM 40 MG/1
40 TABLET, DELAYED RELEASE ORAL
Status: DISCONTINUED | OUTPATIENT
Start: 2024-11-17 | End: 2024-11-19 | Stop reason: HOSPADM

## 2024-11-17 RX ORDER — AMLODIPINE BESYLATE 5 MG/1
5 TABLET ORAL DAILY
Status: DISCONTINUED | OUTPATIENT
Start: 2024-11-17 | End: 2024-11-17

## 2024-11-17 RX ORDER — PREDNISONE 20 MG/1
40 TABLET ORAL DAILY
Status: DISCONTINUED | OUTPATIENT
Start: 2024-11-17 | End: 2024-11-19 | Stop reason: HOSPADM

## 2024-11-17 RX ORDER — SODIUM CHLORIDE 9 MG/ML
INJECTION, SOLUTION INTRAVENOUS CONTINUOUS
Status: ACTIVE | OUTPATIENT
Start: 2024-11-17 | End: 2024-11-17

## 2024-11-17 RX ORDER — CLOPIDOGREL BISULFATE 75 MG/1
75 TABLET ORAL DAILY
Status: DISCONTINUED | OUTPATIENT
Start: 2024-11-17 | End: 2024-11-19 | Stop reason: HOSPADM

## 2024-11-17 RX ORDER — ENOXAPARIN SODIUM 100 MG/ML
40 INJECTION SUBCUTANEOUS DAILY
Status: DISCONTINUED | OUTPATIENT
Start: 2024-11-18 | End: 2024-11-17

## 2024-11-17 RX ORDER — LOSARTAN POTASSIUM 25 MG/1
12.5 TABLET ORAL 2 TIMES DAILY
Status: DISCONTINUED | OUTPATIENT
Start: 2024-11-17 | End: 2024-11-19 | Stop reason: HOSPADM

## 2024-11-17 RX ORDER — AZITHROMYCIN 250 MG/1
500 TABLET, FILM COATED ORAL DAILY
Status: COMPLETED | OUTPATIENT
Start: 2024-11-17 | End: 2024-11-19

## 2024-11-17 RX ORDER — IPRATROPIUM BROMIDE AND ALBUTEROL SULFATE 2.5; .5 MG/3ML; MG/3ML
1 SOLUTION RESPIRATORY (INHALATION)
Status: DISCONTINUED | OUTPATIENT
Start: 2024-11-17 | End: 2024-11-18

## 2024-11-17 RX ADMIN — CARVEDILOL 12.5 MG: 12.5 TABLET, FILM COATED ORAL at 19:42

## 2024-11-17 RX ADMIN — ASPIRIN 325 MG: 325 TABLET, COATED ORAL at 10:35

## 2024-11-17 RX ADMIN — PANTOPRAZOLE SODIUM 40 MG: 40 TABLET, DELAYED RELEASE ORAL at 06:17

## 2024-11-17 RX ADMIN — IPRATROPIUM BROMIDE AND ALBUTEROL SULFATE 1 DOSE: 2.5; .5 SOLUTION RESPIRATORY (INHALATION) at 19:54

## 2024-11-17 RX ADMIN — IPRATROPIUM BROMIDE AND ALBUTEROL SULFATE 1 DOSE: 2.5; .5 SOLUTION RESPIRATORY (INHALATION) at 08:12

## 2024-11-17 RX ADMIN — AZITHROMYCIN DIHYDRATE 500 MG: 250 TABLET, FILM COATED ORAL at 10:17

## 2024-11-17 RX ADMIN — SODIUM CHLORIDE: 9 INJECTION, SOLUTION INTRAVENOUS at 12:28

## 2024-11-17 RX ADMIN — HEPARIN SODIUM 12 UNITS/KG/HR: 10000 INJECTION, SOLUTION INTRAVENOUS at 20:02

## 2024-11-17 RX ADMIN — PANTOPRAZOLE SODIUM 40 MG: 40 TABLET, DELAYED RELEASE ORAL at 16:04

## 2024-11-17 RX ADMIN — PREDNISONE 40 MG: 20 TABLET ORAL at 10:17

## 2024-11-17 RX ADMIN — SODIUM CHLORIDE, PRESERVATIVE FREE 10 ML: 5 INJECTION INTRAVENOUS at 19:42

## 2024-11-17 RX ADMIN — SODIUM CHLORIDE, PRESERVATIVE FREE 10 ML: 5 INJECTION INTRAVENOUS at 10:21

## 2024-11-17 RX ADMIN — ATORVASTATIN CALCIUM 80 MG: 80 TABLET, FILM COATED ORAL at 19:42

## 2024-11-17 RX ADMIN — CLOPIDOGREL BISULFATE 75 MG: 75 TABLET ORAL at 12:28

## 2024-11-17 RX ADMIN — AMLODIPINE BESYLATE 5 MG: 5 TABLET ORAL at 10:20

## 2024-11-17 NOTE — PLAN OF CARE
Problem: Cardiovascular - Adult  Goal: Maintains optimal cardiac output and hemodynamic stability  Outcome: Progressing  Flowsheets (Taken 11/16/2024 1948)  Maintains optimal cardiac output and hemodynamic stability:   Monitor blood pressure and heart rate   Monitor urine output and notify Licensed Independent Practitioner for values outside of normal range   Assess for signs of decreased cardiac output   Administer fluid and/or volume expanders as ordered  Note: Pt has not had any complaints of chest pain, lightheadedness/dizziness, or shortness of breath since admission to unit.      Problem: Respiratory - Adult  Goal: Achieves optimal ventilation and oxygenation  Outcome: Progressing  Flowsheets (Taken 11/16/2024 1948)  Achieves optimal ventilation and oxygenation:   Assess for changes in respiratory status   Assess for changes in mentation and behavior   Position to facilitate oxygenation and minimize respiratory effort   Oxygen supplementation based on oxygen saturation or arterial blood gases  Note: Pt has maintained SpO2 > 92% on 4L NC with no complaints of shortness of breath or dyspnea noted

## 2024-11-17 NOTE — PLAN OF CARE
Problem: Cardiovascular - Adult  Goal: Maintains optimal cardiac output and hemodynamic stability  Outcome: Progressing  Flowsheets (Taken 11/17/2024 1634)  Maintains optimal cardiac output and hemodynamic stability:   Monitor blood pressure and heart rate   Monitor urine output and notify Licensed Independent Practitioner for values outside of normal range   Assess for signs of decreased cardiac output   Administer fluid and/or volume expanders as ordered  Note: Thus far this shift pt has maintained stable vitals with no complaints of chest pain, lightheadedness/dizziness, or shortness of breath/dyspnea.      Problem: Respiratory - Adult  Goal: Achieves optimal ventilation and oxygenation  Outcome: Progressing  Flowsheets (Taken 11/17/2024 1634)  Achieves optimal ventilation and oxygenation:   Assess for changes in respiratory status   Assess for changes in mentation and behavior   Position to facilitate oxygenation and minimize respiratory effort   Oxygen supplementation based on oxygen saturation or arterial blood gases  Note: Pt currently on 1L NC. Pt titrated down from 2L NC. Pt has not had any complaints of shortness of breath/dyspnea.

## 2024-11-17 NOTE — RT PROTOCOL NOTE
RT Nebulizer Bronchodilator Protocol Note    There is a bronchodilator order in the chart from a provider indicating to follow the RT Bronchodilator Protocol and there is an “Initiate RT Bronchodilator Protocol” order as well (see protocol at bottom of note).    CXR Findings:  No results found.    The findings from the last RT Protocol Assessment were as follows:  Smoking: Smoker 15 pack years or more  Respiratory Pattern: Regular pattern and RR 12-20 bpm  Breath Sounds: Slightly diminished and/or crackles  Cough: Strong, productive  Indication for Bronchodilator Therapy:    Bronchodilator Assessment Score: 4    Aerosolized bronchodilator medication orders have been revised according to the RT Nebulizer Bronchodilator Protocol below.    Respiratory Therapist to perform RT Therapy Protocol Assessment initially then follow the protocol.  Repeat RT Therapy Protocol Assessment PRN for score 0-3 or on second treatment, BID, and PRN for scores above 3.    No Indications - adjust the frequency to every 6 hours PRN wheezing or bronchospasm, if no treatments needed after 48 hours then discontinue using Per Protocol order mode.     If indication present, adjust the RT bronchodilator orders based on the Bronchodilator Assessment Score as indicated below.  If a patient is on this medication at home then do not decrease Frequency below that used at home.    0-3 - enter or revise RT bronchodilator order(s) to equivalent RT Bronchodilator order with Frequency of every 4 hours PRN for wheezing or increased work of breathing using Per Protocol order mode.       4-6 - enter or revise RT Bronchodilator order(s) to two equivalent RT bronchodilator orders with one order with BID Frequency and one order with Frequency of every 4 hours PRN wheezing or increased work of breathing using Per Protocol order mode.         7-10 - enter or revise RT Bronchodilator order(s) to two equivalent RT bronchodilator orders with one order with TID

## 2024-11-17 NOTE — H&P
abnormality.      Atelectasis involving the medial segment right middle lobe.      Age indeterminate compression fractures of the T8 and L2 vertebral bodies, new from 1/15/2024, potentially chronic. If there is concern for recent fracture, MRI would be better for assessment.      Electronically signed by Giacomo Diaz MD      XR CHEST (2 VW)   Final Result      1. No findings for acute cardiopulmonary disease.         Electronically signed by Miah Alanis      MRI LUMBAR SPINE WO CONTRAST    (Results Pending)   MRI THORACIC SPINE WO CONTRAST    (Results Pending)         Assessment & Plan   74 y.o. male who presented to the ED after sustaining a fall.     NSTEMI versus COPD exacerbation  Patient is complaining of chest pain and MAJANO which is relieved with rest for the past few weeks.  On arrival to the ED, troponin 43 > 23 > 65.  BNP elevated to 2316.  In the setting of extensive cardiac history with CAD, carotid artery and mesenteric atherosclerosis, CVA and PVD patient needs workup for NSTEMI.  Patient also has increased cough and sputum production from baseline which could likely be due to COPD exacerbation and hence it cannot be ruled out at this point.  CT PE ruled out pulmonary embolism.   -Patient on continuous heparin drip  -Patient started on ASA 81 mg and atorvastatin 80 mg nightly  -Patient received Lasix 40 mg in the ED.  Will hold further Lasix until patient worked up for heart failure as patient does not appear in fluid overload and patient is saturating well at room air.  -Will start him on DuoNebs every 4 hours and PRN albuterol.  Will hold off steroids until cardiac workup completed. Will avoid Azithro at this point for underlying risk of Qtc prolongation.   - viral panel ordered  -Cardiology consulted appreciate recs  -NPO effective midnight  -CBC, and BMP daily  - lipid panel  -Trend troponin     Chronic medical conditions:     Lumbar and cervical degenerative disc disease  -MRI thoracic

## 2024-11-17 NOTE — H&P (VIEW-ONLY)
Cardiology Consultation                                                                    Pt Name: Nicolas Groves  Age: 74 y.o.  Sex: male  : 1950  Location: [facility]/4304-01    Referring Physician: Kashif Beltran MD  Primary cardiologist: Dr Jensen      Reason for Consult:     Reason for Consultation/Chief Complaint: NSTEMI    HPI:      Nicolas Groves is a 74 y.o. male with a past medical history of dementia, CVA , CAD, PAD, mild AS, orthostatic hypotension.    Echo 2024: Normal LV, LVEF 60%, mild aortic stenosis, normal RV.    Patient presented to the emergency room on  after a fall.  Apparently patient was feeling dizzy and has significant lower extremity cramps.  He was also complaining of exertional shortness of breath and chest discomfort over the last few weeks.  He denies any loss of consciousness.  In the emergency room patient's blood pressure was severely elevated at 160/80 mmHg.  CTA chest negative for PE, unremarkable.  Original ECG was normal.  However repeat ECG  consistent with NSR, ST and T wave abnormalities consistent with anterolateral ischemia.  High-sensitivity troponin 23, up at 74.  Creatinine 1.3, hemoglobin 13.  Patient was admitted for NSTEMI versus acute COPD exacerbation, severe hypertension.  He was started on his home antihypertensive medications and heparin drip.  He received 1 dose of Lasix yesterday.  Cardiology was consulted today.  Patient currently reports no symptoms.      Histories     Past Medical History:   has a past medical history of Abdominal aortic atherosclerosis (HCC), Allergic rhinitis, Atherosclerosis of superior mesenteric artery (HCC), CAD (coronary artery disease), Cerebral artery occlusion with cerebral infarction (HCC), Chronic kidney disease, Chronic pansinusitis, Clostridium difficile colitis, Colon polyps, COPD (chronic obstructive pulmonary disease) (HCC), COVID-19 virus infection, CVA (cerebral infarction), DDD (degenerative disc

## 2024-11-18 PROBLEM — I25.10 MULTIPLE VESSEL CORONARY ARTERY DISEASE: Status: ACTIVE | Noted: 2024-11-18

## 2024-11-18 LAB
ALBUMIN SERPL-MCNC: 2.5 G/DL (ref 3.4–5)
ANION GAP SERPL CALCULATED.3IONS-SCNC: 9 MMOL/L (ref 3–16)
ANTI-XA UNFRAC HEPARIN: 0.36 IU/ML (ref 0.3–0.7)
ANTI-XA UNFRAC HEPARIN: 0.45 IU/ML (ref 0.3–0.7)
BUN SERPL-MCNC: 14 MG/DL (ref 7–20)
CALCIUM SERPL-MCNC: 8.2 MG/DL (ref 8.3–10.6)
CHLORIDE SERPL-SCNC: 93 MMOL/L (ref 99–110)
CO2 SERPL-SCNC: 28 MMOL/L (ref 21–32)
CREAT SERPL-MCNC: 0.9 MG/DL (ref 0.8–1.3)
DEPRECATED RDW RBC AUTO: 15.3 % (ref 12.4–15.4)
ECHO BSA: 1.68 M2
EKG ATRIAL RATE: 60 BPM
EKG DIAGNOSIS: NORMAL
EKG P AXIS: 47 DEGREES
EKG P-R INTERVAL: 172 MS
EKG Q-T INTERVAL: 434 MS
EKG QRS DURATION: 84 MS
EKG QTC CALCULATION (BAZETT): 434 MS
EKG R AXIS: 55 DEGREES
EKG T AXIS: 142 DEGREES
EKG VENTRICULAR RATE: 60 BPM
FOLATE SERPL-MCNC: 9.72 NG/ML (ref 4.78–24.2)
GFR SERPLBLD CREATININE-BSD FMLA CKD-EPI: 90 ML/MIN/{1.73_M2}
GLUCOSE SERPL-MCNC: 109 MG/DL (ref 70–99)
HCT VFR BLD AUTO: 36.7 % (ref 40.5–52.5)
HGB BLD-MCNC: 12.3 G/DL (ref 13.5–17.5)
MAGNESIUM SERPL-MCNC: 1.93 MG/DL (ref 1.8–2.4)
MCH RBC QN AUTO: 34.3 PG (ref 26–34)
MCHC RBC AUTO-ENTMCNC: 33.5 G/DL (ref 31–36)
MCV RBC AUTO: 102.4 FL (ref 80–100)
PHOSPHATE SERPL-MCNC: 3.2 MG/DL (ref 2.5–4.9)
PLATELET # BLD AUTO: 159 K/UL (ref 135–450)
PMV BLD AUTO: 7.1 FL (ref 5–10.5)
POTASSIUM SERPL-SCNC: 4.3 MMOL/L (ref 3.5–5.1)
RBC # BLD AUTO: 3.58 M/UL (ref 4.2–5.9)
SODIUM SERPL-SCNC: 130 MMOL/L (ref 136–145)
VIT B12 SERPL-MCNC: 1428 PG/ML (ref 211–911)
WBC # BLD AUTO: 8.5 K/UL (ref 4–11)

## 2024-11-18 PROCEDURE — 6370000000 HC RX 637 (ALT 250 FOR IP)

## 2024-11-18 PROCEDURE — 2500000003 HC RX 250 WO HCPCS: Performed by: INTERNAL MEDICINE

## 2024-11-18 PROCEDURE — C1894 INTRO/SHEATH, NON-LASER: HCPCS | Performed by: INTERNAL MEDICINE

## 2024-11-18 PROCEDURE — 6360000002 HC RX W HCPCS: Performed by: HOSPITALIST

## 2024-11-18 PROCEDURE — 85027 COMPLETE CBC AUTOMATED: CPT

## 2024-11-18 PROCEDURE — 2700000000 HC OXYGEN THERAPY PER DAY

## 2024-11-18 PROCEDURE — 2060000000 HC ICU INTERMEDIATE R&B

## 2024-11-18 PROCEDURE — 6360000002 HC RX W HCPCS: Performed by: INTERNAL MEDICINE

## 2024-11-18 PROCEDURE — 2580000003 HC RX 258: Performed by: HOSPITALIST

## 2024-11-18 PROCEDURE — C1769 GUIDE WIRE: HCPCS | Performed by: INTERNAL MEDICINE

## 2024-11-18 PROCEDURE — 2580000003 HC RX 258: Performed by: FAMILY MEDICINE

## 2024-11-18 PROCEDURE — 2709999900 HC NON-CHARGEABLE SUPPLY: Performed by: INTERNAL MEDICINE

## 2024-11-18 PROCEDURE — 85520 HEPARIN ASSAY: CPT

## 2024-11-18 PROCEDURE — 94761 N-INVAS EAR/PLS OXIMETRY MLT: CPT

## 2024-11-18 PROCEDURE — 80069 RENAL FUNCTION PANEL: CPT

## 2024-11-18 PROCEDURE — 6370000000 HC RX 637 (ALT 250 FOR IP): Performed by: HOSPITALIST

## 2024-11-18 PROCEDURE — 93010 ELECTROCARDIOGRAM REPORT: CPT | Performed by: INTERNAL MEDICINE

## 2024-11-18 PROCEDURE — 6370000000 HC RX 637 (ALT 250 FOR IP): Performed by: INTERNAL MEDICINE

## 2024-11-18 PROCEDURE — 83735 ASSAY OF MAGNESIUM: CPT

## 2024-11-18 PROCEDURE — 87641 MR-STAPH DNA AMP PROBE: CPT

## 2024-11-18 PROCEDURE — 6360000004 HC RX CONTRAST MEDICATION: Performed by: INTERNAL MEDICINE

## 2024-11-18 PROCEDURE — 93458 L HRT ARTERY/VENTRICLE ANGIO: CPT | Performed by: INTERNAL MEDICINE

## 2024-11-18 PROCEDURE — 2580000003 HC RX 258: Performed by: INTERNAL MEDICINE

## 2024-11-18 PROCEDURE — 36415 COLL VENOUS BLD VENIPUNCTURE: CPT

## 2024-11-18 PROCEDURE — 99152 MOD SED SAME PHYS/QHP 5/>YRS: CPT | Performed by: INTERNAL MEDICINE

## 2024-11-18 RX ORDER — IOPAMIDOL 612 MG/ML
INJECTION, SOLUTION INTRAVASCULAR PRN
Status: DISCONTINUED | OUTPATIENT
Start: 2024-11-18 | End: 2024-11-18 | Stop reason: HOSPADM

## 2024-11-18 RX ORDER — SODIUM CHLORIDE 0.9 % (FLUSH) 0.9 %
5-40 SYRINGE (ML) INJECTION PRN
Status: DISCONTINUED | OUTPATIENT
Start: 2024-11-18 | End: 2024-11-19 | Stop reason: HOSPADM

## 2024-11-18 RX ORDER — NITROGLYCERIN 20 MG/100ML
INJECTION INTRAVENOUS PRN
Status: DISCONTINUED | OUTPATIENT
Start: 2024-11-18 | End: 2024-11-18 | Stop reason: HOSPADM

## 2024-11-18 RX ORDER — HEPARIN SODIUM 1000 [USP'U]/ML
60 INJECTION, SOLUTION INTRAVENOUS; SUBCUTANEOUS PRN
Status: DISCONTINUED | OUTPATIENT
Start: 2024-11-18 | End: 2024-11-19 | Stop reason: HOSPADM

## 2024-11-18 RX ORDER — SODIUM CHLORIDE 9 MG/ML
INJECTION, SOLUTION INTRAVENOUS CONTINUOUS
Status: ACTIVE | OUTPATIENT
Start: 2024-11-18 | End: 2024-11-19

## 2024-11-18 RX ORDER — IPRATROPIUM BROMIDE AND ALBUTEROL SULFATE 2.5; .5 MG/3ML; MG/3ML
1 SOLUTION RESPIRATORY (INHALATION) EVERY 4 HOURS PRN
Status: DISCONTINUED | OUTPATIENT
Start: 2024-11-18 | End: 2024-11-19 | Stop reason: HOSPADM

## 2024-11-18 RX ORDER — HEPARIN SODIUM 1000 [USP'U]/ML
30 INJECTION, SOLUTION INTRAVENOUS; SUBCUTANEOUS PRN
Status: DISCONTINUED | OUTPATIENT
Start: 2024-11-18 | End: 2024-11-19 | Stop reason: HOSPADM

## 2024-11-18 RX ORDER — VERAPAMIL HYDROCHLORIDE 2.5 MG/ML
INJECTION, SOLUTION INTRAVENOUS PRN
Status: DISCONTINUED | OUTPATIENT
Start: 2024-11-18 | End: 2024-11-18 | Stop reason: HOSPADM

## 2024-11-18 RX ORDER — HEPARIN SODIUM 1000 [USP'U]/ML
INJECTION, SOLUTION INTRAVENOUS; SUBCUTANEOUS PRN
Status: DISCONTINUED | OUTPATIENT
Start: 2024-11-18 | End: 2024-11-18 | Stop reason: HOSPADM

## 2024-11-18 RX ORDER — ACETAMINOPHEN 325 MG/1
650 TABLET ORAL EVERY 4 HOURS PRN
Status: DISCONTINUED | OUTPATIENT
Start: 2024-11-18 | End: 2024-11-18 | Stop reason: SDUPTHER

## 2024-11-18 RX ORDER — MIDAZOLAM HYDROCHLORIDE 1 MG/ML
INJECTION, SOLUTION INTRAMUSCULAR; INTRAVENOUS PRN
Status: DISCONTINUED | OUTPATIENT
Start: 2024-11-18 | End: 2024-11-18 | Stop reason: HOSPADM

## 2024-11-18 RX ORDER — SODIUM CHLORIDE 0.9 % (FLUSH) 0.9 %
5-40 SYRINGE (ML) INJECTION EVERY 12 HOURS SCHEDULED
Status: DISCONTINUED | OUTPATIENT
Start: 2024-11-18 | End: 2024-11-19 | Stop reason: HOSPADM

## 2024-11-18 RX ORDER — IOPAMIDOL 755 MG/ML
INJECTION, SOLUTION INTRAVASCULAR PRN
Status: DISCONTINUED | OUTPATIENT
Start: 2024-11-18 | End: 2024-11-18 | Stop reason: HOSPADM

## 2024-11-18 RX ORDER — DIVALPROEX SODIUM 500 MG/1
500 TABLET, FILM COATED, EXTENDED RELEASE ORAL NIGHTLY
COMMUNITY

## 2024-11-18 RX ORDER — ASPIRIN 81 MG/1
TABLET, CHEWABLE ORAL PRN
Status: DISCONTINUED | OUTPATIENT
Start: 2024-11-18 | End: 2024-11-18 | Stop reason: HOSPADM

## 2024-11-18 RX ORDER — LIDOCAINE HYDROCHLORIDE 10 MG/ML
INJECTION, SOLUTION EPIDURAL; INFILTRATION; INTRACAUDAL; PERINEURAL PRN
Status: DISCONTINUED | OUTPATIENT
Start: 2024-11-18 | End: 2024-11-18 | Stop reason: HOSPADM

## 2024-11-18 RX ADMIN — PREDNISONE 40 MG: 20 TABLET ORAL at 08:27

## 2024-11-18 RX ADMIN — ASPIRIN 81 MG: 81 TABLET, CHEWABLE ORAL at 08:28

## 2024-11-18 RX ADMIN — PANTOPRAZOLE SODIUM 40 MG: 40 TABLET, DELAYED RELEASE ORAL at 18:12

## 2024-11-18 RX ADMIN — WATER 1000 MG: 1 INJECTION INTRAMUSCULAR; INTRAVENOUS; SUBCUTANEOUS at 08:26

## 2024-11-18 RX ADMIN — SODIUM CHLORIDE, PRESERVATIVE FREE 10 ML: 5 INJECTION INTRAVENOUS at 08:29

## 2024-11-18 RX ADMIN — ATORVASTATIN CALCIUM 80 MG: 80 TABLET, FILM COATED ORAL at 21:21

## 2024-11-18 RX ADMIN — CARVEDILOL 12.5 MG: 12.5 TABLET, FILM COATED ORAL at 08:28

## 2024-11-18 RX ADMIN — AZITHROMYCIN DIHYDRATE 500 MG: 250 TABLET, FILM COATED ORAL at 08:27

## 2024-11-18 RX ADMIN — CLOPIDOGREL BISULFATE 75 MG: 75 TABLET ORAL at 08:27

## 2024-11-18 RX ADMIN — PANTOPRAZOLE SODIUM 40 MG: 40 TABLET, DELAYED RELEASE ORAL at 06:13

## 2024-11-18 RX ADMIN — CARVEDILOL 12.5 MG: 12.5 TABLET, FILM COATED ORAL at 21:21

## 2024-11-18 RX ADMIN — SODIUM CHLORIDE, PRESERVATIVE FREE 10 ML: 5 INJECTION INTRAVENOUS at 21:22

## 2024-11-18 RX ADMIN — SODIUM CHLORIDE, PRESERVATIVE FREE 10 ML: 5 INJECTION INTRAVENOUS at 21:21

## 2024-11-18 RX ADMIN — SODIUM CHLORIDE: 0.9 INJECTION, SOLUTION INTRAVENOUS at 21:16

## 2024-11-18 NOTE — PLAN OF CARE
Problem: Chronic Conditions and Co-morbidities  Goal: Patient's chronic conditions and co-morbidity symptoms are monitored and maintained or improved  Outcome: Progressing     Problem: Discharge Planning  Goal: Discharge to home or other facility with appropriate resources  Outcome: Progressing     Problem: Safety - Adult  Goal: Free from fall injury  Outcome: Progressing     Problem: Cardiovascular - Adult  Goal: Maintains optimal cardiac output and hemodynamic stability  11/18/2024 0345 by Jose Perez RN  Outcome: Progressing     Problem: Cardiovascular - Adult  Goal: Absence of cardiac dysrhythmias or at baseline  Outcome: Progressing     Problem: Respiratory - Adult  Goal: Achieves optimal ventilation and oxygenation  11/18/2024 0345 by Jose Perez, RN  Outcome: Progressing

## 2024-11-18 NOTE — SIGNIFICANT EVENT
73 yo M with HTN, HLD, CAD, COPD, mild aortic stenosis, carotid atherosclerosis, mesenteric atherosclerosis, PVD, CVA, lumbar and cervical degenerative disc disease went to [facility] for syncope.  Treated for orthostatic hypotension and AECOPD.    Underwent LHC on 11/18 and found to have MVCAD.  He is being transferred from [facility] to [facility] for CTS evaluation for possible CABG.    Franck Dean MD

## 2024-11-19 VITALS
OXYGEN SATURATION: 93 % | SYSTOLIC BLOOD PRESSURE: 146 MMHG | RESPIRATION RATE: 18 BRPM | DIASTOLIC BLOOD PRESSURE: 74 MMHG | TEMPERATURE: 98 F | HEIGHT: 66 IN | BODY MASS INDEX: 20.94 KG/M2 | HEART RATE: 71 BPM | WEIGHT: 130.29 LBS

## 2024-11-19 LAB
ALBUMIN SERPL-MCNC: 3.6 G/DL (ref 3.4–5)
ANION GAP SERPL CALCULATED.3IONS-SCNC: 9 MMOL/L (ref 3–16)
ANTI-XA UNFRAC HEPARIN: 0.57 IU/ML (ref 0.3–0.7)
BUN SERPL-MCNC: 13 MG/DL (ref 7–20)
CALCIUM SERPL-MCNC: 8.7 MG/DL (ref 8.3–10.6)
CHLORIDE SERPL-SCNC: 99 MMOL/L (ref 99–110)
CO2 SERPL-SCNC: 29 MMOL/L (ref 21–32)
CREAT SERPL-MCNC: 0.9 MG/DL (ref 0.8–1.3)
DEPRECATED RDW RBC AUTO: 15.6 % (ref 12.4–15.4)
GFR SERPLBLD CREATININE-BSD FMLA CKD-EPI: 90 ML/MIN/{1.73_M2}
GLUCOSE SERPL-MCNC: 93 MG/DL (ref 70–99)
HCT VFR BLD AUTO: 36 % (ref 40.5–52.5)
HGB BLD-MCNC: 12.1 G/DL (ref 13.5–17.5)
MAGNESIUM SERPL-MCNC: 1.97 MG/DL (ref 1.8–2.4)
MCH RBC QN AUTO: 34.6 PG (ref 26–34)
MCHC RBC AUTO-ENTMCNC: 33.7 G/DL (ref 31–36)
MCV RBC AUTO: 102.8 FL (ref 80–100)
MRSA DNA SPEC QL NAA+PROBE: NORMAL
PHOSPHATE SERPL-MCNC: 3 MG/DL (ref 2.5–4.9)
PLATELET # BLD AUTO: 172 K/UL (ref 135–450)
PMV BLD AUTO: 7.6 FL (ref 5–10.5)
POTASSIUM SERPL-SCNC: 3.9 MMOL/L (ref 3.5–5.1)
RBC # BLD AUTO: 3.51 M/UL (ref 4.2–5.9)
SODIUM SERPL-SCNC: 137 MMOL/L (ref 136–145)
WBC # BLD AUTO: 8.9 K/UL (ref 4–11)

## 2024-11-19 PROCEDURE — 2580000003 HC RX 258: Performed by: INTERNAL MEDICINE

## 2024-11-19 PROCEDURE — 6370000000 HC RX 637 (ALT 250 FOR IP): Performed by: INTERNAL MEDICINE

## 2024-11-19 PROCEDURE — 2700000000 HC OXYGEN THERAPY PER DAY

## 2024-11-19 PROCEDURE — 85027 COMPLETE CBC AUTOMATED: CPT

## 2024-11-19 PROCEDURE — 80069 RENAL FUNCTION PANEL: CPT

## 2024-11-19 PROCEDURE — 6370000000 HC RX 637 (ALT 250 FOR IP): Performed by: HOSPITALIST

## 2024-11-19 PROCEDURE — 6370000000 HC RX 637 (ALT 250 FOR IP)

## 2024-11-19 PROCEDURE — 2580000003 HC RX 258: Performed by: HOSPITALIST

## 2024-11-19 PROCEDURE — 94761 N-INVAS EAR/PLS OXIMETRY MLT: CPT

## 2024-11-19 PROCEDURE — 6360000002 HC RX W HCPCS: Performed by: HOSPITALIST

## 2024-11-19 PROCEDURE — 83735 ASSAY OF MAGNESIUM: CPT

## 2024-11-19 PROCEDURE — 85520 HEPARIN ASSAY: CPT

## 2024-11-19 RX ORDER — ATORVASTATIN CALCIUM 80 MG/1
80 TABLET, FILM COATED ORAL NIGHTLY
Qty: 30 TABLET | Refills: 3 | Status: SHIPPED | OUTPATIENT
Start: 2024-11-19

## 2024-11-19 RX ORDER — PANTOPRAZOLE SODIUM 40 MG/1
40 TABLET, DELAYED RELEASE ORAL
Qty: 30 TABLET | Refills: 3 | Status: SHIPPED | OUTPATIENT
Start: 2024-11-19

## 2024-11-19 RX ORDER — PANTOPRAZOLE SODIUM 40 MG/1
40 TABLET, DELAYED RELEASE ORAL
Qty: 30 TABLET | Refills: 3 | Status: CANCELLED | OUTPATIENT
Start: 2024-11-19

## 2024-11-19 RX ADMIN — AZITHROMYCIN DIHYDRATE 500 MG: 250 TABLET, FILM COATED ORAL at 08:33

## 2024-11-19 RX ADMIN — CLOPIDOGREL BISULFATE 75 MG: 75 TABLET ORAL at 08:33

## 2024-11-19 RX ADMIN — PANTOPRAZOLE SODIUM 40 MG: 40 TABLET, DELAYED RELEASE ORAL at 07:22

## 2024-11-19 RX ADMIN — SODIUM CHLORIDE, PRESERVATIVE FREE 10 ML: 5 INJECTION INTRAVENOUS at 08:32

## 2024-11-19 RX ADMIN — CARVEDILOL 12.5 MG: 12.5 TABLET, FILM COATED ORAL at 08:33

## 2024-11-19 RX ADMIN — WATER 1000 MG: 1 INJECTION INTRAMUSCULAR; INTRAVENOUS; SUBCUTANEOUS at 08:32

## 2024-11-19 RX ADMIN — ASPIRIN 81 MG: 81 TABLET, CHEWABLE ORAL at 08:33

## 2024-11-19 RX ADMIN — PREDNISONE 40 MG: 20 TABLET ORAL at 08:33

## 2024-11-19 ASSESSMENT — PAIN SCALES - GENERAL: PAINLEVEL_OUTOF10: 0

## 2024-11-19 NOTE — DISCHARGE INSTR - COC
Continuity of Care Form    Patient Name: Nicolas Groves   :  1950  MRN:  [id number]    Admit date:  2024  Discharge date:  ***    Code Status Order: Full Code   Advance Directives:   Advance Care Flowsheet Documentation             Admitting Physician:  Franck Dean MD  PCP: Km Johns III, MD    Discharging Nurse: ***  Discharging Hospital Unit/Room#: 4304/4304-01  Discharging Unit Phone Number: ***    Emergency Contact:   Extended Emergency Contact Information  Primary Emergency Contact: Annamaria Groves  Address: [address] [place] [address] [facility]  Home Phone: [phone]  Mobile Phone: [phone]  Relation: Spouse    Past Surgical History:  Past Surgical History:   Procedure Laterality Date    ANGIOPLASTY Left 2016    left femoral -popl stent    ANGIOPLASTY  2016    SMA    ANGIOPLASTY Right 2022    Right SFA, Dr. Yan Li    ARTERIAL BYPASS SURGRY Left 2016    left femoral-popliteal bypass graft.    ARTERIAL BYPASS SURGRY  2016    Dr. DE PAZMesh - aorto-SMA bypass using 8mm PTFE    ATHERECTOMY Right 2019    Atherectomy with angioplasty right SFA and popliteal arteries, atherectomy and angioplasty right tibial peroneal trunk    COLONOSCOPY      COLONOSCOPY  2015    Dr. Dale    COLONOSCOPY  2018    Dr. Dale    INGUINAL HERNIA REPAIR Bilateral 2013    bilateral with mesh, Dr. Torres    LUMBAR FUSION  10/23/2020    L4-5 Decompression with anterior/posterior fusion, Dr. Chu, [facility]    SEPTOPLASTY Bilateral 2021    BILATERAL FRONTO-ETHMOIDECTOMY, BILATERAL MAXILLARY ANTROSTOMY WITH TISSUE REMOVAL AND SEPTOPLASTY NASAL SCOPE performed by Rivera Law MD at [facility] OR    UPPER GASTROINTESTINAL ENDOSCOPY  2015    Dr. Dale    UPPER GASTROINTESTINAL ENDOSCOPY N/A 2018    EGD BIOPSY performed by Keila Dale MD at [facility] ENDOSCOPY    VENOUS CLOT SURGERY  2003    R Leg

## 2024-11-19 NOTE — CARE COORDINATION
CASE MANAGEMENT NOTE:     Patient is from home. Patient continues with medical treatment in hospital. Plan is to transfer patient to [facility] per MD. Transfer Center made aware. CM will continue following.   Electronically signed by Tyrese Bojorquez RN on 11/19/2024 at 10:08 AM      CM provided and explained IMM and 4 hour window to Pt. They expressed understanding of their rights to appeal and that they may/will leave the hospital w/o having received the IMM letter 4 hours prior to DC. CM provided Pt a copy of IMM and placed original signed copy on the paperchart. Pt is in agreement w/DC to [facility] today.      
Issues/Barriers to RETURNING to current housing: medical clearance  Potential Assistance needed at discharge: Outpatient PT/OT            Potential DME:    Patient expects to discharge to: House  Plan for transportation at discharge:      Financial    Payor: MEDICARE / Plan: MEDICARE PART A AND B / Product Type: *No Product type* /     Does insurance require precert for SNF: No    Potential assistance Purchasing Medications: No  Meds-to-Beds request: Yes      [id number] - [place], [place] - [address] [phone] - F [phone]  [address] [address]  Phone: [phone] Fax: [phone]      Notes:    Factors facilitating achievement of predicted outcomes: Family support and Pleasant    Barriers to discharge: Medical complications and Medication managment      The Plan for Transition of Care is related to the following treatment goals of Hypoxemia [R09.02]  Acute pulmonary edema [J81.0]  Elevated troponin [R79.89]  NSTEMI (non-ST elevated myocardial infarction) (HCC) [I21.4]    IF APPLICABLE: The Patient and/or patient representative Nicolas and his family were provided with a choice of provider and agrees with the discharge plan. Freedom of choice list with basic dialogue that supports the patient's individualized plan of care/goals and shares the quality data associated with the providers was provided to: Patient   Patient Representative Name:       The Patient and/or Patient Representative Agree with the Discharge Plan? Yes    ALPHONSE Vance, RN    [facility]  Ph: [phone]  Fax: [phone]

## 2024-11-19 NOTE — CONSULTS
Cardiology Consultation                                                                    Pt Name: Nicolas Groves  Age: 74 y.o.  Sex: male  : 1950  Location: [facility]/4304-01    Referring Physician: Kashif Beltran MD  Primary cardiologist: Dr Jensen      Reason for Consult:     Reason for Consultation/Chief Complaint: NSTEMI    HPI:      Nicolas Groves is a 74 y.o. male with a past medical history of dementia, CVA , CAD, PAD, mild AS, orthostatic hypotension.    Echo 2024: Normal LV, LVEF 60%, mild aortic stenosis, normal RV.    Patient presented to the emergency room on  after a fall.  Apparently patient was feeling dizzy and has significant lower extremity cramps.  He was also complaining of exertional shortness of breath and chest discomfort over the last few weeks.  He denies any loss of consciousness.  In the emergency room patient's blood pressure was severely elevated at 160/80 mmHg.  CTA chest negative for PE, unremarkable.  Original ECG was normal.  However repeat ECG  consistent with NSR, ST and T wave abnormalities consistent with anterolateral ischemia.  High-sensitivity troponin 23, up at 74.  Creatinine 1.3, hemoglobin 13.  Patient was admitted for NSTEMI versus acute COPD exacerbation, severe hypertension.  He was started on his home antihypertensive medications and heparin drip.  He received 1 dose of Lasix yesterday.  Cardiology was consulted today.  Patient currently reports no symptoms.      Histories     Past Medical History:   has a past medical history of Abdominal aortic atherosclerosis (HCC), Allergic rhinitis, Atherosclerosis of superior mesenteric artery (HCC), CAD (coronary artery disease), Cerebral artery occlusion with cerebral infarction (HCC), Chronic kidney disease, Chronic pansinusitis, Clostridium difficile colitis, Colon polyps, COPD (chronic obstructive pulmonary disease) (HCC), COVID-19 virus infection, CVA (cerebral infarction), DDD (degenerative disc 
HF RN consult received from Dr Ayala. Chart reviewed.    Pt has known history of HTN / HLD / mild AS. He follows with Dr Jensen. Pt also has hx of COPD and dementia.    Pt presented after a fall which reportedly occurred when he experienced a leg cramp.  Unclear if he lost consciousness or hit his head. He did not recall events in ED.  Family shares he has had \"a cold\" for a few days with cough.   Cardiology was consulted and Dr Beckman noted \"no volume overload\" and no further diuresis.  Pt noted to have NSTEMI vs COPD flare.  Pt underwent LHC and was found to have multivessel disease. He is going to be transferred to [facility] for further treatment.     No HF information / education material added as pt does not have HF.   
of chest pain and MAJANO which is relieved with rest for the past few weeks.  On arrival to the ED, troponin 43 > 23 > 65.  BNP elevated to 2316.  In the setting of extensive cardiac history with CAD, carotid artery and mesenteric atherosclerosis, CVA and PVD patient needs workup for NSTEMI.  Patient also has increased cough and sputum production from baseline which could likely be due to COPD exacerbation and hence it cannot be ruled out at this point.  CT PE ruled out pulmonary embolism.   -Patient on continuous heparin drip  -Patient started on ASA 81 mg and atorvastatin 80 mg nightly  -Patient received Lasix 40 mg in the ED.  Will hold further Lasix until patient worked up for heart failure as patient does not appear in fluid overload and patient is saturating well at room air.  -Will start him on DuoNebs every 4 hours and PRN albuterol.  Will hold off steroids until cardiac workup completed. Will avoid Azithro at this point for underlying risk of Qtc prolongation.   - viral panel ordered  -Cardiology consulted appreciate recs  -NPO effective midnight  -CBC, and BMP daily  - lipid panel  -Trend troponin    Chronic medical conditions:    Lumbar and cervical degenerative disc disease  -MRI thoracic and lumbar spine ordered to rule out any fracture sustained after the fall.    Peptic ulcer disease  Continue Protonix    CAD  HTN  Losartan and nifedipine held as blood pressure well-controlled.  Aspirin and Plavix held as patient on heparin drip  Continue carvedilol    HLD  Continue atorvastatin 80 mg nightly    DVT PPx:  Heparin gtt  Diet:  Diet NPO Exceptions are: Sips of Water with Meds, Ice Chips  ADULT DIET; Regular; No Added Salt (3-4 gm)   Code status:  Full Code     ELOS:  Barriers to discharge:  Disposition  - Preadmission:  - Current:  - Upon discharge:  TBD      Will discuss with attending physician Dr. Ayala, MD Shama Powers MD, PGY-1  Internal Medicine Resident  Contact via [employer]

## 2024-11-19 NOTE — PLAN OF CARE
Problem: Chronic Conditions and Co-morbidities  Goal: Patient's chronic conditions and co-morbidity symptoms are monitored and maintained or improved  Outcome: Progressing  Flowsheets (Taken 11/19/2024 0110)  Care Plan - Patient's Chronic Conditions and Co-Morbidity Symptoms are Monitored and Maintained or Improved:   Monitor and assess patient's chronic conditions and comorbid symptoms for stability, deterioration, or improvement   Collaborate with multidisciplinary team to address chronic and comorbid conditions and prevent exacerbation or deterioration   Update acute care plan with appropriate goals if chronic or comorbid symptoms are exacerbated and prevent overall improvement and discharge     Problem: Safety - Adult  Goal: Free from fall injury  Outcome: Progressing  Flowsheets (Taken 11/19/2024 0110)  Free From Fall Injury: Instruct family/caregiver on patient safety     Problem: Cardiovascular - Adult  Goal: Maintains optimal cardiac output and hemodynamic stability  Outcome: Progressing  Flowsheets (Taken 11/19/2024 0110)  Maintains optimal cardiac output and hemodynamic stability:   Monitor blood pressure and heart rate   Assess for signs of decreased cardiac output

## 2024-11-19 NOTE — DISCHARGE SUMMARY
INTERNAL MEDICINE DEPARTMENT  DISCHARGE SUMMARY    Patient ID: Nicolas Groves                                             Discharge Date: 11/19/2024   Patient's PCP: Km Johns III, MD                                          Discharge Physician: Singh Sosa DO   Admit Date: 11/16/2024   Admitting Physician: Franck Dean MD    PROBLEMS DURING HOSPITALIZATION:  Present on Admission:   Elevated troponin   Multiple vessel coronary artery disease      DISCHARGE DIAGNOSES:  Multi vessel Coronary Artery Disease  Community acquired pneumonia    Hospital Course:      Patient admitted on 11/16/24 with chief complaint syncope with fall without LoC or signs of seizure.  Prior to admission patient had developed a productive cough with green tinged mucus for 2 days.  No fevers, chills, rigors.  CT PE and CT head negative for PE or intracranial abnormality. Bedside echo significant for pulmonary edema consistent with CAP.  EKG without ischemic changes.  Patient admitted for acute COPD exacerbation with PNA.  Pneumonia panel positive for H. Influenza, MSSA, strep pneumo, serratia, and human rhinovirus.  Pt treated with prednisone, CTX, and azithromycin with improvement in symptoms.      Given concern for NSTEMI in the setting of extensive vascular disease requiring grafting cardiology was consulted.  repeat ECG 11/17 consistent with NSR, ST and T wave abnormalities consistent with anterolateral ischemia.  Patient continue on heparin gtt and had LHC 11/18.  LHC significant for multi-vessel disease requiring CABG.  Decision made to transfer patient to [facility] for procedure 11/19.    Patient transferred 1045a on 11/19.  Continue holding home plavix for CABG and anticoagulation with heparin gtt.  Patient received Azithromycin and CTX for 3 days, recommend completing full seven day course of CTX for tx of pna.    On the basis of discharge, patient reported feeling stable.  The patient was found to not be in any acute

## 2024-11-19 NOTE — DISCHARGE INSTRUCTIONS
-Plavix was only stopped due to current upcoming surgery. Restart it when appropriate per cardio.    -Patient has received 3 days of ceftriaxone and has finished 3 days of azithromycin. Please continue ceftriaxone for 4 more days, or longer if you deem appropriate.

## 2024-11-20 LAB — PRELIMINARY: NORMAL

## 2024-11-20 NOTE — PROGRESS NOTES
Internal Medicine Progress Note    Patient Name: Nicolas Groves   Patient : 1950   Date: 2024   Admit Date: 2024     CC: Cough (X2 days, green tinged mucous) and Dizziness (Near-syncopal episode, tunnel vision)       Interval History   - AF. -168. On 2L O2.  - I/O 465/850 - 385  - NAEON  - lying in bed, no acute complaints. Denies SOB, chest pain, trouble breathing.  - Has an EMG appointment tomorrow and wondering if he will leave the hospital in time to make his appointment    HPI:   Mr. Nicolas Groves is a 74 y.o. male with a MHx significant for HTN, HLD, CAD, COPD, mild aortic stenosis, carotid atherosclerosis, mesenteric atherosclerosis, PVD, CVA, lumbar and cervical degenerative disc disease presents to the ED after a fall.  The patient has ongoing lower extremity cramps.  He reports today he got up and was feeling dizzy.  However he was more bothered by his bilateral cramps and he bent down to massage his calf when he hit his head with a garbage can and fell flat on the floor.  The wife said that she came a few minutes after he fell and found him staring at the ceiling and not responding.  It was an unwitnessed fall.  However there were no signs of any seizures as denied any muscle soreness, episode of incontinence or tongue bite.  As per the patient he reports remembering the entire event and denies any episodes of blackout.  Patient reports that he occasionally gets lightheaded but has never had a fall.  As per chart review patient does have history of orthostatic hypotension and has been admitted previously because of a syncopal event.  Patient also reports experiencing chest pain associated with shortness of breath that started a few weeks ago.  It is brought upon by walking or doing activity and is relieved by rest .  Patient denies any orthopnea, palpitations, headaches, visual changes or any change in urinary or bowel habits.     Patient is also complaining of 
     Internal Medicine Progress Note    Date: 11/18/2024   Patient: Nicolas [facility] Day: 2      CC: Cough (X2 days, green tinged mucous) and Dizziness (Near-syncopal episode, tunnel vision)       Interval Hx   ***      HPI: ***      Objective     Vital Signs:  Patient Vitals for the past 8 hrs:   BP Temp Temp src Pulse Resp SpO2 Weight   11/18/24 0749 (!) 172/83 97.6 °F (36.4 °C) Oral 63 18 97 % --   11/18/24 0326 (!) 153/82 97.8 °F (36.6 °C) Oral 62 19 97 % 60.5 kg (133 lb 6.1 oz)       Physical Exam       Labs:  CBC:   Recent Labs     11/16/24  1741 11/17/24  0434 11/18/24  0215   WBC 10.2 7.4 8.5   HGB 15.0 13.4* 12.3*   HCT 45.0 39.6* 36.7*    182 159       BMP:   Recent Labs     11/16/24  1218 11/17/24  0434 11/18/24  0215   * 134* 130*   K 4.6 3.9 4.3   CL 93* 93* 93*   CO2 27 31 28   BUN 11 16 14   CREATININE 1.0 1.3 0.9   GLUCOSE 94 108* 109*   PHOS  --  4.7 3.2     Magnesium:   Recent Labs     11/16/24  1741 11/17/24  0434 11/18/24  0215   MG 1.74* 2.50* 1.93     LFT's: No results for input(s): \"AST\", \"ALT\", \"BILITOT\", \"ALKPHOS\" in the last 72 hours.    Invalid input(s): \"ALB\"      U/A: No results for input(s): \"NITRITE\", \"COLORU\", \"PHUR\", \"LABCAST\", \"WBCUA\", \"RBCUA\", \"MUCUS\", \"TRICHOMONAS\", \"YEAST\", \"BACTERIA\", \"CLARITYU\", \"SPECGRAV\", \"LEUKOCYTESUR\", \"UROBILINOGEN\", \"BILIRUBINUR\", \"BLOODU\", \"GLUCOSEU\", \"KETONES\", \"AMORPHOUS\" in the last 72 hours.    Radiology:  MRI LUMBAR SPINE WO CONTRAST   Final Result      THORACIC SPINE:   Recent moderate inferior endplate compression fracture of T8. No retropulsion or spinal canal compromise.      Multilevel thoracic spondylosis and facet arthropathy causing mild multilevel foraminal stenosis as detailed.      No significant spinal canal stenosis or cord compression.      LUMBAR SPINE:   Recent mild superior endplate compression fracture of L2. No retropulsion or spinal canal compromise.      Postoperative changes of prior posterior and 
     Internal Medicine Progress Note    Date: 11/19/2024   Patient: Nicolas Groves   [facility] Day: 3      CC: Cough (X2 days, green tinged mucous) and Dizziness (Near-syncopal episode, tunnel vision)       Interval Hx   NAEON.  Afebrile and HDS  Cardiac catheterization 11/18 revealing significant multi-vessel disease requiring CABG.  No interventions performed during this procedure.  As we do not offer this service at this facility patient will require transfer.   Plan for transfer at 1045a.  Will continue heparin gtt for now.      HPI:   Mr. Nicolas Groves is a 74 y.o. male with a MHx significant for HTN, HLD, CAD, COPD, mild aortic stenosis, carotid atherosclerosis, mesenteric atherosclerosis, PVD, CVA, lumbar and cervical degenerative disc disease presents to the ED after a fall.  The patient has ongoing lower extremity cramps.  He reports today he got up and was feeling dizzy.  However he was more bothered by his bilateral cramps and he bent down to massage his calf when he hit his head with a garbage can and fell flat on the floor.  The wife said that she came a few minutes after he fell and found him staring at the ceiling and not responding.  It was an unwitnessed fall.  However there were no signs of any seizures as denied any muscle soreness, episode of incontinence or tongue bite.  As per the patient he reports remembering the entire event and denies any episodes of blackout.  Patient reports that he occasionally gets lightheaded but has never had a fall.  As per chart review patient does have history of orthostatic hypotension and has been admitted previously because of a syncopal event.  Patient also reports experiencing chest pain associated with shortness of breath that started a few weeks ago.  It is brought upon by walking or doing activity and is relieved by rest .  Patient denies any orthopnea, palpitations, headaches, visual changes or any change in urinary or bowel habits.     Patient is 
  Physician Progress Note      PATIENT:               SHERI JEFFERS  CSN #:                  [id number]  :                       1950  ADMIT DATE:       2024 11:21 AM  DISCH DATE:        2024 10:57 AM  RESPONDING  PROVIDER #:        Kashif Beltran MD          QUERY TEXT:    Dear Dr. Sosa and/or Dr. Bletran,  Patient admitted with COPD exac and found to have pneumonia and multivessel   CAD.  Pt had SOB, Chest pain and elevated troponin. Possible NSTEMI was noted   then dropped from discharge summary.  If possible, please document in the   progress notes and discharge summary if you are evaluating and/or treating any   of the following:    The medical record reflects the following:  Risk Factors: Hx CAD, CVA, CKD, COPD, HTN, HLD, PVD, SMA stenosis, renal art   atherosclerosis, Troponin=41, 23, 65, 74, 85  Clinical Indicators:  ED for prod cough, lightheadedness, leg cramps,   trop=41, 23, DIP=1299. ED notes NSTEMI  Admit for NSTEMI vs COPD exac\". H and   P-\"reports experiencing chest pain associated with shortness of breath that   started a few weeks ago.  per Cardiology  \"presented to ED...feeling   dizzy...also complaining of exertional shortness of breath and chest   discomfort over the last few weeks\" and \"ECG \" consistent with   anterolateral ischemia.  High-sensitivity troponin 23, up at 74.\" Admit for   \"NSTEMI versus acute COPD exacerbation\" and \"Borderline troponin   Treatment: Hep gtt, ASA, Statin, Cardiology consult, Left Heart Cath, transfer   to another facility for CABG  Thank you,  Kiara Nuno RN, CDS  Options provided:  -- NSTEMI  -- Unstable Angina  -- Demand Ischemia only, no MI  -- Other - I will add my own diagnosis  -- Disagree - Not applicable / Not valid  -- Disagree - Clinically unable to determine / Unknown  -- Refer to Clinical Documentation Reviewer    PROVIDER RESPONSE TEXT:    This patient has demand ischemia only, no MI.    Query created by:  
4 Eyes Admission Assessment     I agree as the admission nurse that 2 RN's have performed a thorough Head to Toe Skin Assessment on the patient. ALL assessment sites listed below have been assessed on admission.       Areas assessed by both nurses: Giacomo & Honey  [x]   Head, Face, and Ears   [x]   Shoulders, Back, and Chest  [x]   Arms, Elbows, and Hands   [x]   Coccyx, Sacrum, and Ischium  [x]   Legs, Feet, and Heels  Scar on abdomen     Does the Patient have Skin Breakdown?  No         Clovis Prevention initiated:  Yes   Wound Care Orders initiated:  No      [facility] nurse consulted for Pressure Injury (Stage 3,4, Unstageable, DTI, NWPT, and Complex wounds) or Clovis score 18 or lower:  No      Nurse 1 eSignature: Electronically signed by Giacomo Rubin RN on 11/16/24 at 6:38 PM EST    **SHARE this note so that the co-signing nurse is able to place an eSignature**    Nurse 2 eSignature: Electronically signed by Honey Riley RN on 11/16/24 at 6:33 PM EST    
Clinical Pharmacy Progress Note  Medication History     Admit Date: 11/16/2024    RN updated home med list on admit - prescription medications appear complete based on Rx dispense history.     List of current medications patient is taking is complete. Home Medication list in EPIC updated to reflect changes noted below.    Source of information: Rx dispense history; interview with patient    Patient's home pharmacy: [facility]     Changes made to medication list:   Medications removed: (include reason, ex: therapy completed, patient no longer taking, etc.)  Nifedipine - marked as not taking by RN on admit; based on PCP documentation, this has been stopped  Memantine - does not take (was taking for headaches)  MVI - per RN documentation, does not take  Medications added:   Divalproex DR 500mg po nightly  Other notes:   Losartan - unclear frequency; per PCP notes, difficult to tell dosing. Last filled for 25mg tabs; Qty #90 on 6/13/24    Current Outpatient Medications   Medication Instructions    acetaminophen (TYLENOL) 650 mg, Oral, EVERY 6 HOURS PRN    aspirin 81 mg, Oral, DAILY    atorvastatin (LIPITOR) 80 MG tablet TAKE 1 TABLET BY MOUTH EVERY DAY    b complex vitamins capsule 1 capsule, Oral, DAILY    carvedilol (COREG) 12.5 mg, Oral, 2 TIMES DAILY    Cholecalciferol (VITAMIN D) 2000 units CAPS capsule 1 capsule, Oral, DAILY    clopidogrel (PLAVIX) 75 MG tablet TAKE 1 TABLET BY MOUTH EVERY DAY    divalproex (DEPAKOTE ER) 500 mg, Oral, Nightly    ferrous sulfate (FEROSUL) 325 mg, Oral, DAILY WITH BREAKFAST    losartan (COZAAR) 12.5 mg, Oral, 2 times daily    pantoprazole (PROTONIX) 40 MG tablet TAKE 1 TABLET BY MOUTH EVERY DAY BEFORE BREAKFAST    Probiotic Product (ALIGN PO) 1 tablet, Oral, DAILY    vitamin B-12 (CYANOCOBALAMIN) 1,000 mcg, Oral, DAILY       Please call with questions--  Kiara Suazo PharmD, [facility]  Wireless: [phone]   11/18/2024 8:47 AM      
Orthostatic Vitals completed:   Lying: /76, HR 65  Sitting: /72, HR 70  Standing: /74, HR 67  Pt asymptomatic    Dr. Siegel notified. Communication received to hold scheduled Carvedilol. Electronically signed by Giacomo Rubin RN on 11/17/2024 at 10:27 AM        
Pt picked up by [facility] to be transported to [facility]. Report called to receiving RN and EMS. Telemetry removed and pt being transported on heparin gtt.  
Pt's Mg 1.74, troponin increased from 23 to 65, aptt >180, ferritin 410. Dr. Tilley notified. Electronically signed by Giacomo Rubin RN on 11/16/2024 at 6:38 PM    
Reached out to Dr Hill regarding heparin gtt order for this patient and small hematoma noted at R radial site. Manual pressure held to site and site marked. Dr Hill came to bedside to assess and says to reach out to cardiology. Trisha Payton states restart heparin gtt and monitor hematoma. No change to hematoma site at this time.  
thoracic spondylosis and facet arthropathy causing mild multilevel foraminal stenosis as detailed.      No significant spinal canal stenosis or cord compression.      LUMBAR SPINE:   Recent mild superior endplate compression fracture of L2. No retropulsion or spinal canal compromise.      Postoperative changes of prior posterior and interbody fusion at L4-L5. No postoperative compressive findings at this level.      Multilevel lumbar spondylosis and facet arthropathy.      Moderate spinal canal stenosis at L1-L2 and L3-L4.      Moderate to severe right and moderate left L5 foraminal stenosis. Other areas of lesser foraminal stenosis as detailed.          Electronically signed by Giacomo Diaz MD      CT HEAD WO CONTRAST   Final Result      No acute intracranial hemorrhage or mass effect.         Mild chronic small vessel ischemic change with scattered remote infarcts.      Paranasal sinus inflammatory disease with a component of chronic sinusitis and prior sinus surgery.      Electronically signed by Giacomo Diaz MD      CT CHEST PULMONARY EMBOLISM W CONTRAST   Final Result      No evidence of pulmonary embolism with limited assessment beyond the proximal segmental level secondary to respiratory motion.      No acute intrathoracic abnormality.      Atelectasis involving the medial segment right middle lobe.      Age indeterminate compression fractures of the T8 and L2 vertebral bodies, new from 1/15/2024, potentially chronic. If there is concern for recent fracture, MRI would be better for assessment.      Electronically signed by Giacomo Diaz MD      XR CHEST (2 VW)   Final Result      1. No findings for acute cardiopulmonary disease.         Electronically signed by Miah Alanis            Assessment & Plan   74 y.o. male  MHx significant for HTN, HLD, CAD, COPD, mild aortic stenosis, carotid atherosclerosis, mesenteric atherosclerosis, PVD, CVA, lumbar and cervical degenerative disc disease

## 2024-11-21 LAB
M PNEUMO DNA SPEC QL NAA+PROBE: NOT DETECTED
SPECIMEN SOURCE: NORMAL

## 2024-11-26 LAB
FINAL REPORT: NORMAL
PRELIMINARY: NORMAL

## 2024-12-16 PROBLEM — R79.89 ELEVATED TROPONIN: Status: RESOLVED | Noted: 2024-11-16 | Resolved: 2024-12-16

## 2024-12-23 ENCOUNTER — TRANSCRIBE ORDERS (OUTPATIENT)
Dept: ADMINISTRATIVE | Age: 74
End: 2024-12-23

## 2024-12-23 DIAGNOSIS — R91.1 SOLITARY PULMONARY NODULE: Primary | ICD-10-CM

## 2024-12-31 ENCOUNTER — HOSPITAL ENCOUNTER (OUTPATIENT)
Dept: CT IMAGING | Age: 74
Discharge: HOME | End: 2024-12-31
Payer: MEDICARE

## 2024-12-31 DIAGNOSIS — R91.1 SOLITARY PULMONARY NODULE: ICD-10-CM

## 2024-12-31 PROCEDURE — 71250 CT THORAX DX C-: CPT

## 2025-02-14 ENCOUNTER — HOSPITAL ENCOUNTER (OUTPATIENT)
Dept: CARDIAC REHAB | Age: 75
Setting detail: THERAPIES SERIES
Discharge: HOME | End: 2025-02-14
Payer: MEDICARE

## 2025-02-14 VITALS
HEIGHT: 66 IN | OXYGEN SATURATION: 94 % | BODY MASS INDEX: 21.79 KG/M2 | HEART RATE: 65 BPM | SYSTOLIC BLOOD PRESSURE: 122 MMHG | WEIGHT: 135.6 LBS | RESPIRATION RATE: 16 BRPM | DIASTOLIC BLOOD PRESSURE: 68 MMHG

## 2025-02-14 PROCEDURE — 93798 PHYS/QHP OP CAR RHAB W/ECG: CPT

## 2025-02-14 RX ORDER — PANTOPRAZOLE SODIUM 40 MG/1
80 TABLET, DELAYED RELEASE ORAL
Qty: 180 TABLET | Refills: 1 | OUTPATIENT
Start: 2025-02-14

## 2025-02-14 ASSESSMENT — PATIENT HEALTH QUESTIONNAIRE - PHQ9
SUM OF ALL RESPONSES TO PHQ QUESTIONS 1-9: 3
7. TROUBLE CONCENTRATING ON THINGS, SUCH AS READING THE NEWSPAPER OR WATCHING TELEVISION: NOT AT ALL
10. IF YOU CHECKED OFF ANY PROBLEMS, HOW DIFFICULT HAVE THESE PROBLEMS MADE IT FOR YOU TO DO YOUR WORK, TAKE CARE OF THINGS AT HOME, OR GET ALONG WITH OTHER PEOPLE: NOT DIFFICULT AT ALL
3. TROUBLE FALLING OR STAYING ASLEEP: SEVERAL DAYS
5. POOR APPETITE OR OVEREATING: NOT AT ALL
SUM OF ALL RESPONSES TO PHQ QUESTIONS 1-9: 3
9. THOUGHTS THAT YOU WOULD BE BETTER OFF DEAD, OR OF HURTING YOURSELF: NOT AT ALL
2. FEELING DOWN, DEPRESSED OR HOPELESS: SEVERAL DAYS
SUM OF ALL RESPONSES TO PHQ9 QUESTIONS 1 & 2: 1
4. FEELING TIRED OR HAVING LITTLE ENERGY: SEVERAL DAYS
SUM OF ALL RESPONSES TO PHQ QUESTIONS 1-9: 3
8. MOVING OR SPEAKING SO SLOWLY THAT OTHER PEOPLE COULD HAVE NOTICED. OR THE OPPOSITE, BEING SO FIGETY OR RESTLESS THAT YOU HAVE BEEN MOVING AROUND A LOT MORE THAN USUAL: NOT AT ALL
SUM OF ALL RESPONSES TO PHQ QUESTIONS 1-9: 3
6. FEELING BAD ABOUT YOURSELF - OR THAT YOU ARE A FAILURE OR HAVE LET YOURSELF OR YOUR FAMILY DOWN: NOT AT ALL
1. LITTLE INTEREST OR PLEASURE IN DOING THINGS: NOT AT ALL

## 2025-02-14 NOTE — CARDIO/PULMONARY
[facility] Cardiac Rehabilitation Initial Evaluation    Nicolas Groves       1950     [id number]    Share medical information:  Yes Annamaria Groves (Spouse) [phone]    Cardiac History    Robotic CABGx1    Physical Assessment     General Appearance   Height:  167.6 cm (5' 6\")  Weight:  61.5 kg (135 lb 9.6 oz)   BMI:  21.9        Cardiovascular Assessment  BP Sittin/68  Sittin/62 (Right arm)  Exercise: 130/62  Heart rate:   65 Monitor           Respiratory Assessment  Resp rate: 16                  SpO2:  94 %  Sleep Apnea:  No  CPAP  No  Oxygen  No       Orthopedic/Exercise Limitations:  No    Pain:   Do you have pain?:  yes - Chronic back pain, at times reaches a 7/10 during exertion.       Fall Risk Assessment     History of falling with or without injury: No  Use of ambulatory aid: No  Difficulty walking/impaired gait: No  Numbness in feet: No  Vision changes: No  Dizziness: No  Shortness of breath: No  Current medications include but not limited to: Anticoagulant, ACE, ARB, Beta  Blocker  Other fall risk : No  Outpatient fall risk intervention strategies: None of these strategies apply    Pt here for first session of Cardiac Rehab.Reviewed and discussed insurance benefits, pt v/u.  Reviewed Cardiac Rehab Routine and RPE scale, pt v/u.  Developed individual treatment plan and goals set with patient; pt in agreement with plan and no further questions at this time.  Performed six minute walk test.  Next visit scheduled for 2025 at 2:30pm.    Denise Doherty RN  2025

## 2025-02-21 ENCOUNTER — HOSPITAL ENCOUNTER (OUTPATIENT)
Dept: CARDIAC REHAB | Age: 75
Setting detail: THERAPIES SERIES
Discharge: HOME | End: 2025-02-21
Payer: MEDICARE

## 2025-02-21 PROCEDURE — 93798 PHYS/QHP OP CAR RHAB W/ECG: CPT

## 2025-02-24 ENCOUNTER — HOSPITAL ENCOUNTER (OUTPATIENT)
Dept: CARDIAC REHAB | Age: 75
Setting detail: THERAPIES SERIES
Discharge: HOME | End: 2025-02-24
Payer: MEDICARE

## 2025-02-24 PROCEDURE — 93798 PHYS/QHP OP CAR RHAB W/ECG: CPT

## 2025-02-26 ENCOUNTER — HOSPITAL ENCOUNTER (OUTPATIENT)
Dept: CARDIAC REHAB | Age: 75
Setting detail: THERAPIES SERIES
Discharge: HOME | End: 2025-02-26
Payer: MEDICARE

## 2025-02-26 PROCEDURE — 93798 PHYS/QHP OP CAR RHAB W/ECG: CPT

## 2025-02-28 ENCOUNTER — APPOINTMENT (OUTPATIENT)
Dept: CARDIAC REHAB | Age: 75
End: 2025-02-28
Payer: MEDICARE

## 2025-03-03 ENCOUNTER — HOSPITAL ENCOUNTER (OUTPATIENT)
Dept: CARDIAC REHAB | Age: 75
Setting detail: THERAPIES SERIES
Discharge: HOME | End: 2025-03-03
Payer: MEDICARE

## 2025-03-03 PROCEDURE — 93798 PHYS/QHP OP CAR RHAB W/ECG: CPT

## 2025-03-05 ENCOUNTER — HOSPITAL ENCOUNTER (OUTPATIENT)
Dept: CARDIAC REHAB | Age: 75
Setting detail: THERAPIES SERIES
Discharge: HOME | End: 2025-03-05
Payer: MEDICARE

## 2025-03-05 PROCEDURE — 93798 PHYS/QHP OP CAR RHAB W/ECG: CPT

## 2025-03-07 ENCOUNTER — HOSPITAL ENCOUNTER (OUTPATIENT)
Dept: CARDIAC REHAB | Age: 75
Setting detail: THERAPIES SERIES
Discharge: HOME | End: 2025-03-07
Payer: MEDICARE

## 2025-03-07 PROCEDURE — 93798 PHYS/QHP OP CAR RHAB W/ECG: CPT

## 2025-03-10 ENCOUNTER — HOSPITAL ENCOUNTER (OUTPATIENT)
Dept: CARDIAC REHAB | Age: 75
Setting detail: THERAPIES SERIES
Discharge: HOME | End: 2025-03-10
Payer: MEDICARE

## 2025-03-10 PROCEDURE — 93798 PHYS/QHP OP CAR RHAB W/ECG: CPT

## 2025-03-12 ENCOUNTER — HOSPITAL ENCOUNTER (OUTPATIENT)
Dept: CARDIAC REHAB | Age: 75
Setting detail: THERAPIES SERIES
Discharge: HOME | End: 2025-03-12
Payer: MEDICARE

## 2025-03-12 PROCEDURE — 93798 PHYS/QHP OP CAR RHAB W/ECG: CPT

## 2025-03-14 ENCOUNTER — HOSPITAL ENCOUNTER (OUTPATIENT)
Dept: CARDIAC REHAB | Age: 75
Setting detail: THERAPIES SERIES
Discharge: HOME | End: 2025-03-14
Payer: MEDICARE

## 2025-03-14 PROCEDURE — 93798 PHYS/QHP OP CAR RHAB W/ECG: CPT

## 2025-03-17 ENCOUNTER — HOSPITAL ENCOUNTER (OUTPATIENT)
Dept: CARDIAC REHAB | Age: 75
Setting detail: THERAPIES SERIES
Discharge: HOME | End: 2025-03-17
Payer: MEDICARE

## 2025-03-17 PROCEDURE — 93798 PHYS/QHP OP CAR RHAB W/ECG: CPT

## 2025-03-19 ENCOUNTER — HOSPITAL ENCOUNTER (OUTPATIENT)
Dept: CARDIAC REHAB | Age: 75
Setting detail: THERAPIES SERIES
Discharge: HOME | End: 2025-03-19
Payer: MEDICARE

## 2025-03-19 PROCEDURE — 93798 PHYS/QHP OP CAR RHAB W/ECG: CPT

## 2025-03-21 ENCOUNTER — HOSPITAL ENCOUNTER (OUTPATIENT)
Dept: CARDIAC REHAB | Age: 75
Setting detail: THERAPIES SERIES
Discharge: HOME | End: 2025-03-21
Payer: MEDICARE

## 2025-03-21 ENCOUNTER — OFFICE VISIT (OUTPATIENT)
Dept: VASCULAR SURGERY | Age: 75
End: 2025-03-21
Payer: MEDICARE

## 2025-03-21 VITALS
BODY MASS INDEX: 22.5 KG/M2 | DIASTOLIC BLOOD PRESSURE: 80 MMHG | SYSTOLIC BLOOD PRESSURE: 170 MMHG | HEIGHT: 66 IN | WEIGHT: 140 LBS

## 2025-03-21 DIAGNOSIS — I70.213 ATHEROSCLEROSIS OF NATIVE ARTERY OF BOTH LOWER EXTREMITIES WITH INTERMITTENT CLAUDICATION: ICD-10-CM

## 2025-03-21 DIAGNOSIS — Z87.891 FORMER SMOKER: ICD-10-CM

## 2025-03-21 DIAGNOSIS — I73.9 PAD (PERIPHERAL ARTERY DISEASE): ICD-10-CM

## 2025-03-21 DIAGNOSIS — E78.2 MIXED HYPERLIPIDEMIA: ICD-10-CM

## 2025-03-21 DIAGNOSIS — I70.1 RENAL ARTERY ATHEROSCLEROSIS: ICD-10-CM

## 2025-03-21 DIAGNOSIS — I70.0 ATHEROSCLEROSIS OF ABDOMINAL AORTA: ICD-10-CM

## 2025-03-21 DIAGNOSIS — K55.1 ATHEROSCLEROSIS OF SUPERIOR MESENTERIC ARTERY: Primary | ICD-10-CM

## 2025-03-21 DIAGNOSIS — I79.8 OTHER DISORDERS OF ARTERIES, ARTERIOLES AND CAPILLARIES IN DISEASES CLASSIFIED ELSEWHERE: ICD-10-CM

## 2025-03-21 DIAGNOSIS — I65.23 CAROTID STENOSIS, ASYMPTOMATIC, BILATERAL: ICD-10-CM

## 2025-03-21 DIAGNOSIS — I65.23 BILATERAL CAROTID ARTERY STENOSIS: ICD-10-CM

## 2025-03-21 DIAGNOSIS — K55.1 MESENTERIC ARTERY STENOSIS: ICD-10-CM

## 2025-03-21 DIAGNOSIS — I73.9 PERIPHERAL ARTERIAL DISEASE: ICD-10-CM

## 2025-03-21 DIAGNOSIS — Z95.828 S/P VASCULAR BYPASS: ICD-10-CM

## 2025-03-21 PROCEDURE — 1159F MED LIST DOCD IN RCRD: CPT | Performed by: SURGERY

## 2025-03-21 PROCEDURE — 3077F SYST BP >= 140 MM HG: CPT | Performed by: SURGERY

## 2025-03-21 PROCEDURE — 93798 PHYS/QHP OP CAR RHAB W/ECG: CPT

## 2025-03-21 PROCEDURE — 99214 OFFICE O/P EST MOD 30 MIN: CPT | Performed by: SURGERY

## 2025-03-21 PROCEDURE — 1036F TOBACCO NON-USER: CPT | Performed by: SURGERY

## 2025-03-21 PROCEDURE — 3017F COLORECTAL CA SCREEN DOC REV: CPT | Performed by: SURGERY

## 2025-03-21 PROCEDURE — G8420 CALC BMI NORM PARAMETERS: HCPCS | Performed by: SURGERY

## 2025-03-21 PROCEDURE — 3079F DIAST BP 80-89 MM HG: CPT | Performed by: SURGERY

## 2025-03-21 PROCEDURE — G8427 DOCREV CUR MEDS BY ELIG CLIN: HCPCS | Performed by: SURGERY

## 2025-03-21 PROCEDURE — 1123F ACP DISCUSS/DSCN MKR DOCD: CPT | Performed by: SURGERY

## 2025-03-21 ASSESSMENT — ENCOUNTER SYMPTOMS
COLOR CHANGE: 0
ABDOMINAL PAIN: 0
BACK PAIN: 1

## 2025-03-21 NOTE — PROGRESS NOTES
Skin:     General: Skin is warm and dry.   Neurological:      Mental Status: He is alert and oriented to person, place, and time.   Psychiatric:         Judgment: Judgment normal.         ASSESSMENT:    Problem List Items Addressed This Visit          Circulatory    Atherosclerosis of abdominal aorta    Atherosclerosis of superior mesenteric artery - Primary    Relevant Orders    Vascular duplex aorta/IVC/iliac/bypass graft complete    Carotid stenosis, asymptomatic, bilateral    Mesenteric artery stenosis    Relevant Orders    Vascular duplex aorta/IVC/iliac/bypass graft complete    Peripheral arterial disease    Relevant Orders    Vascular duplex lower extremity arteries bilateral    Renal artery atherosclerosis       Other    Former smoker    Quit about 7 OR 8 years ago          Hyperlipidemia    S/P vascular bypass     Other Visit Diagnoses         Other disorders of arteries, arterioles and capillaries in diseases classified elsewhere        Relevant Orders    Vascular duplex aorta/IVC/iliac/bypass graft complete      Atherosclerosis of native artery of both lower extremities with intermittent claudication          PAD (peripheral artery disease)          Bilateral carotid artery stenosis        Relevant Orders    Vascular duplex carotid bilateral        As far as his peripheral arterial duplex on the right less than 50% stenosis of the distal popliteal greater than 50% stenosis of the distal tibioperoneal trunk ALISTAIR is noncompressible  Left side widely patent left Steele to below-knee popliteal bypass popliteal artery is a bit dilated at 1.14 ALISTAIR noncompressible no change from previous study  As far as his mesenteric scan celiac artery is not visualized the aorta to superior mesenteric artery bypass is open there is a hemodynamically significant stenosis of the proximal inferior mesenteric artery no change since her previous study approximately 6 months ago    Next his carotid scan right 40% common 50-79

## 2025-03-21 NOTE — PATIENT INSTRUCTIONS
Schedule to return in six months for aorta iliac scan, bilateral CDS, and bilateral arterial duplex scan and office visit      The patient will need to fast for at least 5 hours prior to the aorta iliac ultrasound scan. Please understand that by not doing so may result in having an inaccurate ultrasound and may cause your ultrasound to be rescheduled.

## 2025-03-24 ENCOUNTER — HOSPITAL ENCOUNTER (OUTPATIENT)
Dept: CARDIAC REHAB | Age: 75
Setting detail: THERAPIES SERIES
Discharge: HOME | End: 2025-03-24
Payer: MEDICARE

## 2025-03-24 PROCEDURE — 93798 PHYS/QHP OP CAR RHAB W/ECG: CPT

## 2025-03-26 ENCOUNTER — HOSPITAL ENCOUNTER (OUTPATIENT)
Dept: CARDIAC REHAB | Age: 75
Setting detail: THERAPIES SERIES
Discharge: HOME | End: 2025-03-26
Payer: MEDICARE

## 2025-03-26 PROCEDURE — 93798 PHYS/QHP OP CAR RHAB W/ECG: CPT

## 2025-03-28 ENCOUNTER — HOSPITAL ENCOUNTER (OUTPATIENT)
Dept: CARDIAC REHAB | Age: 75
Setting detail: THERAPIES SERIES
Discharge: HOME | End: 2025-03-28
Payer: MEDICARE

## 2025-03-28 PROCEDURE — 93798 PHYS/QHP OP CAR RHAB W/ECG: CPT

## 2025-03-31 ENCOUNTER — HOSPITAL ENCOUNTER (OUTPATIENT)
Dept: CARDIAC REHAB | Age: 75
Setting detail: THERAPIES SERIES
Discharge: HOME | End: 2025-03-31
Payer: MEDICARE

## 2025-03-31 PROCEDURE — 93798 PHYS/QHP OP CAR RHAB W/ECG: CPT

## 2025-03-31 RX ORDER — ATORVASTATIN CALCIUM 80 MG/1
80 TABLET, FILM COATED ORAL NIGHTLY
Qty: 90 TABLET | Refills: 1 | OUTPATIENT
Start: 2025-03-31

## 2025-04-02 ENCOUNTER — HOSPITAL ENCOUNTER (OUTPATIENT)
Dept: CARDIAC REHAB | Age: 75
Setting detail: THERAPIES SERIES
Discharge: HOME | End: 2025-04-02
Payer: MEDICARE

## 2025-04-02 PROCEDURE — 93798 PHYS/QHP OP CAR RHAB W/ECG: CPT

## 2025-04-04 ENCOUNTER — HOSPITAL ENCOUNTER (OUTPATIENT)
Dept: CARDIAC REHAB | Age: 75
Setting detail: THERAPIES SERIES
Discharge: HOME | End: 2025-04-04
Payer: MEDICARE

## 2025-04-04 PROCEDURE — 93798 PHYS/QHP OP CAR RHAB W/ECG: CPT

## 2025-04-07 ENCOUNTER — HOSPITAL ENCOUNTER (OUTPATIENT)
Dept: CARDIAC REHAB | Age: 75
Setting detail: THERAPIES SERIES
Discharge: HOME | End: 2025-04-07
Payer: MEDICARE

## 2025-04-07 PROCEDURE — 93798 PHYS/QHP OP CAR RHAB W/ECG: CPT

## 2025-04-09 ENCOUNTER — HOSPITAL ENCOUNTER (OUTPATIENT)
Dept: CARDIAC REHAB | Age: 75
Setting detail: THERAPIES SERIES
Discharge: HOME | End: 2025-04-09
Payer: MEDICARE

## 2025-04-09 PROCEDURE — 93798 PHYS/QHP OP CAR RHAB W/ECG: CPT

## 2025-04-11 ENCOUNTER — HOSPITAL ENCOUNTER (OUTPATIENT)
Dept: CARDIAC REHAB | Age: 75
Setting detail: THERAPIES SERIES
Discharge: HOME | End: 2025-04-11
Payer: MEDICARE

## 2025-04-11 PROCEDURE — 93798 PHYS/QHP OP CAR RHAB W/ECG: CPT

## 2025-04-14 ENCOUNTER — HOSPITAL ENCOUNTER (OUTPATIENT)
Dept: CARDIAC REHAB | Age: 75
Setting detail: THERAPIES SERIES
Discharge: HOME | End: 2025-04-14
Payer: MEDICARE

## 2025-04-14 PROCEDURE — 93798 PHYS/QHP OP CAR RHAB W/ECG: CPT

## 2025-04-16 ENCOUNTER — HOSPITAL ENCOUNTER (OUTPATIENT)
Dept: CARDIAC REHAB | Age: 75
Setting detail: THERAPIES SERIES
Discharge: HOME | End: 2025-04-16
Payer: MEDICARE

## 2025-04-16 PROCEDURE — 93798 PHYS/QHP OP CAR RHAB W/ECG: CPT

## 2025-04-18 ENCOUNTER — HOSPITAL ENCOUNTER (OUTPATIENT)
Dept: CARDIAC REHAB | Age: 75
Setting detail: THERAPIES SERIES
Discharge: HOME | End: 2025-04-18
Payer: MEDICARE

## 2025-04-18 PROCEDURE — 93798 PHYS/QHP OP CAR RHAB W/ECG: CPT

## 2025-04-21 ENCOUNTER — HOSPITAL ENCOUNTER (OUTPATIENT)
Dept: CARDIAC REHAB | Age: 75
Setting detail: THERAPIES SERIES
Discharge: HOME | End: 2025-04-21
Payer: MEDICARE

## 2025-04-21 PROCEDURE — 93798 PHYS/QHP OP CAR RHAB W/ECG: CPT

## 2025-04-23 ENCOUNTER — HOSPITAL ENCOUNTER (OUTPATIENT)
Dept: CARDIAC REHAB | Age: 75
Setting detail: THERAPIES SERIES
Discharge: HOME | End: 2025-04-23
Payer: MEDICARE

## 2025-04-23 PROCEDURE — 93798 PHYS/QHP OP CAR RHAB W/ECG: CPT

## 2025-04-25 ENCOUNTER — HOSPITAL ENCOUNTER (OUTPATIENT)
Dept: CARDIAC REHAB | Age: 75
Setting detail: THERAPIES SERIES
Discharge: HOME | End: 2025-04-25
Payer: MEDICARE

## 2025-04-25 PROCEDURE — 93798 PHYS/QHP OP CAR RHAB W/ECG: CPT

## 2025-04-28 ENCOUNTER — APPOINTMENT (OUTPATIENT)
Dept: CARDIAC REHAB | Age: 75
End: 2025-04-28
Payer: MEDICARE

## 2025-04-30 ENCOUNTER — HOSPITAL ENCOUNTER (OUTPATIENT)
Dept: CARDIAC REHAB | Age: 75
Setting detail: THERAPIES SERIES
Discharge: HOME | End: 2025-04-30
Payer: MEDICARE

## 2025-04-30 PROCEDURE — 93798 PHYS/QHP OP CAR RHAB W/ECG: CPT

## 2025-05-02 ENCOUNTER — HOSPITAL ENCOUNTER (OUTPATIENT)
Dept: CARDIAC REHAB | Age: 75
Setting detail: THERAPIES SERIES
Discharge: HOME | End: 2025-05-02
Payer: MEDICARE

## 2025-05-02 PROCEDURE — 93798 PHYS/QHP OP CAR RHAB W/ECG: CPT

## 2025-05-05 ENCOUNTER — HOSPITAL ENCOUNTER (OUTPATIENT)
Dept: CARDIAC REHAB | Age: 75
Setting detail: THERAPIES SERIES
Discharge: HOME | End: 2025-05-05
Payer: MEDICARE

## 2025-05-05 PROCEDURE — 93798 PHYS/QHP OP CAR RHAB W/ECG: CPT

## 2025-05-07 ENCOUNTER — HOSPITAL ENCOUNTER (OUTPATIENT)
Dept: CARDIAC REHAB | Age: 75
Setting detail: THERAPIES SERIES
Discharge: HOME | End: 2025-05-07
Payer: MEDICARE

## 2025-05-07 PROCEDURE — 93798 PHYS/QHP OP CAR RHAB W/ECG: CPT

## 2025-05-09 ENCOUNTER — HOSPITAL ENCOUNTER (OUTPATIENT)
Dept: CARDIAC REHAB | Age: 75
Setting detail: THERAPIES SERIES
Discharge: HOME | End: 2025-05-09
Payer: MEDICARE

## 2025-05-09 PROCEDURE — 93798 PHYS/QHP OP CAR RHAB W/ECG: CPT

## 2025-05-12 ENCOUNTER — HOSPITAL ENCOUNTER (OUTPATIENT)
Dept: CARDIAC REHAB | Age: 75
Setting detail: THERAPIES SERIES
Discharge: HOME | End: 2025-05-12
Payer: MEDICARE

## 2025-05-12 PROCEDURE — 93798 PHYS/QHP OP CAR RHAB W/ECG: CPT

## 2025-05-14 ENCOUNTER — HOSPITAL ENCOUNTER (OUTPATIENT)
Dept: CARDIAC REHAB | Age: 75
Setting detail: THERAPIES SERIES
Discharge: HOME | End: 2025-05-14
Payer: MEDICARE

## 2025-05-14 PROCEDURE — 93798 PHYS/QHP OP CAR RHAB W/ECG: CPT

## 2025-05-16 ENCOUNTER — HOSPITAL ENCOUNTER (OUTPATIENT)
Dept: CARDIAC REHAB | Age: 75
Setting detail: THERAPIES SERIES
Discharge: HOME | End: 2025-05-16
Payer: MEDICARE

## 2025-05-16 PROCEDURE — 93798 PHYS/QHP OP CAR RHAB W/ECG: CPT

## 2025-06-19 RX ORDER — ATORVASTATIN CALCIUM 80 MG/1
80 TABLET, FILM COATED ORAL NIGHTLY
Qty: 90 TABLET | Refills: 1 | OUTPATIENT
Start: 2025-06-19

## (undated) DEVICE — SURE SET-DOUBLE BASIN-LF: Brand: MEDLINE INDUSTRIES, INC.

## (undated) DEVICE — GLOVE ORANGE PI 7 1/2   MSG9075

## (undated) DEVICE — SOLUTION IV 1000ML 0.9% SOD CHL

## (undated) DEVICE — BLADE 1884006HR RAD40 5PK M4 4MM ROTATE: Brand: RAD

## (undated) DEVICE — GUIDEWIRE VASC L260CM DIA0.035IN RAD 3MM J TIP L7CM PTFE

## (undated) DEVICE — CATHETER DIAG 5FR L100CM LUMN ID0.047IN JR4 CRV 0 SIDE H

## (undated) DEVICE — STANDARD HYPODERMIC NEEDLE,POLYPROPYLENE HUB: Brand: MONOJECT

## (undated) DEVICE — SUTURE PROL SZ 3-0 L36IN NONABSORBABLE BLU L26MM SH 1/2 CIR 8522H

## (undated) DEVICE — SYRINGE MED 10ML TRNSLUC BRL PLUNG BLK MRK POLYPR CTRL

## (undated) DEVICE — HOLDER SCALP PLAS G STD

## (undated) DEVICE — PAD, DEFIB, ADULT, RADIOTRANS, PHYSIO: Brand: MEDLINE

## (undated) DEVICE — COUNTER NDL 40 COUNT HLD 70 NUM FOAM BLK SGL MAG W BLDE REMV

## (undated) DEVICE — SUTURE CHROMIC GUT SZ 5-0 L18IN ABSRB BRN P-3 L13MM 3/8 CIR 687G

## (undated) DEVICE — SPLINT 1524055 DOYLE II AIRWAY SET: Brand: DOYLE II ™

## (undated) DEVICE — SPONGE,NEURO,0.5"X3",XR,STRL,LF,10/PK: Brand: MEDLINE

## (undated) DEVICE — CATHETER DIAG 5FR L100CM LUMN ID0.047IN JL3.5 CRV 0 SIDE H

## (undated) DEVICE — INTENDED FOR TISSUE SEPARATION, AND OTHER PROCEDURES THAT REQUIRE A SHARP SURGICAL BLADE TO PUNCTURE OR CUT.: Brand: BARD-PARKER ® CARBON RIB-BACK BLADES

## (undated) DEVICE — DRAPE,INSTRUMENT,MAGNETIC,10X16: Brand: MEDLINE

## (undated) DEVICE — TUBING, SUCTION, 1/4" X 12', STRAIGHT: Brand: MEDLINE

## (undated) DEVICE — TOWEL,OR,DSP,ST,BLUE,DLX,8/PK,10PK/CS: Brand: MEDLINE

## (undated) DEVICE — TOWEL,STOP FLAG GOLD N-W: Brand: MEDLINE

## (undated) DEVICE — COVIDIEN WHOLEY 145CM GUIDE WIRE

## (undated) DEVICE — DRAPE IRRIG FLD WRM W44XL44IN W/ AORN STD PRTBL INTRATEMP

## (undated) DEVICE — SHEATH 1912000 5PK 4MM/0DEG STORZ XOMED: Brand: ENDO-SCRUB®

## (undated) DEVICE — GAUZE,SPONGE,4"X4",16PLY,XRAY,STRL,LF: Brand: MEDLINE

## (undated) DEVICE — GARMENT,MEDLINE,DVT,INT,CALF,MED, GEN2: Brand: MEDLINE

## (undated) DEVICE — CATH LAB PACK: Brand: MEDLINE INDUSTRIES, INC.

## (undated) DEVICE — FORCEPS BX L240CM DIA2.4MM L NDL RAD JAW 4 133340

## (undated) DEVICE — GLIDESHEATH SLENDER STAINLESS STEEL KIT: Brand: GLIDESHEATH SLENDER

## (undated) DEVICE — SYRINGE IRRIG 60ML SFT PLIABLE BLB EZ TO GRP 1 HND USE W/

## (undated) DEVICE — PACK,EENT,TURBAN DRAPE,PK II: Brand: MEDLINE

## (undated) DEVICE — JEWISH HOSPITAL TURNOVER KIT: Brand: MEDLINE INDUSTRIES, INC.

## (undated) DEVICE — SUTURE ABSORBABLE MONOFILAMENT 4-0 SC-1 18 IN PLN GUT 1824H